# Patient Record
Sex: MALE | Race: OTHER | NOT HISPANIC OR LATINO | URBAN - METROPOLITAN AREA
[De-identification: names, ages, dates, MRNs, and addresses within clinical notes are randomized per-mention and may not be internally consistent; named-entity substitution may affect disease eponyms.]

---

## 2016-12-30 NOTE — ASU PATIENT PROFILE, ADULT - ABILITY TO HEAR (WITH HEARING AID OR HEARING APPLIANCE IF NORMALLY USED):
Mildly to Moderately Impaired: difficulty hearing in some environments or speaker may need to increase volume or speak distinctly/[place]

## 2016-12-30 NOTE — ASU PATIENT PROFILE, ADULT - PMH
Diabetes Mellitus Type II    History of Tongue Cancer  surgery and radiation  Hyperlipemia    Hypertension

## 2016-12-30 NOTE — ASU PATIENT PROFILE, ADULT - MEDICATIONS TO HOLD
Diuretics,diabetic pills and vitamins if applicable Diuretics, diabetic pills and vitamins if applicable

## 2016-12-30 NOTE — ASU PATIENT PROFILE, ADULT - PSH
Status Post Colostomy  reanastamosis S/P cataract extraction  Left eye.  Status Post Colostomy  reanastamosis

## 2017-01-03 ENCOUNTER — OUTPATIENT (OUTPATIENT)
Dept: OUTPATIENT SERVICES | Facility: HOSPITAL | Age: 67
LOS: 1 days | End: 2017-01-03
Payer: COMMERCIAL

## 2017-01-03 ENCOUNTER — RX RENEWAL (OUTPATIENT)
Age: 67
End: 2017-01-03

## 2017-01-03 VITALS
DIASTOLIC BLOOD PRESSURE: 63 MMHG | OXYGEN SATURATION: 95 % | HEART RATE: 98 BPM | RESPIRATION RATE: 17 BRPM | SYSTOLIC BLOOD PRESSURE: 100 MMHG

## 2017-01-03 VITALS
RESPIRATION RATE: 14 BRPM | HEART RATE: 66 BPM | TEMPERATURE: 98 F | SYSTOLIC BLOOD PRESSURE: 130 MMHG | WEIGHT: 111.55 LBS | DIASTOLIC BLOOD PRESSURE: 70 MMHG | HEIGHT: 64 IN | OXYGEN SATURATION: 97 %

## 2017-01-03 DIAGNOSIS — Z98.49 CATARACT EXTRACTION STATUS, UNSPECIFIED EYE: Chronic | ICD-10-CM

## 2017-01-03 DIAGNOSIS — H35.371 PUCKERING OF MACULA, RIGHT EYE: ICD-10-CM

## 2017-01-03 PROCEDURE — 67042 VIT FOR MACULAR HOLE: CPT | Mod: RT

## 2017-01-03 PROCEDURE — C1889: CPT

## 2017-01-03 NOTE — ASU DISCHARGE PLAN (ADULT/PEDIATRIC). - NOTIFY
Persistent Nausea and Vomiting/Bleeding that does not stop/Swelling that continues/Pain not relieved by Medications Swelling that continues/Fever greater than 101/Persistent Nausea and Vomiting/Bleeding that does not stop/Excessive Diarrhea/Inability to Tolerate Liquids or Foods/Unable to Urinate/Pain not relieved by Medications

## 2017-01-03 NOTE — ASU DISCHARGE PLAN (ADULT/PEDIATRIC). - PT EDUC
other (specify)/Eye shield with instructions , sunglasses and eye kit given to patient. Eye shield with instructions , sunglasses and eye kit given to patient janae gas bubble card./other (specify)

## 2017-01-09 ENCOUNTER — OTHER (OUTPATIENT)
Age: 67
End: 2017-01-09

## 2017-01-18 ENCOUNTER — MEDICATION RENEWAL (OUTPATIENT)
Age: 67
End: 2017-01-18

## 2017-02-01 ENCOUNTER — OUTPATIENT (OUTPATIENT)
Dept: OUTPATIENT SERVICES | Facility: HOSPITAL | Age: 67
LOS: 1 days | End: 2017-02-01

## 2017-02-01 ENCOUNTER — APPOINTMENT (OUTPATIENT)
Dept: OPHTHALMOLOGY | Facility: CLINIC | Age: 67
End: 2017-02-01

## 2017-02-01 DIAGNOSIS — Z98.49 CATARACT EXTRACTION STATUS, UNSPECIFIED EYE: Chronic | ICD-10-CM

## 2017-02-08 ENCOUNTER — APPOINTMENT (OUTPATIENT)
Dept: OPHTHALMOLOGY | Facility: CLINIC | Age: 67
End: 2017-02-08

## 2017-02-15 DIAGNOSIS — H33.321 ROUND HOLE, RIGHT EYE: ICD-10-CM

## 2017-02-16 ENCOUNTER — APPOINTMENT (OUTPATIENT)
Dept: INTERNAL MEDICINE | Facility: CLINIC | Age: 67
End: 2017-02-16

## 2017-02-16 VITALS
SYSTOLIC BLOOD PRESSURE: 136 MMHG | WEIGHT: 118 LBS | OXYGEN SATURATION: 98 % | HEART RATE: 82 BPM | TEMPERATURE: 98.2 F | DIASTOLIC BLOOD PRESSURE: 78 MMHG | HEIGHT: 63.78 IN | BODY MASS INDEX: 20.39 KG/M2

## 2017-02-16 DIAGNOSIS — Z23 ENCOUNTER FOR IMMUNIZATION: ICD-10-CM

## 2017-02-16 LAB — HBA1C MFR BLD HPLC: 6.5

## 2017-02-16 RX ORDER — PREDNISOLONE ACETATE 10 MG/ML
1 SUSPENSION/ DROPS OPHTHALMIC
Qty: 10 | Refills: 0 | Status: DISCONTINUED | COMMUNITY
Start: 2017-01-04

## 2017-02-16 RX ORDER — FAMOTIDINE 20 MG/1
20 TABLET, FILM COATED ORAL
Qty: 60 | Refills: 0 | Status: DISCONTINUED | COMMUNITY
Start: 2016-11-18

## 2017-02-16 RX ORDER — CIPROFLOXACIN 3 MG/ML
0.3 SOLUTION OPHTHALMIC
Qty: 5 | Refills: 0 | Status: DISCONTINUED | COMMUNITY
Start: 2017-01-04

## 2017-02-16 RX ORDER — AMMONIUM LACTATE 12 %
12 CREAM (GRAM) TOPICAL
Qty: 280 | Refills: 0 | Status: DISCONTINUED | COMMUNITY
Start: 2017-01-10

## 2017-02-16 RX ORDER — ERGOCALCIFEROL 1.25 MG/1
1.25 MG CAPSULE, LIQUID FILLED ORAL
Qty: 10 | Refills: 0 | Status: DISCONTINUED | COMMUNITY
Start: 2016-11-23

## 2017-02-16 RX ORDER — OXYCODONE AND ACETAMINOPHEN 10; 325 MG/1; MG/1
10-325 TABLET ORAL
Qty: 40 | Refills: 0 | Status: DISCONTINUED | COMMUNITY
Start: 2016-08-28

## 2017-02-21 LAB
25(OH)D3 SERPL-MCNC: 37.2 NG/ML
ALBUMIN SERPL ELPH-MCNC: 3.9 G/DL
ALP BLD-CCNC: 54 U/L
ALT SERPL-CCNC: 12 U/L
ANION GAP SERPL CALC-SCNC: 16 MMOL/L
APPEARANCE: CLEAR
AST SERPL-CCNC: 21 U/L
BASOPHILS # BLD AUTO: 0.03 K/UL
BASOPHILS NFR BLD AUTO: 0.3 %
BILIRUB SERPL-MCNC: 0.8 MG/DL
BILIRUBIN URINE: NEGATIVE
BLOOD URINE: NEGATIVE
BUN SERPL-MCNC: 11 MG/DL
CALCIUM SERPL-MCNC: 9.1 MG/DL
CHLORIDE SERPL-SCNC: 94 MMOL/L
CHOLEST SERPL-MCNC: 142 MG/DL
CHOLEST/HDLC SERPL: 1.8 RATIO
CO2 SERPL-SCNC: 24 MMOL/L
COLOR: YELLOW
CREAT SERPL-MCNC: 0.92 MG/DL
CREAT SPEC-SCNC: 75 MG/DL
EOSINOPHIL # BLD AUTO: 0.48 K/UL
EOSINOPHIL NFR BLD AUTO: 5.5 %
GLUCOSE QUALITATIVE U: NORMAL MG/DL
GLUCOSE SERPL-MCNC: 115 MG/DL
H PYLORI AG STL QL: NOT DETECTED
HBA1C MFR BLD HPLC: 6.5 %
HCT VFR BLD CALC: 39.9 %
HDLC SERPL-MCNC: 81 MG/DL
HGB BLD-MCNC: 13.5 G/DL
IMM GRANULOCYTES NFR BLD AUTO: 0.2 %
KETONES URINE: ABNORMAL
LDLC SERPL CALC-MCNC: 47 MG/DL
LEUKOCYTE ESTERASE URINE: NEGATIVE
LYMPHOCYTES # BLD AUTO: 1.54 K/UL
LYMPHOCYTES NFR BLD AUTO: 17.7 %
MAN DIFF?: NORMAL
MCHC RBC-ENTMCNC: 29.4 PG
MCHC RBC-ENTMCNC: 33.8 GM/DL
MCV RBC AUTO: 86.9 FL
MICROALBUMIN 24H UR DL<=1MG/L-MCNC: 0.5 MG/DL
MICROALBUMIN/CREAT 24H UR-RTO: 7 UG/MG
MONOCYTES # BLD AUTO: 1.1 K/UL
MONOCYTES NFR BLD AUTO: 12.6 %
NEUTROPHILS # BLD AUTO: 5.53 K/UL
NEUTROPHILS NFR BLD AUTO: 63.7 %
NITRITE URINE: NEGATIVE
PH URINE: 6
PLATELET # BLD AUTO: 220 K/UL
POTASSIUM SERPL-SCNC: 4.5 MMOL/L
PROT SERPL-MCNC: 6 G/DL
PROTEIN URINE: NEGATIVE MG/DL
RBC # BLD: 4.59 M/UL
RBC # FLD: 14.3 %
SODIUM SERPL-SCNC: 134 MMOL/L
SPECIFIC GRAVITY URINE: 1.01
TRIGL SERPL-MCNC: 71 MG/DL
TSH SERPL-ACNC: 1.53 UIU/ML
UROBILINOGEN URINE: NORMAL MG/DL
VIT B12 SERPL-MCNC: 460 PG/ML
WBC # FLD AUTO: 8.7 K/UL

## 2017-03-17 ENCOUNTER — APPOINTMENT (OUTPATIENT)
Dept: INTERNAL MEDICINE | Facility: CLINIC | Age: 67
End: 2017-03-17

## 2017-03-17 VITALS
WEIGHT: 114 LBS | HEART RATE: 110 BPM | TEMPERATURE: 97.8 F | OXYGEN SATURATION: 98 % | DIASTOLIC BLOOD PRESSURE: 60 MMHG | SYSTOLIC BLOOD PRESSURE: 104 MMHG | BODY MASS INDEX: 19.7 KG/M2 | HEIGHT: 63.78 IN

## 2017-04-01 ENCOUNTER — RX RENEWAL (OUTPATIENT)
Age: 67
End: 2017-04-01

## 2017-04-05 ENCOUNTER — APPOINTMENT (OUTPATIENT)
Dept: OPHTHALMOLOGY | Facility: CLINIC | Age: 67
End: 2017-04-05

## 2017-04-05 ENCOUNTER — OUTPATIENT (OUTPATIENT)
Dept: OUTPATIENT SERVICES | Facility: HOSPITAL | Age: 67
LOS: 1 days | End: 2017-04-05

## 2017-04-05 DIAGNOSIS — Z98.49 CATARACT EXTRACTION STATUS, UNSPECIFIED EYE: Chronic | ICD-10-CM

## 2017-04-19 ENCOUNTER — RX RENEWAL (OUTPATIENT)
Age: 67
End: 2017-04-19

## 2017-04-19 ENCOUNTER — MEDICATION RENEWAL (OUTPATIENT)
Age: 67
End: 2017-04-19

## 2017-05-04 ENCOUNTER — RX RENEWAL (OUTPATIENT)
Age: 67
End: 2017-05-04

## 2017-05-31 ENCOUNTER — RX RENEWAL (OUTPATIENT)
Age: 67
End: 2017-05-31

## 2017-06-08 DIAGNOSIS — H35.349 MACULAR CYST, HOLE, OR PSEUDOHOLE, UNSPECIFIED EYE: ICD-10-CM

## 2017-06-08 DIAGNOSIS — E13.3413: ICD-10-CM

## 2017-06-19 ENCOUNTER — APPOINTMENT (OUTPATIENT)
Dept: INTERNAL MEDICINE | Facility: CLINIC | Age: 67
End: 2017-06-19

## 2017-06-19 VITALS
HEIGHT: 63 IN | WEIGHT: 116 LBS | HEART RATE: 78 BPM | DIASTOLIC BLOOD PRESSURE: 60 MMHG | SYSTOLIC BLOOD PRESSURE: 110 MMHG | TEMPERATURE: 97.4 F | OXYGEN SATURATION: 98 % | BODY MASS INDEX: 20.55 KG/M2

## 2017-06-19 LAB — HBA1C MFR BLD HPLC: 7

## 2017-06-20 ENCOUNTER — APPOINTMENT (OUTPATIENT)
Dept: PHARMACY | Facility: CLINIC | Age: 67
End: 2017-06-20

## 2017-06-21 LAB
CREAT SPEC-SCNC: 30 MG/DL
MICROALBUMIN 24H UR DL<=1MG/L-MCNC: <0.3 MG/DL
MICROALBUMIN/CREAT 24H UR-RTO: NORMAL

## 2017-06-23 ENCOUNTER — APPOINTMENT (OUTPATIENT)
Dept: SPEECH THERAPY | Facility: CLINIC | Age: 67
End: 2017-06-23

## 2017-06-23 ENCOUNTER — OUTPATIENT (OUTPATIENT)
Dept: OUTPATIENT SERVICES | Facility: HOSPITAL | Age: 67
LOS: 1 days | End: 2017-06-23

## 2017-06-23 ENCOUNTER — OUTPATIENT (OUTPATIENT)
Dept: OUTPATIENT SERVICES | Facility: HOSPITAL | Age: 67
LOS: 1 days | Discharge: HOME | End: 2017-06-23

## 2017-06-23 ENCOUNTER — APPOINTMENT (OUTPATIENT)
Dept: PHARMACY | Facility: CLINIC | Age: 67
End: 2017-06-23

## 2017-06-23 DIAGNOSIS — Z98.49 CATARACT EXTRACTION STATUS, UNSPECIFIED EYE: Chronic | ICD-10-CM

## 2017-06-28 DIAGNOSIS — H90.3 SENSORINEURAL HEARING LOSS, BILATERAL: ICD-10-CM

## 2017-07-10 ENCOUNTER — NON-APPOINTMENT (OUTPATIENT)
Age: 67
End: 2017-07-10

## 2017-07-10 ENCOUNTER — APPOINTMENT (OUTPATIENT)
Dept: INTERNAL MEDICINE | Facility: CLINIC | Age: 67
End: 2017-07-10

## 2017-07-10 VITALS
TEMPERATURE: 97.9 F | BODY MASS INDEX: 20.55 KG/M2 | WEIGHT: 116 LBS | DIASTOLIC BLOOD PRESSURE: 72 MMHG | HEIGHT: 63 IN | OXYGEN SATURATION: 98 % | HEART RATE: 77 BPM | SYSTOLIC BLOOD PRESSURE: 110 MMHG

## 2017-07-10 DIAGNOSIS — R41.3 OTHER AMNESIA: ICD-10-CM

## 2017-07-20 ENCOUNTER — OUTPATIENT (OUTPATIENT)
Dept: OUTPATIENT SERVICES | Facility: HOSPITAL | Age: 67
LOS: 1 days | Discharge: HOME | End: 2017-07-20
Payer: MEDICARE

## 2017-07-20 DIAGNOSIS — Z98.49 CATARACT EXTRACTION STATUS, UNSPECIFIED EYE: Chronic | ICD-10-CM

## 2017-07-20 PROCEDURE — 90792 PSYCH DIAG EVAL W/MED SRVCS: CPT

## 2017-07-21 DIAGNOSIS — F10.99 ALCOHOL USE, UNSPECIFIED WITH UNSPECIFIED ALCOHOL-INDUCED DISORDER: ICD-10-CM

## 2017-07-24 ENCOUNTER — APPOINTMENT (OUTPATIENT)
Dept: INTERNAL MEDICINE | Facility: CLINIC | Age: 67
End: 2017-07-24

## 2017-07-24 VITALS
HEIGHT: 63 IN | WEIGHT: 116 LBS | OXYGEN SATURATION: 96 % | SYSTOLIC BLOOD PRESSURE: 122 MMHG | HEART RATE: 84 BPM | TEMPERATURE: 98.1 F | BODY MASS INDEX: 20.55 KG/M2 | DIASTOLIC BLOOD PRESSURE: 72 MMHG

## 2017-07-24 RX ORDER — AZITHROMYCIN 250 MG/1
250 TABLET, FILM COATED ORAL
Qty: 6 | Refills: 0 | Status: DISCONTINUED | COMMUNITY
Start: 2017-07-15

## 2017-08-07 ENCOUNTER — APPOINTMENT (OUTPATIENT)
Dept: OPHTHALMOLOGY | Facility: CLINIC | Age: 67
End: 2017-08-07
Payer: MEDICARE

## 2017-08-07 ENCOUNTER — APPOINTMENT (OUTPATIENT)
Dept: MRI IMAGING | Facility: CLINIC | Age: 67
End: 2017-08-07
Payer: MEDICARE

## 2017-08-07 ENCOUNTER — OUTPATIENT (OUTPATIENT)
Dept: OUTPATIENT SERVICES | Facility: HOSPITAL | Age: 67
LOS: 1 days | End: 2017-08-07
Payer: COMMERCIAL

## 2017-08-07 DIAGNOSIS — H25.12 AGE-RELATED NUCLEAR CATARACT, LEFT EYE: ICD-10-CM

## 2017-08-07 DIAGNOSIS — Z00.8 ENCOUNTER FOR OTHER GENERAL EXAMINATION: ICD-10-CM

## 2017-08-07 DIAGNOSIS — G44.52 NEW DAILY PERSISTENT HEADACHE (NDPH): ICD-10-CM

## 2017-08-07 DIAGNOSIS — Z98.49 CATARACT EXTRACTION STATUS, UNSPECIFIED EYE: Chronic | ICD-10-CM

## 2017-08-07 PROCEDURE — 92015 DETERMINE REFRACTIVE STATE: CPT

## 2017-08-07 PROCEDURE — 92014 COMPRE OPH EXAM EST PT 1/>: CPT

## 2017-08-07 PROCEDURE — 70551 MRI BRAIN STEM W/O DYE: CPT

## 2017-08-07 PROCEDURE — 92134 CPTRZ OPH DX IMG PST SGM RTA: CPT

## 2017-08-07 PROCEDURE — 70551 MRI BRAIN STEM W/O DYE: CPT | Mod: 26

## 2017-08-07 RX ORDER — DOXYCYCLINE HYCLATE 100 MG/1
100 CAPSULE ORAL
Qty: 20 | Refills: 0 | Status: DISCONTINUED | COMMUNITY
Start: 2017-07-18 | End: 2017-08-07

## 2017-08-07 RX ORDER — CEFUROXIME AXETIL 250 MG/1
250 TABLET ORAL
Qty: 20 | Refills: 0 | Status: DISCONTINUED | COMMUNITY
Start: 2017-07-18 | End: 2017-08-07

## 2017-08-07 RX ORDER — LEVOCETIRIZINE DIHYDROCHLORIDE 5 MG/1
5 TABLET ORAL DAILY
Qty: 15 | Refills: 0 | Status: DISCONTINUED | COMMUNITY
Start: 2017-07-24 | End: 2017-08-07

## 2017-08-07 RX ORDER — BENZONATATE 200 MG/1
200 CAPSULE ORAL
Qty: 1 | Refills: 0 | Status: DISCONTINUED | COMMUNITY
Start: 2017-07-24 | End: 2017-08-07

## 2017-08-22 ENCOUNTER — RX RENEWAL (OUTPATIENT)
Age: 67
End: 2017-08-22

## 2017-08-23 ENCOUNTER — RX RENEWAL (OUTPATIENT)
Age: 67
End: 2017-08-23

## 2017-08-29 ENCOUNTER — APPOINTMENT (OUTPATIENT)
Dept: ENDOCRINOLOGY | Facility: CLINIC | Age: 67
End: 2017-08-29
Payer: MEDICARE

## 2017-08-29 VITALS
WEIGHT: 114 LBS | HEART RATE: 77 BPM | SYSTOLIC BLOOD PRESSURE: 120 MMHG | HEIGHT: 63 IN | OXYGEN SATURATION: 95 % | BODY MASS INDEX: 20.2 KG/M2 | DIASTOLIC BLOOD PRESSURE: 70 MMHG

## 2017-08-29 DIAGNOSIS — Z82.49 FAMILY HISTORY OF ISCHEMIC HEART DISEASE AND OTHER DISEASES OF THE CIRCULATORY SYSTEM: ICD-10-CM

## 2017-08-29 PROCEDURE — 83036 HEMOGLOBIN GLYCOSYLATED A1C: CPT | Mod: QW

## 2017-08-29 PROCEDURE — 99204 OFFICE O/P NEW MOD 45 MIN: CPT | Mod: 25

## 2017-08-29 PROCEDURE — 82962 GLUCOSE BLOOD TEST: CPT

## 2017-08-29 RX ORDER — DEXTROMETHORPHAN HBR, GUAIFENESIN 20; 400 MG/20ML; MG/20ML
5-100 SOLUTION ORAL EVERY 4 HOURS
Qty: 1 | Refills: 0 | Status: DISCONTINUED | COMMUNITY
Start: 2017-07-24 | End: 2017-08-29

## 2017-08-29 RX ORDER — ALBUTEROL SULFATE 90 UG/1
108 (90 BASE) AEROSOL, METERED RESPIRATORY (INHALATION)
Qty: 18 | Refills: 0 | Status: DISCONTINUED | COMMUNITY
Start: 2017-07-18 | End: 2017-08-29

## 2017-08-29 RX ORDER — IPRATROPIUM BROMIDE AND ALBUTEROL SULFATE 2.5; .5 MG/3ML; MG/3ML
0.5-2.5 (3) SOLUTION RESPIRATORY (INHALATION)
Qty: 180 | Refills: 0 | Status: DISCONTINUED | COMMUNITY
Start: 2017-07-18 | End: 2017-08-29

## 2017-08-29 RX ORDER — IPRATROPIUM BROMIDE 17 UG/1
17 AEROSOL, METERED RESPIRATORY (INHALATION)
Qty: 12.9 | Refills: 0 | Status: DISCONTINUED | COMMUNITY
Start: 2017-08-23 | End: 2017-08-29

## 2017-08-31 LAB
GLUCOSE BLDC GLUCOMTR-MCNC: 96
HBA1C MFR BLD HPLC: 6.2

## 2017-09-20 ENCOUNTER — APPOINTMENT (OUTPATIENT)
Dept: INTERNAL MEDICINE | Facility: CLINIC | Age: 67
End: 2017-09-20
Payer: MEDICARE

## 2017-09-20 VITALS
OXYGEN SATURATION: 96 % | HEIGHT: 63 IN | DIASTOLIC BLOOD PRESSURE: 70 MMHG | BODY MASS INDEX: 20.38 KG/M2 | WEIGHT: 115 LBS | HEART RATE: 71 BPM | TEMPERATURE: 98.4 F | SYSTOLIC BLOOD PRESSURE: 115 MMHG

## 2017-09-20 DIAGNOSIS — Z23 ENCOUNTER FOR IMMUNIZATION: ICD-10-CM

## 2017-09-20 PROCEDURE — 36415 COLL VENOUS BLD VENIPUNCTURE: CPT

## 2017-09-20 PROCEDURE — G0008: CPT

## 2017-09-20 PROCEDURE — 99214 OFFICE O/P EST MOD 30 MIN: CPT | Mod: 25

## 2017-09-20 PROCEDURE — 90662 IIV NO PRSV INCREASED AG IM: CPT

## 2017-09-20 RX ORDER — LIDOCAINE HYDROCHLORIDE 20 MG/ML
2 SOLUTION OROPHARYNGEAL
Qty: 480 | Refills: 0 | Status: DISCONTINUED | COMMUNITY
Start: 2017-07-25

## 2017-09-20 RX ORDER — DIAZEPAM 5 MG/1
5 TABLET ORAL
Qty: 2 | Refills: 0 | Status: DISCONTINUED | COMMUNITY
Start: 2017-08-03

## 2017-09-20 RX ORDER — NAPROXEN 500 MG/1
500 TABLET ORAL
Qty: 60 | Refills: 0 | Status: DISCONTINUED | COMMUNITY
Start: 2017-05-10

## 2017-09-20 RX ORDER — LIDOCAINE 5 G/100G
5 OINTMENT TOPICAL
Qty: 360 | Refills: 0 | Status: DISCONTINUED | COMMUNITY
Start: 2017-05-15

## 2017-09-21 LAB
ALBUMIN SERPL ELPH-MCNC: 4.1 G/DL
ALP BLD-CCNC: 49 U/L
ALT SERPL-CCNC: 16 U/L
ANION GAP SERPL CALC-SCNC: 13 MMOL/L
AST SERPL-CCNC: 26 U/L
BILIRUB SERPL-MCNC: 0.3 MG/DL
BUN SERPL-MCNC: 9 MG/DL
CALCIUM SERPL-MCNC: 9.7 MG/DL
CHLORIDE SERPL-SCNC: 104 MMOL/L
CHOLEST SERPL-MCNC: 128 MG/DL
CHOLEST/HDLC SERPL: 1.8 RATIO
CO2 SERPL-SCNC: 27 MMOL/L
CREAT SERPL-MCNC: 0.92 MG/DL
CREAT SPEC-SCNC: 24 MG/DL
GLUCOSE SERPL-MCNC: 107 MG/DL
HDLC SERPL-MCNC: 72 MG/DL
LDLC SERPL CALC-MCNC: 47 MG/DL
MICROALBUMIN 24H UR DL<=1MG/L-MCNC: <0.3 MG/DL
MICROALBUMIN/CREAT 24H UR-RTO: NORMAL
POTASSIUM SERPL-SCNC: 4.9 MMOL/L
PROT SERPL-MCNC: 6.2 G/DL
SODIUM SERPL-SCNC: 144 MMOL/L
TRIGL SERPL-MCNC: 46 MG/DL

## 2017-10-12 ENCOUNTER — RX RENEWAL (OUTPATIENT)
Age: 67
End: 2017-10-12

## 2017-12-11 ENCOUNTER — APPOINTMENT (OUTPATIENT)
Dept: OTOLARYNGOLOGY | Facility: CLINIC | Age: 67
End: 2017-12-11
Payer: MEDICARE

## 2017-12-11 VITALS
HEART RATE: 90 BPM | BODY MASS INDEX: 20.38 KG/M2 | WEIGHT: 115 LBS | SYSTOLIC BLOOD PRESSURE: 116 MMHG | DIASTOLIC BLOOD PRESSURE: 73 MMHG | HEIGHT: 63 IN

## 2017-12-11 PROCEDURE — 99214 OFFICE O/P EST MOD 30 MIN: CPT | Mod: 25

## 2017-12-11 PROCEDURE — 31575 DIAGNOSTIC LARYNGOSCOPY: CPT

## 2017-12-14 ENCOUNTER — MEDICATION RENEWAL (OUTPATIENT)
Age: 67
End: 2017-12-14

## 2017-12-18 ENCOUNTER — APPOINTMENT (OUTPATIENT)
Dept: INTERNAL MEDICINE | Facility: CLINIC | Age: 67
End: 2017-12-18
Payer: MEDICARE

## 2017-12-18 VITALS
TEMPERATURE: 98.2 F | DIASTOLIC BLOOD PRESSURE: 70 MMHG | WEIGHT: 116 LBS | SYSTOLIC BLOOD PRESSURE: 100 MMHG | BODY MASS INDEX: 20.55 KG/M2 | OXYGEN SATURATION: 98 % | HEIGHT: 63 IN | HEART RATE: 100 BPM

## 2017-12-18 DIAGNOSIS — Z29.8 ENCOUNTER FOR OTHER SPECIFIED PROPHYLACTIC MEASURES: ICD-10-CM

## 2017-12-18 LAB — HBA1C MFR BLD HPLC: 6.4

## 2017-12-18 PROCEDURE — 83036 HEMOGLOBIN GLYCOSYLATED A1C: CPT | Mod: QW

## 2017-12-18 PROCEDURE — 99214 OFFICE O/P EST MOD 30 MIN: CPT | Mod: 25

## 2017-12-20 ENCOUNTER — APPOINTMENT (OUTPATIENT)
Dept: GASTROENTEROLOGY | Facility: CLINIC | Age: 67
End: 2017-12-20
Payer: MEDICARE

## 2017-12-20 VITALS
HEIGHT: 63 IN | WEIGHT: 113 LBS | DIASTOLIC BLOOD PRESSURE: 67 MMHG | HEART RATE: 89 BPM | BODY MASS INDEX: 20.02 KG/M2 | SYSTOLIC BLOOD PRESSURE: 98 MMHG

## 2017-12-20 PROCEDURE — 99215 OFFICE O/P EST HI 40 MIN: CPT

## 2017-12-20 PROCEDURE — 82274 ASSAY TEST FOR BLOOD FECAL: CPT | Mod: QW

## 2017-12-21 LAB — H PYLORI AG STL QL: NEGATIVE

## 2018-01-04 ENCOUNTER — RESULT REVIEW (OUTPATIENT)
Age: 68
End: 2018-01-04

## 2018-01-08 ENCOUNTER — APPOINTMENT (OUTPATIENT)
Dept: GASTROENTEROLOGY | Facility: CLINIC | Age: 68
End: 2018-01-08
Payer: MEDICARE

## 2018-01-08 ENCOUNTER — LABORATORY RESULT (OUTPATIENT)
Age: 68
End: 2018-01-08

## 2018-01-08 PROCEDURE — 45378 DIAGNOSTIC COLONOSCOPY: CPT

## 2018-01-08 PROCEDURE — 43239 EGD BIOPSY SINGLE/MULTIPLE: CPT | Mod: 59

## 2018-02-05 ENCOUNTER — APPOINTMENT (OUTPATIENT)
Dept: OPHTHALMOLOGY | Facility: CLINIC | Age: 68
End: 2018-02-05

## 2018-02-21 ENCOUNTER — APPOINTMENT (OUTPATIENT)
Dept: INTERNAL MEDICINE | Facility: CLINIC | Age: 68
End: 2018-02-21
Payer: MEDICARE

## 2018-02-21 VITALS
SYSTOLIC BLOOD PRESSURE: 142 MMHG | OXYGEN SATURATION: 99 % | HEART RATE: 110 BPM | TEMPERATURE: 98.6 F | BODY MASS INDEX: 20.02 KG/M2 | DIASTOLIC BLOOD PRESSURE: 70 MMHG | WEIGHT: 113 LBS | HEIGHT: 63 IN

## 2018-02-21 PROCEDURE — 36415 COLL VENOUS BLD VENIPUNCTURE: CPT

## 2018-02-21 PROCEDURE — G0439: CPT | Mod: 25

## 2018-02-21 RX ORDER — OMEPRAZOLE 20 MG/1
20 CAPSULE, DELAYED RELEASE ORAL
Qty: 60 | Refills: 3 | Status: COMPLETED | COMMUNITY
Start: 2017-12-21 | End: 2018-02-21

## 2018-02-21 RX ORDER — OMEPRAZOLE 20 MG/1
20 CAPSULE, DELAYED RELEASE ORAL TWICE DAILY
Qty: 180 | Refills: 3 | Status: COMPLETED | COMMUNITY
Start: 2016-05-09 | End: 2018-02-21

## 2018-02-22 LAB
25(OH)D3 SERPL-MCNC: 40.8 NG/ML
ALBUMIN SERPL ELPH-MCNC: 4.2 G/DL
ALP BLD-CCNC: 73 U/L
ALT SERPL-CCNC: 16 U/L
ANION GAP SERPL CALC-SCNC: 16 MMOL/L
APPEARANCE: CLEAR
AST SERPL-CCNC: 28 U/L
BASOPHILS # BLD AUTO: 0.03 K/UL
BASOPHILS NFR BLD AUTO: 0.4 %
BILIRUB SERPL-MCNC: 0.4 MG/DL
BILIRUBIN URINE: NEGATIVE
BLOOD URINE: NEGATIVE
BUN SERPL-MCNC: 11 MG/DL
CALCIUM SERPL-MCNC: 9.9 MG/DL
CHLORIDE SERPL-SCNC: 88 MMOL/L
CHOLEST SERPL-MCNC: 152 MG/DL
CHOLEST/HDLC SERPL: 1.6 RATIO
CO2 SERPL-SCNC: 28 MMOL/L
COLOR: YELLOW
CREAT SERPL-MCNC: 0.9 MG/DL
CREAT SPEC-SCNC: 45 MG/DL
EOSINOPHIL # BLD AUTO: 0.37 K/UL
EOSINOPHIL NFR BLD AUTO: 4.7 %
GLUCOSE QUALITATIVE U: NEGATIVE MG/DL
GLUCOSE SERPL-MCNC: 136 MG/DL
HBA1C MFR BLD HPLC: 6.6 %
HCT VFR BLD CALC: 40.5 %
HCV AB SER QL: NONREACTIVE
HCV S/CO RATIO: 0.19 S/CO
HDLC SERPL-MCNC: 96 MG/DL
HGB BLD-MCNC: 13.9 G/DL
IMM GRANULOCYTES NFR BLD AUTO: 0.4 %
KETONES URINE: ABNORMAL
LDLC SERPL CALC-MCNC: 44 MG/DL
LEUKOCYTE ESTERASE URINE: NEGATIVE
LYMPHOCYTES # BLD AUTO: 1.01 K/UL
LYMPHOCYTES NFR BLD AUTO: 12.9 %
MAN DIFF?: NORMAL
MCHC RBC-ENTMCNC: 28.4 PG
MCHC RBC-ENTMCNC: 34.3 GM/DL
MCV RBC AUTO: 82.8 FL
MICROALBUMIN 24H UR DL<=1MG/L-MCNC: 1.1 MG/DL
MICROALBUMIN/CREAT 24H UR-RTO: 24 MG/G
MONOCYTES # BLD AUTO: 0.49 K/UL
MONOCYTES NFR BLD AUTO: 6.3 %
NEUTROPHILS # BLD AUTO: 5.91 K/UL
NEUTROPHILS NFR BLD AUTO: 75.3 %
NITRITE URINE: NEGATIVE
PH URINE: 6
PLATELET # BLD AUTO: 320 K/UL
POTASSIUM SERPL-SCNC: 4.5 MMOL/L
PROT SERPL-MCNC: 6.7 G/DL
PROTEIN URINE: NEGATIVE MG/DL
RBC # BLD: 4.89 M/UL
RBC # FLD: 13.8 %
SODIUM SERPL-SCNC: 132 MMOL/L
SPECIFIC GRAVITY URINE: 1.01
TRIGL SERPL-MCNC: 62 MG/DL
TSH SERPL-ACNC: 2.51 UIU/ML
UROBILINOGEN URINE: NEGATIVE MG/DL
VIT B12 SERPL-MCNC: 797 PG/ML
WBC # FLD AUTO: 7.84 K/UL

## 2018-02-23 ENCOUNTER — MEDICATION RENEWAL (OUTPATIENT)
Age: 68
End: 2018-02-23

## 2018-03-12 ENCOUNTER — MEDICATION RENEWAL (OUTPATIENT)
Age: 68
End: 2018-03-12

## 2018-04-27 ENCOUNTER — APPOINTMENT (OUTPATIENT)
Dept: INTERNAL MEDICINE | Facility: CLINIC | Age: 68
End: 2018-04-27
Payer: MEDICARE

## 2018-04-27 VITALS
BODY MASS INDEX: 19.84 KG/M2 | SYSTOLIC BLOOD PRESSURE: 115 MMHG | WEIGHT: 112 LBS | TEMPERATURE: 98.2 F | HEIGHT: 63 IN | HEART RATE: 94 BPM | OXYGEN SATURATION: 98 % | DIASTOLIC BLOOD PRESSURE: 60 MMHG

## 2018-04-27 PROCEDURE — 99214 OFFICE O/P EST MOD 30 MIN: CPT | Mod: 25

## 2018-04-27 PROCEDURE — 36415 COLL VENOUS BLD VENIPUNCTURE: CPT

## 2018-04-27 RX ORDER — CYANOCOBALAMIN (VITAMIN B-12) 1000 MCG
100 TABLET, EXTENDED RELEASE ORAL DAILY
Qty: 30 | Refills: 3 | Status: ACTIVE | COMMUNITY
Start: 2018-04-27 | End: 1900-01-01

## 2018-04-27 RX ORDER — NAPROXEN 250 MG/1
250 TABLET ORAL
Qty: 60 | Refills: 0 | Status: DISCONTINUED | COMMUNITY
Start: 2018-01-10

## 2018-04-30 ENCOUNTER — APPOINTMENT (OUTPATIENT)
Dept: UROLOGY | Facility: CLINIC | Age: 68
End: 2018-04-30
Payer: MEDICARE

## 2018-04-30 VITALS
BODY MASS INDEX: 18.78 KG/M2 | WEIGHT: 110 LBS | DIASTOLIC BLOOD PRESSURE: 60 MMHG | TEMPERATURE: 98.1 F | SYSTOLIC BLOOD PRESSURE: 98 MMHG | HEART RATE: 86 BPM | RESPIRATION RATE: 16 BRPM | HEIGHT: 64 IN

## 2018-04-30 DIAGNOSIS — R33.9 RETENTION OF URINE, UNSPECIFIED: ICD-10-CM

## 2018-04-30 LAB
APPEARANCE: CLEAR
BILIRUBIN URINE: NEGATIVE
BLOOD URINE: NEGATIVE
COLOR: YELLOW
GLUCOSE QUALITATIVE U: NEGATIVE MG/DL
KETONES URINE: NEGATIVE
LEUKOCYTE ESTERASE URINE: NEGATIVE
NITRITE URINE: NEGATIVE
PH URINE: 6.5
PROTEIN URINE: NEGATIVE MG/DL
PSA FREE FLD-MCNC: 19
PSA FREE SERPL-MCNC: 0.15 NG/ML
PSA SERPL-MCNC: 0.79 NG/ML
SPECIFIC GRAVITY URINE: 1.01
UROBILINOGEN URINE: NEGATIVE MG/DL

## 2018-04-30 PROCEDURE — 99204 OFFICE O/P NEW MOD 45 MIN: CPT

## 2018-05-03 LAB
APPEARANCE: CLEAR
BILIRUBIN URINE: NEGATIVE
BLOOD URINE: NEGATIVE
COLOR: YELLOW
GLUCOSE QUALITATIVE U: NEGATIVE MG/DL
KETONES URINE: NEGATIVE
LEUKOCYTE ESTERASE URINE: NEGATIVE
NITRITE URINE: NEGATIVE
PH URINE: 5.5
PROTEIN URINE: NEGATIVE MG/DL
SPECIFIC GRAVITY URINE: 1.01
UROBILINOGEN URINE: NEGATIVE MG/DL

## 2018-05-04 LAB
BACTERIA UR CULT: NORMAL
CORE LAB FLUID CYTOLOGY: NORMAL

## 2018-05-08 ENCOUNTER — MEDICATION RENEWAL (OUTPATIENT)
Age: 68
End: 2018-05-08

## 2018-05-30 ENCOUNTER — INPATIENT (INPATIENT)
Facility: HOSPITAL | Age: 68
LOS: 3 days | Discharge: HOME | End: 2018-06-03
Attending: INTERNAL MEDICINE | Admitting: INTERNAL MEDICINE
Payer: MEDICARE

## 2018-05-30 VITALS
HEART RATE: 104 BPM | RESPIRATION RATE: 18 BRPM | DIASTOLIC BLOOD PRESSURE: 60 MMHG | SYSTOLIC BLOOD PRESSURE: 103 MMHG | OXYGEN SATURATION: 100 % | TEMPERATURE: 98 F

## 2018-05-30 DIAGNOSIS — K52.9 NONINFECTIVE GASTROENTERITIS AND COLITIS, UNSPECIFIED: ICD-10-CM

## 2018-05-30 DIAGNOSIS — Z98.49 CATARACT EXTRACTION STATUS, UNSPECIFIED EYE: Chronic | ICD-10-CM

## 2018-05-30 LAB
ALBUMIN SERPL ELPH-MCNC: 3.9 G/DL — SIGNIFICANT CHANGE UP (ref 3.3–5)
ALP SERPL-CCNC: 75 U/L — SIGNIFICANT CHANGE UP (ref 40–120)
ALT FLD-CCNC: 18 U/L — SIGNIFICANT CHANGE UP (ref 4–41)
APPEARANCE UR: SIGNIFICANT CHANGE UP
AST SERPL-CCNC: 34 U/L — SIGNIFICANT CHANGE UP (ref 4–40)
BASE EXCESS BLDV CALC-SCNC: -8.2 MMOL/L — SIGNIFICANT CHANGE UP
BASOPHILS # BLD AUTO: 0.02 K/UL — SIGNIFICANT CHANGE UP (ref 0–0.2)
BASOPHILS NFR BLD AUTO: 0.5 % — SIGNIFICANT CHANGE UP (ref 0–2)
BASOPHILS NFR SPEC: 1 % — SIGNIFICANT CHANGE UP (ref 0–2)
BILIRUB SERPL-MCNC: 0.2 MG/DL — SIGNIFICANT CHANGE UP (ref 0.2–1.2)
BILIRUB UR-MCNC: NEGATIVE — SIGNIFICANT CHANGE UP
BLOOD GAS VENOUS - CREATININE: 3.42 MG/DL — HIGH (ref 0.5–1.3)
BLOOD UR QL VISUAL: HIGH
BUN SERPL-MCNC: 45 MG/DL — HIGH (ref 7–23)
CALCIUM SERPL-MCNC: 8.4 MG/DL — SIGNIFICANT CHANGE UP (ref 8.4–10.5)
CHLORIDE BLDV-SCNC: 103 MMOL/L — SIGNIFICANT CHANGE UP (ref 96–108)
CHLORIDE SERPL-SCNC: 84 MMOL/L — LOW (ref 98–107)
CO2 SERPL-SCNC: 16 MMOL/L — LOW (ref 22–31)
COLOR SPEC: YELLOW — SIGNIFICANT CHANGE UP
CREAT SERPL-MCNC: 3.42 MG/DL — HIGH (ref 0.5–1.3)
EOSINOPHIL # BLD AUTO: 0 K/UL — SIGNIFICANT CHANGE UP (ref 0–0.5)
EOSINOPHIL NFR BLD AUTO: 0 % — SIGNIFICANT CHANGE UP (ref 0–6)
EOSINOPHIL NFR FLD: 2 % — SIGNIFICANT CHANGE UP (ref 0–6)
GAS PNL BLDV: 118 MMOL/L — CRITICAL LOW (ref 136–146)
GLUCOSE BLDV-MCNC: 152 — HIGH (ref 70–99)
GLUCOSE SERPL-MCNC: 146 MG/DL — HIGH (ref 70–99)
GLUCOSE UR-MCNC: NEGATIVE — SIGNIFICANT CHANGE UP
HCO3 BLDV-SCNC: 16 MMOL/L — LOW (ref 20–27)
HCT VFR BLD CALC: 39.8 % — SIGNIFICANT CHANGE UP (ref 39–50)
HCT VFR BLDV CALC: 43 % — SIGNIFICANT CHANGE UP (ref 39–51)
HGB BLD-MCNC: 13.3 G/DL — SIGNIFICANT CHANGE UP (ref 13–17)
HGB BLDV-MCNC: 14 G/DL — SIGNIFICANT CHANGE UP (ref 13–17)
HYALINE CASTS # UR AUTO: SIGNIFICANT CHANGE UP (ref 0–?)
IMM GRANULOCYTES # BLD AUTO: 0.03 # — SIGNIFICANT CHANGE UP
IMM GRANULOCYTES NFR BLD AUTO: 0.7 % — SIGNIFICANT CHANGE UP (ref 0–1.5)
KETONES UR-MCNC: NEGATIVE — SIGNIFICANT CHANGE UP
LACTATE BLDV-MCNC: 2.3 MMOL/L — HIGH (ref 0.5–2)
LEUKOCYTE ESTERASE UR-ACNC: NEGATIVE — SIGNIFICANT CHANGE UP
LG PLATELETS BLD QL AUTO: SLIGHT — SIGNIFICANT CHANGE UP
LIDOCAIN IGE QN: 257.1 U/L — HIGH (ref 7–60)
LYMPHOCYTES # BLD AUTO: 0.37 K/UL — LOW (ref 1–3.3)
LYMPHOCYTES # BLD AUTO: 9 % — LOW (ref 13–44)
LYMPHOCYTES NFR SPEC AUTO: 7 % — LOW (ref 13–44)
MCHC RBC-ENTMCNC: 27.7 PG — SIGNIFICANT CHANGE UP (ref 27–34)
MCHC RBC-ENTMCNC: 33.4 % — SIGNIFICANT CHANGE UP (ref 32–36)
MCV RBC AUTO: 82.7 FL — SIGNIFICANT CHANGE UP (ref 80–100)
METAMYELOCYTES # FLD: 1 % — SIGNIFICANT CHANGE UP (ref 0–1)
MONOCYTES # BLD AUTO: 0.27 K/UL — SIGNIFICANT CHANGE UP (ref 0–0.9)
MONOCYTES NFR BLD AUTO: 6.5 % — SIGNIFICANT CHANGE UP (ref 2–14)
MONOCYTES NFR BLD: 7 % — SIGNIFICANT CHANGE UP (ref 2–9)
MORPHOLOGY BLD-IMP: NORMAL — SIGNIFICANT CHANGE UP
MUCOUS THREADS # UR AUTO: SIGNIFICANT CHANGE UP
NEUTROPHIL AB SER-ACNC: 66 % — SIGNIFICANT CHANGE UP (ref 43–77)
NEUTROPHILS # BLD AUTO: 3.44 K/UL — SIGNIFICANT CHANGE UP (ref 1.8–7.4)
NEUTROPHILS NFR BLD AUTO: 83.3 % — HIGH (ref 43–77)
NEUTS BAND # BLD: 16 % — HIGH (ref 0–6)
NITRITE UR-MCNC: NEGATIVE — SIGNIFICANT CHANGE UP
NON-SQ EPI CELLS # UR AUTO: <1 — SIGNIFICANT CHANGE UP
NRBC # BLD: 0 /100WBC — SIGNIFICANT CHANGE UP
NRBC # FLD: 0 — SIGNIFICANT CHANGE UP
PCO2 BLDV: 40 MMHG — LOW (ref 41–51)
PH BLDV: 7.27 PH — LOW (ref 7.32–7.43)
PH UR: 6 — SIGNIFICANT CHANGE UP (ref 4.6–8)
PLATELET # BLD AUTO: 193 K/UL — SIGNIFICANT CHANGE UP (ref 150–400)
PLATELET CLUMP BLD QL SMEAR: SLIGHT — SIGNIFICANT CHANGE UP
PLATELET COUNT - ESTIMATE: NORMAL — SIGNIFICANT CHANGE UP
PMV BLD: 9.8 FL — SIGNIFICANT CHANGE UP (ref 7–13)
PO2 BLDV: < 24 MMHG — LOW (ref 35–40)
POTASSIUM BLDV-SCNC: 3.4 MMOL/L — SIGNIFICANT CHANGE UP (ref 3.4–4.5)
POTASSIUM SERPL-MCNC: 3.7 MMOL/L — SIGNIFICANT CHANGE UP (ref 3.5–5.3)
POTASSIUM SERPL-SCNC: 3.7 MMOL/L — SIGNIFICANT CHANGE UP (ref 3.5–5.3)
PROT SERPL-MCNC: 6.7 G/DL — SIGNIFICANT CHANGE UP (ref 6–8.3)
PROT UR-MCNC: 100 MG/DL — SIGNIFICANT CHANGE UP
RBC # BLD: 4.81 M/UL — SIGNIFICANT CHANGE UP (ref 4.2–5.8)
RBC # FLD: 13.4 % — SIGNIFICANT CHANGE UP (ref 10.3–14.5)
RBC CASTS # UR COMP ASSIST: SIGNIFICANT CHANGE UP (ref 0–?)
REVIEW TO FOLLOW: YES — SIGNIFICANT CHANGE UP
SAO2 % BLDV: 13 % — LOW (ref 60–85)
SODIUM SERPL-SCNC: 122 MMOL/L — LOW (ref 135–145)
SP GR SPEC: 1.01 — SIGNIFICANT CHANGE UP (ref 1–1.04)
SQUAMOUS # UR AUTO: SIGNIFICANT CHANGE UP
UROBILINOGEN FLD QL: NORMAL MG/DL — SIGNIFICANT CHANGE UP
WBC # BLD: 4.13 K/UL — SIGNIFICANT CHANGE UP (ref 3.8–10.5)
WBC # FLD AUTO: 4.13 K/UL — SIGNIFICANT CHANGE UP (ref 3.8–10.5)
WBC UR QL: HIGH (ref 0–?)

## 2018-05-30 PROCEDURE — 74177 CT ABD & PELVIS W/CONTRAST: CPT | Mod: 26

## 2018-05-30 RX ORDER — SODIUM CHLORIDE 9 MG/ML
1000 INJECTION INTRAMUSCULAR; INTRAVENOUS; SUBCUTANEOUS
Qty: 0 | Refills: 0 | Status: DISCONTINUED | OUTPATIENT
Start: 2018-05-30 | End: 2018-05-31

## 2018-05-30 RX ORDER — SODIUM CHLORIDE 9 MG/ML
1000 INJECTION INTRAMUSCULAR; INTRAVENOUS; SUBCUTANEOUS ONCE
Qty: 0 | Refills: 0 | Status: COMPLETED | OUTPATIENT
Start: 2018-05-30 | End: 2018-05-30

## 2018-05-30 RX ORDER — METRONIDAZOLE 500 MG
500 TABLET ORAL ONCE
Qty: 0 | Refills: 0 | Status: COMPLETED | OUTPATIENT
Start: 2018-05-30 | End: 2018-05-30

## 2018-05-30 RX ORDER — ACETAMINOPHEN 500 MG
650 TABLET ORAL EVERY 6 HOURS
Qty: 0 | Refills: 0 | Status: DISCONTINUED | OUTPATIENT
Start: 2018-05-30 | End: 2018-06-03

## 2018-05-30 RX ORDER — MORPHINE SULFATE 50 MG/1
4 CAPSULE, EXTENDED RELEASE ORAL ONCE
Qty: 0 | Refills: 0 | Status: DISCONTINUED | OUTPATIENT
Start: 2018-05-30 | End: 2018-05-30

## 2018-05-30 RX ORDER — CIPROFLOXACIN LACTATE 400MG/40ML
400 VIAL (ML) INTRAVENOUS ONCE
Qty: 0 | Refills: 0 | Status: COMPLETED | OUTPATIENT
Start: 2018-05-30 | End: 2018-05-30

## 2018-05-30 RX ADMIN — MORPHINE SULFATE 4 MILLIGRAM(S): 50 CAPSULE, EXTENDED RELEASE ORAL at 18:01

## 2018-05-30 RX ADMIN — SODIUM CHLORIDE 125 MILLILITER(S): 9 INJECTION INTRAMUSCULAR; INTRAVENOUS; SUBCUTANEOUS at 22:29

## 2018-05-30 RX ADMIN — Medication 650 MILLIGRAM(S): at 23:40

## 2018-05-30 RX ADMIN — SODIUM CHLORIDE 1000 MILLILITER(S): 9 INJECTION INTRAMUSCULAR; INTRAVENOUS; SUBCUTANEOUS at 20:47

## 2018-05-30 RX ADMIN — SODIUM CHLORIDE 1000 MILLILITER(S): 9 INJECTION INTRAMUSCULAR; INTRAVENOUS; SUBCUTANEOUS at 17:31

## 2018-05-30 RX ADMIN — Medication 100 MILLIGRAM(S): at 21:12

## 2018-05-30 RX ADMIN — Medication 200 MILLIGRAM(S): at 22:29

## 2018-05-30 RX ADMIN — MORPHINE SULFATE 4 MILLIGRAM(S): 50 CAPSULE, EXTENDED RELEASE ORAL at 17:31

## 2018-05-30 NOTE — ED PROVIDER NOTE - MEDICAL DECISION MAKING DETAILS
68M w/ above history p/w periumb pain, vomiting, diarrhea, fever, RLQ tender, concerning for underlying infxn vs obstruction  -labs, ct, pain meds

## 2018-05-30 NOTE — ED PROVIDER NOTE - OBJECTIVE STATEMENT
68M h/o HTN, HLD, DM presenting with abdominal pain. Started 3 days ago, notes periumbilical pain, constant, aching, a/w fever, vomiting (NBNB 2x/day), diarrhea (watery, multiple episodes), decreased appetite. Denies CP/SOB, dysuria.

## 2018-05-30 NOTE — ED PROVIDER NOTE - ATTENDING CONTRIBUTION TO CARE
Patient is a 67 yo M with hx of DM, HTN, hyperlipidemia, tongue cancer in remission here for abdominal pain x 3 days, + vomiting - nonbloody, nonbilious, diarrhea - nonbloody, fever, decreased appetite and PO intake. Pt reports fever of 104 at home yesterday.   VS noted - tachycardic to 104  Gen. no acute distress, Non toxic   HEENT: EOMI, mmm  Lungs: CTAB/L no C/ W /R   CVS: tachycardic   Abd; Soft, surgical scar midline, ttp in RLQ, no rebound or guarding  Ext: no edema  Skin: no rash  Neuro AAOx3 non focal clear speech  a/p: abd pain, RLQ - ttp - r/o appy vs colitis - labs, morphine, IVF, CT A/P  - Odalis MONCADA Patient is a 69 yo M with hx of DM, HTN, hyperlipidemia, tongue cancer in remission (2004 s/p chemo) here for abdominal pain x 3 days, + vomiting - nonbloody, nonbilious, diarrhea - nonbloody, fever x 3 days, decreased appetite and PO intake. Pt reports fever of 104 at today in the morning.   VS noted - tachycardic to 104  Gen. no acute distress, Non toxic   HEENT: EOMI, mmm  Lungs: CTAB/L no C/ W /R   CVS: tachycardic   Abd; Soft, surgical scar midline (from hernia surgery), ttp in RLQ, no rebound or guarding  Ext: no edema  Skin: no rash  Neuro AAOx3 non focal clear speech  a/p: abd pain, RLQ - ttp - r/o appy vs colitis - labs, morphine, IVF, CT A/P  - Odalis MONCADA

## 2018-05-30 NOTE — ED ADULT NURSE NOTE - OBJECTIVE STATEMENT
pt received to intake #10b with c/o abd pain and subjective fever since last night. denies vomiting and diarrhea. endorses chills. IV placed, labs drawn and sent. pending attending eval. pending CT scan. pt to go to [facility].

## 2018-05-30 NOTE — ED PROVIDER NOTE - PROGRESS NOTE DETAILS
discussed lab results with family regarding elevated lipase, HEIDI, and that pt underwent CT w/ IV contrast with an elevated creatinine. discussed that IV contrast will likely worsen existing HEIDI (from dehydration based on clinical history) and that pt need treatment with IV fluids while in hospital. regarding elevated lipase, family also notes that pt used to be heavy alcohol user, believes last drink was about 2 months ago, no h/o gallstones. pt and family aware of current results and understand that pt will be admitted for further treatment. awaiting CT results. pt feeling better after pain medication, CT shows mild colitis, started on cipro/flagyl, d/w hospitalist, will admit [place]: pt creatine elevated with BUN:CREAT ratio under 15- possibly underlying renal disease due to metabolic syndrome vs post obstructive uropathy - will place a bertrand and hydrate.  CT shows colitis- abx given.  ekg sinus tachy.  vss  admit to med for ange with colitis-

## 2018-05-30 NOTE — ED ADULT TRIAGE NOTE - CHIEF COMPLAINT QUOTE
Patient brought to ER from home by EMS for c/o right lower quadrant pain with rebound tenderness with vomiting and fever.

## 2018-05-30 NOTE — ED ADULT NURSE NOTE - ED STAT RN HANDOFF DETAILS
endorsed to darren salgado. pt  a*o x3, wife at beside as preferred . bertrand draining clear yellow urine. mar made aware of lab results of abnds 16 and fever.

## 2018-05-31 DIAGNOSIS — K52.9 NONINFECTIVE GASTROENTERITIS AND COLITIS, UNSPECIFIED: ICD-10-CM

## 2018-05-31 DIAGNOSIS — N40.0 BENIGN PROSTATIC HYPERPLASIA WITHOUT LOWER URINARY TRACT SYMPTOMS: ICD-10-CM

## 2018-05-31 DIAGNOSIS — N17.9 ACUTE KIDNEY FAILURE, UNSPECIFIED: ICD-10-CM

## 2018-05-31 DIAGNOSIS — A41.9 SEPSIS, UNSPECIFIED ORGANISM: ICD-10-CM

## 2018-05-31 DIAGNOSIS — R74.8 ABNORMAL LEVELS OF OTHER SERUM ENZYMES: ICD-10-CM

## 2018-05-31 DIAGNOSIS — E87.2 ACIDOSIS: ICD-10-CM

## 2018-05-31 DIAGNOSIS — I10 ESSENTIAL (PRIMARY) HYPERTENSION: ICD-10-CM

## 2018-05-31 DIAGNOSIS — Z29.9 ENCOUNTER FOR PROPHYLACTIC MEASURES, UNSPECIFIED: ICD-10-CM

## 2018-05-31 DIAGNOSIS — E87.1 HYPO-OSMOLALITY AND HYPONATREMIA: ICD-10-CM

## 2018-05-31 LAB
ALBUMIN SERPL ELPH-MCNC: 3.1 G/DL — LOW (ref 3.3–5)
ALP SERPL-CCNC: 66 U/L — SIGNIFICANT CHANGE UP (ref 40–120)
ALT FLD-CCNC: 18 U/L — SIGNIFICANT CHANGE UP (ref 4–41)
AMYLASE P1 CFR SERPL: 81 U/L — SIGNIFICANT CHANGE UP (ref 25–125)
AST SERPL-CCNC: 30 U/L — SIGNIFICANT CHANGE UP (ref 4–40)
BASE EXCESS BLDV CALC-SCNC: -10.5 MMOL/L — SIGNIFICANT CHANGE UP
BASOPHILS # BLD AUTO: 0.01 K/UL — SIGNIFICANT CHANGE UP (ref 0–0.2)
BASOPHILS NFR BLD AUTO: 0.5 % — SIGNIFICANT CHANGE UP (ref 0–2)
BILIRUB SERPL-MCNC: 0.2 MG/DL — SIGNIFICANT CHANGE UP (ref 0.2–1.2)
BLOOD GAS VENOUS - CREATININE: 2.31 MG/DL — HIGH (ref 0.5–1.3)
BUN SERPL-MCNC: 21 MG/DL — SIGNIFICANT CHANGE UP (ref 7–23)
BUN SERPL-MCNC: 34 MG/DL — HIGH (ref 7–23)
C DIFF TOX GENS STL QL NAA+PROBE: SIGNIFICANT CHANGE UP
CALCIUM SERPL-MCNC: 7.4 MG/DL — LOW (ref 8.4–10.5)
CALCIUM SERPL-MCNC: 7.5 MG/DL — LOW (ref 8.4–10.5)
CHLORIDE BLDV-SCNC: 108 MMOL/L — SIGNIFICANT CHANGE UP (ref 96–108)
CHLORIDE SERPL-SCNC: 93 MMOL/L — LOW (ref 98–107)
CHLORIDE SERPL-SCNC: 95 MMOL/L — LOW (ref 98–107)
CHOLEST SERPL-MCNC: 118 MG/DL — LOW (ref 120–199)
CO2 SERPL-SCNC: 13 MMOL/L — LOW (ref 22–31)
CO2 SERPL-SCNC: 15 MMOL/L — LOW (ref 22–31)
CREAT SERPL-MCNC: 1.35 MG/DL — HIGH (ref 0.5–1.3)
CREAT SERPL-MCNC: 2.28 MG/DL — HIGH (ref 0.5–1.3)
EOSINOPHIL # BLD AUTO: 0 K/UL — SIGNIFICANT CHANGE UP (ref 0–0.5)
EOSINOPHIL NFR BLD AUTO: 0 % — SIGNIFICANT CHANGE UP (ref 0–6)
GAS PNL BLDV: 127 MMOL/L — LOW (ref 136–146)
GLUCOSE BLDV-MCNC: 122 — HIGH (ref 70–99)
GLUCOSE SERPL-MCNC: 117 MG/DL — HIGH (ref 70–99)
GLUCOSE SERPL-MCNC: 240 MG/DL — HIGH (ref 70–99)
HCO3 BLDV-SCNC: 16 MMOL/L — LOW (ref 20–27)
HCT VFR BLD CALC: 32 % — LOW (ref 39–50)
HCT VFR BLD CALC: 32 % — LOW (ref 39–50)
HCT VFR BLDV CALC: 36.8 % — LOW (ref 39–51)
HDLC SERPL-MCNC: 56 MG/DL — HIGH (ref 35–55)
HGB BLD-MCNC: 11.1 G/DL — LOW (ref 13–17)
HGB BLD-MCNC: 11.1 G/DL — LOW (ref 13–17)
HGB BLDV-MCNC: 12 G/DL — LOW (ref 13–17)
IMM GRANULOCYTES # BLD AUTO: 0.01 # — SIGNIFICANT CHANGE UP
IMM GRANULOCYTES NFR BLD AUTO: 0.5 % — SIGNIFICANT CHANGE UP (ref 0–1.5)
LACTATE BLDV-MCNC: 1.1 MMOL/L — SIGNIFICANT CHANGE UP (ref 0.5–2)
LIDOCAIN IGE QN: 165.7 U/L — HIGH (ref 7–60)
LIPID PNL WITH DIRECT LDL SERPL: 45 MG/DL — SIGNIFICANT CHANGE UP
LYMPHOCYTES # BLD AUTO: 0.18 K/UL — LOW (ref 1–3.3)
LYMPHOCYTES # BLD AUTO: 9.3 % — LOW (ref 13–44)
MAGNESIUM SERPL-MCNC: 1.1 MG/DL — LOW (ref 1.6–2.6)
MAGNESIUM SERPL-MCNC: 1.5 MG/DL — LOW (ref 1.6–2.6)
MCHC RBC-ENTMCNC: 27.9 PG — SIGNIFICANT CHANGE UP (ref 27–34)
MCHC RBC-ENTMCNC: 27.9 PG — SIGNIFICANT CHANGE UP (ref 27–34)
MCHC RBC-ENTMCNC: 34.7 % — SIGNIFICANT CHANGE UP (ref 32–36)
MCHC RBC-ENTMCNC: 34.7 % — SIGNIFICANT CHANGE UP (ref 32–36)
MCV RBC AUTO: 80.4 FL — SIGNIFICANT CHANGE UP (ref 80–100)
MCV RBC AUTO: 80.4 FL — SIGNIFICANT CHANGE UP (ref 80–100)
MONOCYTES # BLD AUTO: 0.18 K/UL — SIGNIFICANT CHANGE UP (ref 0–0.9)
MONOCYTES NFR BLD AUTO: 9.3 % — SIGNIFICANT CHANGE UP (ref 2–14)
NEUTROPHILS # BLD AUTO: 1.56 K/UL — LOW (ref 1.8–7.4)
NEUTROPHILS NFR BLD AUTO: 80.4 % — HIGH (ref 43–77)
NRBC # FLD: 0 — SIGNIFICANT CHANGE UP
NRBC # FLD: 0 — SIGNIFICANT CHANGE UP
PCO2 BLDV: 27 MMHG — LOW (ref 41–51)
PH BLDV: 7.34 PH — SIGNIFICANT CHANGE UP (ref 7.32–7.43)
PHOSPHATE SERPL-MCNC: 2 MG/DL — LOW (ref 2.5–4.5)
PHOSPHATE SERPL-MCNC: 2.8 MG/DL — SIGNIFICANT CHANGE UP (ref 2.5–4.5)
PLATELET # BLD AUTO: 181 K/UL — SIGNIFICANT CHANGE UP (ref 150–400)
PLATELET # BLD AUTO: 181 K/UL — SIGNIFICANT CHANGE UP (ref 150–400)
PMV BLD: 10.4 FL — SIGNIFICANT CHANGE UP (ref 7–13)
PMV BLD: 10.4 FL — SIGNIFICANT CHANGE UP (ref 7–13)
PO2 BLDV: 53 MMHG — HIGH (ref 35–40)
POTASSIUM BLDV-SCNC: 2.9 MMOL/L — CRITICAL LOW (ref 3.4–4.5)
POTASSIUM SERPL-MCNC: 3.1 MMOL/L — LOW (ref 3.5–5.3)
POTASSIUM SERPL-MCNC: 3.1 MMOL/L — LOW (ref 3.5–5.3)
POTASSIUM SERPL-SCNC: 3.1 MMOL/L — LOW (ref 3.5–5.3)
POTASSIUM SERPL-SCNC: 3.1 MMOL/L — LOW (ref 3.5–5.3)
PROT SERPL-MCNC: 5.7 G/DL — LOW (ref 6–8.3)
RBC # BLD: 3.98 M/UL — LOW (ref 4.2–5.8)
RBC # BLD: 3.98 M/UL — LOW (ref 4.2–5.8)
RBC # FLD: 13.2 % — SIGNIFICANT CHANGE UP (ref 10.3–14.5)
RBC # FLD: 13.2 % — SIGNIFICANT CHANGE UP (ref 10.3–14.5)
SAO2 % BLDV: 84.4 % — SIGNIFICANT CHANGE UP (ref 60–85)
SODIUM SERPL-SCNC: 125 MMOL/L — LOW (ref 135–145)
SODIUM SERPL-SCNC: 127 MMOL/L — LOW (ref 135–145)
TRIGL SERPL-MCNC: 102 MG/DL — SIGNIFICANT CHANGE UP (ref 10–149)
WBC # BLD: 1.93 K/UL — LOW (ref 3.8–10.5)
WBC # BLD: 1.93 K/UL — LOW (ref 3.8–10.5)
WBC # FLD AUTO: 1.93 K/UL — LOW (ref 3.8–10.5)
WBC # FLD AUTO: 1.93 K/UL — LOW (ref 3.8–10.5)

## 2018-05-31 PROCEDURE — 12345: CPT | Mod: NC

## 2018-05-31 PROCEDURE — 99223 1ST HOSP IP/OBS HIGH 75: CPT | Mod: GC

## 2018-05-31 RX ORDER — INSULIN LISPRO 100/ML
VIAL (ML) SUBCUTANEOUS AT BEDTIME
Qty: 0 | Refills: 0 | Status: DISCONTINUED | OUTPATIENT
Start: 2018-05-31 | End: 2018-06-03

## 2018-05-31 RX ORDER — MAGNESIUM SULFATE 500 MG/ML
2 VIAL (ML) INJECTION ONCE
Qty: 0 | Refills: 0 | Status: COMPLETED | OUTPATIENT
Start: 2018-05-31 | End: 2018-05-31

## 2018-05-31 RX ORDER — METRONIDAZOLE 500 MG
500 TABLET ORAL EVERY 8 HOURS
Qty: 0 | Refills: 0 | Status: DISCONTINUED | OUTPATIENT
Start: 2018-05-31 | End: 2018-06-01

## 2018-05-31 RX ORDER — MORPHINE SULFATE 50 MG/1
2 CAPSULE, EXTENDED RELEASE ORAL EVERY 4 HOURS
Qty: 0 | Refills: 0 | Status: DISCONTINUED | OUTPATIENT
Start: 2018-05-31 | End: 2018-06-03

## 2018-05-31 RX ORDER — DEXTROSE 50 % IN WATER 50 %
12.5 SYRINGE (ML) INTRAVENOUS ONCE
Qty: 0 | Refills: 0 | Status: DISCONTINUED | OUTPATIENT
Start: 2018-05-31 | End: 2018-06-03

## 2018-05-31 RX ORDER — TAMSULOSIN HYDROCHLORIDE 0.4 MG/1
1 CAPSULE ORAL
Qty: 0 | Refills: 0 | COMMUNITY

## 2018-05-31 RX ORDER — DEXTROSE 50 % IN WATER 50 %
25 SYRINGE (ML) INTRAVENOUS ONCE
Qty: 0 | Refills: 0 | Status: DISCONTINUED | OUTPATIENT
Start: 2018-05-31 | End: 2018-06-03

## 2018-05-31 RX ORDER — SODIUM,POTASSIUM PHOSPHATES 278-250MG
1 POWDER IN PACKET (EA) ORAL
Qty: 0 | Refills: 0 | Status: DISCONTINUED | OUTPATIENT
Start: 2018-05-31 | End: 2018-06-02

## 2018-05-31 RX ORDER — MAGNESIUM SULFATE 500 MG/ML
1 VIAL (ML) INJECTION ONCE
Qty: 0 | Refills: 0 | Status: COMPLETED | OUTPATIENT
Start: 2018-05-31 | End: 2018-05-31

## 2018-05-31 RX ORDER — ASPIRIN/CALCIUM CARB/MAGNESIUM 324 MG
81 TABLET ORAL DAILY
Qty: 0 | Refills: 0 | Status: DISCONTINUED | OUTPATIENT
Start: 2018-05-31 | End: 2018-06-03

## 2018-05-31 RX ORDER — INSULIN LISPRO 100/ML
VIAL (ML) SUBCUTANEOUS
Qty: 0 | Refills: 0 | Status: DISCONTINUED | OUTPATIENT
Start: 2018-05-31 | End: 2018-06-03

## 2018-05-31 RX ORDER — SIMVASTATIN 20 MG/1
20 TABLET, FILM COATED ORAL AT BEDTIME
Qty: 0 | Refills: 0 | Status: DISCONTINUED | OUTPATIENT
Start: 2018-05-31 | End: 2018-06-03

## 2018-05-31 RX ORDER — METFORMIN HYDROCHLORIDE 850 MG/1
1 TABLET ORAL
Qty: 0 | Refills: 0 | COMMUNITY

## 2018-05-31 RX ORDER — MAGNESIUM SULFATE 500 MG/ML
2 VIAL (ML) INJECTION ONCE
Qty: 0 | Refills: 0 | Status: DISCONTINUED | OUTPATIENT
Start: 2018-05-31 | End: 2018-05-31

## 2018-05-31 RX ORDER — SODIUM CHLORIDE 9 MG/ML
1000 INJECTION, SOLUTION INTRAVENOUS
Qty: 0 | Refills: 0 | Status: DISCONTINUED | OUTPATIENT
Start: 2018-05-31 | End: 2018-05-31

## 2018-05-31 RX ORDER — TAMSULOSIN HYDROCHLORIDE 0.4 MG/1
0.4 CAPSULE ORAL AT BEDTIME
Qty: 0 | Refills: 0 | Status: DISCONTINUED | OUTPATIENT
Start: 2018-05-31 | End: 2018-06-03

## 2018-05-31 RX ORDER — FAMOTIDINE 10 MG/ML
1 INJECTION INTRAVENOUS
Qty: 0 | Refills: 0 | COMMUNITY

## 2018-05-31 RX ORDER — INSULIN LISPRO 100/ML
VIAL (ML) SUBCUTANEOUS EVERY 6 HOURS
Qty: 0 | Refills: 0 | Status: DISCONTINUED | OUTPATIENT
Start: 2018-05-31 | End: 2018-05-31

## 2018-05-31 RX ORDER — POTASSIUM CHLORIDE 20 MEQ
10 PACKET (EA) ORAL
Qty: 0 | Refills: 0 | Status: COMPLETED | OUTPATIENT
Start: 2018-05-31 | End: 2018-05-31

## 2018-05-31 RX ORDER — GLUCAGON INJECTION, SOLUTION 0.5 MG/.1ML
1 INJECTION, SOLUTION SUBCUTANEOUS ONCE
Qty: 0 | Refills: 0 | Status: DISCONTINUED | OUTPATIENT
Start: 2018-05-31 | End: 2018-05-31

## 2018-05-31 RX ORDER — POTASSIUM CHLORIDE 20 MEQ
20 PACKET (EA) ORAL
Qty: 0 | Refills: 0 | Status: COMPLETED | OUTPATIENT
Start: 2018-05-31 | End: 2018-05-31

## 2018-05-31 RX ORDER — LOSARTAN POTASSIUM 100 MG/1
1 TABLET, FILM COATED ORAL
Qty: 0 | Refills: 0 | COMMUNITY

## 2018-05-31 RX ORDER — OMEPRAZOLE 10 MG/1
1 CAPSULE, DELAYED RELEASE ORAL
Qty: 0 | Refills: 0 | COMMUNITY

## 2018-05-31 RX ORDER — ASPIRIN/CALCIUM CARB/MAGNESIUM 324 MG
1 TABLET ORAL
Qty: 0 | Refills: 0 | COMMUNITY

## 2018-05-31 RX ORDER — CIPROFLOXACIN LACTATE 400MG/40ML
400 VIAL (ML) INTRAVENOUS EVERY 24 HOURS
Qty: 0 | Refills: 0 | Status: DISCONTINUED | OUTPATIENT
Start: 2018-05-31 | End: 2018-06-03

## 2018-05-31 RX ORDER — AMLODIPINE BESYLATE 2.5 MG/1
1 TABLET ORAL
Qty: 0 | Refills: 0 | COMMUNITY

## 2018-05-31 RX ORDER — SODIUM CHLORIDE 9 MG/ML
1000 INJECTION, SOLUTION INTRAVENOUS
Qty: 0 | Refills: 0 | Status: DISCONTINUED | OUTPATIENT
Start: 2018-05-31 | End: 2018-06-03

## 2018-05-31 RX ORDER — DEXTROSE 50 % IN WATER 50 %
25 SYRINGE (ML) INTRAVENOUS ONCE
Qty: 0 | Refills: 0 | Status: DISCONTINUED | OUTPATIENT
Start: 2018-05-31 | End: 2018-05-31

## 2018-05-31 RX ORDER — DEXTROSE 50 % IN WATER 50 %
15 SYRINGE (ML) INTRAVENOUS ONCE
Qty: 0 | Refills: 0 | Status: DISCONTINUED | OUTPATIENT
Start: 2018-05-31 | End: 2018-06-03

## 2018-05-31 RX ORDER — HEPARIN SODIUM 5000 [USP'U]/ML
5000 INJECTION INTRAVENOUS; SUBCUTANEOUS EVERY 8 HOURS
Qty: 0 | Refills: 0 | Status: DISCONTINUED | OUTPATIENT
Start: 2018-05-31 | End: 2018-06-03

## 2018-05-31 RX ORDER — SIMVASTATIN 20 MG/1
1 TABLET, FILM COATED ORAL
Qty: 0 | Refills: 0 | COMMUNITY

## 2018-05-31 RX ORDER — LINAGLIPTIN 5 MG/1
1 TABLET, FILM COATED ORAL
Qty: 0 | Refills: 0 | COMMUNITY

## 2018-05-31 RX ORDER — GLUCAGON INJECTION, SOLUTION 0.5 MG/.1ML
1 INJECTION, SOLUTION SUBCUTANEOUS ONCE
Qty: 0 | Refills: 0 | Status: DISCONTINUED | OUTPATIENT
Start: 2018-05-31 | End: 2018-06-03

## 2018-05-31 RX ADMIN — Medication 100 MILLIEQUIVALENT(S): at 06:03

## 2018-05-31 RX ADMIN — MORPHINE SULFATE 2 MILLIGRAM(S): 50 CAPSULE, EXTENDED RELEASE ORAL at 07:33

## 2018-05-31 RX ADMIN — SIMVASTATIN 20 MILLIGRAM(S): 20 TABLET, FILM COATED ORAL at 23:51

## 2018-05-31 RX ADMIN — Medication 81 MILLIGRAM(S): at 13:28

## 2018-05-31 RX ADMIN — MORPHINE SULFATE 2 MILLIGRAM(S): 50 CAPSULE, EXTENDED RELEASE ORAL at 07:48

## 2018-05-31 RX ADMIN — TAMSULOSIN HYDROCHLORIDE 0.4 MILLIGRAM(S): 0.4 CAPSULE ORAL at 23:44

## 2018-05-31 RX ADMIN — Medication 100 MILLIGRAM(S): at 06:46

## 2018-05-31 RX ADMIN — Medication 100 MILLIGRAM(S): at 13:30

## 2018-05-31 RX ADMIN — Medication 100 MILLIEQUIVALENT(S): at 10:31

## 2018-05-31 RX ADMIN — Medication 20 MILLIEQUIVALENT(S): at 23:43

## 2018-05-31 RX ADMIN — Medication 100 MILLIEQUIVALENT(S): at 13:31

## 2018-05-31 RX ADMIN — Medication 20 MILLIEQUIVALENT(S): at 18:17

## 2018-05-31 RX ADMIN — HEPARIN SODIUM 5000 UNIT(S): 5000 INJECTION INTRAVENOUS; SUBCUTANEOUS at 23:43

## 2018-05-31 RX ADMIN — Medication 1 TABLET(S): at 18:20

## 2018-05-31 RX ADMIN — Medication 20 MILLIEQUIVALENT(S): at 19:24

## 2018-05-31 RX ADMIN — Medication 100 GRAM(S): at 04:45

## 2018-05-31 RX ADMIN — HEPARIN SODIUM 5000 UNIT(S): 5000 INJECTION INTRAVENOUS; SUBCUTANEOUS at 13:28

## 2018-05-31 RX ADMIN — Medication 50 GRAM(S): at 18:17

## 2018-05-31 RX ADMIN — Medication 200 MILLIGRAM(S): at 13:30

## 2018-05-31 RX ADMIN — HEPARIN SODIUM 5000 UNIT(S): 5000 INJECTION INTRAVENOUS; SUBCUTANEOUS at 06:46

## 2018-05-31 RX ADMIN — Medication 100 MILLIGRAM(S): at 23:45

## 2018-05-31 NOTE — PROGRESS NOTE ADULT - ATTENDING COMMENTS
Pt seen and examined. 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 Was in usual state of health until Sunday when he and his wife went on a Trip to [facility] Sun he had seafood. Had a Zoroastrianism gathering and ate vegetarian food. wife also ate same thing and had loose stool which self resolved however his symptoms progressed with diarrhea, vomiting and fever.     Pt reports abdominal cramping improving. No further nausea/vomiting. Tolerating liquids today.  Diarrhea improving 3-4 episodes from morning to evening today. Reports he feels like he wants to eat.     Exam notable for cachetic man, some white plaque on L buccal mucosa which daughter reports is chronic from dental irritation. Abdominal exam benign as well as cardiac and lung    Continue empiric abx. F/u pending stool studies  Correct hyponatremia slowly and monitor BMP frequently  Start nystatin for possible thrush on L buccal mucosa  Trend Cr Pt seen and examined. 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 Was in usual state of health until Sunday when he and his wife went on a Trip to [facility] Sun he had seafood. Had a Taoism gathering and ate vegetarian food. wife also ate same thing and had loose stool which self resolved however his symptoms progressed with diarrhea, vomiting and fever.     Pt reports abdominal cramping improving. No further nausea/vomiting. Tolerating liquids today.  Diarrhea improving 3-4 episodes from morning to evening today. Reports he feels like he wants to eat. Denies any recent weight loss.    Exam notable for cachetic man, some white plaque on L buccal mucosa which daughter reports is chronic from dental irritation. Abdominal exam benign as well as cardiac and lung    Continue empiric abx. F/u pending stool studies  Correct hyponatremia slowly and monitor BMP frequently  Start nystatin for possible thrush on L buccal mucosa  Trend Cr  Advance diet as tolerated

## 2018-05-31 NOTE — H&P ADULT - PROBLEM SELECTOR PLAN 1
pt with 3 days abd pain, diarrhea, now colitis shown on CT.  Likely infectious. Although no risk factors will check for c diff and stool cultures.   will c/w cipro and flagyl for now.  consider consulting GI in am.   will keep npo for now.

## 2018-05-31 NOTE — PROGRESS NOTE ADULT - PROBLEM SELECTOR PLAN 3
- Elevated lipase in setting of abd pain and diarrhea.   - CT review w/ radiology service        - no evidence of pancreatitis on scan.   - History and exam seem more consistent with colitis.   - Will check lipid profile to r/o hypertriglyceridemia.   - Will also trend lipase, and check amylase. - Elevated lipase in setting of abd pain and diarrhea.   - CT review w/ radiology service    - no evidence of pancreatitis on scan.   - History and exam seem more consistent with colitis.   - Will check lipid profile to r/o hypertriglyceridemia.   - Will also trend lipase, and check amylase.

## 2018-05-31 NOTE — H&P ADULT - NSHPLABSRESULTS_GEN_ALL_CORE
13.3   4.13  )-----------( 193      ( 30 May 2018 17:19 )             39.8           122<L>  |  84<L>  |  45<H>  ----------------------------<  146<H>  3.7   |  16<L>  |  3.42<H>    Ca    8.4      30 May 2018 17:19    TPro  6.7  /  Alb  3.9  /  TBili  0.2  /  DBili  x   /  AST  34  /  ALT  18  /  AlkPhos  75            Lipase, Serum: 257.1 U/L (18 @ 17:19)      Urinalysis Basic - ( 30 May 2018 17:19 )    Color: YELLOW / Appearance: HAZY / S.011 / pH: 6.0  Gluc: NEGATIVE / Ketone: NEGATIVE  / Bili: NEGATIVE / Urobili: NORMAL mg/dL   Blood: TRACE / Protein: 100 mg/dL / Nitrite: NEGATIVE   Leuk Esterase: NEGATIVE / RBC: 0-2 / WBC 5-10   Sq Epi: OCC / Non Sq Epi: x / Bacteria: x    Lactate Trend  Blood Gas Venous Comprehensive (18 @ 17:19)    Blood Gas Venous - Lactate: 2.3: Please note updated reference range. mmol/L    Blood Gas Venous - Chloride: 103 mmol/L    Blood Gas Venous - Creatinine: 3.42 mg/dL    pH, Venous: 7.27 pH    pCO2, Venous: 40 mmHg    pO2, Venous: < 24 mmHg    HCO3, Venous: 16 mmol/L    Base Excess, Venous: -8.2: REFERENCE RANGE = -3 + 2 mmol/L mmol/L    Oxygen Saturation, Venous: 13.0 %    Blood Gas Venous - Sodium: 118 mmol/L    Blood Gas Venous - Potassium: 3.4 mmol/L    Blood Gas Venous - Glucose: 152    Blood Gas Venous - Hemoglobin: 14.0 g/dL    Blood Gas Venous - Hematocrit: 43.0 %      CAPILLARY BLOOD GLUCOSE      RADIOLOGY, EKG & ADDITIONAL TESTS: Reviewed.   EKG- normal sinus rhythm.    < from: CT Abdomen and Pelvis w/ IV Cont (18 @ 19:40) >    FINDINGS:    LOWER CHEST: Within normal limits.    LIVER: Within normal limits.  BILE DUCTS: Normal caliber.  GALLBLADDER: Within normal limits.  SPLEEN: Within normal limits.  PANCREAS: Within normal limits.  ADRENALS: Within normal limits.  KIDNEYS/URETERS: Within normal limits.    BLADDER: Within normal limits.  REPRODUCTIVE ORGANS: Within normal limits.    BOWEL: Mild diffuse colonic wall thickening with trace associated   inflammatory change. Mildly thickened terminal ileum. Normal appendix. No   bowel obstruction.  PERITONEUM: No free air or fluid.  VESSELS:  Normal limits.  RETROPERITONEUM: No lymphadenopathy.    ABDOMINAL WALL: Ventral hernia containing fat and nonobstructed   transverse colon. Small left fat-containing hernia.  BONES: Degenerative changes. Bilateral L5 pars defects. A   benign-appearing expansile lesion of the left proximal femur with   sclerotic borders is unchanged dating back to .    IMPRESSION:  Mild colitis and terminal ileitis of indeterminate cause. Inflammatory   etiology is considered.    < end of copied text >

## 2018-05-31 NOTE — H&P ADULT - NSHPPHYSICALEXAM_GEN_ALL_CORE
Vital Signs Last 24 Hrs  T(C): 37.8 (31 May 2018 00:47), Max: 38.7 (30 May 2018 23:37)  T(F): 100 (31 May 2018 00:47), Max: 101.7 (30 May 2018 23:37)  HR: 125 (31 May 2018 00:47) (104 - 125)  BP: 123/59 (31 May 2018 00:47) (103/60 - 123/59)  BP(mean): --  RR: 18 (31 May 2018 00:47) (18 - 18)  SpO2: 99% (31 May 2018 00:47) (98% - 100%)    GENERAL: NAD,   HEENT:  Atraumatic, Normocephalic  EYES: EOMI, PERRLA, conjunctiva and sclera clear  NECK: Supple, No JVD  CHEST/LUNG: Clear to auscultation bilaterally; No wheezes, rales, or rhonchi  HEART: Regular rate and rhythm; No murmurs, rubs, or gallops  ABDOMEN: mild tenderness, worse in umblical area. non distended, normal bs.   EXTREMITIES:  2+ Peripheral Pulses, No clubbing, cyanosis, or edema  PSYCH: AAOx3  NEUROLOGY: non-focal exam  SKIN: No rashes or lesions Vital Signs Last 24 Hrs  T(C): 37.8 (31 May 2018 00:47), Max: 38.7 (30 May 2018 23:37)  T(F): 100 (31 May 2018 00:47), Max: 101.7 (30 May 2018 23:37)  HR: 125 (31 May 2018 00:47) (104 - 125)  BP: 123/59 (31 May 2018 00:47) (103/60 - 123/59)  BP(mean): --  RR: 18 (31 May 2018 00:47) (18 - 18)  SpO2: 99% (31 May 2018 00:47) (98% - 100%)    GENERAL: NAD, well developed  HEENT:  Atraumatic, Normocephalic  EYES: EOMI, PERRLA, conjunctiva and sclera clear  NECK: Supple, No JVD  CHEST/LUNG: Clear to auscultation bilaterally; No wheezes, rales, or rhonchi  HEART: Regular rate and rhythm; No murmurs, rubs, or gallops  ABDOMEN: mild tenderness, worse in umbilical area. non distended, normal BS.   EXTREMITIES:  2+ Peripheral Pulses, No clubbing, cyanosis, or edema  PSYCH: AAOx3, euthymic   NEUROLOGY: non-focal exam, moving all extremities   SKIN: No rashes or lesions

## 2018-05-31 NOTE — PROGRESS NOTE ADULT - PROBLEM SELECTOR PLAN 1
- 3 days abd pain, diarrhea, now colitis shown on CT, likely infectious.   - C. diff negative  - c/w cipro and flagyl  - NPO for now - 3 days abd pain, diarrhea, now colitis shown on CT, likely infectious.   - C. diff negative  - c/w cipro and flagyl

## 2018-05-31 NOTE — H&P ADULT - NSHPREVIEWOFSYSTEMS_GEN_ALL_CORE
REVIEW OF SYSTEMS:    CONSTITUTIONAL: +fever, no weakness.   EYES/ENT: No visual changes;  No vertigo or throat pain   NECK: No pain or stiffness  RESPIRATORY: No cough, wheezing, hemoptysis; No shortness of breath  CARDIOVASCULAR: No chest pain or palpitations  GASTROINTESTINAL: see HPI  GENITOURINARY: No dysuria, frequency or hematuria  MUSCULOSKELETAL: No myalgias or arthralgias.   NEUROLOGICAL: No numbness or weakness  SKIN: No itching, burning, rashes, or lesions   All other review of systems is negative unless indicated above. REVIEW OF SYSTEMS:    CONSTITUTIONAL: +fever, no weakness.   EYES/ENT: No visual changes;  No vertigo or throat pain   NECK: No pain or stiffness  RESPIRATORY: No cough, wheezing, hemoptysis; No shortness of breath  CARDIOVASCULAR: No chest pain or palpitations  GASTROINTESTINAL: + diffuse abdominal pain, + diarrhea, + vomiting   GENITOURINARY: No dysuria, frequency or hematuria  MUSCULOSKELETAL: No myalgias or arthralgias.   NEUROLOGICAL: No numbness or weakness  SKIN: No itching, burning, rashes, or lesions   All other review of systems is negative unless indicated above.

## 2018-05-31 NOTE — PROGRESS NOTE ADULT - SUBJECTIVE AND OBJECTIVE BOX
Patient is a 68y old  Male who presents with a chief complaint of abdominal pain, diarrhea. 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   - CT: Mild colitis and terminal ileitis  - Started on Cipro/Flagyl  - Blood Cx, stool Cx, O&P sent. C. Diff negative  Overnight Events: No acute events  Subjective:     PHYSICAL EXAM:  Vitals: T(F): 98.6  HR: 111  BP: 119/58  RR: 18  SpO2: 100% on RA  GENERAL: NAD, elderly   HEAD:  Atraumatic, Normocephalic  EYES: EOMI, PERRLA, conjunctiva and sclera clear  NECK: Supple, No JVD  CHEST/LUNG: Clear to auscultation bilaterally; No wheeze  HEART: Regular rate and rhythm; 2/6 systolic murmur at upper sternal border.  ABDOMEN: Soft, Nontender, Nondistended; Bowel sounds present  EXTREMITIES: WWP, no edema  PSYCH: Alert, oriented, pleasant   NEUROLOGY: non-focal  SKIN: No rashes or lesions    LABS:  CAPILLARY BLOOD GLUCOSE      POCT Blood Glucose.: 119 mg/dL (31 May 2018 06:50)    I&O's Summary    30 May 2018 07:01  -  31 May 2018 07:00  --------------------------------------------------------  IN: 300 mL / OUT: 1600 mL / NET: -1300 mL                              11.1   1.93  )-----------( 181      ( 31 May 2018 02:20 )             32.0     WBC Trend: 1.93<--, 4.13<--      127<L>  |  93<L>  |  34<H>  ----------------------------<  117<H>  3.1<L>   |  13<L>  |  2.28<H>    Ca    7.4<L>      31 May 2018 02:20  Phos  2.8     05-  Mg     1.1     -    TPro  5.7<L>  /  Alb  3.1<L>  /  TBili  0.2  /  DBili  x   /  AST  30  /  ALT  18  /  AlkPhos  66      Creatinine Trend: 2.28<--, 3.42<--        Urinalysis Basic - ( 30 May 2018 17:19 )    Color: YELLOW / Appearance: HAZY / S.011 / pH: 6.0  Gluc: NEGATIVE / Ketone: NEGATIVE  / Bili: NEGATIVE / Urobili: NORMAL mg/dL   Blood: TRACE / Protein: 100 mg/dL / Nitrite: NEGATIVE   Leuk Esterase: NEGATIVE / RBC: 0-2 / WBC 5-10   Sq Epi: OCC / Non Sq Epi: x / Bacteria: x            MEDICATIONS  (STANDING):  aspirin enteric coated 81 milliGRAM(s) Oral daily  ciprofloxacin   IVPB 400 milliGRAM(s) IV Intermittent every 24 hours  dextrose 5%. 1000 milliLiter(s) (50 mL/Hr) IV Continuous <Continuous>  dextrose 50% Injectable 12.5 Gram(s) IV Push once  dextrose 50% Injectable 25 Gram(s) IV Push once  dextrose 50% Injectable 25 Gram(s) IV Push once  heparin  Injectable 5000 Unit(s) SubCutaneous every 8 hours  insulin lispro (HumaLOG) corrective regimen sliding scale   SubCutaneous every 6 hours  metroNIDAZOLE  IVPB 500 milliGRAM(s) IV Intermittent every 8 hours  potassium chloride  10 mEq/100 mL IVPB 10 milliEquivalent(s) IV Intermittent every 1 hour  simvastatin 20 milliGRAM(s) Oral at bedtime  tamsulosin 0.4 milliGRAM(s) Oral at bedtime    MEDICATIONS  (PRN):  acetaminophen   Tablet 650 milliGRAM(s) Oral every 6 hours PRN For Temp greater than 38 C (100.4 F)  dextrose 40% Gel 15 Gram(s) Oral once PRN Blood Glucose LESS THAN 70 milliGRAM(s)/deciliter  glucagon  Injectable 1 milliGRAM(s) IntraMuscular once PRN Glucose LESS THAN 70 milligrams/deciliter  morphine  - Injectable 2 milliGRAM(s) IV Push every 4 hours PRN moderate and severe pain Patient is a 68y old  Male who presents with a chief complaint of abdominal pain, diarrhea. 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   - CT: Mild colitis and terminal ileitis  - Started on Cipro/Flagyl  - Blood Cx, stool Cx, O&P sent. C. Diff negative  Overnight Events: No acute events  Subjective: Continued abdominal pain. No additional concerns    PHYSICAL EXAM:  Vitals: T(F): 98.6  HR: 111  BP: 119/58  RR: 18  SpO2: 100% on RA  GENERAL: NAD, elderly   HEAD:  Atraumatic, Normocephalic  EYES: EOMI, PERRLA, conjunctiva and sclera clear  NECK: Supple, No JVD  CHEST/LUNG: Clear to auscultation bilaterally; No wheeze  HEART: Regular rate and rhythm; 2/6 systolic murmur at upper sternal border.  ABDOMEN: Soft, Nontender, Nondistended; Bowel sounds present  EXTREMITIES: WWP, no edema  PSYCH: Alert, oriented, pleasant   NEUROLOGY: non-focal  SKIN: No rashes or lesions    LABS:  CAPILLARY BLOOD GLUCOSE      POCT Blood Glucose.: 119 mg/dL (31 May 2018 06:50)    I&O's Summary    30 May 2018 07:01  -  31 May 2018 07:00  --------------------------------------------------------  IN: 300 mL / OUT: 1600 mL / NET: -1300 mL                              11.1   1.93  )-----------( 181      ( 31 May 2018 02:20 )             32.0     WBC Trend: 1.93<--, 4.13<--  05-31    127<L>  |  93<L>  |  34<H>  ----------------------------<  117<H>  3.1<L>   |  13<L>  |  2.28<H>    Ca    7.4<L>      31 May 2018 02:20  Phos  2.8     -  Mg     1.1         TPro  5.7<L>  /  Alb  3.1<L>  /  TBili  0.2  /  DBili  x   /  AST  30  /  ALT  18  /  AlkPhos  66      Creatinine Trend: 2.28<--, 3.42<--        Urinalysis Basic - ( 30 May 2018 17:19 )    Color: YELLOW / Appearance: HAZY / S.011 / pH: 6.0  Gluc: NEGATIVE / Ketone: NEGATIVE  / Bili: NEGATIVE / Urobili: NORMAL mg/dL   Blood: TRACE / Protein: 100 mg/dL / Nitrite: NEGATIVE   Leuk Esterase: NEGATIVE / RBC: 0-2 / WBC 5-10   Sq Epi: OCC / Non Sq Epi: x / Bacteria: x            MEDICATIONS  (STANDING):  aspirin enteric coated 81 milliGRAM(s) Oral daily  ciprofloxacin   IVPB 400 milliGRAM(s) IV Intermittent every 24 hours  dextrose 5%. 1000 milliLiter(s) (50 mL/Hr) IV Continuous <Continuous>  dextrose 50% Injectable 12.5 Gram(s) IV Push once  dextrose 50% Injectable 25 Gram(s) IV Push once  dextrose 50% Injectable 25 Gram(s) IV Push once  heparin  Injectable 5000 Unit(s) SubCutaneous every 8 hours  insulin lispro (HumaLOG) corrective regimen sliding scale   SubCutaneous every 6 hours  metroNIDAZOLE  IVPB 500 milliGRAM(s) IV Intermittent every 8 hours  potassium chloride  10 mEq/100 mL IVPB 10 milliEquivalent(s) IV Intermittent every 1 hour  simvastatin 20 milliGRAM(s) Oral at bedtime  tamsulosin 0.4 milliGRAM(s) Oral at bedtime    MEDICATIONS  (PRN):  acetaminophen   Tablet 650 milliGRAM(s) Oral every 6 hours PRN For Temp greater than 38 C (100.4 F)  dextrose 40% Gel 15 Gram(s) Oral once PRN Blood Glucose LESS THAN 70 milliGRAM(s)/deciliter  glucagon  Injectable 1 milliGRAM(s) IntraMuscular once PRN Glucose LESS THAN 70 milligrams/deciliter  morphine  - Injectable 2 milliGRAM(s) IV Push every 4 hours PRN moderate and severe pain

## 2018-05-31 NOTE — H&P ADULT - ASSESSMENT
68M hx gastritis/esophagitis, DM2, HTN, HLD, BPH, Tongue ca s/p resection and chemo presents to ED with 3 days of abdominal pain and diarrhea found to have colitis on CT, elevated lipase and acute renal failure.

## 2018-05-31 NOTE — H&P ADULT - PMH
BPH (benign prostatic hyperplasia)    Diabetes Mellitus Type II    Gastritis    History of Tongue Cancer  surgery and radiation  Hyperlipemia    Hypertension

## 2018-05-31 NOTE — PROVIDER CONTACT NOTE (CRITICAL VALUE NOTIFICATION) - BACKGROUND
Pt was admitted for noninfectious gastroenteritis on 5/30/18. PMH of history of tongue cancer, hyperlipidemia, diabetes type II, hypertension

## 2018-05-31 NOTE — PROGRESS NOTE ADULT - ASSESSMENT
68M hx gastritis/esophagitis, DM2, HTN, HLD, BPH, Tongue ca s/p resection and chemo presents to ED with 3 days of abdominal pain and diarrhea found to have colitis on CT, elevated lipase and acute renal failure. 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

## 2018-05-31 NOTE — H&P ADULT - PROBLEM SELECTOR PLAN 4
Pt with tachcardia and fever meeting sepsis criteria likely 2/2 colitis  c/w cipro and flagyl. pt s/p 2L ns bolus, Lactate 2.3, will trend.   will send blood cultures. Pt with tachycardia and fever meeting sepsis criteria likely 2/2 colitis  c/w cipro and flagyl. pt s/p 2L ns bolus, Lactate 2.3, will trend.   will send blood cultures.

## 2018-05-31 NOTE — H&P ADULT - HISTORY OF PRESENT ILLNESS
68M hx gastritis/esophagitis, DM2, HTN, HLD, BPH, Tongue ca s/p resection and chemo presents to ED with 3 days of abdominal pain and diarrhea. Pt states started having diffuse abdominal pain about 3 days ago, very severe in most quadrants, but worse in umblical area. Does not radiate to back or groin. He's also had diffuse diarrhea for the last 3 days, non bloody. Yesterday he had one episode of NBNB vomit. He's had decreased PO intake. Yesterday he started spiking fever to 100. Pt denies sick contacts, change in diet, recent abx, rash, dysuria, or back pain. Pt had colonoscopy 1/2018 which showed internal hemorrhoids and no polyps. EGD showed chronic esophagitis and gastritis. H. Pylori negative. Pt denies etoh use.     In ED, pt spiked temp to 101.7, p- 124, bp 113/43, rr- 18, spo2 98 on ra. CT a/p showed colitis, Pt recieved cipro and flagyl and 2L NS bolus. 68M hx gastritis/esophagitis, DM2, HTN, HLD, BPH, Tongue ca s/p resection and chemo presents to ED with 3 days of abdominal pain and diarrhea. Pt states started having diffuse abdominal pain about 3 days ago, very severe in most quadrants, but worse in umblical area. Does not radiate to back or groin. He's also had diffuse diarrhea for the last 3 days, non bloody. Yesterday he had one episode of NBNB vomit. He's had decreased PO intake. Yesterday he started spiking fever to 100. Pt denies sick contacts, change in diet, recent abx, rash, dysuria, or back pain. Pt had colonoscopy 1/2018 which showed internal hemorrhoids and no polyps. EGD showed chronic esophagitis and gastritis. H. Pylori negative. Pt denies etoh use.     In ED, pt spiked temp to 101.7, p- 124, bp 113/43, rr- 18, spo2 98 on RA. CT a/p showed colitis, Pt recieved cipro and flagyl and 2L NS bolus.

## 2018-05-31 NOTE — H&P ADULT - PROBLEM SELECTOR PLAN 2
pt with creatinine of 3.4 (0.9 in February).   LIkely pre-renal in setting of diarrhea and poor PO intake, however may also have component of post void obstruction with BPH, as well as medication induced with diuretics, ARB, PPI.   will hold antihypertensives and PPi for now. will leave in bertrand.  Will get urine lytes. Pt already given fluids. Will continue to monitor creatinine. pt with creatinine of 3.4 (0.9 in February).   Likely pre-renal in setting of diarrhea and poor PO intake, however may also have component of post void obstruction with BPH, as well as medication induced with diuretics, ARB, PPI.   will hold antihypertensives and PPI for now. will leave in bertrand.  Will get urine lytes. Pt already given fluids. Will continue to monitor creatinine.

## 2018-05-31 NOTE — H&P ADULT - PROBLEM SELECTOR PLAN 3
Pt with elevated lipase in setting of abd pain and diarrhea.   Reviewed CT with radiology and there's no evidence of pancreatitis on scan.   History and exam seem more consistent with colitis.   Pt denies drinking. Will check lipid profile to r/o hypertriglyceridemia.   Will also trend lipase, and check amylase.   Pt will be kept NPO. Pt with elevated lipase in setting of abd pain and diarrhea.   Reviewed CT with radiology service and there's no evidence of pancreatitis on scan.   History and exam seem more consistent with colitis.   Pt denies drinking. Will check lipid profile to r/o hypertriglyceridemia.   Will also trend lipase, and check amylase.   Pt will be kept NPO.

## 2018-05-31 NOTE — PATIENT PROFILE ADULT. - LANGUAGE ASSISTANCE NEEDED
prefers family to translate; wife and daughter at bedside/Yes-Patient/Caregiver accepts free interpretation services...

## 2018-05-31 NOTE — H&P ADULT - PROBLEM SELECTOR PLAN 6
pt with metabolic acidosis multifactorial with acute renal failure and diarrhea.   will continue to monitor.

## 2018-05-31 NOTE — H&P ADULT - PROBLEM SELECTOR PLAN 5
pt with hyponatremia in setting of diarrhea and poor PO intake. Pt received 2L ns bolus in ED and was on maintenance fluids. will d/c for now and recheck BMP to ensure sodium isn't rising too fast. Will adjust fluids accordingly

## 2018-06-01 LAB
BUN SERPL-MCNC: 10 MG/DL — SIGNIFICANT CHANGE UP (ref 7–23)
BUN SERPL-MCNC: 11 MG/DL — SIGNIFICANT CHANGE UP (ref 7–23)
BUN SERPL-MCNC: 13 MG/DL — SIGNIFICANT CHANGE UP (ref 7–23)
BUN SERPL-MCNC: 16 MG/DL — SIGNIFICANT CHANGE UP (ref 7–23)
CALCIUM SERPL-MCNC: 7.6 MG/DL — LOW (ref 8.4–10.5)
CALCIUM SERPL-MCNC: 7.6 MG/DL — LOW (ref 8.4–10.5)
CALCIUM SERPL-MCNC: 7.7 MG/DL — LOW (ref 8.4–10.5)
CALCIUM SERPL-MCNC: 7.8 MG/DL — LOW (ref 8.4–10.5)
CHLORIDE SERPL-SCNC: 92 MMOL/L — LOW (ref 98–107)
CHLORIDE SERPL-SCNC: 96 MMOL/L — LOW (ref 98–107)
CHLORIDE SERPL-SCNC: 97 MMOL/L — LOW (ref 98–107)
CHLORIDE SERPL-SCNC: 97 MMOL/L — LOW (ref 98–107)
CO2 SERPL-SCNC: 14 MMOL/L — LOW (ref 22–31)
CO2 SERPL-SCNC: 16 MMOL/L — LOW (ref 22–31)
CO2 SERPL-SCNC: 17 MMOL/L — LOW (ref 22–31)
CO2 SERPL-SCNC: 18 MMOL/L — LOW (ref 22–31)
CREAT SERPL-MCNC: 0.9 MG/DL — SIGNIFICANT CHANGE UP (ref 0.5–1.3)
CREAT SERPL-MCNC: 0.91 MG/DL — SIGNIFICANT CHANGE UP (ref 0.5–1.3)
CREAT SERPL-MCNC: 1.03 MG/DL — SIGNIFICANT CHANGE UP (ref 0.5–1.3)
CREAT SERPL-MCNC: 1.12 MG/DL — SIGNIFICANT CHANGE UP (ref 0.5–1.3)
GLUCOSE SERPL-MCNC: 112 MG/DL — HIGH (ref 70–99)
GLUCOSE SERPL-MCNC: 121 MG/DL — HIGH (ref 70–99)
GLUCOSE SERPL-MCNC: 153 MG/DL — HIGH (ref 70–99)
GLUCOSE SERPL-MCNC: 264 MG/DL — HIGH (ref 70–99)
HBA1C BLD-MCNC: 6.9 % — HIGH (ref 4–5.6)
HCT VFR BLD CALC: 32.5 % — LOW (ref 39–50)
HGB BLD-MCNC: 10.9 G/DL — LOW (ref 13–17)
MAGNESIUM SERPL-MCNC: 1.6 MG/DL — SIGNIFICANT CHANGE UP (ref 1.6–2.6)
MAGNESIUM SERPL-MCNC: 1.9 MG/DL — SIGNIFICANT CHANGE UP (ref 1.6–2.6)
MAGNESIUM SERPL-MCNC: 2.2 MG/DL — SIGNIFICANT CHANGE UP (ref 1.6–2.6)
MCHC RBC-ENTMCNC: 26.9 PG — LOW (ref 27–34)
MCHC RBC-ENTMCNC: 33.5 % — SIGNIFICANT CHANGE UP (ref 32–36)
MCV RBC AUTO: 80.2 FL — SIGNIFICANT CHANGE UP (ref 80–100)
NRBC # FLD: 0 — SIGNIFICANT CHANGE UP
PHOSPHATE SERPL-MCNC: 1.5 MG/DL — LOW (ref 2.5–4.5)
PHOSPHATE SERPL-MCNC: 1.9 MG/DL — LOW (ref 2.5–4.5)
PHOSPHATE SERPL-MCNC: 2 MG/DL — LOW (ref 2.5–4.5)
PLATELET # BLD AUTO: 206 K/UL — SIGNIFICANT CHANGE UP (ref 150–400)
PMV BLD: 10.6 FL — SIGNIFICANT CHANGE UP (ref 7–13)
POTASSIUM SERPL-MCNC: 3.1 MMOL/L — LOW (ref 3.5–5.3)
POTASSIUM SERPL-MCNC: 3.4 MMOL/L — LOW (ref 3.5–5.3)
POTASSIUM SERPL-MCNC: 3.6 MMOL/L — SIGNIFICANT CHANGE UP (ref 3.5–5.3)
POTASSIUM SERPL-MCNC: 4 MMOL/L — SIGNIFICANT CHANGE UP (ref 3.5–5.3)
POTASSIUM SERPL-SCNC: 3.1 MMOL/L — LOW (ref 3.5–5.3)
POTASSIUM SERPL-SCNC: 3.4 MMOL/L — LOW (ref 3.5–5.3)
POTASSIUM SERPL-SCNC: 3.6 MMOL/L — SIGNIFICANT CHANGE UP (ref 3.5–5.3)
POTASSIUM SERPL-SCNC: 4 MMOL/L — SIGNIFICANT CHANGE UP (ref 3.5–5.3)
RBC # BLD: 4.05 M/UL — LOW (ref 4.2–5.8)
RBC # FLD: 13.3 % — SIGNIFICANT CHANGE UP (ref 10.3–14.5)
SODIUM SERPL-SCNC: 123 MMOL/L — LOW (ref 135–145)
SODIUM SERPL-SCNC: 125 MMOL/L — LOW (ref 135–145)
SODIUM SERPL-SCNC: 126 MMOL/L — LOW (ref 135–145)
SODIUM SERPL-SCNC: 129 MMOL/L — LOW (ref 135–145)
SPECIMEN SOURCE: SIGNIFICANT CHANGE UP
SPECIMEN SOURCE: SIGNIFICANT CHANGE UP
WBC # BLD: 3.18 K/UL — LOW (ref 3.8–10.5)
WBC # FLD AUTO: 3.18 K/UL — LOW (ref 3.8–10.5)

## 2018-06-01 PROCEDURE — 99233 SBSQ HOSP IP/OBS HIGH 50: CPT

## 2018-06-01 RX ORDER — SODIUM CHLORIDE 9 MG/ML
1000 INJECTION, SOLUTION INTRAVENOUS
Qty: 0 | Refills: 0 | Status: DISCONTINUED | OUTPATIENT
Start: 2018-06-01 | End: 2018-06-03

## 2018-06-01 RX ORDER — POTASSIUM PHOSPHATE, MONOBASIC POTASSIUM PHOSPHATE, DIBASIC 236; 224 MG/ML; MG/ML
15 INJECTION, SOLUTION INTRAVENOUS ONCE
Qty: 0 | Refills: 0 | Status: COMPLETED | OUTPATIENT
Start: 2018-06-01 | End: 2018-06-01

## 2018-06-01 RX ORDER — POTASSIUM CHLORIDE 20 MEQ
10 PACKET (EA) ORAL
Qty: 0 | Refills: 0 | Status: COMPLETED | OUTPATIENT
Start: 2018-06-01 | End: 2018-06-01

## 2018-06-01 RX ORDER — SODIUM CHLORIDE 9 MG/ML
1000 INJECTION INTRAMUSCULAR; INTRAVENOUS; SUBCUTANEOUS
Qty: 0 | Refills: 0 | Status: DISCONTINUED | OUTPATIENT
Start: 2018-06-01 | End: 2018-06-01

## 2018-06-01 RX ORDER — POTASSIUM CHLORIDE 20 MEQ
40 PACKET (EA) ORAL ONCE
Qty: 0 | Refills: 0 | Status: COMPLETED | OUTPATIENT
Start: 2018-06-01 | End: 2018-06-01

## 2018-06-01 RX ADMIN — SIMVASTATIN 20 MILLIGRAM(S): 20 TABLET, FILM COATED ORAL at 22:08

## 2018-06-01 RX ADMIN — Medication 100 MILLIEQUIVALENT(S): at 02:06

## 2018-06-01 RX ADMIN — POTASSIUM PHOSPHATE, MONOBASIC POTASSIUM PHOSPHATE, DIBASIC 62.5 MILLIMOLE(S): 236; 224 INJECTION, SOLUTION INTRAVENOUS at 05:41

## 2018-06-01 RX ADMIN — Medication 40 MILLIEQUIVALENT(S): at 01:54

## 2018-06-01 RX ADMIN — Medication 100 MILLIGRAM(S): at 05:41

## 2018-06-01 RX ADMIN — HEPARIN SODIUM 5000 UNIT(S): 5000 INJECTION INTRAVENOUS; SUBCUTANEOUS at 11:19

## 2018-06-01 RX ADMIN — POTASSIUM PHOSPHATE, MONOBASIC POTASSIUM PHOSPHATE, DIBASIC 62.5 MILLIMOLE(S): 236; 224 INJECTION, SOLUTION INTRAVENOUS at 22:09

## 2018-06-01 RX ADMIN — Medication 2: at 12:53

## 2018-06-01 RX ADMIN — TAMSULOSIN HYDROCHLORIDE 0.4 MILLIGRAM(S): 0.4 CAPSULE ORAL at 22:16

## 2018-06-01 RX ADMIN — Medication 81 MILLIGRAM(S): at 11:19

## 2018-06-01 RX ADMIN — Medication 100 MILLIEQUIVALENT(S): at 03:10

## 2018-06-01 RX ADMIN — Medication 200 MILLIGRAM(S): at 11:20

## 2018-06-01 RX ADMIN — Medication 1 TABLET(S): at 09:43

## 2018-06-01 RX ADMIN — Medication 100 MILLIEQUIVALENT(S): at 04:12

## 2018-06-01 RX ADMIN — SODIUM CHLORIDE 70 MILLILITER(S): 9 INJECTION INTRAMUSCULAR; INTRAVENOUS; SUBCUTANEOUS at 01:54

## 2018-06-01 RX ADMIN — HEPARIN SODIUM 5000 UNIT(S): 5000 INJECTION INTRAVENOUS; SUBCUTANEOUS at 05:42

## 2018-06-01 RX ADMIN — HEPARIN SODIUM 5000 UNIT(S): 5000 INJECTION INTRAVENOUS; SUBCUTANEOUS at 22:08

## 2018-06-01 RX ADMIN — Medication 1 TABLET(S): at 17:55

## 2018-06-01 RX ADMIN — SODIUM CHLORIDE 75 MILLILITER(S): 9 INJECTION, SOLUTION INTRAVENOUS at 13:43

## 2018-06-01 RX ADMIN — Medication 1 TABLET(S): at 11:20

## 2018-06-01 NOTE — PROGRESS NOTE ADULT - SUBJECTIVE AND OBJECTIVE BOX
Patient is a 68y old  Male who presents with a chief complaint of abdominal pain, diarrhea. 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   - CT: Mild colitis and terminal ileitis  - Started on Cipro/Flagyl  - Blood Cx, stool Cx, O&P sent. C. Diff negative  - Continue with aggressive repletion of electrolytes  Overnight Events: Continued repletion of electrolytes overnight  Subjective: Continues to have numerous episodes of non-bloody diarrhea. No f/c, no n/v. No other concerns.      PHYSICAL EXAM:  Vitals: T(F): 98.4  HR: 97  BP: 110/62  RR: 18  SpO2: 98% on RA  GENERAL: NAD, elderly   HEAD:  Atraumatic, Normocephalic  EYES: EOMI, PERRLA, conjunctiva and sclera clear  CHEST/LUNG: Clear to auscultation bilaterally; No wheeze  HEART: Regular rate and rhythm; 2/6 systolic murmur at upper sternal border.  ABDOMEN: Soft, Nontender, Nondistended; Bowel sounds present  EXTREMITIES: WWP, no edema  PSYCH: Alert, oriented, pleasant   NEUROLOGY: non-focal  SKIN: No rashes or lesions    LABS:  CAPILLARY BLOOD GLUCOSE      POCT Blood Glucose.: 200 mg/dL (31 May 2018 22:09)  POCT Blood Glucose.: 107 mg/dL (31 May 2018 17:53)  POCT Blood Glucose.: 118 mg/dL (31 May 2018 12:30)    I&O's Summary    31 May 2018 07:01  -  2018 07:00  --------------------------------------------------------  IN: 0 mL / OUT: 1000 mL / NET: -1000 mL                              11.1   1.93  )-----------( 181      ( 31 May 2018 02:20 )             32.0     WBC Trend: 1.93<--, 4.13<--      123<L>  |  92<L>  |  16  ----------------------------<  153<H>  3.1<L>   |  16<L>  |  1.12    Ca    7.6<L>      2018 00:20  Phos  1.9     06-01  Mg     2.2     06-01    TPro  5.7<L>  /  Alb  3.1<L>  /  TBili  0.2  /  DBili  x   /  AST  30  /  ALT  18  /  AlkPhos  66  05-    Creatinine Trend: 1.12<--, 1.35<--, 2.28<--, 3.42<--        Urinalysis Basic - ( 30 May 2018 17:19 )    Color: YELLOW / Appearance: HAZY / S.011 / pH: 6.0  Gluc: NEGATIVE / Ketone: NEGATIVE  / Bili: NEGATIVE / Urobili: NORMAL mg/dL   Blood: TRACE / Protein: 100 mg/dL / Nitrite: NEGATIVE   Leuk Esterase: NEGATIVE / RBC: 0-2 / WBC 5-10   Sq Epi: OCC / Non Sq Epi: x / Bacteria: x            MEDICATIONS  (STANDING):  aspirin enteric coated 81 milliGRAM(s) Oral daily  ciprofloxacin   IVPB 400 milliGRAM(s) IV Intermittent every 24 hours  dextrose 5%. 1000 milliLiter(s) (50 mL/Hr) IV Continuous <Continuous>  dextrose 50% Injectable 12.5 Gram(s) IV Push once  dextrose 50% Injectable 25 Gram(s) IV Push once  heparin  Injectable 5000 Unit(s) SubCutaneous every 8 hours  insulin lispro (HumaLOG) corrective regimen sliding scale   SubCutaneous three times a day before meals  insulin lispro (HumaLOG) corrective regimen sliding scale   SubCutaneous at bedtime  metroNIDAZOLE  IVPB 500 milliGRAM(s) IV Intermittent every 8 hours  potassium acid phosphate/sodium acid phosphate tablet (K-PHOS No. 2) 1 Tablet(s) Oral three times a day with meals  simvastatin 20 milliGRAM(s) Oral at bedtime  sodium chloride 0.9%. 1000 milliLiter(s) (70 mL/Hr) IV Continuous <Continuous>  tamsulosin 0.4 milliGRAM(s) Oral at bedtime    MEDICATIONS  (PRN):  acetaminophen   Tablet 650 milliGRAM(s) Oral every 6 hours PRN For Temp greater than 38 C (100.4 F)  dextrose 40% Gel 15 Gram(s) Oral once PRN Blood Glucose LESS THAN 70 milliGRAM(s)/deciliter  glucagon  Injectable 1 milliGRAM(s) IntraMuscular once PRN Glucose LESS THAN 70 milligrams/deciliter  morphine  - Injectable 2 milliGRAM(s) IV Push every 4 hours PRN moderate and severe pain Major depression

## 2018-06-01 NOTE — PROGRESS NOTE ADULT - ASSESSMENT
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

## 2018-06-01 NOTE — PROGRESS NOTE ADULT - PROBLEM SELECTOR PLAN 3
- Elevated lipase in setting of abd pain and diarrhea.   - CT review w/ radiology service    - no evidence of pancreatitis on scan.   - History and exam seem more consistent with colitis.   - No hypertriglyceridemia on lipid panel

## 2018-06-01 NOTE — PROGRESS NOTE ADULT - PROBLEM SELECTOR PLAN 1
- 3 days abd pain, diarrhea, now colitis shown on CT, likely infectious.   - C. diff negative  - c/w cipro and flagyl, pending stool cultures, O&P, GI PCR - 3 days abd pain, diarrhea, now colitis shown on CT, likely infectious.   - C. diff negative     - d/c flagyl  - c/w cipro

## 2018-06-02 LAB
BUN SERPL-MCNC: 6 MG/DL — LOW (ref 7–23)
BUN SERPL-MCNC: 8 MG/DL — SIGNIFICANT CHANGE UP (ref 7–23)
CALCIUM SERPL-MCNC: 7.9 MG/DL — LOW (ref 8.4–10.5)
CALCIUM SERPL-MCNC: 7.9 MG/DL — LOW (ref 8.4–10.5)
CHLORIDE SERPL-SCNC: 97 MMOL/L — LOW (ref 98–107)
CHLORIDE SERPL-SCNC: 98 MMOL/L — SIGNIFICANT CHANGE UP (ref 98–107)
CO2 SERPL-SCNC: 19 MMOL/L — LOW (ref 22–31)
CO2 SERPL-SCNC: 19 MMOL/L — LOW (ref 22–31)
CREAT SERPL-MCNC: 0.78 MG/DL — SIGNIFICANT CHANGE UP (ref 0.5–1.3)
CREAT SERPL-MCNC: 0.84 MG/DL — SIGNIFICANT CHANGE UP (ref 0.5–1.3)
GLUCOSE SERPL-MCNC: 115 MG/DL — HIGH (ref 70–99)
GLUCOSE SERPL-MCNC: 190 MG/DL — HIGH (ref 70–99)
HCT VFR BLD CALC: 30.1 % — LOW (ref 39–50)
HGB BLD-MCNC: 10.9 G/DL — LOW (ref 13–17)
MAGNESIUM SERPL-MCNC: 1.3 MG/DL — LOW (ref 1.6–2.6)
MAGNESIUM SERPL-MCNC: 1.5 MG/DL — LOW (ref 1.6–2.6)
MCHC RBC-ENTMCNC: 27.7 PG — SIGNIFICANT CHANGE UP (ref 27–34)
MCHC RBC-ENTMCNC: 36.2 % — HIGH (ref 32–36)
MCV RBC AUTO: 76.6 FL — LOW (ref 80–100)
NRBC # FLD: 0 — SIGNIFICANT CHANGE UP
PHOSPHATE SERPL-MCNC: 2.1 MG/DL — LOW (ref 2.5–4.5)
PHOSPHATE SERPL-MCNC: 2.9 MG/DL — SIGNIFICANT CHANGE UP (ref 2.5–4.5)
PLATELET # BLD AUTO: 192 K/UL — SIGNIFICANT CHANGE UP (ref 150–400)
PMV BLD: 10.5 FL — SIGNIFICANT CHANGE UP (ref 7–13)
POTASSIUM SERPL-MCNC: 3.5 MMOL/L — SIGNIFICANT CHANGE UP (ref 3.5–5.3)
POTASSIUM SERPL-MCNC: 3.6 MMOL/L — SIGNIFICANT CHANGE UP (ref 3.5–5.3)
POTASSIUM SERPL-SCNC: 3.5 MMOL/L — SIGNIFICANT CHANGE UP (ref 3.5–5.3)
POTASSIUM SERPL-SCNC: 3.6 MMOL/L — SIGNIFICANT CHANGE UP (ref 3.5–5.3)
RBC # BLD: 3.93 M/UL — LOW (ref 4.2–5.8)
RBC # FLD: 13.5 % — SIGNIFICANT CHANGE UP (ref 10.3–14.5)
SODIUM SERPL-SCNC: 131 MMOL/L — LOW (ref 135–145)
SODIUM SERPL-SCNC: 131 MMOL/L — LOW (ref 135–145)
SPECIMEN SOURCE: SIGNIFICANT CHANGE UP
WBC # BLD: 3.06 K/UL — LOW (ref 3.8–10.5)
WBC # FLD AUTO: 3.06 K/UL — LOW (ref 3.8–10.5)

## 2018-06-02 PROCEDURE — 99233 SBSQ HOSP IP/OBS HIGH 50: CPT

## 2018-06-02 RX ORDER — LACTOBACILLUS ACIDOPHILUS 100MM CELL
1 CAPSULE ORAL
Qty: 0 | Refills: 0 | Status: DISCONTINUED | OUTPATIENT
Start: 2018-06-02 | End: 2018-06-03

## 2018-06-02 RX ORDER — LOPERAMIDE HCL 2 MG
2 TABLET ORAL ONCE
Qty: 0 | Refills: 0 | Status: COMPLETED | OUTPATIENT
Start: 2018-06-02 | End: 2018-06-02

## 2018-06-02 RX ORDER — SODIUM,POTASSIUM PHOSPHATES 278-250MG
1 POWDER IN PACKET (EA) ORAL
Qty: 0 | Refills: 0 | Status: COMPLETED | OUTPATIENT
Start: 2018-06-02 | End: 2018-06-02

## 2018-06-02 RX ORDER — MAGNESIUM SULFATE 500 MG/ML
2 VIAL (ML) INJECTION ONCE
Qty: 0 | Refills: 0 | Status: COMPLETED | OUTPATIENT
Start: 2018-06-02 | End: 2018-06-02

## 2018-06-02 RX ORDER — MAGNESIUM SULFATE 500 MG/ML
1 VIAL (ML) INJECTION ONCE
Qty: 0 | Refills: 0 | Status: COMPLETED | OUTPATIENT
Start: 2018-06-02 | End: 2018-06-02

## 2018-06-02 RX ORDER — POTASSIUM CHLORIDE 20 MEQ
20 PACKET (EA) ORAL ONCE
Qty: 0 | Refills: 0 | Status: COMPLETED | OUTPATIENT
Start: 2018-06-02 | End: 2018-06-02

## 2018-06-02 RX ADMIN — Medication 1 TABLET(S): at 17:40

## 2018-06-02 RX ADMIN — Medication 1: at 12:26

## 2018-06-02 RX ADMIN — SIMVASTATIN 20 MILLIGRAM(S): 20 TABLET, FILM COATED ORAL at 22:21

## 2018-06-02 RX ADMIN — Medication 20 MILLIEQUIVALENT(S): at 01:55

## 2018-06-02 RX ADMIN — Medication 50 GRAM(S): at 01:54

## 2018-06-02 RX ADMIN — HEPARIN SODIUM 5000 UNIT(S): 5000 INJECTION INTRAVENOUS; SUBCUTANEOUS at 13:26

## 2018-06-02 RX ADMIN — SODIUM CHLORIDE 75 MILLILITER(S): 9 INJECTION, SOLUTION INTRAVENOUS at 12:25

## 2018-06-02 RX ADMIN — Medication 1 TABLET(S): at 22:21

## 2018-06-02 RX ADMIN — Medication 100 GRAM(S): at 12:26

## 2018-06-02 RX ADMIN — Medication 2 MILLIGRAM(S): at 08:35

## 2018-06-02 RX ADMIN — TAMSULOSIN HYDROCHLORIDE 0.4 MILLIGRAM(S): 0.4 CAPSULE ORAL at 22:19

## 2018-06-02 RX ADMIN — HEPARIN SODIUM 5000 UNIT(S): 5000 INJECTION INTRAVENOUS; SUBCUTANEOUS at 22:19

## 2018-06-02 RX ADMIN — Medication 200 MILLIGRAM(S): at 13:25

## 2018-06-02 RX ADMIN — Medication 1 TABLET(S): at 08:35

## 2018-06-02 RX ADMIN — Medication 81 MILLIGRAM(S): at 12:27

## 2018-06-02 RX ADMIN — HEPARIN SODIUM 5000 UNIT(S): 5000 INJECTION INTRAVENOUS; SUBCUTANEOUS at 05:39

## 2018-06-02 RX ADMIN — SODIUM CHLORIDE 50 MILLILITER(S): 9 INJECTION, SOLUTION INTRAVENOUS at 08:37

## 2018-06-02 RX ADMIN — Medication 1 TABLET(S): at 12:26

## 2018-06-02 NOTE — PROGRESS NOTE ADULT - PROBLEM SELECTOR PLAN 1
- 4 days abd pain, diarrhea, now colitis shown on CT, likely infectious.   - C. diff negative     - d/c flagyl  - c/w cipro  - Given one dose of loperamide today.   - F/u stool studies.

## 2018-06-02 NOTE — PROGRESS NOTE ADULT - SUBJECTIVE AND OBJECTIVE BOX
Patient is a 68y old  Male who presents with a chief complaint of abdominal pain, diarrhea. (31 May 2018 01:47)      SUBJECTIVE / OVERNIGHT EVENTS: Pt states that he is still having BMs every few hours that are small in volumes.    MEDICATIONS  (STANDING):  aspirin enteric coated 81 milliGRAM(s) Oral daily  ciprofloxacin   IVPB 400 milliGRAM(s) IV Intermittent every 24 hours  dextrose 5%. 1000 milliLiter(s) (50 mL/Hr) IV Continuous <Continuous>  dextrose 50% Injectable 12.5 Gram(s) IV Push once  dextrose 50% Injectable 25 Gram(s) IV Push once  heparin  Injectable 5000 Unit(s) SubCutaneous every 8 hours  insulin lispro (HumaLOG) corrective regimen sliding scale   SubCutaneous three times a day before meals  insulin lispro (HumaLOG) corrective regimen sliding scale   SubCutaneous at bedtime  lactated ringers. 1000 milliLiter(s) (50 mL/Hr) IV Continuous <Continuous>  magnesium sulfate  IVPB 1 Gram(s) IV Intermittent once  potassium acid phosphate/sodium acid phosphate tablet (K-PHOS No. 2) 1 Tablet(s) Oral four times a day with meals  simvastatin 20 milliGRAM(s) Oral at bedtime  tamsulosin 0.4 milliGRAM(s) Oral at bedtime    MEDICATIONS  (PRN):  acetaminophen   Tablet 650 milliGRAM(s) Oral every 6 hours PRN For Temp greater than 38 C (100.4 F)  dextrose 40% Gel 15 Gram(s) Oral once PRN Blood Glucose LESS THAN 70 milliGRAM(s)/deciliter  glucagon  Injectable 1 milliGRAM(s) IntraMuscular once PRN Glucose LESS THAN 70 milligrams/deciliter  morphine  - Injectable 2 milliGRAM(s) IV Push every 4 hours PRN moderate and severe pain      T(C): 36.9 (06-02-18 @ 05:37), Max: 37.1 (06-01-18 @ 21:44)  HR: 88 (06-02-18 @ 05:37) (75 - 94)  BP: 110/60 (06-02-18 @ 05:37) (99/58 - 129/86)  RR: 18 (06-02-18 @ 05:37) (18 - 19)  SpO2: 100% (06-02-18 @ 05:37) (99% - 100%)  CAPILLARY BLOOD GLUCOSE      POCT Blood Glucose.: 162 mg/dL (02 Jun 2018 12:09)  POCT Blood Glucose.: 120 mg/dL (02 Jun 2018 08:19)  POCT Blood Glucose.: 203 mg/dL (01 Jun 2018 21:43)  POCT Blood Glucose.: 128 mg/dL (01 Jun 2018 17:50)  POCT Blood Glucose.: 236 mg/dL (01 Jun 2018 12:35)    I&O's Summary      PHYSICAL EXAM:  Vitals: T(F): 98.4  HR: 97  BP: 110/62  RR: 18  SpO2: 98% on RA  GENERAL: NAD, elderly   HEAD:  Atraumatic, Normocephalic  EYES: EOMI, PERRLA, conjunctiva and sclera clear  CHEST/LUNG: Clear to auscultation bilaterally; No wheeze  HEART: Regular rate and rhythm; 2/6 systolic murmur at upper sternal border.  ABDOMEN: Soft, Nontender, Nondistended; Bowel sounds present  EXTREMITIES: WWP, no edema  PSYCH: Alert, oriented, pleasant   NEUROLOGY: non-focal  SKIN: No rashes or lesions    LABS:  (06-02 @ 05:04)                        10.9  3.06 )-----------( 192                 30.1    Neutrophils = -- (--%)  Lymphocytes = -- (--%)  Eosinophils = -- (--%)  Basophils = -- (--%)  Monocytes = -- (--%)  Bands = --%    WBC Trend: 3.06<--, 3.18<--, 1.93<--  Hb Trend: 10.9<--, 10.9<--, 11.1<--, 13.3<--  Plt Trend: 192<--, 206<--, 181<--, 193<--  06-02    131<L>  |  98  |  6<L>  ----------------------------<  190<H>  3.5   |  19<L>  |  0.78    Ca    7.9<L>      02 Jun 2018 10:20  Phos  2.1     06-02  Mg     1.5     06-02      Creatinine Trend: 0.78<--, 0.84<--, 0.90<--, 0.91<--, 1.03<--, 1.12<--

## 2018-06-03 ENCOUNTER — TRANSCRIPTION ENCOUNTER (OUTPATIENT)
Age: 68
End: 2018-06-03

## 2018-06-03 VITALS
HEART RATE: 83 BPM | OXYGEN SATURATION: 98 % | SYSTOLIC BLOOD PRESSURE: 116 MMHG | TEMPERATURE: 99 F | DIASTOLIC BLOOD PRESSURE: 71 MMHG | RESPIRATION RATE: 18 BRPM

## 2018-06-03 LAB
ALBUMIN SERPL ELPH-MCNC: 2.8 G/DL — LOW (ref 3.3–5)
ALP SERPL-CCNC: 97 U/L — SIGNIFICANT CHANGE UP (ref 40–120)
ALT FLD-CCNC: 22 U/L — SIGNIFICANT CHANGE UP (ref 4–41)
AST SERPL-CCNC: 32 U/L — SIGNIFICANT CHANGE UP (ref 4–40)
BASOPHILS # BLD AUTO: 0.01 K/UL — SIGNIFICANT CHANGE UP (ref 0–0.2)
BASOPHILS NFR BLD AUTO: 0.3 % — SIGNIFICANT CHANGE UP (ref 0–2)
BILIRUB SERPL-MCNC: < 0.2 MG/DL — LOW (ref 0.2–1.2)
BUN SERPL-MCNC: 4 MG/DL — LOW (ref 7–23)
CALCIUM SERPL-MCNC: 8.3 MG/DL — LOW (ref 8.4–10.5)
CHLORIDE SERPL-SCNC: 102 MMOL/L — SIGNIFICANT CHANGE UP (ref 98–107)
CO2 SERPL-SCNC: 21 MMOL/L — LOW (ref 22–31)
CREAT SERPL-MCNC: 0.79 MG/DL — SIGNIFICANT CHANGE UP (ref 0.5–1.3)
EOSINOPHIL # BLD AUTO: 0.17 K/UL — SIGNIFICANT CHANGE UP (ref 0–0.5)
EOSINOPHIL NFR BLD AUTO: 5.2 % — SIGNIFICANT CHANGE UP (ref 0–6)
GI PCR PANEL, STOOL: SIGNIFICANT CHANGE UP
GLUCOSE SERPL-MCNC: 112 MG/DL — HIGH (ref 70–99)
HCT VFR BLD CALC: 29.7 % — LOW (ref 39–50)
HGB BLD-MCNC: 10.4 G/DL — LOW (ref 13–17)
IMM GRANULOCYTES # BLD AUTO: 0.05 # — SIGNIFICANT CHANGE UP
IMM GRANULOCYTES NFR BLD AUTO: 1.5 % — SIGNIFICANT CHANGE UP (ref 0–1.5)
LYMPHOCYTES # BLD AUTO: 0.65 K/UL — LOW (ref 1–3.3)
LYMPHOCYTES # BLD AUTO: 19.9 % — SIGNIFICANT CHANGE UP (ref 13–44)
MAGNESIUM SERPL-MCNC: 1.3 MG/DL — LOW (ref 1.6–2.6)
MCHC RBC-ENTMCNC: 27.9 PG — SIGNIFICANT CHANGE UP (ref 27–34)
MCHC RBC-ENTMCNC: 35 % — SIGNIFICANT CHANGE UP (ref 32–36)
MCV RBC AUTO: 79.6 FL — LOW (ref 80–100)
MONOCYTES # BLD AUTO: 0.56 K/UL — SIGNIFICANT CHANGE UP (ref 0–0.9)
MONOCYTES NFR BLD AUTO: 17.2 % — HIGH (ref 2–14)
NEUTROPHILS # BLD AUTO: 1.82 K/UL — SIGNIFICANT CHANGE UP (ref 1.8–7.4)
NEUTROPHILS NFR BLD AUTO: 55.9 % — SIGNIFICANT CHANGE UP (ref 43–77)
NRBC # FLD: 0 — SIGNIFICANT CHANGE UP
PHOSPHATE SERPL-MCNC: 3.1 MG/DL — SIGNIFICANT CHANGE UP (ref 2.5–4.5)
PLATELET # BLD AUTO: 211 K/UL — SIGNIFICANT CHANGE UP (ref 150–400)
PMV BLD: 9.9 FL — SIGNIFICANT CHANGE UP (ref 7–13)
POTASSIUM SERPL-MCNC: 3.5 MMOL/L — SIGNIFICANT CHANGE UP (ref 3.5–5.3)
POTASSIUM SERPL-SCNC: 3.5 MMOL/L — SIGNIFICANT CHANGE UP (ref 3.5–5.3)
PROT SERPL-MCNC: 5.3 G/DL — LOW (ref 6–8.3)
RBC # BLD: 3.73 M/UL — LOW (ref 4.2–5.8)
RBC # FLD: 13.7 % — SIGNIFICANT CHANGE UP (ref 10.3–14.5)
SODIUM SERPL-SCNC: 136 MMOL/L — SIGNIFICANT CHANGE UP (ref 135–145)
SPECIMEN SOURCE: SIGNIFICANT CHANGE UP
WBC # BLD: 3.26 K/UL — LOW (ref 3.8–10.5)
WBC # FLD AUTO: 3.26 K/UL — LOW (ref 3.8–10.5)

## 2018-06-03 PROCEDURE — 99239 HOSP IP/OBS DSCHRG MGMT >30: CPT

## 2018-06-03 RX ORDER — MAGNESIUM SULFATE 500 MG/ML
1 VIAL (ML) INJECTION ONCE
Qty: 0 | Refills: 0 | Status: COMPLETED | OUTPATIENT
Start: 2018-06-03 | End: 2018-06-03

## 2018-06-03 RX ADMIN — Medication 1 TABLET(S): at 09:16

## 2018-06-03 RX ADMIN — SODIUM CHLORIDE 75 MILLILITER(S): 9 INJECTION, SOLUTION INTRAVENOUS at 09:17

## 2018-06-03 RX ADMIN — SODIUM CHLORIDE 75 MILLILITER(S): 9 INJECTION, SOLUTION INTRAVENOUS at 06:25

## 2018-06-03 RX ADMIN — Medication 1: at 13:15

## 2018-06-03 RX ADMIN — HEPARIN SODIUM 5000 UNIT(S): 5000 INJECTION INTRAVENOUS; SUBCUTANEOUS at 06:24

## 2018-06-03 RX ADMIN — Medication 81 MILLIGRAM(S): at 13:16

## 2018-06-03 RX ADMIN — Medication 1 TABLET(S): at 13:16

## 2018-06-03 RX ADMIN — Medication 100 GRAM(S): at 13:16

## 2018-06-03 NOTE — DISCHARGE NOTE ADULT - MEDICATION SUMMARY - MEDICATIONS TO TAKE
I will START or STAY ON the medications listed below when I get home from the hospital:    aspirin 81 mg oral delayed release tablet  -- 1 tab(s) by mouth once a day  -- Indication: For Primary Prevention    losartan 50 mg oral tablet  -- 1 tab(s) by mouth once a day  -- Indication: For Hypertension    tamsulosin 0.4 mg oral capsule  -- 1 cap(s) by mouth once a day  -- Indication: For Benign Prostatic Hyperplasia    metFORMIN 1000 mg oral tablet  -- 1 tab(s) by mouth 2 times a day  -- Indication: For Diabetes    Tradjenta 5 mg oral tablet  -- 1 tab(s) by mouth once a day  -- Indication: For Diabetes    simvastatin 20 mg oral tablet  -- 1 tab(s) by mouth once a day (at bedtime)  -- Indication: For Hyperlipidemia    amLODIPine 5 mg oral tablet  -- 1 tab(s) by mouth once a day  -- Indication: For Hypertension    hydroCHLOROthiazide 25 mg oral tablet  -- 1 tab(s) by mouth once a day  -- Indication: For Hypertension    famotidine 20 mg oral tablet  -- 1 tab(s) by mouth 2 times a day  -- Indication: For Gastritis    omeprazole 40 mg oral delayed release capsule  -- 1 cap(s) by mouth 2 times a day  -- Indication: For Gastritis

## 2018-06-03 NOTE — DISCHARGE NOTE ADULT - OTHER SIGNIFICANT FINDINGS
CT Abdomen and Pelvis w/ IV Cont (05.30.18 @ 19:40)   IMPRESSION:  Mild colitis and terminal ileitis of indeterminate cause. Inflammatory etiology is considered.

## 2018-06-03 NOTE — PROGRESS NOTE ADULT - PROBLEM SELECTOR PLAN 5
- hyponatremia in setting of diarrhea and poor PO intake.      - s/p 2L ns bolus in ED and on maintenance fluids.      - Gentle IVF, trend BMP q 24 now     - Adjust fluids accordingly
- hyponatremia in setting of diarrhea and poor PO intake.      - s/p 2L ns bolus in ED and was on maintenance fluids.      - Gentle IVF, trend BMP q 24 now     - Adjust fluids accordingly
- hyponatremia in setting of diarrhea and poor PO intake.      - s/p 2L ns bolus in ED and was on maintenance fluids.      - d/c for now and recheck BMP to ensure sodium isn't rising too fast.      - Will adjust fluids accordingly
- hyponatremia in setting of diarrhea and poor PO intake.      - s/p 2L ns bolus in ED and was on maintenance fluids.      - Gentle IVF, trend BMP     - Adjust fluids accordingly

## 2018-06-03 NOTE — PROGRESS NOTE ADULT - PROBLEM SELECTOR PROBLEM 2
HEIDI (acute kidney injury)

## 2018-06-03 NOTE — DISCHARGE NOTE ADULT - CARE PROVIDER_API CALL
Eliza Sandoval (AMBAR), Geriatric Medicine; Internal Medicine  [address]  [place] [address]  Phone: [phone]  Fax: [phone]

## 2018-06-03 NOTE — DISCHARGE NOTE ADULT - HOSPITAL COURSE
Mr. 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 He states that he started having diffuse abdominal pain about 3 days ago, very severe in most quadrants, but worse in umblical area. He also had one episode of NBNB vomit. CT of the abdomen demonstrated colitis. He was also found to be hyponatremic and with an HEIDI, creatinine 3.4. He was aggressively hydrated and started on cipro/flagyl. Stool culture and C. diff PCR were negative. His diarrhea resolved over 4 days and his creatinine fell to 0.79. Antibiotics were discontinued and he was discharged in stable condition. Mr. 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 He states that he started having diffuse abdominal pain about 3 days ago, very severe in most quadrants, but worse in umblical area. He also had one episode of NBNB vomit. CT of the abdomen demonstrated colitis. He was also found to be hyponatremic and with an HEIDI, creatinine 3.4. He was aggressively hydrated and started on cipro/flagyl. Stool culture and C. diff PCR were negative. Salmonella was found of GI PCR. His diarrhea resolved over 4 days and his creatinine fell to 0.79. Antibiotics were discontinued and he was discharged in stable condition.

## 2018-06-03 NOTE — DISCHARGE NOTE ADULT - CONDITIONS AT DISCHARGE
Patient is alert and orientedx4 able to make needs known, self care. Patient being discharged home. patient in no acute distress at the moment. patient is [place] but no hearing aids, skin intact.

## 2018-06-03 NOTE — PROGRESS NOTE ADULT - PROBLEM SELECTOR PROBLEM 7
BPH (benign prostatic hyperplasia)

## 2018-06-03 NOTE — PROGRESS NOTE ADULT - PROBLEM SELECTOR PLAN 6
- pt with metabolic acidosis multifactorial with acute renal failure and diarrhea.   will continue to monitor.

## 2018-06-03 NOTE — DISCHARGE NOTE ADULT - PLAN OF CARE
Follow-up with your primary care physician You were admitted to [facility] for concern of diarrhea. You were found to have a condition known as Colitis. This is an inflammation of the colon and can lead to diarrhea as in your case. This is often due to infection. You were started on an antibiotic and received supportive care with IV fluids. Your electrolytes (blood salts) were out of balance from your diarrhea, and this was aggressively managed in the hospital. With resolution of your diarrhea, you were discharged in stable condition. Please follow-up with your primary care physician within the next month. As a result of your diarrhea, you became dehydrated. This lead to dysfunction of your kidneys known as an "acute kidney injury." With aggressive hydration, your kidney function returned to normal. Please follow-up with you primary care physician to assure that your kidneys remain healthy. You were admitted to [facility] for concern of diarrhea. You were found to have a condition known as Colitis and salmonella on GI PCR. This is an inflammation of the colon and can lead to diarrhea as in your case. This is often due to infection. You were treated with 5 days of an appropriate antibiotic and received supportive care with IV fluids. Your electrolytes (blood salts) were out of balance from your diarrhea, and this was aggressively managed in the hospital. With resolution of your diarrhea, you were discharged in stable condition. Please follow-up with your primary care physician within the next month. Please return to the hospital if you develop perfuse diarrhea or persistent fevers.

## 2018-06-03 NOTE — PROGRESS NOTE ADULT - ATTENDING COMMENTS
Stool culture with salmonella. Patient improved with ciprofloxacin. Will complete a 5 day course given response to treatment.    Stools more formed - 1 this morning. Afebrile. Will d/c home with oral antibiotics and f/u with PCP.    40 min spent in coordinating d/c of this patient

## 2018-06-03 NOTE — DISCHARGE NOTE ADULT - PATIENT PORTAL LINK FT
You can access the [facility] Patient Portal, offered by [facility], by registering with the following website: [email or web address]

## 2018-06-03 NOTE — PROGRESS NOTE ADULT - PROBLEM SELECTOR PLAN 1
- 4 days abd pain, diarrhea, now colitis shown on CT, likely infectious.   - C. diff negative  - c/w cipro  - s/p loperamide x1 yesterday.   - F/u stool studies.

## 2018-06-03 NOTE — PROGRESS NOTE ADULT - SUBJECTIVE AND OBJECTIVE BOX
Patient is a 68y old  Male who presents with a chief complaint of abdominal pain, diarrhea. 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   - CT: Mild colitis and terminal ileitis  - Started on Cipro/Flagyl  - Blood Cx, stool Cx, O&P sent. C. Diff negative  - Continued aggressive repletion of electrolytes  Overnight Events: No acute events. Started of immodium  Subjective: Continues to have non-bloody diarrhea but less frequently. No f/c, no n/v. No other concerns.    PHYSICAL EXAM:  Vitals: T(F): 98.2  HR: 88  BP: 118/72  RR: 18  SpO2: 100% on RA  GENERAL: NAD, elderly   HEAD:  Atraumatic, Normocephalic  EYES: EOMI, PERRLA, conjunctiva and sclera clear  CHEST/LUNG: Clear to auscultation bilaterally; No wheeze  HEART: Regular rate and rhythm; 2/6 systolic murmur at upper sternal border.  ABDOMEN: Soft, Nontender, Nondistended; Bowel sounds present  EXTREMITIES: WWP, no edema  PSYCH: Alert, oriented, pleasant   NEUROLOGY: non-focal  SKIN: No rashes or lesions    LABS:  CAPILLARY BLOOD GLUCOSE      POCT Blood Glucose.: 118 mg/dL (02 Jun 2018 22:04)  POCT Blood Glucose.: 101 mg/dL (02 Jun 2018 17:36)  POCT Blood Glucose.: 162 mg/dL (02 Jun 2018 12:09)  POCT Blood Glucose.: 120 mg/dL (02 Jun 2018 08:19)    I&O's Summary    02 Jun 2018 07:01  -  03 Jun 2018 07:00  --------------------------------------------------------  IN: 300 mL / OUT: 350 mL / NET: -50 mL                              10.4   3.26  )-----------( 211      ( 03 Jun 2018 05:55 )             29.7     WBC Trend: 3.26<--, 3.06<--, 3.18<--  06-02    131<L>  |  98  |  6<L>  ----------------------------<  190<H>  3.5   |  19<L>  |  0.78    Ca    7.9<L>      02 Jun 2018 10:20  Phos  2.1     06-02  Mg     1.5     06-02      Creatinine Trend: 0.78<--, 0.84<--, 0.90<--, 0.91<--, 1.03<--, 1.12<--    MEDICATIONS  (STANDING):  aspirin enteric coated 81 milliGRAM(s) Oral daily  ciprofloxacin   IVPB 400 milliGRAM(s) IV Intermittent every 24 hours  dextrose 5%. 1000 milliLiter(s) (50 mL/Hr) IV Continuous <Continuous>  dextrose 50% Injectable 12.5 Gram(s) IV Push once  dextrose 50% Injectable 25 Gram(s) IV Push once  heparin  Injectable 5000 Unit(s) SubCutaneous every 8 hours  insulin lispro (HumaLOG) corrective regimen sliding scale   SubCutaneous three times a day before meals  insulin lispro (HumaLOG) corrective regimen sliding scale   SubCutaneous at bedtime  lactated ringers. 1000 milliLiter(s) (75 mL/Hr) IV Continuous <Continuous>  lactobacillus acidophilus 1 Tablet(s) Oral three times a day with meals  simvastatin 20 milliGRAM(s) Oral at bedtime  tamsulosin 0.4 milliGRAM(s) Oral at bedtime    MEDICATIONS  (PRN):  acetaminophen   Tablet 650 milliGRAM(s) Oral every 6 hours PRN For Temp greater than 38 C (100.4 F)  dextrose 40% Gel 15 Gram(s) Oral once PRN Blood Glucose LESS THAN 70 milliGRAM(s)/deciliter  glucagon  Injectable 1 milliGRAM(s) IntraMuscular once PRN Glucose LESS THAN 70 milligrams/deciliter  morphine  - Injectable 2 milliGRAM(s) IV Push every 4 hours PRN moderate and severe pain

## 2018-06-03 NOTE — PROGRESS NOTE ADULT - PROBLEM SELECTOR PLAN 2
- Cr of  3.4 (0.9 in February).   - Likely pre-renal in setting of diarrhea and poor PO intake,      - Also consider post void obstruction with BPH,      - Also consider Medication induced with diuretics, ARB, PPI.   - Hold antihypertensives and PPI for now  - c/w bertrand  - f/u urine lytes.  - Consider IVF
- SCr markedly improve, now . 3.4 on admission (0.9 in February).   - Likely pre-renal in setting of diarrhea and poor PO intake,   - Hold antihypertensives and PPI for now  - c/w IVF rate increased to 75 from 50
- SCr markedly improve, now 0.78. 3.4 on admission (0.9 in February).   - Likely pre-renal in setting of diarrhea and poor PO intake,   - Hold antihypertensives and PPI for now  - c/w IVF 75 ml/hr
- SCr markedly improve, now . 3.4 on admission (0.9 in February).   - Likely pre-renal in setting of diarrhea and poor PO intake,   - Hold antihypertensives and PPI for now  - c/w IVF, BMP q6h

## 2018-06-03 NOTE — PROGRESS NOTE ADULT - PROBLEM SELECTOR PLAN 7
- Head placed given renal failure, unclear if retaining.   - Will c/w tamsulosin and do trial of void once HEIDI improved.
- Will c/w tamsulosin
- Will c/w tamsulosin
- Head placed given renal failure, unclear if retaining.   - Will c/w tamsulosin and do trial of void once HEIDI improved.

## 2018-06-03 NOTE — DISCHARGE NOTE ADULT - CARE PROVIDERS DIRECT ADDRESSES
[email or web address]

## 2018-06-03 NOTE — PROGRESS NOTE ADULT - PROBLEM SELECTOR PLAN 4
- Initially with tachycardia and fever meeting sepsis criteria likely 2/2 colitis  - However, tachycardia likely 2/2 low circulating volume and not sepsis.  - Nevertheless, c/w cipro. pt s/p 2L ns bolus, Lactate 2.3, will trend.   - f/u blood cultures.
- Initially with tachycardia and fever meeting sepsis criteria likely 2/2 colitis  - However, tachycardia likely 2/2 low circulating volume and not sepsis.  - Nevertheless, c/w cipro. pt s/p 2L ns bolus, Lactate 2.3, will trend.   - f/u blood cultures.
- Tachycardia and fever meeting sepsis criteria likely 2/2 colitis  - c/w cipro and flagyl. pt s/p 2L ns bolus, Lactate 2.3, will trend.   - f/u blood cultures.
- Initially with tachycardia and fever meeting sepsis criteria likely 2/2 colitis  - However, tachycardia likely 2/2 low circulating volume and not sepsis.  - Nevertheless, c/w cipro and flagyl. pt s/p 2L ns bolus, Lactate 2.3, will trend.   - f/u blood cultures.

## 2018-06-03 NOTE — PROGRESS NOTE ADULT - PROBLEM SELECTOR PLAN 8
will hold home medications in setting of sepsis and ange. bp currently stable.

## 2018-06-03 NOTE — DISCHARGE NOTE ADULT - CARE PLAN
Principal Discharge DX:	Colitis, acute  Goal:	Follow-up with your primary care physician  Assessment and plan of treatment:	You were admitted to [facility] for concern of diarrhea. You were found to have a condition known as Colitis. This is an inflammation of the colon and can lead to diarrhea as in your case. This is often due to infection. You were started on an antibiotic and received supportive care with IV fluids. Your electrolytes (blood salts) were out of balance from your diarrhea, and this was aggressively managed in the hospital. With resolution of your diarrhea, you were discharged in stable condition. Please follow-up with your primary care physician within the next month.  Secondary Diagnosis:	HEIDI (acute kidney injury)  Goal:	Follow-up with your primary care physician  Assessment and plan of treatment:	As a result of your diarrhea, you became dehydrated. This lead to dysfunction of your kidneys known as an "acute kidney injury." With aggressive hydration, your kidney function returned to normal. Please follow-up with you primary care physician to assure that your kidneys remain healthy. Principal Discharge DX:	Colitis, acute  Goal:	Follow-up with your primary care physician  Assessment and plan of treatment:	You were admitted to [facility] for concern of diarrhea. You were found to have a condition known as Colitis and salmonella on GI PCR. This is an inflammation of the colon and can lead to diarrhea as in your case. This is often due to infection. You were treated with 5 days of an appropriate antibiotic and received supportive care with IV fluids. Your electrolytes (blood salts) were out of balance from your diarrhea, and this was aggressively managed in the hospital. With resolution of your diarrhea, you were discharged in stable condition. Please follow-up with your primary care physician within the next month. Please return to the hospital if you develop perfuse diarrhea or persistent fevers.  Secondary Diagnosis:	HEIDI (acute kidney injury)  Goal:	Follow-up with your primary care physician  Assessment and plan of treatment:	As a result of your diarrhea, you became dehydrated. This lead to dysfunction of your kidneys known as an "acute kidney injury." With aggressive hydration, your kidney function returned to normal. Please follow-up with you primary care physician to assure that your kidneys remain healthy.

## 2018-06-04 LAB — BACTERIA STL CULT: SIGNIFICANT CHANGE UP

## 2018-06-05 LAB
BACTERIA BLD CULT: SIGNIFICANT CHANGE UP
BACTERIA BLD CULT: SIGNIFICANT CHANGE UP
O+P SPEC CONC: SIGNIFICANT CHANGE UP
SPECIMEN SOURCE: SIGNIFICANT CHANGE UP
TRI STN SPEC: SIGNIFICANT CHANGE UP

## 2018-06-11 ENCOUNTER — APPOINTMENT (OUTPATIENT)
Dept: INTERNAL MEDICINE | Facility: CLINIC | Age: 68
End: 2018-06-11
Payer: MEDICARE

## 2018-06-11 VITALS
SYSTOLIC BLOOD PRESSURE: 85 MMHG | OXYGEN SATURATION: 98 % | WEIGHT: 109 LBS | DIASTOLIC BLOOD PRESSURE: 50 MMHG | TEMPERATURE: 97.5 F | HEART RATE: 94 BPM | BODY MASS INDEX: 18.61 KG/M2 | HEIGHT: 64 IN

## 2018-06-11 DIAGNOSIS — Z09 ENCOUNTER FOR FOLLOW-UP EXAMINATION AFTER COMPLETED TREATMENT FOR CONDITIONS OTHER THAN MALIGNANT NEOPLASM: ICD-10-CM

## 2018-06-11 PROCEDURE — 36415 COLL VENOUS BLD VENIPUNCTURE: CPT

## 2018-06-11 PROCEDURE — 99214 OFFICE O/P EST MOD 30 MIN: CPT | Mod: 25

## 2018-06-11 NOTE — ASSESSMENT
[FreeTextEntry1] : s/p salmonella gastro enetritis \par - doing better - tolerating food po \par - advised gatroade , hydrations \par \par Low BP - hold BP medications \par - f/u 1 week \par - monitor Bp at home \par \par DM \par -- Controlled, continue current medications. Monitor Accu-Cheks daily,fasting and post prandial 2 hrs,  ophthalmology followup, podiatry follow up. Monitor for signs of hypoglycemia. Check hemoglobin A1c, urine for microalbumin.\par Continue 1800 kcal ADA diet, cut  rice, pasta, sugar, sweet, soda, juices, exercise daily 30 minutes , loose weight .\par

## 2018-06-11 NOTE — HISTORY OF PRESENT ILLNESS
[FreeTextEntry1] : hospital discharge \par Admission Date: \par -  Admission Date 30-May-2018 21:27. \par \par Discharge Date: \par - Discharge Date	03-Jun-2018 [de-identified] : - Hospital Course	 \par Mr. 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 He states that he \par started having diffuse abdominal pain about 3 days ago, very severe in most \par quadrants, but worse in umblical area. He also had one episode of NBNB vomit. \par CT of the abdomen demonstrated colitis. He was also found to be hyponatremic \par and with an HEIDI, creatinine 3.4. He was aggressively hydrated and started on \par cipro/flagyl. Stool culture and C. diff PCR were negative. Salmonella was found \par of GI PCR. His diarrhea resolved over 4 days and his creatinine fell to 0.79. \par Antibiotics were discontinued and he was discharged in stable condition. \par \par Treatments/Procedures/Significant Findings/Patient Condition: \par Other Significant Findings: \par - Other Significant Findings	 \par CT Abdomen and Pelvis w/ IV Cont (05.30.18 @ 19:40) \par IMPRESSION: \par Mild colitis and terminal ileitis of indeterminate cause. Inflammatory etiology \par is considered. \par \par dx as salmonella coilitis\par - tolerating po well , appetite is less , does not want to eat well, no fever , stools formed \par - no abd pain + bloating at times \par

## 2018-06-11 NOTE — REVIEW OF SYSTEMS
[Negative] : Gastrointestinal [Abdominal Pain] : no abdominal pain [Nausea] : no nausea [Diarrhea] : no diarrhea [FreeTextEntry7] : see HPI

## 2018-06-13 ENCOUNTER — MEDICATION RENEWAL (OUTPATIENT)
Age: 68
End: 2018-06-13

## 2018-06-13 LAB
ALBUMIN SERPL ELPH-MCNC: 3.6 G/DL
ALP BLD-CCNC: 70 U/L
ALT SERPL-CCNC: 22 U/L
ANION GAP SERPL CALC-SCNC: 17 MMOL/L
AST SERPL-CCNC: 20 U/L
BASOPHILS # BLD AUTO: 0.03 K/UL
BASOPHILS NFR BLD AUTO: 0.4 %
BILIRUB SERPL-MCNC: 0.3 MG/DL
BUN SERPL-MCNC: 6 MG/DL
CALCIUM SERPL-MCNC: 9 MG/DL
CHLORIDE SERPL-SCNC: 97 MMOL/L
CO2 SERPL-SCNC: 22 MMOL/L
CREAT SERPL-MCNC: 0.81 MG/DL
EOSINOPHIL # BLD AUTO: 0.25 K/UL
EOSINOPHIL NFR BLD AUTO: 3.5 %
GLUCOSE SERPL-MCNC: 175 MG/DL
HCT VFR BLD CALC: 34.7 %
HGB BLD-MCNC: 10.7 G/DL
IMM GRANULOCYTES NFR BLD AUTO: 0.4 %
LYMPHOCYTES # BLD AUTO: 1.95 K/UL
LYMPHOCYTES NFR BLD AUTO: 27 %
MAN DIFF?: NORMAL
MCHC RBC-ENTMCNC: 26.5 PG
MCHC RBC-ENTMCNC: 30.8 GM/DL
MCV RBC AUTO: 85.9 FL
MONOCYTES # BLD AUTO: 0.41 K/UL
MONOCYTES NFR BLD AUTO: 5.7 %
NEUTROPHILS # BLD AUTO: 4.55 K/UL
NEUTROPHILS NFR BLD AUTO: 63 %
PLATELET # BLD AUTO: 401 K/UL
POTASSIUM SERPL-SCNC: 4.4 MMOL/L
PROT SERPL-MCNC: 5.7 G/DL
RBC # BLD: 4.04 M/UL
RBC # FLD: 15.1 %
SODIUM SERPL-SCNC: 136 MMOL/L
WBC # FLD AUTO: 7.22 K/UL

## 2018-06-22 ENCOUNTER — APPOINTMENT (OUTPATIENT)
Dept: INTERNAL MEDICINE | Facility: CLINIC | Age: 68
End: 2018-06-22
Payer: MEDICARE

## 2018-06-22 VITALS
BODY MASS INDEX: 18.44 KG/M2 | TEMPERATURE: 97.9 F | DIASTOLIC BLOOD PRESSURE: 90 MMHG | SYSTOLIC BLOOD PRESSURE: 160 MMHG | WEIGHT: 108 LBS | HEART RATE: 90 BPM | HEIGHT: 64 IN

## 2018-06-22 DIAGNOSIS — Z86.19 PERSONAL HISTORY OF OTHER INFECTIOUS AND PARASITIC DISEASES: ICD-10-CM

## 2018-06-22 DIAGNOSIS — R22.1 LOCALIZED SWELLING, MASS AND LUMP, NECK: ICD-10-CM

## 2018-06-22 PROCEDURE — 99214 OFFICE O/P EST MOD 30 MIN: CPT

## 2018-06-22 NOTE — HISTORY OF PRESENT ILLNESS
[de-identified] : was in hospital 6/2018 for gastroenetritis\par dx as salmonella coilitis\par - tolerating po well , appetite is better , feels tired and weak  ,  stools formed \par - no abd pain \par - wants rx for glucerna \par \par HTN- BP medicatiosn on hold due to low BP \par \par Anemia- h-10.7 started iron tablets \par \par has forms for home care to be filled \par \par

## 2018-06-22 NOTE — ASSESSMENT
[FreeTextEntry1] : s/p salmonella GASTRO enteritis - resolving \par - doing better - tolerating food po \par \par anemia- h/h low - on iron pills repeat test 3 months \par - colonoscope 1/2018 ok \par \par poor po intake and weight loss- pt requesting glucerna - referral to see nutritionist given \par \par Anemia- continue iron tablets \par \par hypertyension \par -bp 150/90 \par - restart all medication \par - f/u 4 week \par - monitor Bp at home \par \par DM \par -- Controlled, continue current medications. Monitor Accu-Cheks daily,fasting and post prandial 2 hrs,  ophthalmology followup, podiatry follow up. Monitor for signs of hypoglycemia. Check hemoglobin A1c, urine for microalbumin.\par Continue 1800 kcal ADA diet, cut  rice, pasta, sugar, sweet, soda, juices, exercise daily 30 minutes , loose weight .\par

## 2018-07-09 ENCOUNTER — APPOINTMENT (OUTPATIENT)
Dept: CHRONIC DISEASE MANAGEMENT | Facility: CLINIC | Age: 68
End: 2018-07-09
Payer: MEDICARE

## 2018-07-09 PROCEDURE — 96150: CPT

## 2018-07-10 ENCOUNTER — MEDICATION RENEWAL (OUTPATIENT)
Age: 68
End: 2018-07-10

## 2018-07-11 ENCOUNTER — APPOINTMENT (OUTPATIENT)
Dept: RADIOLOGY | Facility: IMAGING CENTER | Age: 68
End: 2018-07-11
Payer: MEDICARE

## 2018-07-11 ENCOUNTER — OUTPATIENT (OUTPATIENT)
Dept: OUTPATIENT SERVICES | Facility: HOSPITAL | Age: 68
LOS: 1 days | End: 2018-07-11
Payer: COMMERCIAL

## 2018-07-11 DIAGNOSIS — Z00.8 ENCOUNTER FOR OTHER GENERAL EXAMINATION: ICD-10-CM

## 2018-07-11 DIAGNOSIS — Z98.49 CATARACT EXTRACTION STATUS, UNSPECIFIED EYE: Chronic | ICD-10-CM

## 2018-07-11 PROCEDURE — 73565 X-RAY EXAM OF KNEES: CPT | Mod: 26

## 2018-07-11 PROCEDURE — 73565 X-RAY EXAM OF KNEES: CPT

## 2018-07-30 ENCOUNTER — APPOINTMENT (OUTPATIENT)
Dept: UROLOGY | Facility: CLINIC | Age: 68
End: 2018-07-30

## 2018-07-30 ENCOUNTER — APPOINTMENT (OUTPATIENT)
Dept: INTERNAL MEDICINE | Facility: CLINIC | Age: 68
End: 2018-07-30
Payer: MEDICARE

## 2018-07-30 VITALS
BODY MASS INDEX: 18.95 KG/M2 | SYSTOLIC BLOOD PRESSURE: 74 MMHG | HEART RATE: 90 BPM | OXYGEN SATURATION: 95 % | TEMPERATURE: 97.8 F | HEIGHT: 64 IN | WEIGHT: 111 LBS | DIASTOLIC BLOOD PRESSURE: 20 MMHG

## 2018-07-30 VITALS — SYSTOLIC BLOOD PRESSURE: 90 MMHG | DIASTOLIC BLOOD PRESSURE: 45 MMHG

## 2018-07-30 VITALS — SYSTOLIC BLOOD PRESSURE: 70 MMHG | DIASTOLIC BLOOD PRESSURE: 20 MMHG

## 2018-07-30 PROBLEM — N40.0 BENIGN PROSTATIC HYPERPLASIA WITHOUT LOWER URINARY TRACT SYMPTOMS: Chronic | Status: ACTIVE | Noted: 2018-05-31

## 2018-07-30 PROBLEM — K29.70 GASTRITIS, UNSPECIFIED, WITHOUT BLEEDING: Chronic | Status: ACTIVE | Noted: 2018-05-31

## 2018-07-30 PROCEDURE — 99214 OFFICE O/P EST MOD 30 MIN: CPT

## 2018-07-30 NOTE — HISTORY OF PRESENT ILLNESS
[FreeTextEntry1] : f/u  [de-identified] : has forms for parking disability to be filled - got upset when I refused - said he will change his PCP- I informed it up to him to deside who he wants to see \par - as far as the paper work for Parking I cannot do without a reasonable diagnosis \par \par was in hospital 6/2018 for gastroenteritis\par dx as salmonella colitis\par - tolerating po well , appetite is better , feels tired and weak  ,  stools formed \par - no abd pain \par - wants rx for Glucerna \par \par HTN- restarted on medication came to check \par - no cp or dizzy spells \par \par Anemia- h-10.7 started iron tablets \par \par has forms for home care to be filled \par \par h/o DM x 10 yrs \par -saw endo 8/2017\par -saw eye doctor - retina specialist - did eye 1/3rd ? hole in retina ,s/p lazer rx , last visit 5 months ago\par -on metformin 1000 twice daily , Tradjenta 5mg daily -- \par -on statin 20 mg simvastatin , on arb losartan 50 \par - saw podiatrist Dx with feet neuropathy recommended diabetic shoes \par \par oral cancer 2004 rx with sx and chemo , followed by ENT yearly \par \par GERD- worse since 2 months , still acting up , started famotidine 20 bid , \par -saw gastro did 1/11/18 did EGD - Path reviewed ok \par -on omeprazole 20 ch twice daily , slight better \par - Colonoscope 1/2018 - due 5 yrs \par

## 2018-07-30 NOTE — ASSESSMENT
[FreeTextEntry1] : s/p salmonella GASTRO enteritis - resolved \par - doing better - tolerating food po \par \par anemia- h/h low - on iron pills repeat test 3 months \par - colonoscope 1/2018 ok \par \par poor po intake and weight loss- pt requesting glucerna - saw nutritionist \par \par hypertension \par -bp 100/45 \par - discontinue HCTZ , continue amlodipine and losartaan \par - f/u 4 week \par - monitor Bp at home \par \par DM \par - check Hgbaic - microalbumin \par -- Controlled, continue current medications. Monitor Accu-Cheks daily,fasting and post prandial 2 hrs,  ophthalmology followup, podiatry follow up. Monitor for signs of hypoglycemia. Check hemoglobin A1c, urine for microalbumin.\par Continue 1800 kcal ADA diet, cut  rice, pasta, sugar, sweet, soda, juices, exercise daily 30 minutes , loose weight .\par

## 2018-09-06 ENCOUNTER — APPOINTMENT (OUTPATIENT)
Dept: RADIOLOGY | Facility: IMAGING CENTER | Age: 68
End: 2018-09-06
Payer: MEDICARE

## 2018-09-06 ENCOUNTER — OUTPATIENT (OUTPATIENT)
Dept: OUTPATIENT SERVICES | Facility: HOSPITAL | Age: 68
LOS: 1 days | End: 2018-09-06
Payer: COMMERCIAL

## 2018-09-06 DIAGNOSIS — Z00.8 ENCOUNTER FOR OTHER GENERAL EXAMINATION: ICD-10-CM

## 2018-09-06 DIAGNOSIS — Z98.49 CATARACT EXTRACTION STATUS, UNSPECIFIED EYE: Chronic | ICD-10-CM

## 2018-09-06 PROCEDURE — 71100 X-RAY EXAM RIBS UNI 2 VIEWS: CPT

## 2018-09-06 PROCEDURE — 71100 X-RAY EXAM RIBS UNI 2 VIEWS: CPT | Mod: 26

## 2018-09-17 ENCOUNTER — APPOINTMENT (OUTPATIENT)
Dept: CHRONIC DISEASE MANAGEMENT | Facility: CLINIC | Age: 68
End: 2018-09-17

## 2018-09-19 ENCOUNTER — APPOINTMENT (OUTPATIENT)
Dept: INTERNAL MEDICINE | Facility: CLINIC | Age: 68
End: 2018-09-19

## 2018-09-24 ENCOUNTER — APPOINTMENT (OUTPATIENT)
Dept: CHRONIC DISEASE MANAGEMENT | Facility: CLINIC | Age: 68
End: 2018-09-24

## 2018-11-07 ENCOUNTER — INBOUND DOCUMENT (OUTPATIENT)
Age: 68
End: 2018-11-07

## 2019-02-05 NOTE — ASU PATIENT PROFILE, ADULT - VISION (WITH CORRECTIVE LENSES IF THE PATIENT USUALLY WEARS THEM):
Right eye distorted vision./Partially impaired: cannot see medication labels or newsprint, but can see obstacles in path, and the surrounding layout; can count fingers at arm's length
Airway patent, TM normal bilaterally, normal appearing mouth, dry mucosa, nose, throat, neck supple with full range of motion, no cervical adenopathy.

## 2019-02-06 ENCOUNTER — APPOINTMENT (OUTPATIENT)
Dept: OPHTHALMOLOGY | Facility: CLINIC | Age: 69
End: 2019-02-06
Payer: MEDICARE

## 2019-02-06 DIAGNOSIS — H35.341 MACULAR CYST, HOLE, OR PSEUDOHOLE, RIGHT EYE: ICD-10-CM

## 2019-02-06 PROCEDURE — 92014 COMPRE OPH EXAM EST PT 1/>: CPT

## 2019-02-06 PROCEDURE — 92134 CPTRZ OPH DX IMG PST SGM RTA: CPT

## 2019-03-04 PROBLEM — R41.3 MEMORY LOSS: Status: ACTIVE | Noted: 2017-07-03

## 2019-04-01 ENCOUNTER — APPOINTMENT (OUTPATIENT)
Dept: INTERNAL MEDICINE | Facility: CLINIC | Age: 69
End: 2019-04-01
Payer: MEDICARE

## 2019-04-01 VITALS
OXYGEN SATURATION: 96 % | BODY MASS INDEX: 20.32 KG/M2 | HEIGHT: 64 IN | DIASTOLIC BLOOD PRESSURE: 68 MMHG | HEART RATE: 90 BPM | TEMPERATURE: 98.2 F | WEIGHT: 119 LBS | SYSTOLIC BLOOD PRESSURE: 112 MMHG

## 2019-04-01 PROCEDURE — 99214 OFFICE O/P EST MOD 30 MIN: CPT

## 2019-04-01 RX ORDER — AMLODIPINE BESYLATE 5 MG/1
5 TABLET ORAL DAILY
Qty: 30 | Refills: 6 | Status: DISCONTINUED | COMMUNITY
Start: 2017-08-29 | End: 2019-04-01

## 2019-04-01 NOTE — ASSESSMENT
[FreeTextEntry1] : \par anemia- h/h low - on iron pills- get BW done last week \par - colonoscope 1/2018- poor prep - due again 4/2019 scheduled with EGD \par \par poor po intake and weight loss- pt requesting glucerna - saw nutritionist last year Rx renewed \par \par hypertension \par -bp 110/70\par - on losartaan and HCTZ \par - f/u 4 week \par - monitor Bp at home \par \par DM \par - check Hgbaic - microalbumin \par -- Controlled, continue current medications. Monitor Accu-Cheks daily,fasting and post prandial 2 hrs,  ophthalmology followup, podiatry follow up. Monitor for signs of hypoglycemia. Check hemoglobin A1c, urine for microalbumin.\par Continue 1800 kcal ADA diet, cut  rice, pasta, sugar, sweet, soda, juices, exercise daily 30 minutes , loose weight .\par

## 2019-04-01 NOTE — HISTORY OF PRESENT ILLNESS
[Spouse] : spouse [FreeTextEntry1] : switching care from outside physician to me \par came in for f/u will schedule CPE at a later date  [de-identified] : saw cardio last week did EKG- will be getting stress test 5/16th  \par -has colonoscope procedure - schedule for colonoscope 4/17/19 \par - last colonoscope 1/2018 - poor prep hx anemia recomeded by gi to repeat - also did bw with gastro last week \par \par HTN- on HCTZ 25 , losartan 50 \par - no cp or dizzy spells \par \par Anemia-did BW with gastro last week - taking glucerna 1 can - needs rx renewed was approved by insurance for 3 month last year \par \par h/o DM x 10 yrs \par -saw eye doctor 2/2019 -\par - retina specialist in past - did eye 1/3rd ? hole in retina ,s/p lazer rx , last visit 5 months ago\par -on metformin 500 twice daily , Tradjenta 5mg daily -- \par -on statin 20 mg simvastatin , on arb losartan 50 \par - saw podiatrist Dx with feet neuropathy recommended diabetic shoes \par \par oral cancer 2004 rx with sx and chemo , followed by ENT yearly \par \par GERD- still acting up , started famotidine 20 bid , \par -saw gastro did 1/11/18 did EGD - Path reviewed - will be getting another 4/2019 \par -on omeprazole 20 ch twice daily , slight better \par - Colonoscope 1/2018 - due 4/2019 \par

## 2019-04-01 NOTE — PHYSICAL EXAM
[No Acute Distress] : no acute distress [Well Nourished] : well nourished [Well Developed] : well developed [No Respiratory Distress] : no respiratory distress  [Clear to Auscultation] : lungs were clear to auscultation bilaterally [No Accessory Muscle Use] : no accessory muscle use [Normal Rate] : normal rate  [Regular Rhythm] : with a regular rhythm [Normal S1, S2] : normal S1 and S2 [Soft] : abdomen soft [Non Tender] : non-tender [Non-distended] : non-distended [Normal Bowel Sounds] : normal bowel sounds

## 2019-04-02 LAB
APPEARANCE: CLEAR
BILIRUBIN URINE: NEGATIVE
BLOOD URINE: NEGATIVE
COLOR: COLORLESS
CREAT SPEC-SCNC: 18 MG/DL
GLUCOSE QUALITATIVE U: NEGATIVE
KETONES URINE: NEGATIVE
LEUKOCYTE ESTERASE URINE: NEGATIVE
MICROALBUMIN 24H UR DL<=1MG/L-MCNC: <1.2 MG/DL
MICROALBUMIN/CREAT 24H UR-RTO: NORMAL MG/G
NITRITE URINE: NEGATIVE
PH URINE: 6.5
PROTEIN URINE: NEGATIVE
SPECIFIC GRAVITY URINE: 1.01
UROBILINOGEN URINE: NORMAL

## 2019-04-05 ENCOUNTER — APPOINTMENT (OUTPATIENT)
Dept: INTERNAL MEDICINE | Facility: CLINIC | Age: 69
End: 2019-04-05

## 2019-04-09 ENCOUNTER — RX RENEWAL (OUTPATIENT)
Age: 69
End: 2019-04-09

## 2019-04-15 ENCOUNTER — APPOINTMENT (OUTPATIENT)
Dept: GASTROENTEROLOGY | Facility: CLINIC | Age: 69
End: 2019-04-15

## 2019-04-24 ENCOUNTER — RX RENEWAL (OUTPATIENT)
Age: 69
End: 2019-04-24

## 2019-05-10 ENCOUNTER — RX RENEWAL (OUTPATIENT)
Age: 69
End: 2019-05-10

## 2019-05-31 ENCOUNTER — APPOINTMENT (OUTPATIENT)
Dept: INTERNAL MEDICINE | Facility: CLINIC | Age: 69
End: 2019-05-31
Payer: MEDICARE

## 2019-05-31 VITALS
HEIGHT: 64 IN | BODY MASS INDEX: 19.97 KG/M2 | WEIGHT: 117 LBS | HEART RATE: 82 BPM | OXYGEN SATURATION: 97 % | TEMPERATURE: 97.8 F | DIASTOLIC BLOOD PRESSURE: 62 MMHG | SYSTOLIC BLOOD PRESSURE: 116 MMHG

## 2019-05-31 PROCEDURE — 90471 IMMUNIZATION ADMIN: CPT

## 2019-05-31 PROCEDURE — 90750 HZV VACC RECOMBINANT IM: CPT

## 2019-05-31 PROCEDURE — 36415 COLL VENOUS BLD VENIPUNCTURE: CPT

## 2019-05-31 PROCEDURE — G0444 DEPRESSION SCREEN ANNUAL: CPT | Mod: 59

## 2019-05-31 PROCEDURE — G0439: CPT | Mod: 25

## 2019-05-31 RX ORDER — DONEPEZIL HYDROCHLORIDE 10 MG/1
10 TABLET ORAL
Qty: 30 | Refills: 0 | Status: DISCONTINUED | COMMUNITY
Start: 2017-08-12 | End: 2019-05-31

## 2019-05-31 RX ORDER — ACETAMINOPHEN 500 MG
1200-1000 TABLET ORAL
Qty: 90 | Refills: 0 | Status: ACTIVE | COMMUNITY
Start: 2019-05-31

## 2019-05-31 NOTE — HEALTH RISK ASSESSMENT
[Good] : ~his/her~  mood as  good [No falls in past year] : Patient reported no falls in the past year [0] : 2) Feeling down, depressed, or hopeless: Not at all (0) [With Significant Other] : lives with significant other [] :  [Feels Safe at Home] : Feels safe at home [Fully functional (bathing, dressing, toileting, transferring, walking, feeding)] : Fully functional (bathing, dressing, toileting, transferring, walking, feeding) [Fully functional (using the telephone, shopping, preparing meals, housekeeping, doing laundry, using] : Fully functional and needs no help or supervision to perform IADLs (using the telephone, shopping, preparing meals, housekeeping, doing laundry, using transportation, managing medications and managing finances) [] : No [de-identified] : sedentary  [[id number]] : 0 [Reports changes in hearing] : Reports no changes in hearing [Reports changes in vision] : Reports no changes in vision [Reports changes in dental health] : Reports no changes in dental health

## 2019-05-31 NOTE — ASSESSMENT
[FreeTextEntry1] : \par anemia- h/h low - on iron pills\par - colonoscope 4/2019 and EGD - neg \par -oral cancer 2004 rx with sx and chemo , followed by ENT yearly \par \par poor po intake and weight loss- pt requesting glucerna - saw nutritionist last year Rx renewed \par \par hypertension \par -bp 110/60\par - on losartaan and HCTZ 12.5 \par - monitor Bp at home \par \par DM \par - check Hgbaic - microalbumin \par -- Controlled, continue current medications. Monitor Accu-Cheks daily,fasting and post prandial 2 hrs,  ophthalmology followup, podiatry follow up. Monitor for signs of hypoglycemia. Check hemoglobin A1c, urine for microalbumin.\par Continue 1800 kcal ADA diet, cut  rice, pasta, sugar, sweet, soda, juices, exercise daily 30 minutes , loose weight .\par \par HCM \par Prevnar 13 given 2017\par pneumococcal 23 -2015\par colonoscope- 2015 ,Colonoscope 4/17/19 due 7-10 yrs \par flu vaccine given 1/2019\par hep c- ordered \par shringrex given today

## 2019-05-31 NOTE — HISTORY OF PRESENT ILLNESS
[Spouse] : spouse [FreeTextEntry1] : Came in for annual check up  [de-identified] : saw cardio  did EKG- did stress test 5/8/19 neg   \par - Did colonoscope 4/17/19 due repeat 7 yrs \par - last colonoscope 1/2018 - poor prep hx anemia recomeded by gi to repeat - also did bw with gastro last week \par \par HTN- on HCTZ 25 , losartan 50 \par - no cp or dizzy spells \par \par Anemia- taking glucerna - weight loss still on going as per pt \par \par h/o DM x 10 yrs \par -saw eye doctor 2/2019 -\par - retina specialist in past - did eye 1/3rd ? hole in retina ,s/p lazer rx , last visit 5 months ago\par -on metformin 500 twice daily , Tradjenta 5mg daily -- \par -on statin 20 mg simvastatin , on arb losartan 50 \par - saw podiatrist Dx with feet neuropathy recommended diabetic shoes \par \par oral cancer 2004 rx with sx and chemo , followed by ENT yearly \par \par GERD- still acting up , started famotidine 20 bid , \par -saw gastro-repeated EGd neg mild gastritis and colonoscope  4/2019 neg - due 7 yrs \par -on omeprazole 20 ch twice daily , slight better \par

## 2019-06-02 ENCOUNTER — FORM ENCOUNTER (OUTPATIENT)
Age: 69
End: 2019-06-02

## 2019-06-03 ENCOUNTER — APPOINTMENT (OUTPATIENT)
Dept: RADIOLOGY | Facility: IMAGING CENTER | Age: 69
End: 2019-06-03
Payer: MEDICARE

## 2019-06-03 ENCOUNTER — OUTPATIENT (OUTPATIENT)
Dept: OUTPATIENT SERVICES | Facility: HOSPITAL | Age: 69
LOS: 1 days | End: 2019-06-03
Payer: COMMERCIAL

## 2019-06-03 DIAGNOSIS — Z00.00 ENCOUNTER FOR GENERAL ADULT MEDICAL EXAMINATION WITHOUT ABNORMAL FINDINGS: ICD-10-CM

## 2019-06-03 DIAGNOSIS — Z98.49 CATARACT EXTRACTION STATUS, UNSPECIFIED EYE: Chronic | ICD-10-CM

## 2019-06-03 PROCEDURE — 77080 DXA BONE DENSITY AXIAL: CPT | Mod: 26

## 2019-06-03 PROCEDURE — 77080 DXA BONE DENSITY AXIAL: CPT

## 2019-06-04 ENCOUNTER — APPOINTMENT (OUTPATIENT)
Dept: RADIOLOGY | Facility: IMAGING CENTER | Age: 69
End: 2019-06-04

## 2019-06-05 ENCOUNTER — MEDICATION RENEWAL (OUTPATIENT)
Age: 69
End: 2019-06-05

## 2019-06-05 LAB
25(OH)D3 SERPL-MCNC: 36.1 NG/ML
ALBUMIN SERPL ELPH-MCNC: 4 G/DL
ALP BLD-CCNC: 56 U/L
ALT SERPL-CCNC: 14 U/L
ANION GAP SERPL CALC-SCNC: 11 MMOL/L
APPEARANCE: CLEAR
AST SERPL-CCNC: 23 U/L
BASOPHILS # BLD AUTO: 0.04 K/UL
BASOPHILS NFR BLD AUTO: 0.8 %
BILIRUB SERPL-MCNC: 0.3 MG/DL
BILIRUBIN URINE: NEGATIVE
BLOOD URINE: NEGATIVE
BUN SERPL-MCNC: 6 MG/DL
CALCIUM SERPL-MCNC: 9.3 MG/DL
CHLORIDE SERPL-SCNC: 101 MMOL/L
CHOLEST SERPL-MCNC: 124 MG/DL
CHOLEST/HDLC SERPL: 1.6 RATIO
CO2 SERPL-SCNC: 27 MMOL/L
COLOR: YELLOW
CREAT SERPL-MCNC: 0.91 MG/DL
EOSINOPHIL # BLD AUTO: 0.44 K/UL
EOSINOPHIL NFR BLD AUTO: 8.3 %
ESTIMATED AVERAGE GLUCOSE: 157 MG/DL
GLUCOSE QUALITATIVE U: NEGATIVE
GLUCOSE SERPL-MCNC: 131 MG/DL
HBA1C MFR BLD HPLC: 7.1 %
HCT VFR BLD CALC: 38 %
HDLC SERPL-MCNC: 80 MG/DL
HGB BLD-MCNC: 12 G/DL
IMM GRANULOCYTES NFR BLD AUTO: 0.2 %
KETONES URINE: NEGATIVE
LDLC SERPL CALC-MCNC: 33 MG/DL
LEUKOCYTE ESTERASE URINE: NEGATIVE
LYMPHOCYTES # BLD AUTO: 1.06 K/UL
LYMPHOCYTES NFR BLD AUTO: 20.1 %
MAN DIFF?: NORMAL
MCHC RBC-ENTMCNC: 27.3 PG
MCHC RBC-ENTMCNC: 31.6 GM/DL
MCV RBC AUTO: 86.6 FL
MEV IGG FLD QL IA: >300 AU/ML
MEV IGG+IGM SER-IMP: POSITIVE
MONOCYTES # BLD AUTO: 0.63 K/UL
MONOCYTES NFR BLD AUTO: 12 %
NEUTROPHILS # BLD AUTO: 3.09 K/UL
NEUTROPHILS NFR BLD AUTO: 58.6 %
NITRITE URINE: NEGATIVE
PH URINE: 8
PLATELET # BLD AUTO: 251 K/UL
POTASSIUM SERPL-SCNC: 4.8 MMOL/L
PROT SERPL-MCNC: 5.7 G/DL
PROTEIN URINE: NEGATIVE
RBC # BLD: 4.39 M/UL
RBC # FLD: 13.5 %
SODIUM SERPL-SCNC: 139 MMOL/L
SPECIFIC GRAVITY URINE: 1.01
TRIGL SERPL-MCNC: 53 MG/DL
TSH SERPL-ACNC: 2.02 UIU/ML
UROBILINOGEN URINE: NORMAL
VIT B12 SERPL-MCNC: >2000 PG/ML
WBC # FLD AUTO: 5.27 K/UL

## 2019-06-12 ENCOUNTER — RX RENEWAL (OUTPATIENT)
Age: 69
End: 2019-06-12

## 2019-06-17 ENCOUNTER — RX RENEWAL (OUTPATIENT)
Age: 69
End: 2019-06-17

## 2019-07-03 ENCOUNTER — MEDICATION RENEWAL (OUTPATIENT)
Age: 69
End: 2019-07-03

## 2019-07-10 ENCOUNTER — APPOINTMENT (OUTPATIENT)
Dept: ORTHOPEDIC SURGERY | Facility: CLINIC | Age: 69
End: 2019-07-10
Payer: MEDICARE

## 2019-07-10 VITALS — HEIGHT: 64 IN | BODY MASS INDEX: 19.97 KG/M2 | WEIGHT: 117 LBS

## 2019-07-10 DIAGNOSIS — G89.29 PAIN IN LEFT KNEE: ICD-10-CM

## 2019-07-10 DIAGNOSIS — M76.31 ILIOTIBIAL BAND SYNDROME, RIGHT LEG: ICD-10-CM

## 2019-07-10 DIAGNOSIS — M25.562 PAIN IN LEFT KNEE: ICD-10-CM

## 2019-07-10 PROCEDURE — 99204 OFFICE O/P NEW MOD 45 MIN: CPT

## 2019-07-10 PROCEDURE — 73562 X-RAY EXAM OF KNEE 3: CPT | Mod: 50

## 2019-07-15 ENCOUNTER — RX RENEWAL (OUTPATIENT)
Age: 69
End: 2019-07-15

## 2019-08-13 ENCOUNTER — APPOINTMENT (OUTPATIENT)
Dept: INTERNAL MEDICINE | Facility: CLINIC | Age: 69
End: 2019-08-13
Payer: MEDICARE

## 2019-08-13 VITALS
WEIGHT: 116 LBS | HEIGHT: 64 IN | TEMPERATURE: 98.2 F | HEART RATE: 108 BPM | OXYGEN SATURATION: 98 % | DIASTOLIC BLOOD PRESSURE: 60 MMHG | BODY MASS INDEX: 19.81 KG/M2 | SYSTOLIC BLOOD PRESSURE: 144 MMHG

## 2019-08-13 PROCEDURE — 99213 OFFICE O/P EST LOW 20 MIN: CPT

## 2019-08-13 NOTE — ASSESSMENT
[FreeTextEntry1] : \par Diabetes\par A1C 7.1, repeat at next visit with PCP\par continues metformin and Tradjenta\par on statin\par \par Hypertension\par well controlled, on losartan and HCTZ\par \par Anemia\par continues iron\par \par Osteoporosis\par ca + vit D\par repeat in 2 yr. \par \par HM\par Shingrix #2 needed, prefers to wait until PCP visit scheduled for 8/30\par

## 2019-08-30 ENCOUNTER — APPOINTMENT (OUTPATIENT)
Dept: INTERNAL MEDICINE | Facility: CLINIC | Age: 69
End: 2019-08-30
Payer: MEDICARE

## 2019-08-30 VITALS
DIASTOLIC BLOOD PRESSURE: 70 MMHG | TEMPERATURE: 97.9 F | OXYGEN SATURATION: 98 % | WEIGHT: 117 LBS | BODY MASS INDEX: 19.97 KG/M2 | HEART RATE: 109 BPM | SYSTOLIC BLOOD PRESSURE: 150 MMHG | HEIGHT: 64 IN

## 2019-08-30 DIAGNOSIS — Z23 ENCOUNTER FOR IMMUNIZATION: ICD-10-CM

## 2019-08-30 PROCEDURE — 99214 OFFICE O/P EST MOD 30 MIN: CPT | Mod: 25

## 2019-08-30 PROCEDURE — 36415 COLL VENOUS BLD VENIPUNCTURE: CPT

## 2019-08-30 PROCEDURE — 90471 IMMUNIZATION ADMIN: CPT

## 2019-08-30 PROCEDURE — 90750 HZV VACC RECOMBINANT IM: CPT

## 2019-08-30 RX ORDER — DICLOFENAC SODIUM 50 MG/1
50 TABLET, DELAYED RELEASE ORAL
Qty: 20 | Refills: 0 | Status: DISCONTINUED | COMMUNITY
Start: 2019-07-10 | End: 2019-08-30

## 2019-08-30 NOTE — HISTORY OF PRESENT ILLNESS
[de-identified] : \par HTN- on HCTZ 25 , losartan 50 \par - no cp or dizzy spells \par \par Anemia- taking glucerna - weight loss still on going as per pt \par \par h/o DM x 10 yrs \par -saw eye doctor 2/2019 -\par - retina specialist in past - did eye 1/3rd ? hole in retina ,s/p lazer rx , last visit 5 months ago\par -on metformin 500 twice daily , Tradjenta 5mg daily -- \par -on statin 20 mg simvastatin , on arb losartan 50 \par - saw podiatrist Dx with feet neuropathy recommended diabetic shoes \par \par oral cancer 2004 rx with sx and chemo , followed by ENT yearly \par \par GERD- still acting up \par -saw gastro-repeated EGd neg mild gastritis and colonoscope 4/2019 neg - due 7 yrs \par -on omeprazole 40 ch twice daily , slight better \par \par saw cardio did EKG- did stress test 5/8/19 neg \par - Did colonoscope 4/17/19 due repeat 7 yrs \par

## 2019-08-30 NOTE — ASSESSMENT
[FreeTextEntry1] : anemia- h/h low - on iron pills\par - colonoscope 4/2019 and EGD - neg \par -oral cancer 2004 rx with sx and chemo , followed by ENT yearly \par - change PPI to omeprazole 40 daily from BID dosing \par \par Osteoporosis \par - cont Calcium 500-600 mg daily \par -cont vitamin d 1000 units daily \par - do weight bearing exercises: walking with weights, stair climbing , weight training , jogging, aerobics etc \par -repeat test 2 yrs for a f/u\par \par \par hypertension \par -bp 110/60\par - on losartaan and HCTZ 12.5 \par - monitor Bp at home \par \par DM \par - check Hgbaic - microalbumin \par -- Controlled, continue current medications. Monitor Accu-Cheks daily,fasting and post prandial 2 hrs, ophthalmology followup, podiatry follow up. Monitor for signs of hypoglycemia. Check hemoglobin A1c, urine for microalbumin.\par Continue 1800 kcal ADA diet, cut rice, pasta, sugar, sweet, soda, juices, exercise daily 30 minutes , loose weight.\par \par HCM \par Prevnar 13 given 2017\par pneumococcal 23 -2015\par colonoscope- 2015 ,Colonoscope 4/17/19 due 7-10 yrs \par flu vaccine given 1/2019\par hep c- ordered \par shringrex 5/2019 - second dose today

## 2019-09-03 LAB
ALBUMIN SERPL ELPH-MCNC: 4.1 G/DL
ALP BLD-CCNC: 57 U/L
ALT SERPL-CCNC: 17 U/L
ANION GAP SERPL CALC-SCNC: 14 MMOL/L
AST SERPL-CCNC: 26 U/L
BASOPHILS # BLD AUTO: 0.04 K/UL
BASOPHILS NFR BLD AUTO: 0.8 %
BILIRUB SERPL-MCNC: 0.4 MG/DL
BUN SERPL-MCNC: 8 MG/DL
CALCIUM SERPL-MCNC: 9.4 MG/DL
CHLORIDE SERPL-SCNC: 98 MMOL/L
CHOLEST SERPL-MCNC: 141 MG/DL
CHOLEST/HDLC SERPL: 1.5 RATIO
CO2 SERPL-SCNC: 26 MMOL/L
CREAT SERPL-MCNC: 0.89 MG/DL
EOSINOPHIL # BLD AUTO: 0.3 K/UL
EOSINOPHIL NFR BLD AUTO: 5.7 %
ESTIMATED AVERAGE GLUCOSE: 137 MG/DL
GLUCOSE SERPL-MCNC: 142 MG/DL
HBA1C MFR BLD HPLC: 6.4 %
HCT VFR BLD CALC: 43.3 %
HDLC SERPL-MCNC: 94 MG/DL
HGB BLD-MCNC: 13.9 G/DL
IMM GRANULOCYTES NFR BLD AUTO: 0.4 %
LDLC SERPL CALC-MCNC: 39 MG/DL
LYMPHOCYTES # BLD AUTO: 1.07 K/UL
LYMPHOCYTES NFR BLD AUTO: 20.3 %
MAN DIFF?: NORMAL
MCHC RBC-ENTMCNC: 27.6 PG
MCHC RBC-ENTMCNC: 32.1 GM/DL
MCV RBC AUTO: 86.1 FL
MONOCYTES # BLD AUTO: 0.48 K/UL
MONOCYTES NFR BLD AUTO: 9.1 %
NEUTROPHILS # BLD AUTO: 3.37 K/UL
NEUTROPHILS NFR BLD AUTO: 63.7 %
PLATELET # BLD AUTO: 244 K/UL
POTASSIUM SERPL-SCNC: 5.3 MMOL/L
PROT SERPL-MCNC: 6.1 G/DL
RBC # BLD: 5.03 M/UL
RBC # FLD: 14.9 %
SODIUM SERPL-SCNC: 138 MMOL/L
TRIGL SERPL-MCNC: 42 MG/DL
WBC # FLD AUTO: 5.28 K/UL

## 2019-09-30 ENCOUNTER — RX RENEWAL (OUTPATIENT)
Age: 69
End: 2019-09-30

## 2019-10-04 ENCOUNTER — MED ADMIN CHARGE (OUTPATIENT)
Age: 69
End: 2019-10-04

## 2019-10-04 ENCOUNTER — APPOINTMENT (OUTPATIENT)
Dept: INTERNAL MEDICINE | Facility: CLINIC | Age: 69
End: 2019-10-04
Payer: MEDICARE

## 2019-10-04 PROCEDURE — G0008: CPT

## 2019-10-04 PROCEDURE — 90662 IIV NO PRSV INCREASED AG IM: CPT

## 2019-10-24 ENCOUNTER — MEDICATION RENEWAL (OUTPATIENT)
Age: 69
End: 2019-10-24

## 2019-11-26 NOTE — PATIENT PROFILE ADULT. - NS PRO PT REFERRAL QUES 2 YN
W Plasty Text: The lesion was extirpated to the level of the fat with a #15 scalpel blade.  Given the location of the defect, shape of the defect and the proximity to free margins a W-plasty was deemed most appropriate for repair.  Using a sterile surgical marker, the appropriate transposition arms of the W-plasty were drawn incorporating the defect and placing the expected incisions within the relaxed skin tension lines where possible.    The area thus outlined was incised deep to adipose tissue with a #15 scalpel blade.  The skin margins were undermined to an appropriate distance in all directions utilizing iris scissors.  The opposing transposition arms were then transposed into place in opposite direction and anchored with interrupted buried subcutaneous sutures. no

## 2019-11-29 ENCOUNTER — APPOINTMENT (OUTPATIENT)
Dept: INTERNAL MEDICINE | Facility: CLINIC | Age: 69
End: 2019-11-29
Payer: MEDICARE

## 2019-11-29 VITALS
BODY MASS INDEX: 19.81 KG/M2 | SYSTOLIC BLOOD PRESSURE: 124 MMHG | WEIGHT: 116 LBS | TEMPERATURE: 98.3 F | HEIGHT: 64 IN | DIASTOLIC BLOOD PRESSURE: 76 MMHG | HEART RATE: 77 BPM | OXYGEN SATURATION: 96 %

## 2019-11-29 LAB — HBA1C MFR BLD HPLC: 6.6

## 2019-11-29 PROCEDURE — 83036 HEMOGLOBIN GLYCOSYLATED A1C: CPT | Mod: QW

## 2019-11-29 PROCEDURE — 99214 OFFICE O/P EST MOD 30 MIN: CPT | Mod: 25

## 2019-11-29 RX ORDER — HYDROCHLOROTHIAZIDE 12.5 MG/1
12.5 CAPSULE ORAL
Qty: 90 | Refills: 0 | Status: DISCONTINUED | COMMUNITY
Start: 2019-11-09

## 2019-11-29 RX ORDER — KETOCONAZOLE 20 MG/G
2 CREAM TOPICAL
Qty: 60 | Refills: 0 | Status: DISCONTINUED | COMMUNITY
Start: 2019-11-27

## 2019-11-29 RX ORDER — DICLOFENAC SODIUM 10 MG/G
1 GEL TOPICAL
Qty: 100 | Refills: 0 | Status: DISCONTINUED | COMMUNITY
Start: 2019-11-26

## 2019-11-29 RX ORDER — ONDANSETRON 4 MG/1
4 TABLET ORAL
Qty: 9 | Refills: 0 | Status: DISCONTINUED | COMMUNITY
Start: 2019-10-06

## 2019-11-29 NOTE — ASSESSMENT
[FreeTextEntry1] : .b/l knee pain s/p gel inj \par -rx for brace given \par -avoid squatting , kneeling and twisting\par \par anemia- resolved , h -13.9 8/2019 \par - colonoscope 4/2019 and EGD - neg \par -oral cancer 2004 rx with sx and chemo , followed by ENT yearly \par - change PPI to omeprazole 40 daily from BID dosing \par \par lower back pain / radiculopathy \par - advised to avoid pulling pushing , lifting , carrying weights , bending etc \par - do warm compresses \par -back brace given \par -physical therapy if no help -referral to see ortho given \par - side effects reviewed \par - rtc if no improvement\par \par Osteoporosis \par - cont Calcium 500-600 mg daily \par -cont vitamin d 1000 units daily \par - do weight bearing exercises: walking with weights, stair climbing , weight training , jogging, aerobics etc \par -repeat test 2 yrs for a f/u\par \par hypertension \par -bp 130/60\par - on losartaan and HCTZ 12.5 \par - monitor Bp at home \par \par DM \par - check Hgbaic 6.6 stable  \par -- Controlled, continue current medications. Monitor Accu-Cheks daily,fasting and post prandial 2 hrs, ophthalmology followup, podiatry follow up. Monitor for signs of hypoglycemia. Check hemoglobin A1c, urine for microalbumin.\par Continue 1800 kcal ADA diet, cut rice, pasta, sugar, sweet, soda, juices, exercise daily 30 minutes , loose weight.\par \par HCM \par Prevnar 13 given 2017\par pneumococcal 23 -2015\par colonoscope- 2015 ,Colonoscope 4/17/19 due 7-10 yrs \par flu vaccine given 10/2019\par hep c-neg \par shringrex  5/2019 - second dose 8/2019

## 2019-11-29 NOTE — HISTORY OF PRESENT ILLNESS
[de-identified] : HTN- on HCTZ 25 , losartan 50 \par - no cp or dizzy spells \par \par b/l knee pain seeing ortho got B/l gel injections \par - feels slight better \par - will be traveling to [facility] next week - needs supportive brace \par lower back pain - needs rx for brace - feels pain \par \par Anemia- taking Glucerna - weight loss still on going as per pt \par \par h/o DM x 10 yrs \par -saw eye doctor 2/2019 -\par - retina specialist in past - did eye 1/3rd ? hole in retina ,s/p lazer rx , last visit 5 months ago\par -on metformin 500 twice daily , Tradjenta 5mg daily -- \par -on statin 20 mg simvastatin , on arb losartan 50 \par - saw podiatrist Dx with feet neuropathy recommended diabetic shoes \par \par oral cancer 2004 rx with sx and chemo , followed by ENT yearly \par \par GERD- still acting up \par -saw gastro- repeated EGd neg mild gastritis and colonoscope 4/2019 neg - due 7 yrs \par -on omeprazole 40 ch twice daily , slight better \par \par saw cardio did EKG- did stress test 5/8/19 neg \par - Did colonoscope 4/17/19 due repeat 7 yrs

## 2020-01-14 ENCOUNTER — RX RENEWAL (OUTPATIENT)
Age: 70
End: 2020-01-14

## 2020-01-20 ENCOUNTER — RX RENEWAL (OUTPATIENT)
Age: 70
End: 2020-01-20

## 2020-01-24 ENCOUNTER — APPOINTMENT (OUTPATIENT)
Dept: INTERNAL MEDICINE | Facility: CLINIC | Age: 70
End: 2020-01-24
Payer: MEDICARE

## 2020-01-24 VITALS
HEIGHT: 64 IN | SYSTOLIC BLOOD PRESSURE: 100 MMHG | HEART RATE: 72 BPM | BODY MASS INDEX: 18.95 KG/M2 | DIASTOLIC BLOOD PRESSURE: 60 MMHG | TEMPERATURE: 97.7 F | WEIGHT: 111 LBS | OXYGEN SATURATION: 96 %

## 2020-01-24 DIAGNOSIS — R35.0 FREQUENCY OF MICTURITION: ICD-10-CM

## 2020-01-24 PROCEDURE — 99214 OFFICE O/P EST MOD 30 MIN: CPT | Mod: 25

## 2020-01-24 PROCEDURE — 36415 COLL VENOUS BLD VENIPUNCTURE: CPT

## 2020-01-24 RX ORDER — MEFLOQUINE HYDROCHLORIDE 250 MG/1
250 TABLET ORAL
Qty: 8 | Refills: 0 | Status: DISCONTINUED | COMMUNITY
Start: 2017-12-18 | End: 2020-01-24

## 2020-01-24 NOTE — ASSESSMENT
[FreeTextEntry1] : increase urine frequencu \par - get UA , referral to see urologist given pt not taking tamsulosin \par \par .b/l knee pain s/p gel inj - better \par -avoid squatting , kneeling and twisting\par \par anemia- resolved , h -13.9 8/2019 \par - colonoscope 4/2019 and EGD - neg \par -oral cancer 2004 rx with sx and chemo , followed by ENT yearly \par - change PPI to omeprazole 40 daily from BID dosing \par \par lower back pain / radiculopathy \par - advised to avoid pulling pushing , lifting , carrying weights , bending etc \par - do warm compresses \par -back brace given \par -physical therapy if no help -referral to see ortho given \par - side effects reviewed \par - rtc if no improvement\par \par Osteoporosis \par - cont Calcium 500-600 mg daily \par -cont vitamin d 1000 units daily \par - do weight bearing exercises: walking with weights, stair climbing , weight training , jogging, aerobics etc \par -repeat test 2 yrs for a f/u\par \par hypertension \par -bp 130/60\par - on losartaan and HCTZ 12.5 \par - monitor Bp at home \par \par DM \par - check Hgbaic 6.6 stable \par -- Controlled, continue current medi\par -  Monitor Accu-Cheks daily,fasting and post prandial 2 hrs, ophthalmology followup, podiatry follow up. Monitor for signs of hypoglycemia. Check hemoglobin A1c, urine for microalbumin.\par Continue 1800 kcal ADA diet, cut rice, pasta, sugar, sweet, soda, juices, exercise daily 30 minutes , loose weight.\par \par HCM \par Prevnar 13 given 2017\par pneumococcal 23 -2015\par colonoscope- 2015 ,Colonoscope 4/17/19 due 7-10 yrs \par flu vaccine given 10/2019\par hep c-neg \par shringrex 5/2019 - second dose 8/2019. complete \par \par

## 2020-01-24 NOTE — HISTORY OF PRESENT ILLNESS
[Spouse] : spouse [de-identified] : f/u on ch medical issues \par \par HTN- on HCTZ 25 , losartan 50 \par - no cp or dizzy spells \par \par b/l knee pain seeing ortho got B/l gel injections \par - feels slight better \par lower back pain - needs rx for brace - feels pain \par \par Anemia- taking Glucerna - weight loss still on going as per pt \par \par h/o DM x 10 yrs \par -saw eye doctor 2/2019 -\par - retina specialist in past - did eye 1/3rd ? hole in retina ,s/p lazer rx\par -on metformin 500 twice daily , Tradjenta 5mg daily -- \par -on statin 20 mg simvastatin , on arb losartan 50 \par - saw podiatrist Dx with feet neuropathy recommended diabetic shoes \par \par oral cancer 2004 rx with sx and chemo , followed by ENT yearly \par \par GERD- still acting up \par -saw gastro- repeated EGd neg mild gastritis and colonoscope 4/2019 neg - due 7 yrs \par -on omeprazole 40 ch twice daily , slight better \par \par saw cardio did EKG- did stress test 5/8/19 neg \par - Did colonoscope 4/17/19 due repeat 7 yrs \par

## 2020-01-27 LAB
ANION GAP SERPL CALC-SCNC: 11 MMOL/L
APPEARANCE: CLEAR
BILIRUBIN URINE: NEGATIVE
BLOOD URINE: NEGATIVE
BUN SERPL-MCNC: 7 MG/DL
CALCIUM SERPL-MCNC: 9.2 MG/DL
CHLORIDE SERPL-SCNC: 99 MMOL/L
CHOLEST SERPL-MCNC: 135 MG/DL
CHOLEST/HDLC SERPL: 1.7 RATIO
CO2 SERPL-SCNC: 28 MMOL/L
COLOR: NORMAL
CREAT SERPL-MCNC: 0.88 MG/DL
GLUCOSE QUALITATIVE U: NEGATIVE
GLUCOSE SERPL-MCNC: 121 MG/DL
HDLC SERPL-MCNC: 79 MG/DL
KETONES URINE: NEGATIVE
LDLC SERPL CALC-MCNC: 45 MG/DL
LEUKOCYTE ESTERASE URINE: NEGATIVE
NITRITE URINE: NEGATIVE
PH URINE: 7.5
POTASSIUM SERPL-SCNC: 5 MMOL/L
PROTEIN URINE: NEGATIVE
SODIUM SERPL-SCNC: 138 MMOL/L
SPECIFIC GRAVITY URINE: 1.01
TRIGL SERPL-MCNC: 59 MG/DL
UROBILINOGEN URINE: NORMAL

## 2020-01-29 ENCOUNTER — APPOINTMENT (OUTPATIENT)
Dept: UROLOGY | Facility: CLINIC | Age: 70
End: 2020-01-29
Payer: MEDICARE

## 2020-01-29 PROCEDURE — 99213 OFFICE O/P EST LOW 20 MIN: CPT

## 2020-01-29 NOTE — ASSESSMENT
[FreeTextEntry1] : 69M hx T2DM, HTN, HLD, oral CA s/p resection and chemotherapy here w/urinary frequency.\par -- PVR 119cc, pt feels empty\par -- w/some residual, LUTS likely 2/2 BPH\par -- again will trial on Flomax 0.4mg qHS\par -- f/u UCx, UA\par -- f/u PSA\par -- reassess in 6mo

## 2020-01-29 NOTE — HISTORY OF PRESENT ILLNESS
[FreeTextEntry1] : 69M hx T2DM, HTN, HLD, oral CA s/p resection and chemotherapy here w/urinary frequency. Pt was previouslt evaluated with similar symptoms and was instructed to take Flomax, however they did not start that. Recently pt was evaluated by PMD, who obtained a UA - neg nitrite, neg LE, neg blood. Pt primarily bothered by nocturia, occurring 4-5x a night. He also has frequency 6-8 times a day. He states he feels emtpy after voiding. Endorses some weak stream. Denies dysuria, hematuria. Drinks 5-6 glasses of water a day, drinks 2 cups of tea in the AM and afternoon. Does not eat spicy foods.

## 2020-01-29 NOTE — PHYSICAL EXAM
[General Appearance - Well Nourished] : well nourished [General Appearance - Well Developed] : well developed [Edema] : no peripheral edema [] : no respiratory distress [Normal Appearance] : normal appearance [Abdomen Mass (___ Cm)] : no abdominal mass palpated [Abdomen Soft] : soft [Abdomen Tenderness] : non-tender [Exaggerated Use Of Accessory Muscles For Inspiration] : no accessory muscle use [Skin Color & Pigmentation] : normal skin color and pigmentation [Normal Station and Gait] : the gait and station were normal for the patient's age [Oriented To Time, Place, And Person] : oriented to person, place, and time [No Focal Deficits] : no focal deficits [Affect] : the affect was normal [Urethral Meatus] : meatus normal [Epididymis] : the epididymides were normal [Scrotum] : the scrotum was normal [Penis Abnormality] : normal circumcised penis [Testes Tenderness] : no tenderness of the testes [Testes Mass (___cm)] : there were no testicular masses [Prostate Tenderness] : the prostate was not tender [No Prostate Nodules] : no prostate nodules [Rectal Exam - Rectum] : digital rectal exam was normal [Prostate Size ___ gm] : prostate size [unfilled] gm

## 2020-02-03 LAB
APPEARANCE: CLEAR
BACTERIA UR CULT: NORMAL
BACTERIA: NEGATIVE
BILIRUBIN URINE: NEGATIVE
BLOOD URINE: NEGATIVE
COLOR: COLORLESS
GLUCOSE QUALITATIVE U: NEGATIVE
HYALINE CASTS: 0 /LPF
KETONES URINE: NEGATIVE
LEUKOCYTE ESTERASE URINE: NEGATIVE
MICROSCOPIC-UA: NORMAL
NITRITE URINE: NEGATIVE
PH URINE: 6.5
PROTEIN URINE: NEGATIVE
RED BLOOD CELLS URINE: 0 /HPF
SPECIFIC GRAVITY URINE: 1.01
SQUAMOUS EPITHELIAL CELLS: 0 /HPF
UROBILINOGEN URINE: NORMAL
WHITE BLOOD CELLS URINE: 0 /HPF

## 2020-02-11 LAB — PSA SERPL-MCNC: 0.97 NG/ML

## 2020-03-11 ENCOUNTER — APPOINTMENT (OUTPATIENT)
Dept: ORTHOPEDIC SURGERY | Facility: CLINIC | Age: 70
End: 2020-03-11
Payer: MEDICARE

## 2020-03-11 VITALS
SYSTOLIC BLOOD PRESSURE: 149 MMHG | HEIGHT: 64 IN | DIASTOLIC BLOOD PRESSURE: 84 MMHG | BODY MASS INDEX: 19.63 KG/M2 | WEIGHT: 115 LBS | HEART RATE: 90 BPM

## 2020-03-11 DIAGNOSIS — M17.12 UNILATERAL PRIMARY OSTEOARTHRITIS, LEFT KNEE: ICD-10-CM

## 2020-03-11 DIAGNOSIS — Z78.9 OTHER SPECIFIED HEALTH STATUS: ICD-10-CM

## 2020-03-11 PROCEDURE — 99204 OFFICE O/P NEW MOD 45 MIN: CPT

## 2020-03-11 PROCEDURE — 73564 X-RAY EXAM KNEE 4 OR MORE: CPT | Mod: 50

## 2020-03-11 NOTE — DISCUSSION/SUMMARY
[de-identified] : The patient was informed of his findings. He was advised of starting a course of physical therapy and may take Mobic if cleared by his primary care physician. Followup in 4-6 weeks to check his progress

## 2020-03-11 NOTE — HISTORY OF PRESENT ILLNESS
[Worsening] : worsening [___ wks] : [unfilled] week(s) ago [8] : a maximum pain level of 8/10 [Standing] : standing [Intermit.] : ~He/She~ states the symptoms seem to be intermittent [Walking] : worsened by walking [de-identified] : Pt presents for initial evaluation with pain in his bilateral knees worse pain to the right knee. Pt states there is no known injury. Pt has hx of CA to the left lower leg scar is noted. Pt takes Tylenol for pain with relief. Pt PCP is Dr Sandoval. [de-identified] : sit to stand [de-identified] : icy hot

## 2020-03-11 NOTE — PHYSICAL EXAM
[de-identified] : physical examination of both knees as close as tenderness to the medial compartment laterally. No acute deformities. well-healed left calf lateral incision corresponding to prior fibular excision surgery. [de-identified] : X-rays taken of both knees in AP lateral skyline and disclosed mild medial joint space narrowing Bilaterally

## 2020-03-13 ENCOUNTER — APPOINTMENT (OUTPATIENT)
Dept: OPHTHALMOLOGY | Facility: CLINIC | Age: 70
End: 2020-03-13

## 2020-03-26 ENCOUNTER — APPOINTMENT (OUTPATIENT)
Dept: SURGERY | Facility: CLINIC | Age: 70
End: 2020-03-26
Payer: MEDICARE

## 2020-03-26 VITALS
WEIGHT: 115 LBS | DIASTOLIC BLOOD PRESSURE: 72 MMHG | HEIGHT: 64 IN | SYSTOLIC BLOOD PRESSURE: 109 MMHG | BODY MASS INDEX: 19.63 KG/M2 | HEART RATE: 74 BPM

## 2020-03-26 PROCEDURE — 40808 BIOPSY OF MOUTH LESION: CPT

## 2020-03-26 PROCEDURE — 99203 OFFICE O/P NEW LOW 30 MIN: CPT | Mod: 25

## 2020-03-26 NOTE — ASSESSMENT
[FreeTextEntry1] : suspect hyperkeratosis. biopsy performed under topical cetacaine and 1 % lidocaine with epi. tolerated well. to call next week for results.

## 2020-03-26 NOTE — CONSULT LETTER
[Dear  ___] : Dear  [unfilled], [Consult Letter:] : I had the pleasure of evaluating your patient, [unfilled]. [Please see my note below.] : Please see my note below. [Consult Closing:] : Thank you very much for allowing me to participate in the care of this patient.  If you have any questions, please do not hesitate to contact me. [Sincerely,] : Sincerely, [FreeTextEntry2] : Dr. David Kanner, Dr. Jaime Kay [FreeTextEntry3] : Donald Xie MD, FACS\par System Director, Endocrine Surgery\par [facility]\par  [DrCarrington  ___] : Dr. SOLER

## 2020-03-26 NOTE — REASON FOR VISIT
[Initial Consultation] : an initial consultation for [FreeTextEntry2] : Lesion mouth [Spouse] : spouse [Other: _____] : [unfilled]

## 2020-03-26 NOTE — PHYSICAL EXAM
[de-identified] : changes left neck from prior surgery [de-identified] : no palpable thyroid nodules [Laryngoscopy Performed] : laryngoscopy was performed, see procedure section for findings [Midline] : located in midline position [de-identified] : changes left buccal and gingival flap with white lesion on surface [Normal] : orientation to person, place, and time: normal [de-identified] : indirect  laryngoscopy shows normal vocal cord mobility bilaterally with no lesions noted

## 2020-03-26 NOTE — HISTORY OF PRESENT ILLNESS
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 history of dental extraction 10 years ago. seen in this office  for white lesion of gingiva. biopsy showed hyperkeratosis.  did not return for f/u.  [facility]  recently went to dentist and referred here for lesion mouth.   denies bleeding or pain or weight loss.

## 2020-04-06 ENCOUNTER — RX RENEWAL (OUTPATIENT)
Age: 70
End: 2020-04-06

## 2020-04-21 ENCOUNTER — APPOINTMENT (OUTPATIENT)
Dept: OTOLARYNGOLOGY | Facility: CLINIC | Age: 70
End: 2020-04-21

## 2020-04-22 ENCOUNTER — RX RENEWAL (OUTPATIENT)
Age: 70
End: 2020-04-22

## 2020-04-22 ENCOUNTER — APPOINTMENT (OUTPATIENT)
Dept: ORTHOPEDIC SURGERY | Facility: CLINIC | Age: 70
End: 2020-04-22

## 2020-05-01 ENCOUNTER — APPOINTMENT (OUTPATIENT)
Dept: INTERNAL MEDICINE | Facility: CLINIC | Age: 70
End: 2020-05-01

## 2020-05-21 ENCOUNTER — APPOINTMENT (OUTPATIENT)
Dept: OPHTHALMOLOGY | Facility: CLINIC | Age: 70
End: 2020-05-21
Payer: MEDICARE

## 2020-05-21 ENCOUNTER — NON-APPOINTMENT (OUTPATIENT)
Age: 70
End: 2020-05-21

## 2020-05-21 PROCEDURE — 99212 OFFICE O/P EST SF 10 MIN: CPT | Mod: 95

## 2020-05-22 ENCOUNTER — RX RENEWAL (OUTPATIENT)
Age: 70
End: 2020-05-22

## 2020-05-27 ENCOUNTER — APPOINTMENT (OUTPATIENT)
Dept: OPHTHALMOLOGY | Facility: CLINIC | Age: 70
End: 2020-05-27
Payer: MEDICARE

## 2020-05-27 ENCOUNTER — NON-APPOINTMENT (OUTPATIENT)
Age: 70
End: 2020-05-27

## 2020-05-27 PROCEDURE — 92134 CPTRZ OPH DX IMG PST SGM RTA: CPT

## 2020-05-27 PROCEDURE — 92136 OPHTHALMIC BIOMETRY: CPT

## 2020-05-27 PROCEDURE — 92014 COMPRE OPH EXAM EST PT 1/>: CPT

## 2020-06-01 ENCOUNTER — APPOINTMENT (OUTPATIENT)
Dept: OTOLARYNGOLOGY | Facility: CLINIC | Age: 70
End: 2020-06-01

## 2020-06-16 ENCOUNTER — RX RENEWAL (OUTPATIENT)
Age: 70
End: 2020-06-16

## 2020-06-18 ENCOUNTER — APPOINTMENT (OUTPATIENT)
Dept: INTERNAL MEDICINE | Facility: CLINIC | Age: 70
End: 2020-06-18
Payer: MEDICARE

## 2020-06-18 DIAGNOSIS — M25.569 PAIN IN UNSPECIFIED KNEE: ICD-10-CM

## 2020-06-18 PROCEDURE — 99213 OFFICE O/P EST LOW 20 MIN: CPT | Mod: 95

## 2020-06-18 NOTE — ASSESSMENT
[FreeTextEntry1] : .b/l knee pain s/p gel inj - better \par -avoid squatting , kneeling and twisting\par \par anemia- resolved , h -13.9 8/2019 \par - colonoscope 4/2019 and EGD - neg \par -oral cancer 2004 rx with sx and chemo , followed by ENT yearly \par - change PPI to omeprazole 40 daily from BID dosing \par \par lower back pain / radiculopathy \par - advised to avoid pulling pushing , lifting , carrying weights , bending etc \par - do warm compresses \par -back brace given \par -physical therapy if no help -referral to see ortho given \par - side effects reviewed \par - rtc if no improvement\par \par Osteoporosis \par - cont Calcium 500-600 mg daily \par -cont vitamin d 1000 units daily \par - do weight bearing exercises: walking with weights, stair climbing , weight training , jogging, aerobics etc \par -repeat test 2 yrs for a f/u\par \par hypertension \par -bp 120/60\par - on losartaan and HCTZ 12.5 \par - monitor Bp at home \par \par DM \par - check Hgbaic 6.6 stable \par -- Controlled, continue current medi\par - Monitor Accu-Cheks daily,fasting and post prandial 2 hrs, ophthalmology followup, podiatry follow up. Monitor for signs of hypoglycemia. Check hemoglobin A1c, urine for microalbumin.\par Continue 1800 kcal ADA diet, cut rice, pasta, sugar, sweet, soda, juices, exercise daily 30 minutes , loose weight.\par \par HCM \par Prevnar 13 given 2017\par pneumococcal 23 -2015\par colonoscope- 2015 ,Colonoscope 4/17/19 due 7-10 yrs \par flu vaccine given 10/2019\par hep c-neg \par shringrex 5/2019 - second dose 8/2019. complete

## 2020-06-18 NOTE — HISTORY OF PRESENT ILLNESS
[Home] : at home, [unfilled] , at the time of the visit. [Medical Office: ([facility])___] : at the medical office located in  [Spouse] : spouse [FreeTextEntry4] : on File  [de-identified] : f/u on ch medical issues \par \par needs MQ 11 forms filled \par \par HTN- on HCTZ 25 , losartan 50 , home readings good- 120/70 \par - no cp or dizzy spells \par c/o lower back and b/l knee pain seeing ortho got B/l gel injections \par - feels slight better \par lower back pain - needs rx for brace - feels pain \par \par Anemia- taking Glucerna - weight loss still on going as per pt \par \par h/o DM x 10 yrs \par -saw eye doctor 5/2020 \par - retina specialist in past - did eye 1/3rd ? hole in retina ,s/p lazer rx - cataract rt eye was advising sx \par -on metformin 500 twice daily , Tradjenta 5mg daily -- \par -on statin 20 mg simvastatin , on arb losartan 50 \par - saw podiatrist Dx with feet neuropathy recommended diabetic shoes \par \par oral cancer 2004 rx with sx and chemo , followed by ENT yearly \par \par GERD- still acting up \par -saw gastro- repeated EGd neg mild gastritis and colonoscope 4/2019 neg - due 7 yrs \par -on omeprazole 40 ch twice daily , slight better \par \par saw cardio did EKG- did stress test 5/8/19 neg \par - Did colonoscope 4/17/19 due repeat 7 yrs \par  \par

## 2020-06-26 ENCOUNTER — APPOINTMENT (OUTPATIENT)
Dept: ORTHOPEDIC SURGERY | Facility: CLINIC | Age: 70
End: 2020-06-26
Payer: MEDICARE

## 2020-06-26 VITALS
SYSTOLIC BLOOD PRESSURE: 128 MMHG | WEIGHT: 120 LBS | DIASTOLIC BLOOD PRESSURE: 71 MMHG | BODY MASS INDEX: 20.49 KG/M2 | HEIGHT: 64 IN | HEART RATE: 74 BPM

## 2020-06-26 DIAGNOSIS — M25.461 EFFUSION, RIGHT KNEE: ICD-10-CM

## 2020-06-26 PROCEDURE — 73564 X-RAY EXAM KNEE 4 OR MORE: CPT | Mod: 50

## 2020-06-26 PROCEDURE — 20610 DRAIN/INJ JOINT/BURSA W/O US: CPT | Mod: RT

## 2020-06-26 PROCEDURE — 99213 OFFICE O/P EST LOW 20 MIN: CPT | Mod: 25

## 2020-06-26 NOTE — HISTORY OF PRESENT ILLNESS
[FreeTextEntry1] : This is a 70-year-old male with a history of diabetes, hyperlipidemia, hypertension, GERD, oral cancer status post mandible excision with left fibula reconstruction (2004, [facility]) with subsequent radiation and no evidence of metastatic disease, who presents for evaluation of bilateral knee pain (R>L).  He has had chronic bilateral knee pain related to degenerative arthritis which has been managed initially by Dr. Rojas (7/2019), followed by Dr. Monahan (3/2020).  Has had multiple injections in the past which have offered several months of relief at a time.  He was recommended to go to physical therapy in March however did not go due to the coronavirus epidemic.  His first session was therefore 4 days ago (6/22), and then a second session 2 days ago (6/24/20), after which his right knee pain began.  He says he was performing stationary bike and general strengthening exercises at that time.  Denies any trauma.  Has since had 10 out of 10 right knee pain localized to his right knee without radiation.  He notes a great deal of localized swelling and has applied ice and taken Tylenol without relief.  He has not taken any NSAIDs due to his history of GERD.  Otherwise denies any fevers or constitutional symptoms.

## 2020-06-26 NOTE — DISCUSSION/SUMMARY
[de-identified] : This is a 70-year-old male with bilateral degenerative arthritis with a right knee effusion likely a flare after vigorous physical therapy.  I have aspirated his right knee and injected steroid today which has provided symptomatic relief.  I have also sent a cell count and crystal analysis to rule out pseudogout.  Low suspicion of septic arthritis given his range of motion and clinical exam.  I have also prescribed meloxicam to be taken on a as needed basis with his omeprazole.  Side effects of this medication were reviewed.  He may resume his physical therapy.  He may follow-up on a as needed basis.

## 2020-06-26 NOTE — DATA REVIEWED
[de-identified] : 6/26/2020: Bilateral knee x-rays (AP, lateral, oblique, sunrise): No fx's, dislocations or osseous lesions.  Stable bilateral tricompartmental degenerative changes unchanged from prior.

## 2020-06-26 NOTE — PHYSICAL EXAM
[FreeTextEntry1] : General: well nourished, in no acute distress, alert and oriented to person, place and time.\par Psychiatric: normal mood and affect, no abnormal movements or speech patterns.\par Eyes: vision intact without deficits, sclera and conjunctiva were normal, pupils were equal in size. \par ENT: Ears and nose were normal in appearance. No thyromegaly.\par Lymph: no enlarged nodes, no lymphedema in extremity.\par Respiratory: Normal respiratory rhythm and effort. No wheezing detected without auscultation. No shortness of breath or respiratory distress.\par Cardiac: no cardiac related leg swelling, 2+ peripheral pulses.\par Neurology: normal gross sensation in extremities to light touch.\par Abdomen: soft, non-tender, tympanic, no masses.\par \par RLE:\par \par Skin CDI. Large effusion is present. No ecchymosis. +joint line TTP. ROM: 0-130 degrees w/ pain, w/o crepitus. No varus/valgus instability. No palpable masses. No lymphedema.\par Alignment: neutral\par EHL/FHL/GS/TA 5/5. S/S/SP/DP/T SILT. Toes warm, BCR. Compartments soft.\par \par LLE:\par \par Skin CDI. Lateral incisional scar over fibula well healed w/o any TTP. No effusion is present. No ecchymosis. No joint line TTP. ROM: 0-130 degrees w/ pain, w/o crepitus. No varus/valgus instability. No palpable masses. No lymphedema.\par Alignment: neutral\par EHL/FHL/GS/TA 5/5. S/S/SP/DP/T SILT. Toes warm, BCR. Compartments soft.

## 2020-06-26 NOTE — PROCEDURE
[Aspiration] : Aspiration [Injection] : Injection [Right] : of the right [Effusion] : Effusion [Knee Joint] : knee joint [Bleeding] : bleeding [Inflammation] : Inflammation [Infection] : infection [Patient] : patient [Risk] : risk [Verbal Consent Obtained] : verbal consent was obtained prior to the procedure [Alcohol] : Alcohol [Benefits] : benefits [Ethyl Chloride Spray] : ethyl chloride spray was used as a topical anesthetic [Lateral] : lateral [___ mL Fluid] : [unfilled] mL of [Yellow] : yellow [Same Needle/New Syringe] : the syringe was changed and the same needle was left in place and [Clear] : clear [1% Lidocaine___(mL)] : [unfilled] mL of 1% Lidocaine [Methylpred. 40mg/mL___(mL)] : [unfilled] mL of 40mg/mL methylprednisolone [Bandage Applied] : a bandage [Ace Wrap] : an ace wrap [Crystal Analysis] : crystal analysis [Cell Count] : cell count [Tolerated Well] : the patient tolerated the procedure well [None] : none [de-identified] : Glucose increase discussed [de-identified] : blood tinged w/ calcifications [de-identified] : 21 [de-identified] : dramatic improvement in pain post injection

## 2020-06-28 ENCOUNTER — NON-APPOINTMENT (OUTPATIENT)
Age: 70
End: 2020-06-28

## 2020-06-29 LAB
B PERT IGG+IGM PNL SER: ABNORMAL
COLOR FLD: NORMAL
FLUID INTAKE SUBSTANCE CLASS: NORMAL
LYMPHOCYTES # FLD MANUAL: 4 %
MONOS+MACROS NFR FLD MANUAL: 10 %
NEUTS SEG # FLD MANUAL: 86 %
RBC # FLD MANUAL: ABNORMAL /UL
SYCRY CLARITY: ABNORMAL
SYCRY COLOR: ABNORMAL
SYCRY ID: ABNORMAL
SYCRY TUBE: NORMAL
TOTAL CELLS COUNTED FLD: NORMAL /UL
TUBE TYPE: NORMAL

## 2020-07-06 ENCOUNTER — RX RENEWAL (OUTPATIENT)
Age: 70
End: 2020-07-06

## 2020-07-07 ENCOUNTER — RX RENEWAL (OUTPATIENT)
Age: 70
End: 2020-07-07

## 2020-07-09 ENCOUNTER — RX RENEWAL (OUTPATIENT)
Age: 70
End: 2020-07-09

## 2020-07-17 ENCOUNTER — APPOINTMENT (OUTPATIENT)
Dept: INTERNAL MEDICINE | Facility: CLINIC | Age: 70
End: 2020-07-17
Payer: MEDICARE

## 2020-07-17 VITALS
OXYGEN SATURATION: 96 % | HEIGHT: 64 IN | BODY MASS INDEX: 18.44 KG/M2 | DIASTOLIC BLOOD PRESSURE: 70 MMHG | TEMPERATURE: 98 F | WEIGHT: 108 LBS | SYSTOLIC BLOOD PRESSURE: 115 MMHG | HEART RATE: 70 BPM

## 2020-07-17 DIAGNOSIS — Z11.59 ENCOUNTER FOR SCREENING FOR OTHER VIRAL DISEASES: ICD-10-CM

## 2020-07-17 DIAGNOSIS — M17.11 UNILATERAL PRIMARY OSTEOARTHRITIS, RIGHT KNEE: ICD-10-CM

## 2020-07-17 PROCEDURE — 99214 OFFICE O/P EST MOD 30 MIN: CPT | Mod: 25

## 2020-07-17 PROCEDURE — 36415 COLL VENOUS BLD VENIPUNCTURE: CPT

## 2020-07-17 NOTE — ASSESSMENT
[FreeTextEntry1] : \par .b/l knee pain s/p gel inj -right knee pain swelling saw ortho did tap dx pseudo gout s/p steroid shot 6/2020\par -avoid squatting , kneeling and twisting\par - cont PT \par GERD\par -start omeprazole 40 po daily 30 minutes prior to breakfast 1 month trial \par -Educated patient lifestyle modification, advice to avoid fried food, greasy oily and spicy foods, avoid tomato, orange, lemon , or caffeinated beverages.\par -Avoid reclining upto 3 hours after meals\par -Followup gastro \par \par anemia- resolved , h -13.9 8/2019 \par - colonoscope 4/2019 and EGD - neg \par -oral cancer 2004 rx with sx and chemo , followed by ENT yearly \par - change PPI to omeprazole 40 daily from BID dosing \par \par lower back pain / radiculopathy \par - advised to avoid pulling pushing , lifting , carrying weights , bending etc \par - do warm compresses \par -back brace given \par -physical therapy if no help -referral to see ortho given \par - side effects reviewed \par - rtc if no improvement\par \par Osteoporosis \par - cont Calcium 500-600 mg daily \par -cont vitamin d 1000 units daily \par - do weight bearing exercises: walking with weights, stair climbing , weight training , jogging, aerobics etc \par -repeat test 2 yrs for a f/u\par \par hypertension \par -bp 120/60\par - on losartaan and HCTZ 12.5 \par - monitor Bp at home \par \par DM \par - check Hgbaic 6.6 stable \par -- Controlled, continue current medi\par - Monitor Accu-Cheks daily,fasting and post prandial 2 hrs, ophthalmology followup, podiatry follow up. Monitor for signs of hypoglycemia. Check hemoglobin A1c, urine for microalbumin.\par Continue 1800 kcal ADA diet, cut rice, pasta, sugar, sweet, soda, juices, exercise daily 30 minutes , loose weight.\par \par HCM \par Prevnar 13 given 2017\par pneumococcal 23 -2015\par colonoscope- 2015 ,Colonoscope 4/17/19 due 7-10 yrs \par flu vaccine given 10/2019\par hep c-neg \par shringrex 5/2019 - second dose 8/2019. complete. \par

## 2020-07-17 NOTE — REVIEW OF SYSTEMS
[Negative] : Gastrointestinal [Joint Pain] : joint pain [Joint Stiffness] : joint stiffness [Joint Swelling] : joint swelling

## 2020-07-17 NOTE — HISTORY OF PRESENT ILLNESS
[Spouse] : spouse [de-identified] : f/u on ch medical issues\par \par right knee pain - swelling saw ortho last month did arthrocentesis- did steroid shot \par - dx as arthritis - pseudogout - started therpay 2 x week \par \par HTN- on HCTZ 25 , losartan 50 , home readings good- 120/70 \par - no cp or dizzy spells \par \par c/o lower back and b/l knee pain seeing ortho got B/l gel injections \par - feels slight better \par lower back pain - needs rx for brace - feels pain \par \par Anemia- taking Glucerna - weight loss still on going as per pt \par \par h/o DM x 10 yrs \par -saw eye doctor 5/2020 \par - retina specialist in past - did eye 1/3rd ? hole in retina ,s/p lazer rx - cataract rt eye was advising sx \par -on metformin 500 twice daily , Tradjenta 5mg daily -- \par -on statin 20 mg simvastatin , on arb losartan 50 \par - saw podiatrist Dx with feet neuropathy recommended diabetic shoes \par \par oral cancer 2004 rx with sx and chemo , followed by ENT yearly \par \par GERD- still acting up \par -saw gastro- repeated EGd neg mild gastritis and colonoscope 4/2019 neg - due 7 yrs \par -on omeprazole 40 ch twice daily , slight better \par \par saw cardio did EKG- did stress test 5/8/19 neg \par - Did colonoscope 4/17/19 due repeat 7 yrs \par

## 2020-07-20 LAB
25(OH)D3 SERPL-MCNC: 55.9 NG/ML
ALBUMIN SERPL ELPH-MCNC: 4.7 G/DL
ALP BLD-CCNC: 67 U/L
ALT SERPL-CCNC: 21 U/L
ANION GAP SERPL CALC-SCNC: 13 MMOL/L
APPEARANCE: CLEAR
AST SERPL-CCNC: 25 U/L
BASOPHILS # BLD AUTO: 0.03 K/UL
BASOPHILS NFR BLD AUTO: 0.4 %
BILIRUB SERPL-MCNC: 0.4 MG/DL
BILIRUBIN URINE: NEGATIVE
BLOOD URINE: NEGATIVE
BUN SERPL-MCNC: 6 MG/DL
CALCIUM SERPL-MCNC: 10.2 MG/DL
CHLORIDE SERPL-SCNC: 96 MMOL/L
CHOLEST SERPL-MCNC: 150 MG/DL
CHOLEST/HDLC SERPL: 1.9 RATIO
CO2 SERPL-SCNC: 27 MMOL/L
COLOR: NORMAL
CREAT SERPL-MCNC: 0.89 MG/DL
EOSINOPHIL # BLD AUTO: 0.32 K/UL
EOSINOPHIL NFR BLD AUTO: 4.5 %
ESTIMATED AVERAGE GLUCOSE: 143 MG/DL
GLUCOSE QUALITATIVE U: NEGATIVE
GLUCOSE SERPL-MCNC: 113 MG/DL
HBA1C MFR BLD HPLC: 6.6 %
HCT VFR BLD CALC: 45.9 %
HDLC SERPL-MCNC: 80 MG/DL
HGB BLD-MCNC: 14.6 G/DL
IMM GRANULOCYTES NFR BLD AUTO: 0.3 %
KETONES URINE: NEGATIVE
LDLC SERPL CALC-MCNC: 56 MG/DL
LEUKOCYTE ESTERASE URINE: NEGATIVE
LYMPHOCYTES # BLD AUTO: 1.15 K/UL
LYMPHOCYTES NFR BLD AUTO: 16 %
MAN DIFF?: NORMAL
MCHC RBC-ENTMCNC: 27.5 PG
MCHC RBC-ENTMCNC: 31.8 GM/DL
MCV RBC AUTO: 86.6 FL
MONOCYTES # BLD AUTO: 0.65 K/UL
MONOCYTES NFR BLD AUTO: 9 %
NEUTROPHILS # BLD AUTO: 5.02 K/UL
NEUTROPHILS NFR BLD AUTO: 69.8 %
NITRITE URINE: NEGATIVE
PH URINE: 7
PLATELET # BLD AUTO: 280 K/UL
POTASSIUM SERPL-SCNC: 5.5 MMOL/L
PROT SERPL-MCNC: 6.7 G/DL
PROTEIN URINE: NEGATIVE
PSA FREE FLD-MCNC: 24 %
PSA FREE SERPL-MCNC: 0.19 NG/ML
PSA SERPL-MCNC: 0.82 NG/ML
RBC # BLD: 5.3 M/UL
RBC # FLD: 13.3 %
SARS-COV-2 IGG SERPL IA-ACNC: <0.1 INDEX
SARS-COV-2 IGG SERPL QL IA: NEGATIVE
SODIUM SERPL-SCNC: 136 MMOL/L
SPECIFIC GRAVITY URINE: 1.01
TRIGL SERPL-MCNC: 68 MG/DL
TSH SERPL-ACNC: 1.74 UIU/ML
UROBILINOGEN URINE: NORMAL
VIT B12 SERPL-MCNC: 1596 PG/ML
WBC # FLD AUTO: 7.19 K/UL

## 2020-07-21 ENCOUNTER — APPOINTMENT (OUTPATIENT)
Dept: GASTROENTEROLOGY | Facility: CLINIC | Age: 70
End: 2020-07-21
Payer: MEDICARE

## 2020-07-21 VITALS
WEIGHT: 108 LBS | TEMPERATURE: 97.9 F | BODY MASS INDEX: 18.44 KG/M2 | HEART RATE: 74 BPM | HEIGHT: 64 IN | DIASTOLIC BLOOD PRESSURE: 59 MMHG | SYSTOLIC BLOOD PRESSURE: 108 MMHG

## 2020-07-21 DIAGNOSIS — Z86.010 PERSONAL HISTORY OF COLONIC POLYPS: ICD-10-CM

## 2020-07-21 DIAGNOSIS — R09.89 OTHER SPECIFIED SYMPTOMS AND SIGNS INVOLVING THE CIRCULATORY AND RESPIRATORY SYSTEMS: ICD-10-CM

## 2020-07-21 DIAGNOSIS — H91.90 UNSPECIFIED HEARING LOSS, UNSPECIFIED EAR: ICD-10-CM

## 2020-07-21 PROCEDURE — 82274 ASSAY TEST FOR BLOOD FECAL: CPT | Mod: QW

## 2020-07-21 PROCEDURE — 99214 OFFICE O/P EST MOD 30 MIN: CPT

## 2020-07-21 RX ORDER — MELOXICAM 15 MG/1
15 TABLET ORAL
Qty: 30 | Refills: 2 | Status: DISCONTINUED | COMMUNITY
Start: 2020-06-26 | End: 2020-07-21

## 2020-07-21 RX ORDER — FAMOTIDINE 20 MG/1
20 TABLET, FILM COATED ORAL
Refills: 0 | Status: DISCONTINUED | COMMUNITY
End: 2020-07-21

## 2020-07-21 RX ORDER — POLYVINYL ALCOHOL 14 MG/ML
1.4 SOLUTION/ DROPS OPHTHALMIC
Qty: 1 | Refills: 5 | Status: DISCONTINUED | COMMUNITY
Start: 2017-02-08 | End: 2020-07-21

## 2020-07-21 RX ORDER — MELOXICAM 15 MG/1
15 TABLET ORAL
Qty: 30 | Refills: 1 | Status: DISCONTINUED | COMMUNITY
Start: 2020-03-11 | End: 2020-07-21

## 2020-07-21 RX ORDER — TAMSULOSIN HYDROCHLORIDE 0.4 MG/1
0.4 CAPSULE ORAL
Qty: 90 | Refills: 0 | Status: DISCONTINUED | COMMUNITY
Start: 2018-04-30 | End: 2020-07-21

## 2020-07-21 RX ORDER — CHLORHEXIDINE GLUCONATE 4 %
325 (65 FE) LIQUID (ML) TOPICAL DAILY
Qty: 30 | Refills: 6 | Status: DISCONTINUED | COMMUNITY
Start: 2018-06-13 | End: 2020-07-21

## 2020-07-21 RX ORDER — LIDOCAINE 5 G/100G
5 OINTMENT TOPICAL
Qty: 1 | Refills: 0 | Status: DISCONTINUED | COMMUNITY
Start: 2017-12-14 | End: 2020-07-21

## 2020-07-21 RX ORDER — OMEPRAZOLE 40 MG/1
40 CAPSULE, DELAYED RELEASE ORAL
Qty: 90 | Refills: 0 | Status: DISCONTINUED | COMMUNITY
Start: 2018-01-08 | End: 2020-07-21

## 2020-07-21 NOTE — ASSESSMENT
[FreeTextEntry1] : 1. GERD-recurrent symptoms after stopping omeprazole-upper endoscopy April 17, 2019 revealed mild gastritis; upper endoscopy April 2015 revealed intense antral and prepyloric erythema.\par 2. Tubular adenomas at colonoscopy April 2015--rule out metachronous colorectal neoplasia.\par 3. Oral cancer 2004, status post surgery and radiation-abnormal appearing buccal mucosa and gumline-rule out recurrent malignancy, rule out radiation change, rule out Candida..\par 4. Type 2 diabetes mellitus, reasonably well-controlled.\par 5. Smoker.\par 6. Hypertension.\par 7. Hyperlipidemia.\par 8.  Significant weight loss-rule out occult malignancy.\par 9.  Rales left base-rule out malignancy, rule out pneumonia, rule out interstitial change secondary to fibrosis.\par 10.  Status post cholecystectomy.\par \par Plan:\par 1.  A prescription for omeprazole 40 mg once daily 30 to 45 minutes AC was electronically submitted.\par 2.  Request made for CT scan of the neck, chest, abdomen and pelvis.\par 3.  Blood test results were reviewed.\par 4.  The patient has an appointment to see Dr. Xie later this week for reevaluation.\par 5.  Repeat upper endoscopy may be necessary depending on the results of the above tests and the patient's response to the PPI.\par \par

## 2020-07-21 NOTE — CONSULT LETTER
[Consult Letter:] : I had the pleasure of evaluating your patient, [unfilled]. [Dear  ___] : Dear  [unfilled], [Consult Closing:] : Thank you very much for allowing me to participate in the care of this patient.  If you have any questions, please do not hesitate to contact me. [( Thank you for referring [unfilled] for consultation for _____ )] : Thank you for referring [unfilled] for consultation for [unfilled] [Please see my note below.] : Please see my note below. [Sincerely,] : Sincerely, [FreeTextEntry3] : Darvin Lopes MD

## 2020-07-21 NOTE — REVIEW OF SYSTEMS
[Eye Pain] : eye pain [Constipation] : constipation [Loss Of Hearing] : hearing loss [Diarrhea] : diarrhea [Heartburn] : heartburn [Dry Skin] : dry skin [Negative] : Heme/Lymph

## 2020-07-21 NOTE — PHYSICAL EXAM
[General Appearance - Alert] : alert [General Appearance - In No Acute Distress] : in no acute distress [Sclera] : the sclera and conjunctiva were normal [Neck Cervical Mass (___cm)] : no neck mass was observed [Jugular Venous Distention Increased] : there was no jugular-venous distention [Thyroid Diffuse Enlargement] : the thyroid was not enlarged [Thyroid Nodule] : there were no palpable thyroid nodules [Auscultation Breath Sounds / Voice Sounds] : lungs were clear to auscultation bilaterally [Heart Sounds] : normal S1 and S2 [Heart Rate And Rhythm] : heart rate was normal and rhythm regular [Heart Sounds Gallop] : no gallops [Murmurs] : no murmurs [Full Pulse] : the pedal pulses are present [Heart Sounds Pericardial Friction Rub] : no pericardial rub [Bowel Sounds] : normal bowel sounds [Edema] : there was no peripheral edema [Abdomen Tenderness] : non-tender [Abdomen Soft] : soft [Abdomen Mass (___ Cm)] : no abdominal mass palpated [Normal Sphincter Tone] : normal sphincter tone [No Rectal Mass] : no rectal mass [Penis Abnormality] : the penis was normal [Scrotum] : the scrotum was normal [Testes Swelling] : the testicles were not swollen [Testes Mass (___cm)] : there were no testicular masses [Cervical Lymph Nodes Enlarged Posterior Bilaterally] : posterior cervical [Cervical Lymph Nodes Enlarged Anterior Bilaterally] : anterior cervical [Supraclavicular Lymph Nodes Enlarged Bilaterally] : supraclavicular [Femoral Lymph Nodes Enlarged Bilaterally] : femoral [Axillary Lymph Nodes Enlarged Bilaterally] : axillary [No Spinal Tenderness] : no spinal tenderness [No CVA Tenderness] : no ~M costovertebral angle tenderness [Inguinal Lymph Nodes Enlarged Bilaterally] : inguinal [Abnormal Walk] : normal gait [Nail Clubbing] : no clubbing  or cyanosis of the fingernails [Musculoskeletal - Swelling] : no joint swelling seen [Motor Tone] : muscle strength and tone were normal [Skin Color & Pigmentation] : normal skin color and pigmentation [] : no rash [Skin Turgor] : normal skin turgor [Internal Hemorrhoid] : no internal hemorrhoids [External Hemorrhoid] : no external hemorrhoids [Occult Blood Positive] : stool was negative for occult blood [FreeTextEntry1] : flat affect

## 2020-07-21 NOTE — HISTORY OF PRESENT ILLNESS
[FreeTextEntry1] : The patient is accompanied by his wife to help with translation.  The patient has a history of heartburn for a number of years, and he has been controlled on omeprazole 40 mg daily.  However, he ran out of medication 2 weeks ago, and has now been complaining of increased heartburn and "acid."  He has lost about 12 pounds over the past few months, but has no other specific complaints.  His bowel habits are regular.  He had upper endoscopy on April 17, 2019 by Dr. Juan Alexander, and it revealed mild gastritis, but was otherwise normal.  He also had a colonoscopy on the same day, and it revealed mild diverticulosis.  Dr. Nix had previously seen the patient in 2017.  The patient had a biopsy of his mouth on March 27, 2020 by , and it revealed "marked hyperparakeratosis and hyperorthokeratosis. Dysplasia is not identified.  The patient has an appointment to see Dr. Xie later this week.

## 2020-07-23 ENCOUNTER — APPOINTMENT (OUTPATIENT)
Dept: SURGERY | Facility: CLINIC | Age: 70
End: 2020-07-23
Payer: MEDICARE

## 2020-07-23 ENCOUNTER — APPOINTMENT (OUTPATIENT)
Dept: SURGERY | Facility: CLINIC | Age: 70
End: 2020-07-23

## 2020-07-23 DIAGNOSIS — Z85.819 PERSONAL HISTORY OF MALIGNANT NEOPLASM OF UNSPECIFIED SITE OF LIP, ORAL CAVITY, AND PHARYNX: ICD-10-CM

## 2020-07-23 PROCEDURE — 99213 OFFICE O/P EST LOW 20 MIN: CPT

## 2020-07-23 NOTE — HISTORY OF PRESENT ILLNESS
[de-identified] : Pt with history of gingival ca treated with surgery and  RT in 2005  by dr merino at [facility]. history of dental extraction 10 years ago. seen in this office  for white lesion of gingiva. biopsy showed hyperkeratosis.  did not return for f/u.  has been followed annually by head and neck service at [facility].  recently went to dentist and referred here for lesion mouth.   biopsy showed hyperkeratosis.  denies bleeding or pain or weight loss.

## 2020-07-23 NOTE — PHYSICAL EXAM
[de-identified] : no palpable thyroid nodules [de-identified] : changes left neck from prior surgery [Laryngoscopy Performed] : laryngoscopy was performed, see procedure section for findings [Midline] : located in midline position [de-identified] : changes left buccal and gingival flap with white lesion on surface [Normal] : orientation to person, place, and time: normal

## 2020-07-23 NOTE — ASSESSMENT
[FreeTextEntry1] : once again, encouraged to see dr garay, who had canceled appointment in June. to return as needed.  d/w dr garay who will be certain to f/u

## 2020-07-24 ENCOUNTER — APPOINTMENT (OUTPATIENT)
Dept: CT IMAGING | Facility: IMAGING CENTER | Age: 70
End: 2020-07-24

## 2020-07-25 ENCOUNTER — OUTPATIENT (OUTPATIENT)
Dept: OUTPATIENT SERVICES | Facility: HOSPITAL | Age: 70
LOS: 1 days | End: 2020-07-25
Payer: COMMERCIAL

## 2020-07-25 ENCOUNTER — APPOINTMENT (OUTPATIENT)
Dept: CT IMAGING | Facility: IMAGING CENTER | Age: 70
End: 2020-07-25
Payer: MEDICARE

## 2020-07-25 ENCOUNTER — RESULT REVIEW (OUTPATIENT)
Age: 70
End: 2020-07-25

## 2020-07-25 DIAGNOSIS — K13.70 UNSPECIFIED LESIONS OF ORAL MUCOSA: ICD-10-CM

## 2020-07-25 DIAGNOSIS — Z98.49 CATARACT EXTRACTION STATUS, UNSPECIFIED EYE: Chronic | ICD-10-CM

## 2020-07-25 DIAGNOSIS — R63.4 ABNORMAL WEIGHT LOSS: ICD-10-CM

## 2020-07-25 DIAGNOSIS — R09.89 OTHER SPECIFIED SYMPTOMS AND SIGNS INVOLVING THE CIRCULATORY AND RESPIRATORY SYSTEMS: ICD-10-CM

## 2020-07-25 PROCEDURE — 71260 CT THORAX DX C+: CPT

## 2020-07-25 PROCEDURE — 70491 CT SOFT TISSUE NECK W/DYE: CPT | Mod: 26

## 2020-07-25 PROCEDURE — 74177 CT ABD & PELVIS W/CONTRAST: CPT | Mod: 26

## 2020-07-25 PROCEDURE — 71260 CT THORAX DX C+: CPT | Mod: 26

## 2020-07-25 PROCEDURE — 70491 CT SOFT TISSUE NECK W/DYE: CPT

## 2020-07-25 PROCEDURE — 74177 CT ABD & PELVIS W/CONTRAST: CPT

## 2020-07-28 ENCOUNTER — OUTPATIENT (OUTPATIENT)
Dept: OUTPATIENT SERVICES | Facility: HOSPITAL | Age: 70
LOS: 1 days | Discharge: HOME | End: 2020-07-28

## 2020-07-28 ENCOUNTER — APPOINTMENT (OUTPATIENT)
Dept: OTOLARYNGOLOGY | Facility: CLINIC | Age: 70
End: 2020-07-28
Payer: MEDICARE

## 2020-07-28 VITALS — BODY MASS INDEX: 18.78 KG/M2 | HEIGHT: 64 IN | WEIGHT: 110 LBS

## 2020-07-28 DIAGNOSIS — Z98.49 CATARACT EXTRACTION STATUS, UNSPECIFIED EYE: Chronic | ICD-10-CM

## 2020-07-28 PROCEDURE — 99214 OFFICE O/P EST MOD 30 MIN: CPT | Mod: 25

## 2020-07-28 PROCEDURE — 31575 DIAGNOSTIC LARYNGOSCOPY: CPT

## 2020-07-28 NOTE — HISTORY OF PRESENT ILLNESS
[de-identified] : 70 year male presents for evaluation of mandibular gingiva found on pathology 03/27/2020. Pt is accompanied by daughter, Sharee , who will help translate for pt, denies pac services. History of oral cancer s/p composite resection and reconstruction and adj RT completed 2004. Denies dysphagia, odynophagia, weight loss. Last CT chest/neck 07/25/2020.  He reports that this area comes and goes and has been an issue secondary to irritation from his dentures.  He denies any pain or bleeding.

## 2020-07-28 NOTE — PROCEDURE
[None] : none [Unable to Cooperate with Mirror] : patient unable to cooperate with mirror [Flexible Endoscope] : examined with the flexible endoscope [Serial Number: ___] : Serial Number: [unfilled] [de-identified] : No lesions in the NPx, OPx, HPx or larynx. Stable posttreatment changes, VC are mobile, airway patent.

## 2020-07-28 NOTE — PHYSICAL EXAM
[Laryngoscopy Performed] : laryngoscopy was performed, see procedure section for findings [Normal] : no rashes [de-identified] : Posttreatment changes.  No LAD. [FreeTextEntry1] : Posttreatment changes, the mandible is quite atrophic, there is an area of thickened mucosa along the left buccal area, well defined, no ulceration, no TTP.

## 2020-07-28 NOTE — REASON FOR VISIT
[Subsequent Evaluation] : a subsequent evaluation for [Family Member] : family member [FreeTextEntry2] : evaluation of mandibular gingiva

## 2020-07-30 ENCOUNTER — APPOINTMENT (OUTPATIENT)
Dept: UROLOGY | Facility: CLINIC | Age: 70
End: 2020-07-30

## 2020-08-01 ENCOUNTER — NON-APPOINTMENT (OUTPATIENT)
Age: 70
End: 2020-08-01

## 2020-08-04 ENCOUNTER — RX RENEWAL (OUTPATIENT)
Age: 70
End: 2020-08-04

## 2020-08-12 DIAGNOSIS — K13.70 UNSPECIFIED LESIONS OF ORAL MUCOSA: ICD-10-CM

## 2020-08-12 DIAGNOSIS — Z85.819 PERSONAL HISTORY OF MALIGNANT NEOPLASM OF UNSPECIFIED SITE OF LIP, ORAL CAVITY, AND PHARYNX: ICD-10-CM

## 2020-09-16 ENCOUNTER — APPOINTMENT (OUTPATIENT)
Dept: OPHTHALMOLOGY | Facility: CLINIC | Age: 70
End: 2020-09-16

## 2020-10-01 ENCOUNTER — APPOINTMENT (OUTPATIENT)
Dept: OPHTHALMOLOGY | Facility: AMBULATORY SURGERY CENTER | Age: 70
End: 2020-10-01

## 2020-10-02 ENCOUNTER — APPOINTMENT (OUTPATIENT)
Dept: OPHTHALMOLOGY | Facility: CLINIC | Age: 70
End: 2020-10-02

## 2020-10-07 ENCOUNTER — APPOINTMENT (OUTPATIENT)
Dept: OPHTHALMOLOGY | Facility: CLINIC | Age: 70
End: 2020-10-07

## 2020-10-13 ENCOUNTER — APPOINTMENT (OUTPATIENT)
Dept: OTOLARYNGOLOGY | Facility: CLINIC | Age: 70
End: 2020-10-13
Payer: MEDICARE

## 2020-10-13 VITALS
WEIGHT: 108 LBS | DIASTOLIC BLOOD PRESSURE: 63 MMHG | BODY MASS INDEX: 18.44 KG/M2 | HEIGHT: 64 IN | HEART RATE: 79 BPM | SYSTOLIC BLOOD PRESSURE: 108 MMHG

## 2020-10-13 PROCEDURE — 99214 OFFICE O/P EST MOD 30 MIN: CPT | Mod: 25

## 2020-10-13 PROCEDURE — 31575 DIAGNOSTIC LARYNGOSCOPY: CPT

## 2020-10-13 NOTE — PHYSICAL EXAM
[Laryngoscopy Performed] : laryngoscopy was performed, see procedure section for findings [Normal] : no rashes [de-identified] : Posttreatment changes.  No LAD. [FreeTextEntry1] : Posttreatment changes, the mandible is quite atrophic, there is an area of thickened mucosa along the left buccal area, well defined, no ulceration, no TTP - this appears stable.

## 2020-10-13 NOTE — HISTORY OF PRESENT ILLNESS
[de-identified] : 70 year male with history of oral cancer s/p composite resection and reconstruction using RFFF and FFF followed by RT in 2004 and bx of mandibular gingiva on  03/27/2020 showed no dysplasia. . Last CT chest/neck 07/25/2020. \par  Pt is accompanied by wife, who will help translate for pt, here to f/u inner cheek area, no pain no bleeding no c.o . Denies dysphagia, odynophagia, weight loss  He still has issues with his dentures, and not eating well and has some wt loss.  He denies any pain or bleeding.

## 2020-10-13 NOTE — PROCEDURE
[Unable to Cooperate with Mirror] : patient unable to cooperate with mirror [None] : none [Flexible Endoscope] : examined with the flexible endoscope [Serial Number: ___] : Serial Number: [unfilled] [de-identified] : No lesions in the NPx, OPx, HPx or larynx.  Stable posttreatment changes, VC are mobile, airway patent.\par

## 2020-10-28 ENCOUNTER — NON-APPOINTMENT (OUTPATIENT)
Age: 70
End: 2020-10-28

## 2020-10-28 ENCOUNTER — APPOINTMENT (OUTPATIENT)
Dept: OPHTHALMOLOGY | Facility: CLINIC | Age: 70
End: 2020-10-28
Payer: MEDICARE

## 2020-10-28 ENCOUNTER — APPOINTMENT (OUTPATIENT)
Dept: INTERNAL MEDICINE | Facility: CLINIC | Age: 70
End: 2020-10-28
Payer: MEDICARE

## 2020-10-28 VITALS
DIASTOLIC BLOOD PRESSURE: 60 MMHG | SYSTOLIC BLOOD PRESSURE: 96 MMHG | TEMPERATURE: 97.9 F | BODY MASS INDEX: 19.46 KG/M2 | WEIGHT: 114 LBS | HEART RATE: 78 BPM | OXYGEN SATURATION: 98 % | HEIGHT: 64 IN

## 2020-10-28 PROCEDURE — 99072 ADDL SUPL MATRL&STAF TM PHE: CPT

## 2020-10-28 PROCEDURE — G0439: CPT

## 2020-10-28 PROCEDURE — G0444 DEPRESSION SCREEN ANNUAL: CPT

## 2020-10-28 PROCEDURE — 36415 COLL VENOUS BLD VENIPUNCTURE: CPT

## 2020-10-28 PROCEDURE — G0442 ANNUAL ALCOHOL SCREEN 15 MIN: CPT

## 2020-10-28 PROCEDURE — 92014 COMPRE OPH EXAM EST PT 1/>: CPT

## 2020-10-28 RX ORDER — HYDROCHLOROTHIAZIDE 12.5 MG/1
12.5 TABLET ORAL DAILY
Qty: 90 | Refills: 3 | Status: DISCONTINUED | COMMUNITY
Start: 2019-05-31 | End: 2020-10-28

## 2020-10-28 NOTE — HEALTH RISK ASSESSMENT
[Good] : ~his/her~  mood as  good [No] : No [No falls in past year] : Patient reported no falls in the past year [0] : 2) Feeling down, depressed, or hopeless: Not at all (0) [With Significant Other] : lives with significant other [Retired] : retired [] :  [] : No [de-identified] : walking  [[id number]] : 0 [Reports changes in hearing] : Reports no changes in hearing [Reports changes in vision] : Reports no changes in vision [Reports changes in dental health] : Reports no changes in dental health

## 2020-10-28 NOTE — ASSESSMENT
[FreeTextEntry1] : .b/l knee pain s/p gel inj -right knee pain swelling saw ortho did tap dx pseudo gout s/p steroid shot 6/2020\par -avoid squatting , kneeling and twisting\par - cont PT \par \par GERD- saw gastro did CT chest / abd pelvis 7/2020 reviewed - lesion sclerotic left thigh unchanged from 2006 \par -on omeprazole 40 po daily 30 minutes prior to breakfast 1 month trial \par -Educated patient lifestyle modification, advice to avoid fried food, greasy oily and spicy foods, avoid tomato, orange, lemon , or caffeinated beverages.\par -Avoid reclining upto 3 hours after meals\par -Followup gastro \par \par anemia- resolved \par - colonoscope 4/2019 and EGD - neg \par -oral cancer 2004 rx with sx and chemo , followed by ENT yearly \par - change PPI to omeprazole 40 daily from BID dosing \par \par lower back pain / radiculopathy \par - advised to avoid pulling pushing , lifting , carrying weights , bending etc \par - do warm compresses \par -back brace given \par - side effects reviewed \par - rtc if no improvement\par \par Osteoporosis - 2019 - referral for endo given \par - cont Calcium 500-600 mg daily \par -cont vitamin d 1000 units daily \par - do weight bearing exercises: walking with weights, stair climbing , weight training , jogging, aerobics etc \par -repeat test 2 yrs for a f/u\par \par hypertension \par -bp 96/58 - low - discontinue HCTZ \[facility] \par - monitor Bp at home \par \par DM \par - check Hgbaic 6.6 stable 7/2020\par -- Controlled, continue current medi\par - Monitor Accu-Cheks daily,fasting and post prandial 2 hrs, ophthalmology followup, podiatry follow up. Monitor for signs of hypoglycemia. Check hemoglobin A1c, urine for microalbumin.\par Continue 1800 kcal ADA diet, cut rice, pasta, sugar, sweet, soda, juices, exercise daily 30 minutes , loose weight.\par \par BPH -\par restart tamsulosin 0.4 mg po qhs \par - increase frequency- d/c HCTZ  - f/u urologist \par \par HCM \par Prevnar 13 given 2017\par pneumococcal 23 -2015\par colonoscope- 2015 ,Colonoscope 4/17/19 due 7-10 yrs \par [facility] \par hep c-neg \par shringrex 5/2019 - second dose 8/2019. complete. \par

## 2020-10-28 NOTE — HISTORY OF PRESENT ILLNESS
[Spouse] : spouse [de-identified] : came in for CPE \par \par right knee pain \par - swelling saw ortho 6/2020 did arthrocentesis- did steroid shot \par - dx as arthritis - pseudogout - finished therpay 2 x week \par \par HTN- on HCTZ 25 , losartan 50 , home readings good- 120/70 \par - no cp or dizzy spells \par \par c/o lower back and b/l knee pain seeing ortho got B/l gel injections \par - feels slight better \par lower back pain - needs rx for brace - feels pain \par \par Anemia- taking Glucerna - weight loss still on going as per pt \par \par h/o DM x 10 yrs \par -saw eye doctor 5/2020 \par - retina specialist in past - did eye 1/3rd ? hole in retina ,s/p lazer rx - cataract rt eye was advising sx \par -on metformin 750 twice daily , Tradjenta 5mg daily -- \par -on statin 20 mg simvastatin , on arb losartan 50 \par - saw podiatrist Dx with feet neuropathy recommended diabetic shoes \par \par oral cancer 2004 rx with sx and chemo , followed by ENT yearly stable \par \par GERD- still acting up - saw gastro did CT abd /pelvis/ chest 7/2020-reviewed with pt \par -saw gastro- repeated EGd neg mild gastritis and colonoscope 4/2019 neg - due 7 yrs \par -on omeprazole 40 ch twice daily , slight better \par \par saw cardio did EKG- did stress test 5/8/19 neg \par - Did colonoscope 4/17/19 due repeat 7 yrs \par  \par

## 2020-10-29 ENCOUNTER — APPOINTMENT (OUTPATIENT)
Dept: INTERNAL MEDICINE | Facility: CLINIC | Age: 70
End: 2020-10-29
Payer: MEDICARE

## 2020-10-29 VITALS
WEIGHT: 114 LBS | HEART RATE: 103 BPM | TEMPERATURE: 98.2 F | SYSTOLIC BLOOD PRESSURE: 104 MMHG | OXYGEN SATURATION: 97 % | DIASTOLIC BLOOD PRESSURE: 54 MMHG | HEIGHT: 64 IN | BODY MASS INDEX: 19.46 KG/M2

## 2020-10-29 DIAGNOSIS — H25.12 AGE-RELATED NUCLEAR CATARACT, LEFT EYE: ICD-10-CM

## 2020-10-29 DIAGNOSIS — Z01.818 ENCOUNTER FOR OTHER PREPROCEDURAL EXAMINATION: ICD-10-CM

## 2020-10-29 LAB
ALBUMIN SERPL ELPH-MCNC: 4 G/DL
ALP BLD-CCNC: 61 U/L
ALT SERPL-CCNC: 26 U/L
ANION GAP SERPL CALC-SCNC: 11 MMOL/L
APPEARANCE: CLEAR
AST SERPL-CCNC: 25 U/L
BASOPHILS # BLD AUTO: 0.05 K/UL
BASOPHILS NFR BLD AUTO: 0.6 %
BILIRUB SERPL-MCNC: 0.3 MG/DL
BILIRUBIN URINE: NEGATIVE
BLOOD URINE: NEGATIVE
BUN SERPL-MCNC: 15 MG/DL
CALCIUM SERPL-MCNC: 9.4 MG/DL
CHLORIDE SERPL-SCNC: 101 MMOL/L
CHOLEST SERPL-MCNC: 128 MG/DL
CO2 SERPL-SCNC: 29 MMOL/L
COLOR: NORMAL
CREAT SERPL-MCNC: 0.88 MG/DL
EOSINOPHIL # BLD AUTO: 0.58 K/UL
EOSINOPHIL NFR BLD AUTO: 6.5 %
ESTIMATED AVERAGE GLUCOSE: 143 MG/DL
GLUCOSE QUALITATIVE U: NEGATIVE
GLUCOSE SERPL-MCNC: 149 MG/DL
HBA1C MFR BLD HPLC: 6.6 %
HCT VFR BLD CALC: 41.3 %
HDLC SERPL-MCNC: 62 MG/DL
HGB BLD-MCNC: 13.1 G/DL
IMM GRANULOCYTES NFR BLD AUTO: 0.3 %
KETONES URINE: NEGATIVE
LDLC SERPL CALC-MCNC: 56 MG/DL
LEUKOCYTE ESTERASE URINE: NEGATIVE
LYMPHOCYTES # BLD AUTO: 1.21 K/UL
LYMPHOCYTES NFR BLD AUTO: 13.6 %
MAN DIFF?: NORMAL
MCHC RBC-ENTMCNC: 27.9 PG
MCHC RBC-ENTMCNC: 31.7 GM/DL
MCV RBC AUTO: 88.1 FL
MONOCYTES # BLD AUTO: 0.8 K/UL
MONOCYTES NFR BLD AUTO: 9 %
NEUTROPHILS # BLD AUTO: 6.24 K/UL
NEUTROPHILS NFR BLD AUTO: 70 %
NITRITE URINE: NEGATIVE
NONHDLC SERPL-MCNC: 67 MG/DL
PH URINE: 7
PLATELET # BLD AUTO: 231 K/UL
POTASSIUM SERPL-SCNC: 4.2 MMOL/L
PROT SERPL-MCNC: 5.7 G/DL
PROTEIN URINE: NEGATIVE
RBC # BLD: 4.69 M/UL
RBC # FLD: 13.2 %
SODIUM SERPL-SCNC: 140 MMOL/L
SPECIFIC GRAVITY URINE: 1.01
TRIGL SERPL-MCNC: 54 MG/DL
TSH SERPL-ACNC: 2.69 UIU/ML
UROBILINOGEN URINE: NORMAL
WBC # FLD AUTO: 8.91 K/UL

## 2020-10-29 PROCEDURE — 99072 ADDL SUPL MATRL&STAF TM PHE: CPT

## 2020-10-29 PROCEDURE — 93000 ELECTROCARDIOGRAM COMPLETE: CPT

## 2020-10-29 RX ORDER — OMEPRAZOLE 40 MG/1
40 CAPSULE, DELAYED RELEASE ORAL
Qty: 30 | Refills: 0 | Status: DISCONTINUED | COMMUNITY
Start: 2020-07-17 | End: 2020-10-29

## 2020-10-29 RX ORDER — BLOOD PRESSURE TEST KIT-LARGE
KIT MISCELLANEOUS
Qty: 1 | Refills: 0 | Status: ACTIVE | COMMUNITY
Start: 2018-06-11 | End: 1900-01-01

## 2020-10-29 RX ORDER — DICLOFENAC SODIUM 20 MG/G
2 SOLUTION TOPICAL
Qty: 224 | Refills: 0 | Status: DISCONTINUED | COMMUNITY
Start: 2020-04-22

## 2020-10-29 NOTE — RESULTS/DATA
[] : results reviewed [Normal] : The 12 - lead ECG is normal [No Acute Ischemia] : No acute ischemia [No Interval Change] : no interval change

## 2020-10-29 NOTE — PHYSICAL EXAM
[No Carotid Bruits] : no carotid bruits [No Edema] : there was no peripheral edema [Normal] : affect was normal and insight and judgment were intact

## 2020-10-29 NOTE — HISTORY OF PRESENT ILLNESS
[No Pertinent Cardiac History] : no history of aortic stenosis, atrial fibrillation, coronary artery disease, recent myocardial infarction, or implantable device/pacemaker [No Pertinent Pulmonary History] : no history of asthma, COPD, sleep apnea, or smoking [Diabetes] : diabetes [(Patient denies any chest pain, claudication, dyspnea on exertion, orthopnea, palpitations or syncope)] : Patient denies any chest pain, claudication, dyspnea on exertion, orthopnea, palpitations or syncope [Good (7-10 METs)] : Good (7-10 METs) [Chronic Anticoagulation] : no chronic anticoagulation [Chronic Kidney Disease] : no chronic kidney disease [FreeTextEntry1] : left cataract extraction  [FreeTextEntry2] : 11/5/20 [FreeTextEntry3] : Dr. Isha Medellin  [FreeTextEntry4] : 70 y.o. male with hypertension, diabetes, oral ca, anemia, back pain, GERD, OA, BPH.  Presents for presurgical evaluation prior to cataract surgery. \par Seen recently by PCP for wellness visit.  Diabetes well controlled.  Stopped HCTZ d/t low BP, continues losartan.  \par Feeling overall well.  Very active through the day.  Walked 30 min to appointment today.  Feels well with activity.  No chest pain, no dyspnea, no edema.   [FreeTextEntry7] : \par exercise stress test 5/8/2019 - normal\par echocardiogram 5/7/2019 - EF 64%, trace tricuspid and pulmonic regurg, normal PA pressure

## 2020-10-29 NOTE — PLAN
[FreeTextEntry1] : \par Home BP cuff ordered, advised to monitor 2-3 times/week and call with readings in 2 weeks.  Discussed decreasing losartan dose If remains low despite stopping HCTZ as of yesterday.

## 2020-10-29 NOTE — ASSESSMENT
[High Risk Surgery - Intraperitoneal, Intrathoracic or Supringuinal Vascular Procedures] : High Risk Surgery - Intraperitoneal, Intrathoracic or Supringuinal Vascular Procedures - No (0) [Ischemic Heart Disease] : Ischemic Heart Disease - No (0) [Congestive Heart Failure] : Congestive Heart Failure - No (0) [Prior Cerebrovascular Accident or TIA] : Prior Cerebrovascular Accident or TIA - No (0) [Creatinine >= 2mg/dL (1 Point)] : Creatinine >= 2mg/dL - No (0) [Insulin-dependent Diabetic (1 Point)] : Insulin-dependent Diabetic - No (0) [0] : 0 , RCRI Class: I, Risk of Post-Op Cardiac Complications: 3.9%, 95% CI for Risk Estimate: 2.8% - 5.4% [Patient Optimized for Surgery] : Patient optimized for surgery

## 2020-11-02 ENCOUNTER — APPOINTMENT (OUTPATIENT)
Dept: UROLOGY | Facility: CLINIC | Age: 70
End: 2020-11-02
Payer: MEDICARE

## 2020-11-02 VITALS — TEMPERATURE: 97.5 F

## 2020-11-02 LAB
APPEARANCE: CLEAR
BACTERIA: NEGATIVE
BILIRUBIN URINE: NEGATIVE
BLOOD URINE: NEGATIVE
COLOR: COLORLESS
GLUCOSE QUALITATIVE U: NEGATIVE
HYALINE CASTS: 0 /LPF
KETONES URINE: NEGATIVE
LEUKOCYTE ESTERASE URINE: NEGATIVE
MICROSCOPIC-UA: NORMAL
NITRITE URINE: NEGATIVE
PH URINE: 6.5
PROTEIN URINE: NEGATIVE
RED BLOOD CELLS URINE: 0 /HPF
SPECIFIC GRAVITY URINE: 1.01
SQUAMOUS EPITHELIAL CELLS: 0 /HPF
UROBILINOGEN URINE: NORMAL
WHITE BLOOD CELLS URINE: 0 /HPF

## 2020-11-02 PROCEDURE — 51798 US URINE CAPACITY MEASURE: CPT

## 2020-11-02 PROCEDURE — 99072 ADDL SUPL MATRL&STAF TM PHE: CPT

## 2020-11-02 PROCEDURE — 99213 OFFICE O/P EST LOW 20 MIN: CPT | Mod: 25

## 2020-11-02 NOTE — HISTORY OF PRESENT ILLNESS
[FreeTextEntry1] : Mauro Philip returns to the office today.  He is a 70-year-old man with history of BPH and lower urinary tract symptoms related to outlet obstruction.  He had been on tamsulosin which has been started by Dr. Ferguson in the past.  The patient states that he has been doing quite well with this medication but apparently ran out and then was given a new prescription by his primary care physician that he just restarted a few days ago.  Without the medication, he had noticed worsening nocturia about 3 or 4 times.  With the medication, he says that he usually voids only once or twice overnight.  He also has noticed increase in urgency and frequency.  He denies any gross hematuria or dysuria. \par \par \par \par

## 2020-11-02 NOTE — ASSESSMENT
[FreeTextEntry1] : A PSA was checked in July of this year and was found to be less than 1 ng/mL at that time.  This would be interpreted as him having very low risk of harboring clinically significant prostate cancer.\par \par A post void residual bladder scan today showed a volume of 124 mL.  This is relatively consistent with a previous post void residual of 118 mL in 2018.  His prescription for tamsulosin was renewed with refills for the next year.  He seems to be quite a bit more comfortable with the medication and will continue it for now.  He seems to be satisfied with his voiding patterns with the use of the medication and does not need any other additional urologic intervention at this point.  If he does develop worsening symptoms over time, either additional medication or surgical management of the prostate should be considered.\par \par He will return to the office in 1 year.\par \par

## 2020-11-03 LAB — BACTERIA UR CULT: NORMAL

## 2020-11-04 ENCOUNTER — APPOINTMENT (OUTPATIENT)
Dept: OPHTHALMOLOGY | Facility: CLINIC | Age: 70
End: 2020-11-04

## 2020-11-16 ENCOUNTER — APPOINTMENT (OUTPATIENT)
Dept: ENDOCRINOLOGY | Facility: CLINIC | Age: 70
End: 2020-11-16
Payer: MEDICARE

## 2020-11-16 VITALS
OXYGEN SATURATION: 95 % | WEIGHT: 115 LBS | SYSTOLIC BLOOD PRESSURE: 120 MMHG | TEMPERATURE: 97.7 F | BODY MASS INDEX: 19.63 KG/M2 | HEIGHT: 64 IN | HEART RATE: 71 BPM | DIASTOLIC BLOOD PRESSURE: 64 MMHG

## 2020-11-16 PROCEDURE — 99072 ADDL SUPL MATRL&STAF TM PHE: CPT

## 2020-11-16 PROCEDURE — 99204 OFFICE O/P NEW MOD 45 MIN: CPT | Mod: 25

## 2020-11-16 NOTE — PHYSICAL EXAM
[Alert] : alert [Well Nourished] : well nourished [Healthy Appearance] : healthy appearance [No Acute Distress] : no acute distress [Normal Sclera/Conjunctiva] : normal sclera/conjunctiva [Normal Hearing] : hearing was normal [No Neck Mass] : no neck mass was observed [No Respiratory Distress] : no respiratory distress [No Accessory Muscle Use] : no accessory muscle use [Clear to Auscultation] : lungs were clear to auscultation bilaterally [Normal S1, S2] : normal S1 and S2 [Normal Rate] : heart rate was normal [Regular Rhythm] : with a regular rhythm [No Edema] : no peripheral edema [Normal Bowel Sounds] : normal bowel sounds [Not Tender] : non-tender [Soft] : abdomen soft [No CVA Tenderness] : no ~M costovertebral angle tenderness [Normal Gait] : normal gait [No Involuntary Movements] : no involuntary movements were seen [Right Foot Was Examined] : right foot ~C was examined [Left Foot Was Examined] : left foot ~C was examined [Normal] : normal [Full ROM] : with full range of motion [Diminished Throughout Both Feet] : normal tactile sensation with monofilament testing throughout both feet [No Tremors] : no tremors [Oriented x3] : oriented to person, place, and time [Normal Affect] : the affect was normal

## 2020-11-16 NOTE — ASSESSMENT
[FreeTextEntry1] : This is a 70 y.o. man w/ DM2, HTN, LBP/ joint pain, anemia, oral cancer 2004 rx with sx and chemo, GERD, here for new pt. visit for DM2 and osteoporosis.\par \par DM2: He has a h/o DM x 10 yrs.\par His A1c was 6.6 10/2020. Well controlled.\par He is on metformin 750 twice daily , Tradjenta 5mg daily.\par OMEGA DOUGLAS  counseled on lifestyle modification through diet and exercise.\par UTD w/ optho  5/2020 and podiatrist.\par Will check urine microalb today.\par \par HTN: Well controlled on current regimen.\par \par HLD: C/W statin\par \par Osteoporosis:\par DEXA 6/2019 showed osteoporosis with T score -3 in L-spine. Will repeat.\par He takes Ca 1200mg daily and a MVI. Instructed to start Vit D 2000u PO daily.\par Will evaluate for secondary causes of osteo- PTH/ test/ celiac screen\par He will be undergoing dental work, will hold off starting tx until after that.\par \par \par

## 2020-11-16 NOTE — HISTORY OF PRESENT ILLNESS
[FreeTextEntry1] : This is a 70 y.o. man w/ DM2, HTN, LBP/ joint pain, anemia, oral cancer 2004 rx with sx and chemo, GERD, here for new pt. visit for DM2 and osteoporosis.\par \par DM2: He has ah/o DM x 10 yrs.\par His A1c was 6.6 10/2020.\par \par He is on metformin 750 twice daily , Tradjenta 5mg daily.\par \par -saw eye doctor 5/2020 \par - retina specialist in past - did eye 1/3rd ? hole in retina ,s/p ericka rx - cataract rt eye was advising sx , is going for the L eye\par \par - saw podiatrist Dx with feet neuropathy recommended diabetic shoes \par \par Diet:\par Bkfst: toast and tea\par Lunch: roti w/ veg or rice\par dinner: roti w/ veg \par He drinks glucerna and coffee\par He has dentures so has trouble chewing..\par \par He walks for 30 min most days.\par \par Also w/ h/o osteo. DEXA 6/2019 showed osteoporosis with T score -3 in L-spine.\par He takes Ca 1200mg daily and a MVI.\par \par He saw dentist last mth. On 11/20 he may get implant. He is going for a consultation.\par \par He has never had a broken bone. \par \par No prolonged steroid use.\par  [Calcium (dietary)] : dietary Calcium [Diabetes] : a history of diabetes

## 2020-11-19 ENCOUNTER — NON-APPOINTMENT (OUTPATIENT)
Age: 70
End: 2020-11-19

## 2020-11-19 ENCOUNTER — APPOINTMENT (OUTPATIENT)
Dept: OPHTHALMOLOGY | Facility: AMBULATORY SURGERY CENTER | Age: 70
End: 2020-11-19
Payer: MEDICARE

## 2020-11-19 PROCEDURE — 66984 XCAPSL CTRC RMVL W/O ECP: CPT | Mod: LT

## 2020-11-20 ENCOUNTER — NON-APPOINTMENT (OUTPATIENT)
Age: 70
End: 2020-11-20

## 2020-11-20 ENCOUNTER — APPOINTMENT (OUTPATIENT)
Dept: OPHTHALMOLOGY | Facility: CLINIC | Age: 70
End: 2020-11-20
Payer: MEDICARE

## 2020-11-20 LAB
CALCIUM SERPL-MCNC: 9.1 MG/DL
CREAT SPEC-SCNC: 39 MG/DL
MICROALBUMIN 24H UR DL<=1MG/L-MCNC: <1.2 MG/DL
MICROALBUMIN/CREAT 24H UR-RTO: NORMAL MG/G
PARATHYROID HORMONE INTACT: 32 PG/ML
SHBG SERPL-SCNC: 42 NMOL/L
TTG IGA SER IA-ACNC: <1.2 U/ML
TTG IGA SER-ACNC: NEGATIVE
TTG IGG SER IA-ACNC: 6 U/ML
TTG IGG SER IA-ACNC: ABNORMAL

## 2020-11-20 PROCEDURE — 99024 POSTOP FOLLOW-UP VISIT: CPT

## 2020-11-23 ENCOUNTER — NON-APPOINTMENT (OUTPATIENT)
Age: 70
End: 2020-11-23

## 2020-11-25 ENCOUNTER — NON-APPOINTMENT (OUTPATIENT)
Age: 70
End: 2020-11-25

## 2020-11-25 ENCOUNTER — APPOINTMENT (OUTPATIENT)
Dept: OPHTHALMOLOGY | Facility: CLINIC | Age: 70
End: 2020-11-25
Payer: MEDICARE

## 2020-11-25 LAB
TESTOST BND SERPL-MCNC: 7.1 PG/ML
TESTOST SERPL-MCNC: 363.3 NG/DL

## 2020-11-25 PROCEDURE — 99024 POSTOP FOLLOW-UP VISIT: CPT

## 2020-12-11 ENCOUNTER — OUTPATIENT (OUTPATIENT)
Dept: OUTPATIENT SERVICES | Facility: HOSPITAL | Age: 70
LOS: 1 days | End: 2020-12-11
Payer: COMMERCIAL

## 2020-12-11 ENCOUNTER — APPOINTMENT (OUTPATIENT)
Dept: RADIOLOGY | Facility: IMAGING CENTER | Age: 70
End: 2020-12-11
Payer: MEDICARE

## 2020-12-11 DIAGNOSIS — Z98.49 CATARACT EXTRACTION STATUS, UNSPECIFIED EYE: Chronic | ICD-10-CM

## 2020-12-11 DIAGNOSIS — Z00.8 ENCOUNTER FOR OTHER GENERAL EXAMINATION: ICD-10-CM

## 2020-12-11 PROCEDURE — 77080 DXA BONE DENSITY AXIAL: CPT | Mod: 26

## 2020-12-11 PROCEDURE — 77080 DXA BONE DENSITY AXIAL: CPT

## 2020-12-21 ENCOUNTER — APPOINTMENT (OUTPATIENT)
Dept: ORTHOPEDIC SURGERY | Facility: CLINIC | Age: 70
End: 2020-12-21
Payer: MEDICARE

## 2020-12-21 VITALS
WEIGHT: 115 LBS | DIASTOLIC BLOOD PRESSURE: 85 MMHG | BODY MASS INDEX: 19.63 KG/M2 | HEIGHT: 64 IN | SYSTOLIC BLOOD PRESSURE: 147 MMHG | OXYGEN SATURATION: 98 % | HEART RATE: 100 BPM

## 2020-12-21 PROCEDURE — 99072 ADDL SUPL MATRL&STAF TM PHE: CPT

## 2020-12-21 PROCEDURE — 99214 OFFICE O/P EST MOD 30 MIN: CPT

## 2020-12-21 NOTE — HISTORY OF PRESENT ILLNESS
[Worsening] : worsening [___ wks] : [unfilled] week(s) ago [8] : a minimum pain level of 8/10 [10] : a maximum pain level of 10/10 [Intermit.] : ~He/She~ states the symptoms seem to be intermittent [Walking] : worsened by walking [Knee Flexion] : worsened with knee flexion [Rest] : relieved by rest [de-identified] : Pt presents for follow up visit, today he is having pain in  his left knee, there is no edema, no buckling. Pt has no known injury. Hx reveal CA 2004 in his mouth. Pt had injection approx 1 year ago for the left knee pain, he does not recall the medication that was injected.  PT was seen by Dr James La for aspiration to the right knee 6/2020. [de-identified] : certain movements [de-identified] : Tylenol

## 2020-12-21 NOTE — DISCUSSION/SUMMARY
[de-identified] : The patient was advised of his findings after reviewing the results of his prior knee aspiration.  With the use of an  the patient was advised on seeking further definitive treatment with a rheumatologist since he has positive pyrophosphate crystals in his left knee aspirate.

## 2020-12-21 NOTE — PHYSICAL EXAM
[de-identified] : Physical examination of the left knee at this time does not disclose any acute effusions or erythema.  Range of motion from 0 to 120 degrees relatively nontender.

## 2020-12-23 ENCOUNTER — RX RENEWAL (OUTPATIENT)
Age: 70
End: 2020-12-23

## 2021-01-04 ENCOUNTER — NON-APPOINTMENT (OUTPATIENT)
Age: 71
End: 2021-01-04

## 2021-01-16 ENCOUNTER — APPOINTMENT (OUTPATIENT)
Dept: RHEUMATOLOGY | Facility: CLINIC | Age: 71
End: 2021-01-16

## 2021-01-19 ENCOUNTER — APPOINTMENT (OUTPATIENT)
Dept: OTOLARYNGOLOGY | Facility: CLINIC | Age: 71
End: 2021-01-19
Payer: MEDICARE

## 2021-01-19 PROCEDURE — 99214 OFFICE O/P EST MOD 30 MIN: CPT | Mod: 25

## 2021-01-19 PROCEDURE — 99072 ADDL SUPL MATRL&STAF TM PHE: CPT

## 2021-01-19 PROCEDURE — 31575 DIAGNOSTIC LARYNGOSCOPY: CPT

## 2021-01-19 NOTE — REASON FOR VISIT
[Subsequent Evaluation] : a subsequent evaluation for [FreeTextEntry2] : evaluation of mandibular gingiva

## 2021-01-19 NOTE — HISTORY OF PRESENT ILLNESS
[de-identified] : 70 year male with history of oral cancer s/p composite resection and reconstruction using RFFF and FFF followed by RT in 2004 and bx of mandibular gingiva on 03/27/2020 showed no dysplasia. Last CT chest/neck 07/25/2020.  Pt is accompanied by wife, who will help translate for pt, here to f/u inner cheek area irritation mostly like secondary to denture. pt is feeling better,  no pain no bleeding no c.o . pt went to dentist and told them that surgical site area is very stiffness and concerned in making new dentures. pt is having trouble chewing and loss some wt.  Denies dysphagia, odynophagia.

## 2021-01-19 NOTE — PHYSICAL EXAM
[Laryngoscopy Performed] : laryngoscopy was performed, see procedure section for findings [Normal] : no rashes [de-identified] : Posttreatment changes.  No LAD. [FreeTextEntry1] : Posttreatment changes, the mandible is quite atrophic, there is an area of thickened mucosa along the left buccal area, well defined, no ulceration, no TTP - this appears stable.

## 2021-01-19 NOTE — PROCEDURE
[Unable to Cooperate with Mirror] : patient unable to cooperate with mirror [None] : none [Flexible Endoscope] : examined with the flexible endoscope [Serial Number: ___] : Serial Number: [unfilled] [de-identified] : No lesions in the NPx, OPx, HPx or larynx. Stable posttreatment changes, VC are mobile, airway patent.\par

## 2021-01-20 ENCOUNTER — NON-APPOINTMENT (OUTPATIENT)
Age: 71
End: 2021-01-20

## 2021-01-20 ENCOUNTER — APPOINTMENT (OUTPATIENT)
Dept: OPHTHALMOLOGY | Facility: CLINIC | Age: 71
End: 2021-01-20
Payer: MEDICARE

## 2021-01-20 PROCEDURE — 92134 CPTRZ OPH DX IMG PST SGM RTA: CPT

## 2021-01-20 PROCEDURE — 99024 POSTOP FOLLOW-UP VISIT: CPT

## 2021-01-21 ENCOUNTER — NON-APPOINTMENT (OUTPATIENT)
Age: 71
End: 2021-01-21

## 2021-01-21 ENCOUNTER — APPOINTMENT (OUTPATIENT)
Dept: INTERNAL MEDICINE | Facility: CLINIC | Age: 71
End: 2021-01-21
Payer: MEDICARE

## 2021-01-21 VITALS
SYSTOLIC BLOOD PRESSURE: 105 MMHG | OXYGEN SATURATION: 98 % | WEIGHT: 120 LBS | BODY MASS INDEX: 20.49 KG/M2 | DIASTOLIC BLOOD PRESSURE: 70 MMHG | TEMPERATURE: 98.1 F | HEIGHT: 64 IN | HEART RATE: 88 BPM

## 2021-01-21 PROCEDURE — 99214 OFFICE O/P EST MOD 30 MIN: CPT | Mod: 25

## 2021-01-21 PROCEDURE — 99072 ADDL SUPL MATRL&STAF TM PHE: CPT

## 2021-01-21 RX ORDER — OFLOXACIN 3 MG/ML
0.3 SOLUTION/ DROPS OPHTHALMIC
Qty: 5 | Refills: 0 | Status: DISCONTINUED | COMMUNITY
Start: 2020-12-03

## 2021-01-21 NOTE — ASSESSMENT
[FreeTextEntry1] : .b/l knee pain s/p gel inj -right knee pain swelling saw ortho did tap dx pseudo gout s/p steroid shot 6/2020\par -avoid squatting , kneeling and twisting\par - cont PT - walking with rollator \par - letter for access a ride given \par \par GERD- saw gastro did CT chest / abd pelvis 7/2020 reviewed - lesion sclerotic left thigh unchanged from 2006 \par -on omeprazole 40 po daily 30 minutes prior to breakfast 1 month trial \par -Educated patient lifestyle modification, advice to avoid fried food, greasy oily and spicy foods, avoid tomato, orange, lemon , or caffeinated beverages.\par -Avoid reclining upto 3 hours after meals\par -Followup gastro \par \par anemia- resolved \par - colonoscope 4/2019 and EGD - neg \par -oral cancer 2004 rx with sx and chemo , followed by ENT yearly \par - change PPI to omeprazole 40 daily from BID dosing \par \par lower back pain / radiculopathy \par - advised to avoid pulling pushing , lifting , carrying weights , bending etc \par - do warm compresses \par -back brace given \par - side effects reviewed \par - rtc if no improvement\par \par Osteoporosis - 2019 - followed by endo dexa 12/2020 - osteopenia \par - cont Calcium 500-600 mg daily \par -cont vitamin d 1000 units daily \par - do weight bearing exercises: walking with weights, stair climbing , weight training , jogging, aerobics etc \par -repeat test 2 yrs for a f/u\par \par hypertension \par -bp 92/58 - low - discontinue HCTZ 10/2020 , change losartan to 25 mg daily \par - monitor Bp at home \par \par DM \par - check Hgbaic 6.6 stable 7/2020  10/2020 \par -- Controlled, continue current medication Metformin 750 2 tab daily \par - Monitor Accu-Cheks daily,fasting and post prandial 2 hrs, ophthalmology followup, podiatry follow up. Monitor for signs of hypoglycemia. Check hemoglobin A1c, urine for microalbumin.\par Continue 1800 kcal ADA diet, cut rice, pasta, sugar, sweet, soda, juices, exercise daily 30 minutes , loose weight.\par \par BPH -\par -tamsulosin 0.4 mg po qhs \par - increase frequency- d/c HCTZ - f/u urologist \par \par HCM \par Prevnar 13 given 2017\par pneumococcal 23 -2015\par colonoscope-  4/17/19 due 7-10 yrs \par [facility] \par hep c-neg \par shringrex 5/2019 - second dose 8/2019. complete. \par  \par COVID Vaccine: discussed risk and benefits of COVID vaccine; given risk factors for severe COVID disease, I recommend patient receive COVID vaccine; asked them to consider protective effects and balance with minimal known side effects from the vaccine at this time. \par \par COVID Vaccine referrals: \par must go to web site: nygrv92nocdtyt.health.nys.gov or or vaccinefinder.[facility].gov\par or Text "myturn" to 36270\par or call [phone]\par \par \par

## 2021-01-21 NOTE — HISTORY OF PRESENT ILLNESS
[Spouse] : spouse [de-identified] : f/u on ch medical issues \par \par right knee pain \par - swelling saw ortho 6/2020 did arthrocentesis- did steroid shot \par - dx as arthritis - pseudogout - finished therapy  2 x week - walking with rollator \par \par HTN- on HCTZ 25 , losartan 50 , home readings good- 120/70 \par - no cp or dizzy spells \par \par c/o lower back and b/l knee pain seeing ortho got B/l gel injections \par - feels slight better \par lower back pain - needs rx for brace - feels pain \par \par Anemia- taking Glucerna - weight loss still on going as per pt \par \par h/o DM x 10 yrs \par -saw eye doctor 5/2020 \par - retina specialist in past - did eye 1/3rd ? hole in retina ,s/p lazer rx - cataract rt eye was advising sx \par -on metformin 750 twice daily , Tradjenta 5mg daily -- \par -on statin 20 mg simvastatin , on arb losartan 50 \par - saw podiatrist Dx with feet neuropathy recommended diabetic shoes \par \par oral cancer 2004 rx with sx and chemo , followed by ENT yearly stable \par \par GERD- still acting up - saw gastro did CT abd /pelvis/ chest 7/2020-reviewed with pt \par -saw gastro- repeated EGd neg mild gastritis and colonoscope 4/2019 neg - due 7 yrs \par -on omeprazole 40 ch twice daily , slight better \par \par saw cardio did EKG- did stress test 5/8/19 neg \par - Did colonoscope 4/17/19 due repeat 7 yrs \par

## 2021-01-22 ENCOUNTER — NON-APPOINTMENT (OUTPATIENT)
Age: 71
End: 2021-01-22

## 2021-01-25 LAB
ALBUMIN SERPL ELPH-MCNC: 4 G/DL
ALP BLD-CCNC: 67 U/L
ALT SERPL-CCNC: 26 U/L
ANION GAP SERPL CALC-SCNC: 12 MMOL/L
AST SERPL-CCNC: 29 U/L
BILIRUB SERPL-MCNC: 0.2 MG/DL
BUN SERPL-MCNC: 17 MG/DL
CALCIUM SERPL-MCNC: 9 MG/DL
CHLORIDE SERPL-SCNC: 103 MMOL/L
CHOLEST SERPL-MCNC: 118 MG/DL
CO2 SERPL-SCNC: 25 MMOL/L
CREAT SERPL-MCNC: 0.98 MG/DL
ESTIMATED AVERAGE GLUCOSE: 154 MG/DL
GLUCOSE SERPL-MCNC: 168 MG/DL
HBA1C MFR BLD HPLC: 7 %
HDLC SERPL-MCNC: 62 MG/DL
LDLC SERPL CALC-MCNC: 36 MG/DL
NONHDLC SERPL-MCNC: 56 MG/DL
POTASSIUM SERPL-SCNC: 4.7 MMOL/L
PROT SERPL-MCNC: 5.8 G/DL
SODIUM SERPL-SCNC: 140 MMOL/L
TRIGL SERPL-MCNC: 96 MG/DL

## 2021-03-15 ENCOUNTER — APPOINTMENT (OUTPATIENT)
Dept: OTOLARYNGOLOGY | Facility: CLINIC | Age: 71
End: 2021-03-15
Payer: MEDICARE

## 2021-03-15 ENCOUNTER — OUTPATIENT (OUTPATIENT)
Dept: OUTPATIENT SERVICES | Facility: HOSPITAL | Age: 71
LOS: 1 days | Discharge: HOME | End: 2021-03-15

## 2021-03-15 VITALS
WEIGHT: 120 LBS | BODY MASS INDEX: 20.49 KG/M2 | SYSTOLIC BLOOD PRESSURE: 137 MMHG | HEIGHT: 64 IN | DIASTOLIC BLOOD PRESSURE: 80 MMHG | HEART RATE: 91 BPM

## 2021-03-15 DIAGNOSIS — Z98.49 CATARACT EXTRACTION STATUS, UNSPECIFIED EYE: Chronic | ICD-10-CM

## 2021-03-15 PROCEDURE — 99072 ADDL SUPL MATRL&STAF TM PHE: CPT

## 2021-03-15 PROCEDURE — 31575 DIAGNOSTIC LARYNGOSCOPY: CPT

## 2021-03-15 PROCEDURE — 99214 OFFICE O/P EST MOD 30 MIN: CPT | Mod: 25

## 2021-03-15 NOTE — HISTORY OF PRESENT ILLNESS
[de-identified] : 70 year male with history of oral cancer s/p composite resection and reconstruction using RFFF and FFF followed by RT in 2004 and bx of mandibular gingiva on 03/27/2020 showed no dysplasia. Last CT chest/neck 07/25/2020. He presents earlier than planned reporting new swelling along the right parotid area which he noticed about 1-2 weeks ago.  He denies pain.  He also denies any pain, bleeding from the left buccal lesion.  He reports that his eating has improved and weight stabilized.  He denies any dyspnea, dysphagia, otalgia.  His dentist is working on a new set of dentures for him.

## 2021-03-15 NOTE — REASON FOR VISIT
[Subsequent Evaluation] : a subsequent evaluation for [FreeTextEntry2] : follow up on mass on his right jaw.

## 2021-03-15 NOTE — PHYSICAL EXAM
[Laryngoscopy Performed] : laryngoscopy was performed, see procedure section for findings [Normal] : normal appearance, well groomed, well nourished, and in no acute distress [de-identified] : Posttreatment changes.  No LAD.  There is some erythema and fullness overlying the right parotid soft tissues, no TTP. [FreeTextEntry1] : Posttreatment changes, the mandible is quite atrophic, there is an area of thickened mucosa along the left buccal area, well defined, no ulceration, no TTP - this appears stable.

## 2021-03-15 NOTE — PROCEDURE
[Unable to Cooperate with Mirror] : patient unable to cooperate with mirror [None] : none [Flexible Endoscope] : examined with the flexible endoscope [Serial Number: ___] : Serial Number: [unfilled] [de-identified] : No lesions in the NPx, OPx, HPx or larynx. Stable posttreatment changes, VC are mobile, airway patent.

## 2021-03-17 DIAGNOSIS — M81.0 AGE-RELATED OSTEOPOROSIS W/OUT CURRENT PATHOLOGICAL FRACTURE: ICD-10-CM

## 2021-03-24 DIAGNOSIS — Z85.819 PERSONAL HISTORY OF MALIGNANT NEOPLASM OF UNSPECIFIED SITE OF LIP, ORAL CAVITY, AND PHARYNX: ICD-10-CM

## 2021-03-24 DIAGNOSIS — R22.1 LOCALIZED SWELLING, MASS AND LUMP, NECK: ICD-10-CM

## 2021-03-24 DIAGNOSIS — K13.70 UNSPECIFIED LESIONS OF ORAL MUCOSA: ICD-10-CM

## 2021-04-01 ENCOUNTER — NON-APPOINTMENT (OUTPATIENT)
Age: 71
End: 2021-04-01

## 2021-04-19 ENCOUNTER — APPOINTMENT (OUTPATIENT)
Dept: OTOLARYNGOLOGY | Facility: CLINIC | Age: 71
End: 2021-04-19
Payer: MEDICAID

## 2021-04-19 VITALS
DIASTOLIC BLOOD PRESSURE: 57 MMHG | HEART RATE: 85 BPM | WEIGHT: 118 LBS | HEIGHT: 64 IN | SYSTOLIC BLOOD PRESSURE: 88 MMHG | BODY MASS INDEX: 20.14 KG/M2

## 2021-04-19 DIAGNOSIS — R22.1 LOCALIZED SWELLING, MASS AND LUMP, NECK: ICD-10-CM

## 2021-04-19 PROCEDURE — 99072 ADDL SUPL MATRL&STAF TM PHE: CPT

## 2021-04-19 PROCEDURE — 99214 OFFICE O/P EST MOD 30 MIN: CPT | Mod: 25

## 2021-04-19 PROCEDURE — 31575 DIAGNOSTIC LARYNGOSCOPY: CPT

## 2021-04-19 NOTE — PHYSICAL EXAM
[Laryngoscopy Performed] : laryngoscopy was performed, see procedure section for findings [Normal] : no rashes [de-identified] : Posttreatment changes.  No LAD.  There is some fullness overlying the right parotid soft tissues, the erythema is improved, no TTP. [FreeTextEntry1] : Posttreatment changes, the mandible is quite atrophic, there is an area of thickened mucosa along the left buccal area, well defined, no ulceration, no TTP - this appears stable.

## 2021-04-19 NOTE — HISTORY OF PRESENT ILLNESS
[de-identified] : 71 year male with history of oral cancer s/p composite resection and reconstruction using RFFF and FFF followed by RT in 2004 and bx of mandibular gingiva on 03/27/2020 showed no dysplasia. Last CT chest/neck 07/25/2020. He is f/u on  right parotid area discomfort and completed antibiotic and no change. pt c.o chronic irritation in the left of mouth. His dentist is working on a new set of dentures for him.  He denies pain.  He reports that his eating has improved and weight stabilized.  He denies any dyspnea, dysphagia, otalgia.

## 2021-04-19 NOTE — REASON FOR VISIT
[Subsequent Evaluation] : a subsequent evaluation for [FreeTextEntry2] : follow up on mass on right side of the jaw.

## 2021-04-19 NOTE — PROCEDURE
[Unable to Cooperate with Mirror] : patient unable to cooperate with mirror [None] : none [Flexible Endoscope] : examined with the flexible endoscope [Serial Number: ___] : Serial Number: [unfilled] [de-identified] : No lesions in the NPx, OPx, HPx or larynx. Stable posttreatment changes, VC are mobile, airway patent.

## 2021-05-10 ENCOUNTER — APPOINTMENT (OUTPATIENT)
Dept: ENDOCRINOLOGY | Facility: CLINIC | Age: 71
End: 2021-05-10
Payer: MEDICARE

## 2021-05-10 VITALS
HEART RATE: 84 BPM | TEMPERATURE: 97.9 F | OXYGEN SATURATION: 99 % | DIASTOLIC BLOOD PRESSURE: 64 MMHG | SYSTOLIC BLOOD PRESSURE: 106 MMHG | WEIGHT: 120 LBS | HEIGHT: 64 IN | BODY MASS INDEX: 20.49 KG/M2

## 2021-05-10 LAB
GLUCOSE BLDC GLUCOMTR-MCNC: 148
HBA1C MFR BLD HPLC: 7

## 2021-05-10 PROCEDURE — 82962 GLUCOSE BLOOD TEST: CPT

## 2021-05-10 PROCEDURE — 83036 HEMOGLOBIN GLYCOSYLATED A1C: CPT | Mod: QW

## 2021-05-10 PROCEDURE — 99072 ADDL SUPL MATRL&STAF TM PHE: CPT

## 2021-05-10 PROCEDURE — 99215 OFFICE O/P EST HI 40 MIN: CPT | Mod: 25

## 2021-05-11 ENCOUNTER — RX RENEWAL (OUTPATIENT)
Age: 71
End: 2021-05-11

## 2021-05-12 ENCOUNTER — APPOINTMENT (OUTPATIENT)
Dept: INTERNAL MEDICINE | Facility: CLINIC | Age: 71
End: 2021-05-12
Payer: MEDICARE

## 2021-05-12 VITALS
BODY MASS INDEX: 20.49 KG/M2 | HEART RATE: 84 BPM | HEIGHT: 64 IN | DIASTOLIC BLOOD PRESSURE: 60 MMHG | OXYGEN SATURATION: 98 % | WEIGHT: 120 LBS | TEMPERATURE: 98.2 F | SYSTOLIC BLOOD PRESSURE: 105 MMHG

## 2021-05-12 DIAGNOSIS — M25.532 PAIN IN LEFT WRIST: ICD-10-CM

## 2021-05-12 LAB
ANION GAP SERPL CALC-SCNC: 12 MMOL/L
BUN SERPL-MCNC: 15 MG/DL
CALCIUM SERPL-MCNC: 9.9 MG/DL
CHLORIDE SERPL-SCNC: 99 MMOL/L
CHOLEST SERPL-MCNC: 124 MG/DL
CO2 SERPL-SCNC: 27 MMOL/L
CREAT SERPL-MCNC: 0.88 MG/DL
GLUCOSE SERPL-MCNC: 114 MG/DL
HDLC SERPL-MCNC: 60 MG/DL
LDLC SERPL CALC-MCNC: 49 MG/DL
NONHDLC SERPL-MCNC: 64 MG/DL
POTASSIUM SERPL-SCNC: 5.3 MMOL/L
SODIUM SERPL-SCNC: 137 MMOL/L
TRIGL SERPL-MCNC: 75 MG/DL

## 2021-05-12 PROCEDURE — 99214 OFFICE O/P EST MOD 30 MIN: CPT | Mod: 25

## 2021-05-12 PROCEDURE — 99072 ADDL SUPL MATRL&STAF TM PHE: CPT

## 2021-05-12 RX ORDER — AMOXICILLIN AND CLAVULANATE POTASSIUM 500; 125 MG/1; MG/1
500-125 TABLET, FILM COATED ORAL TWICE DAILY
Qty: 14 | Refills: 0 | Status: DISCONTINUED | COMMUNITY
Start: 2021-03-15 | End: 2021-05-12

## 2021-05-12 RX ORDER — METFORMIN HYDROCHLORIDE 1000 MG/1
1000 TABLET, EXTENDED RELEASE ORAL
Qty: 180 | Refills: 1 | Status: DISCONTINUED | COMMUNITY
Start: 2020-07-20 | End: 2021-05-12

## 2021-05-12 RX ORDER — METFORMIN ER 750 MG 750 MG/1
750 TABLET ORAL
Qty: 180 | Refills: 0 | Status: DISCONTINUED | COMMUNITY
Start: 2021-04-05 | End: 1900-01-01

## 2021-05-12 NOTE — ASSESSMENT
[FreeTextEntry1] : left wrist and thumb pain > rt - \par advised wrist brace at night \par - ortho hand specialist evaluation \par \par DM \par - [facility] 7 5/2021 \par -- Controlled, continue current medication Metformin 1000 BID and Trajanta 5 qd \par - Monitor Accu-Cheks daily,fasting and post prandial 2 hrs, ophthalmology followup, podiatry follow up. Monitor for signs of hypoglycemia. Check hemoglobin A1c, urine for microalbumin.\par Continue 1800 kcal ADA diet, cut rice, pasta, sugar, sweet, soda, juices, exercise daily 30 minutes , loose weight.\par \par .b/l knee pain s/p gel inj -right knee pain swelling saw ortho did tap dx pseudo gout s/p steroid shot 6/2020\par -avoid squatting , kneeling and twisting\par - cont PT - walking with rollator \par - letter for access a ride given \par \par GERD- saw gastro did CT chest / abd pelvis 7/2020 reviewed - lesion sclerotic left thigh unchanged from 2006 \par -on omeprazole 40 po daily 30 minutes prior to breakfast 1 month trial \par -Educated patient lifestyle modification, advice to avoid fried food, greasy oily and spicy foods, avoid tomato, orange, lemon , or caffeinated beverages.\par -Avoid reclining upto 3 hours after meals\par -Followup gastro \par \par anemia- resolved \par - colonoscope 4/2019 and EGD - neg \par -oral cancer 2004 rx with sx and chemo , followed by ENT yearly \par - change PPI to omeprazole 40 daily from BID dosing \par \par lower back pain / radiculopathy \par - advised to avoid pulling pushing , lifting , carrying weights , bending etc \par - do warm compresses \par -back brace given \par - side effects reviewed \par - rtc if no improvement\par \par Osteoporosis - 2019 - followed by endo dexa 12/2020 - osteopenia \par - cont Calcium 500-600 mg daily \par -cont vitamin d 1000 units daily \par - do weight bearing exercises: walking with weights, stair climbing , weight training , jogging, aerobics etc \par -repeat test 2 yrs for a f/u\par \par hypertension \par -stable ( - discontinue HCTZ 10/2020 ) change losartan to 25 mg daily \par - monitor Bp at home \par \par BPH -\par -tamsulosin 0.4 mg po qhs \par - increase frequency- d/c HCTZ - f/u urologist \par \par HCM \par Prevnar 13 given 2017\par pneumococcal 23 -2015\par colonoscope- 4/17/19 due 7-10 yrs \par [facility] \par hep c-neg \par shringrex 5/2019 - second dose 8/2019. complete. \par Pfizer - completed feb \par \par 5/12/21HCP fors given to pt will fill and bring next visit

## 2021-05-12 NOTE — HISTORY OF PRESENT ILLNESS
[de-identified] : f/u on ch medical issues \par \par c/o pain in left thumb base pain and wrist pain > rt wrist , cannot open bottles \par \par h/o DM x 10 yrs \par -saw Endo - [facility] 7 his metformin was increased to 1000 BID , - continue Tradjenta 5mg daily \par -saw eye doctor 5/2020 \par - retina specialist in past - did eye 1/3rd ? hole in retina ,s/p lazer rx - cataract rt eye was advising sx \par -on statin 20 mg simvastatin , on arb losartan 50 \par - saw podiatrist Dx with feet neuropathy recommended diabetic shoes \par \par oral cancer 2004 rx with sx and chemo , followed by ENT yearly stable \par \par GERD- still acting up - saw gastro did CT abd /pelvis/ chest 7/2020-reviewed with pt \par -saw gastro- repeated EGd neg mild gastritis and colonoscope 4/2019 neg - due 7 yrs \par -on omeprazole 40 ch twice daily , slight better \par \par right knee pain - ongoing \par - swelling saw ortho 6/2020 did arthrocentesis- did steroid shot \par - dx as arthritis - pseudogout - finished therapy 2 x week - walking with rollator \par \par HTN- on HCTZ 25 , losartan 50 , home readings good- 120/70 \par - no cp or dizzy spells \par \par c/o lower back and b/l knee pain seeing ortho got B/l gel injections \par - feels slight better \par lower back pain - needs rx for brace - feels pain \par \par Anemia- taking Glucerna - weight loss still on going as per pt \par \par saw cardio did EKG- did stress test 5/8/19 neg \par - Did colonoscope 4/17/19 due repeat 7 yrs \par

## 2021-05-14 LAB
CREAT SPEC-SCNC: 20 MG/DL
MICROALBUMIN 24H UR DL<=1MG/L-MCNC: <1.2 MG/DL
MICROALBUMIN/CREAT 24H UR-RTO: NORMAL MG/G

## 2021-06-14 ENCOUNTER — APPOINTMENT (OUTPATIENT)
Dept: ORTHOPEDIC SURGERY | Facility: CLINIC | Age: 71
End: 2021-06-14
Payer: MEDICARE

## 2021-06-14 VITALS
WEIGHT: 120 LBS | DIASTOLIC BLOOD PRESSURE: 65 MMHG | HEART RATE: 82 BPM | SYSTOLIC BLOOD PRESSURE: 103 MMHG | HEIGHT: 64 IN | BODY MASS INDEX: 20.49 KG/M2

## 2021-06-14 DIAGNOSIS — M17.0 BILATERAL PRIMARY OSTEOARTHRITIS OF KNEE: ICD-10-CM

## 2021-06-14 PROCEDURE — 73564 X-RAY EXAM KNEE 4 OR MORE: CPT | Mod: LT

## 2021-06-14 PROCEDURE — 99214 OFFICE O/P EST MOD 30 MIN: CPT

## 2021-06-14 PROCEDURE — 73130 X-RAY EXAM OF HAND: CPT | Mod: LT

## 2021-06-14 NOTE — HISTORY OF PRESENT ILLNESS
[de-identified] : 71 year old RHD male accompanied by his wife who is translating for evaluation of left hand pain x several months. He complains of pain at the base of the left thumb. There was no injury or trauma. The pain worsens when bearing weight or opening a jar. He has not done anything to treat this problem. \par He has a secondary complaint of chronic bilateral knee pain. He was seen in the past by Dr. Reuben La and Dr. Juvenal Monahan. He was treated with aspiration and cortisone injection which helped relieve pain temporary. He was found to have pseudogout in the left knee and was advised to follow up with rheumatology but has not yet done so.

## 2021-06-14 NOTE — PHYSICAL EXAM
[LE] : Sensory: Intact in bilateral lower extremities [DP] : dorsalis pedis 2+ and symmetric bilaterally [PT] : posterior tibial 2+ and symmetric bilaterally [Rad] : radial 2+ and symmetric bilaterally [Normal] : Alert and in no acute distress [Poor Appearance] : well-appearing [Acute Distress] : not in acute distress [Obese] : not obese [de-identified] : The patient has no respiratory distress. Mood and affect are normal. The patient is alert and oriented to person, place and time.\par Examination of the cervical spine demonstrates no tenderness, no deformity and no muscle spasm. Cervical spine rotation is 60° to the right, 60° to the left, 75° of extension and 45° of flexion. Neurologic exam of the upper extremities reveals intact sensation to light touch. Motor function is 5 over 5 in all groups. Deep tendon reflexes are 2+ and equal at the biceps, triceps and brachioradialis.\par Examination of the left shoulder demonstrates no swelling, no deformity and no tenderness. The shoulder is stable. Drop arm test is negative. [place] test is negative. Liftoff test is negative. Motor strength is 5 over 5 in all groups. Range of motion is full and identical to that of the right shoulder. Flexion is 160°, abduction 160°, external rotation 45° and internal rotation to the lower thoracic level.  The elbows are stable and nontender.  Examination of the left wrist and hand demonstrates tenderness at the thumb basal joint.  There is mild discomfort with motion.  Tendon function is intact throughout.  There is no deformity.  The skin is intact.  There is no lymphedema.\par There is no pain with active or passive motion of the hips.  There is no tenderness of either hip.  Examination of the knees demonstrates no swelling or deformity.  Quadriceps and hamstring function are intact.  The collateral and cruciate ligaments are stable.  There is mild anterior tenderness of the left knee.  Range of motion 0 to 115 degrees at both knees.  Gallo test is negative bilaterally.  The calves are soft and nontender.  Both Achilles tendons are intact.  There is no tenderness or swelling of either ankle or foot.  The skin is intact.  There is no lymphedema. [de-identified] : AP, lateral and oblique x-rays of the left hand taken today demonstrate no fracture or dislocation.  There are degenerative changes at the thumb CMC joint\par AP, lateral, tunnel and sunrise x-rays of both knees taken today demonstrate degenerative changes and chondrocalcinosis worse on the left than the right.  There is evidence of prior resection of the midportion of the left fibula.\par \par X-rays of the left knee which were taken December 21, 2020 are reviewed.  There are degenerative changes of the left knee seen on those x-rays.

## 2021-06-14 NOTE — DISCUSSION/SUMMARY
[de-identified] : The patient has evidence of arthritis of the left hand and both knees.  He has evidence of chondrocalcinosis on previously seen x-rays.  I have discussed the pathology, natural history and treatment options with him and his wife.  He will take ibuprofen for pain.  Medication risks have been reviewed.  If this stops being satisfactory he should have rheumatologic evaluation.  We discussed the possibility of physical therapy but he feels he can exercise on his own.

## 2021-07-17 ENCOUNTER — RX RENEWAL (OUTPATIENT)
Age: 71
End: 2021-07-17

## 2021-07-18 ENCOUNTER — RX RENEWAL (OUTPATIENT)
Age: 71
End: 2021-07-18

## 2021-07-20 ENCOUNTER — APPOINTMENT (OUTPATIENT)
Dept: OTOLARYNGOLOGY | Facility: CLINIC | Age: 71
End: 2021-07-20

## 2021-07-21 ENCOUNTER — NON-APPOINTMENT (OUTPATIENT)
Age: 71
End: 2021-07-21

## 2021-07-21 ENCOUNTER — APPOINTMENT (OUTPATIENT)
Dept: OPHTHALMOLOGY | Facility: CLINIC | Age: 71
End: 2021-07-21
Payer: MEDICARE

## 2021-07-21 PROCEDURE — 92012 INTRM OPH EXAM EST PATIENT: CPT

## 2021-07-22 ENCOUNTER — RX RENEWAL (OUTPATIENT)
Age: 71
End: 2021-07-22

## 2021-08-10 ENCOUNTER — APPOINTMENT (OUTPATIENT)
Dept: OTOLARYNGOLOGY | Facility: CLINIC | Age: 71
End: 2021-08-10
Payer: MEDICARE

## 2021-08-10 VITALS
HEART RATE: 73 BPM | BODY MASS INDEX: 20.49 KG/M2 | HEIGHT: 64 IN | SYSTOLIC BLOOD PRESSURE: 100 MMHG | WEIGHT: 120 LBS | DIASTOLIC BLOOD PRESSURE: 62 MMHG

## 2021-08-10 PROCEDURE — 99214 OFFICE O/P EST MOD 30 MIN: CPT | Mod: 25

## 2021-08-10 PROCEDURE — 31575 DIAGNOSTIC LARYNGOSCOPY: CPT

## 2021-08-10 NOTE — REASON FOR VISIT
[Subsequent Evaluation] : a subsequent evaluation for [Spouse] : spouse [FreeTextEntry2] : follow up for mass on right jaw.

## 2021-08-10 NOTE — PROCEDURE
[Trismus] : trismus preventing mirror examination [Dysphagia] : dysphagia not clearly evaluated by indirect laryngoscopy [None] : none [Flexible Endoscope] : examined with the flexible endoscope [Serial Number: ___] : Serial Number: [unfilled] [de-identified] : No lesions in the NPx, OPx, HPx or larynx. Stable posttreatment changes, VC are mobile, airway patent. \par  [de-identified] : oral cancer

## 2021-08-10 NOTE — HISTORY OF PRESENT ILLNESS
[de-identified] : 71 year male with history of oral cancer s/p composite resection and reconstruction using RFFF and FFF followed by RT in 2004 and bx of mandibular gingiva on 03/27/2020 showed no dysplasia. Last CT chest/neck 07/25/2020. pt c.o chronic irritation in the left of mouth despite his new dentures and that area has been more bothersome over the past couple of weeks.  He reports that his eating is an issue because the dentures don’t fit completely but overall has improved and weight stabilized. He denies any dyspnea, dysphagia, otalgia, bleeding.

## 2021-08-10 NOTE — PHYSICAL EXAM
L eye injury [de-identified] : Posttreatment changes.  No LAD.  There is some fullness overlying the right parotid soft tissues, the erythema is improved, no TTP. [Laryngoscopy Performed] : laryngoscopy was performed, see procedure section for findings [FreeTextEntry1] : Posttreatment changes, the mandible is quite atrophic, there is an area of thickened mucosa along the left buccal area, well defined, no ulceration - slightly more irritated superiorly right along where the upper and lower denture meet, no TTP, no bleeding. [Normal] : no rashes

## 2021-08-11 ENCOUNTER — APPOINTMENT (OUTPATIENT)
Dept: INTERNAL MEDICINE | Facility: CLINIC | Age: 71
End: 2021-08-11
Payer: MEDICARE

## 2021-08-11 VITALS
SYSTOLIC BLOOD PRESSURE: 100 MMHG | HEIGHT: 64 IN | BODY MASS INDEX: 20.14 KG/M2 | TEMPERATURE: 98.1 F | HEART RATE: 71 BPM | DIASTOLIC BLOOD PRESSURE: 60 MMHG | OXYGEN SATURATION: 98 % | WEIGHT: 118 LBS

## 2021-08-11 DIAGNOSIS — M54.16 RADICULOPATHY, LUMBAR REGION: ICD-10-CM

## 2021-08-11 DIAGNOSIS — G89.29 RADICULOPATHY, LUMBAR REGION: ICD-10-CM

## 2021-08-11 PROCEDURE — 99214 OFFICE O/P EST MOD 30 MIN: CPT | Mod: 25

## 2021-08-11 NOTE — ASSESSMENT
[FreeTextEntry1] : left wrist and thumb pain > rt - \par advised wrist brace at night \par - ortho hand specialist consult reviewed \par \par DM \par - [facility] 7 5/2021 -get [facility] \par -- Controlled, continue current medication Metformin 1000 BID and Trajanta 5 qd \par - Monitor Accu-Cheks daily,fasting and post prandial 2 hrs, ophthalmology followup, podiatry follow up. Monitor for signs of hypoglycemia. Check hemoglobin A1c, urine for microalbumin.\par Continue 1800 kcal ADA diet, cut rice, pasta, sugar, sweet, soda, juices, exercise daily 30 minutes , loose weight.\par \par .b/l knee pain s/p gel inj -right knee pain swelling saw ortho did tap dx pseudo gout s/p steroid shot 6/2020\par -avoid squatting , kneeling and twisting\par - cont PT - walking with rollator \par - letter for access a ride given \par \par GERD- saw GASTRO did CT chest / abd pelvis 7/2020 reviewed - lesion sclerotic left thigh unchanged from 2006 \par -on omeprazole 40 po daily 30 minutes prior to breakfast 1 month trial \par -Educated patient lifestyle modification, advice to avoid fried food, greasy oily and spicy foods, avoid tomato, orange, lemon , or caffeinated beverages.\par -Avoid reclining upto 3 hours after meals\par -Followup gastro \par \par anemia- resolved \par - colonoscope 4/2019 and EGD - neg \par -oral cancer 2004 rx with sx and chemo , followed by ENT yearly \par - change PPI to omeprazole 40 daily from BID dosing \par \par lower back pain / radiculopathy \par - advised to avoid pulling pushing , lifting , carrying weights , bending etc \par - do warm compresses \par -back brace given \par - side effects reviewed \par - rtc if no improvement\par \par Osteoporosis - 2019 - followed by endo dexa 12/2020 - osteopenia \par - cont Calcium 500-600 mg daily \par -cont vitamin d 1000 units daily \par - do weight bearing exercises: walking with weights, stair climbing , weight training , jogging, aerobics etc \par -repeat test 2 yrs for a f/u\par \par hypertension \par -stable ( - discontinue HCTZ 10/2020 ) change losartan to 25 mg daily \par - monitor Bp at home \par \par BPH -\par -tamsulosin 0.4 mg po qhs \par - increase frequency- d/c HCTZ - f/u urologist \par \par HCM \par Prevnar 13 given 2017\par pneumococcal 23 -2015\par colonoscope- 4/17/19 due 7-10 yrs \par [facility] \par hep c-neg \par shringrex 5/2019 - second dose 8/2019. complete. \par Moderna 1/25/21, 2/22/21 \par \par HCP forms scanned to chart

## 2021-08-11 NOTE — REVIEW OF SYSTEMS
[Joint Pain] : joint pain [Joint Stiffness] : joint stiffness [Back Pain] : back pain [Negative] : Gastrointestinal [Joint Swelling] : no joint swelling

## 2021-08-11 NOTE — HISTORY OF PRESENT ILLNESS
[de-identified] : f/u on ch medical issues \par \par c/o pain in left thumb base pain and wrist pain > rt wrist , cannot open bottles - saw ortho recommended PT dx arthritis \par \par h/o DM x 10 yrs \par -saw Endo - [facility] 7 his metformin was increased to 1000 BID , - continue Tradjenta 5mg daily \par -saw eye doctor 5/2020 \par - retina specialist in past - did eye 1/3rd ? hole in retina ,s/p lazer rx - cataract rt eye was advising sx \par -on statin 20 mg simvastatin , on arb losartan 50 \par - saw podiatrist Dx with feet neuropathy recommended diabetic shoes \par \par oral cancer 2004 rx with sx and chemo , followed by ENT yearly stable \par \par GERD- still acting up - saw gastro did CT abd /pelvis/ chest 7/2020-reviewed with pt \par -saw gastro- repeated EGd neg mild gastritis and colonoscope 4/2019 neg - due 7 yrs \par -on omeprazole 40 ch twice daily , slight better \par \par right knee pain - ongoing \par - swelling saw ortho 6/2020 did arthrocentesis- did steroid shot \par - dx as arthritis - pseudogout - finished therapy 2 x week - walking with rollator \par \par HTN- on HCTZ 25 , losartan 50 , home readings good- 120/70 \par - no cp or dizzy spells \par \par c/o lower back and b/l knee pain seeing ortho got B/l gel injections \par - feels slight better \par lower back pain - needs rx for brace - feels pain \par \par Anemia- taking Glucerna - weight loss still on going as per pt \par \par saw cardio did EKG- did stress test 5/8/19 neg \par - Did colonoscope 4/17/19 due repeat 7 yrs \par

## 2021-08-24 RX ORDER — BLOOD SUGAR DIAGNOSTIC
STRIP MISCELLANEOUS TWICE DAILY
Qty: 60 | Refills: 3 | Status: ACTIVE | COMMUNITY
Start: 2018-06-11 | End: 1900-01-01

## 2021-08-24 RX ORDER — LANCETS 28 GAUGE
EACH MISCELLANEOUS
Qty: 3 | Refills: 3 | Status: ACTIVE | COMMUNITY
Start: 2018-06-11 | End: 1900-01-01

## 2021-08-24 RX ORDER — BLOOD-GLUCOSE METER
KIT MISCELLANEOUS
Qty: 1 | Refills: 0 | Status: ACTIVE | COMMUNITY
Start: 2018-06-11 | End: 1900-01-01

## 2021-09-21 ENCOUNTER — APPOINTMENT (OUTPATIENT)
Dept: ENDOCRINOLOGY | Facility: CLINIC | Age: 71
End: 2021-09-21

## 2021-10-25 ENCOUNTER — APPOINTMENT (OUTPATIENT)
Dept: RADIOLOGY | Facility: IMAGING CENTER | Age: 71
End: 2021-10-25
Payer: MEDICARE

## 2021-10-25 ENCOUNTER — OUTPATIENT (OUTPATIENT)
Dept: OUTPATIENT SERVICES | Facility: HOSPITAL | Age: 71
LOS: 1 days | End: 2021-10-25
Payer: COMMERCIAL

## 2021-10-25 DIAGNOSIS — Z00.8 ENCOUNTER FOR OTHER GENERAL EXAMINATION: ICD-10-CM

## 2021-10-25 DIAGNOSIS — Z98.49 CATARACT EXTRACTION STATUS, UNSPECIFIED EYE: Chronic | ICD-10-CM

## 2021-10-25 PROCEDURE — 71046 X-RAY EXAM CHEST 2 VIEWS: CPT

## 2021-10-25 PROCEDURE — 71046 X-RAY EXAM CHEST 2 VIEWS: CPT | Mod: 26

## 2021-10-26 ENCOUNTER — APPOINTMENT (OUTPATIENT)
Dept: ENDOCRINOLOGY | Facility: CLINIC | Age: 71
End: 2021-10-26
Payer: MEDICARE

## 2021-10-26 VITALS
DIASTOLIC BLOOD PRESSURE: 70 MMHG | OXYGEN SATURATION: 98 % | BODY MASS INDEX: 20.49 KG/M2 | HEART RATE: 74 BPM | TEMPERATURE: 97.7 F | HEIGHT: 64 IN | SYSTOLIC BLOOD PRESSURE: 120 MMHG | WEIGHT: 120 LBS

## 2021-10-26 LAB — HBA1C MFR BLD HPLC: 7

## 2021-10-26 PROCEDURE — 83036 HEMOGLOBIN GLYCOSYLATED A1C: CPT | Mod: QW

## 2021-10-26 PROCEDURE — 99215 OFFICE O/P EST HI 40 MIN: CPT | Mod: 25

## 2021-10-28 LAB
25(OH)D3 SERPL-MCNC: 72.8 NG/ML
ALBUMIN SERPL ELPH-MCNC: 4.1 G/DL
ALP BLD-CCNC: 67 U/L
ALT SERPL-CCNC: 22 U/L
ANION GAP SERPL CALC-SCNC: 13 MMOL/L
AST SERPL-CCNC: 30 U/L
BILIRUB SERPL-MCNC: 0.3 MG/DL
BUN SERPL-MCNC: 17 MG/DL
CALCIUM SERPL-MCNC: 10.1 MG/DL
CHLORIDE SERPL-SCNC: 100 MMOL/L
CO2 SERPL-SCNC: 25 MMOL/L
CREAT SERPL-MCNC: 0.9 MG/DL
POTASSIUM SERPL-SCNC: 4.6 MMOL/L
PROT SERPL-MCNC: 6 G/DL
SODIUM SERPL-SCNC: 138 MMOL/L

## 2021-10-31 RX ORDER — ZOLEDRONIC ACID 5 MG/100ML
5 INJECTION INTRAVENOUS
Qty: 1 | Refills: 0 | Status: DISCONTINUED | OUTPATIENT
Start: 2020-12-09 | End: 2021-10-31

## 2021-11-09 ENCOUNTER — RX RENEWAL (OUTPATIENT)
Age: 71
End: 2021-11-09

## 2021-11-15 ENCOUNTER — APPOINTMENT (OUTPATIENT)
Dept: OTOLARYNGOLOGY | Facility: CLINIC | Age: 71
End: 2021-11-15
Payer: MEDICARE

## 2021-11-15 VITALS
WEIGHT: 121 LBS | SYSTOLIC BLOOD PRESSURE: 137 MMHG | HEART RATE: 83 BPM | BODY MASS INDEX: 20.16 KG/M2 | HEIGHT: 65 IN | DIASTOLIC BLOOD PRESSURE: 73 MMHG

## 2021-11-15 PROCEDURE — 31575 DIAGNOSTIC LARYNGOSCOPY: CPT

## 2021-11-15 PROCEDURE — 99214 OFFICE O/P EST MOD 30 MIN: CPT | Mod: 25

## 2021-11-15 RX ORDER — LOSARTAN POTASSIUM 25 MG/1
25 TABLET, FILM COATED ORAL DAILY
Qty: 1 | Refills: 0 | Status: COMPLETED | COMMUNITY
Start: 2017-09-20 | End: 2021-11-15

## 2021-11-15 RX ORDER — SIMVASTATIN 10 MG/1
10 TABLET, FILM COATED ORAL
Refills: 0 | Status: COMPLETED | COMMUNITY
End: 2021-11-15

## 2021-11-15 NOTE — PROCEDURE
[Trismus] : trismus preventing mirror examination [None] : none [Flexible Endoscope] : examined with the flexible endoscope [Serial Number: ___] : Serial Number: [unfilled] [de-identified] : No lesions in the NPx, OPx, HPx or larynx. Stable posttreatment changes, VC are mobile, airway patent. \par

## 2021-11-15 NOTE — PHYSICAL EXAM
[Laryngoscopy Performed] : laryngoscopy was performed, see procedure section for findings [Normal] : no rashes [de-identified] : Posttreatment changes.  No LAD.  There is some fullness overlying the right parotid soft tissues, no TTP. [FreeTextEntry1] : Posttreatment changes, the mandible is quite atrophic, there is an area of thickened mucosa along the left buccal area, well defined, no ulceration, no TTP, no bleeding.

## 2021-11-15 NOTE — HISTORY OF PRESENT ILLNESS
[de-identified] : 71 year male with history of oral cancer s/p composite resection and reconstruction using RFFF and FFF followed by RT in 2004 and bx of mandibular gingiva on 03/27/2020. showed no dysplasia. Last CT chest/neck 07/25/2020. Patient reports irritation to left mouth continues. Patient reports dentures fit better now, eating food has improved overall but reports feeling little bit of food at a time, denies changes to appetite. Patient reports weight is stable. Patient denies new swelling or pain at jaw. Patient denies dysphagia, odynophagia, aspirations, dyspnea, dysphonia, otalgia, recent fevers, throat infections or bleeding.

## 2021-11-18 ENCOUNTER — APPOINTMENT (OUTPATIENT)
Dept: INTERNAL MEDICINE | Facility: CLINIC | Age: 71
End: 2021-11-18
Payer: MEDICARE

## 2021-11-18 VITALS
TEMPERATURE: 97.9 F | DIASTOLIC BLOOD PRESSURE: 68 MMHG | BODY MASS INDEX: 19.97 KG/M2 | SYSTOLIC BLOOD PRESSURE: 120 MMHG | WEIGHT: 120 LBS | HEART RATE: 85 BPM | OXYGEN SATURATION: 98 %

## 2021-11-18 DIAGNOSIS — M25.512 PAIN IN LEFT SHOULDER: ICD-10-CM

## 2021-11-18 PROCEDURE — G0444 DEPRESSION SCREEN ANNUAL: CPT

## 2021-11-18 PROCEDURE — G0439: CPT

## 2021-11-18 PROCEDURE — G0442 ANNUAL ALCOHOL SCREEN 15 MIN: CPT

## 2021-11-18 NOTE — HISTORY OF PRESENT ILLNESS
[Spouse] : spouse [de-identified] : Seen today for AWV \par \par c/o pain in left thumb base pain and wrist pain > rt wrist , cannot open bottles - saw ortho recommended PT dx arthritis \par -left shoulder pain - knee pain - was told to see rheum needs referral \par \par h/o DM x 10 yrs \par -saw Endo - [facility] 7 his metformin was increased to 1000 BID , - continue Tradjenta 5mg daily \par -saw eye doctor 7/2021 \par - retina specialist in past - did eye 1/3rd ? hole in retina ,s/p lazer rx - cataract rt eye was advising sx \par -on statin 20 mg simvastatin , on arb losartan 50 \par - saw podiatrist Dx with feet neuropathy recommended diabetic shoes \par \par oral cancer 2004 rx with sx and chemo , followed by ENT yearly stable \par \par GERD- still acting up - saw gastro did CT abd /pelvis/ chest 7/2020-reviewed with pt \par -saw gastro- repeated EGd neg mild gastritis and colonoscope 4/2019 neg - due 7 yrs \par -on omeprazole 40 ch twice daily , slight better \par \par right knee pain - ongoing \par - swelling saw ortho 6/2020 did arthrocentesis- did steroid shot \par - dx as arthritis - pseudogout - finished therapy 2 x week - walking with rollator \par \par HTN- on HCTZ 25 , losartan 50 , home readings good- 120/70 \par - no cp or dizzy spells \par \par c/o lower back and b/l knee pain seeing ortho got B/l gel injections \par - feels slight better \par lower back pain - needs rx for brace - feels pain \par \par Anemia- taking Glucerna - weight loss still on going as per pt \par \par saw cardio did EKG- did stress test 11/2021 results pending has appt with Dr roman for results \par - Did colonoscope 4/17/19 due repeat 7 yrs \par \par Chest xray -10/25/21  was negative

## 2021-11-18 NOTE — HEALTH RISK ASSESSMENT
[Good] : ~his/her~  mood as  good [No] : No [No falls in past year] : Patient reported no falls in the past year [0] : 2) Feeling down, depressed, or hopeless: Not at all (0) [PHQ-2 Negative - No further assessment needed] : PHQ-2 Negative - No further assessment needed [With Significant Other] : lives with significant other [Retired] : retired [] :  [Independent] : using telephone [Some assistance needed] : managing finances [Full assistance needed] : using transportation [] : No [de-identified] : weights ,walking , stretching  [GYI2Coids] : 0 [Reports changes in hearing] : Reports no changes in hearing [Reports changes in vision] : Reports no changes in vision [Reports changes in dental health] : Reports no changes in dental health

## 2021-11-18 NOTE — ASSESSMENT
[FreeTextEntry1] : left wrist and thumb pain > rt - Knee pain, left shoulder pain \par advised wrist brace at night \par - ortho hand specialist consult reviewed \par - referral to see rheum given \par \par DM \par - [facility] 7 10/2021 -get [facility]IC \par -- Controlled, continue current medication Metformin 1000 BID and Trajanta 5 qd \par - Monitor Accu-Cheks daily,fasting and post prandial 2 hrs, ophthalmology followup, podiatry follow up. Monitor for signs of hypoglycemia. Check hemoglobin A1c, urine for microalbumin.\par Continue 1800 kcal ADA diet, cut rice, pasta, sugar, sweet, soda, juices, exercise daily 30 minutes , loose weight.\par \par .b/l knee pain s/p gel inj -right knee pain swelling saw ortho did tap dx pseudo gout s/p steroid shot 6/2020\par -avoid squatting , kneeling and twisting\par - cont PT - walking with rollator \par - letter for access a ride given \par \par GERD- saw GASTRO did CT chest / abd pelvis 7/2020 reviewed - lesion sclerotic left thigh unchanged from 2006 \par -on omeprazole 40 po daily 30 minutes prior to breakfast 1 month trial \par -Educated patient lifestyle modification, advice to avoid fried food, greasy oily and spicy foods, avoid tomato, orange, lemon , or caffeinated beverages.\par -Avoid reclining upto 3 hours after meals\par -Followup gastro \par \par anemia- resolved \par - colonoscope 4/2019 and EGD - neg \par -oral cancer 2004 rx with sx and chemo , followed by ENT yearly \par - change PPI to omeprazole 40 daily from BID dosing \par \par lower back pain / radiculopathy \par - advised to avoid pulling pushing , lifting , carrying weights , bending etc \par - do warm compresses \par -back brace given \par - side effects reviewed \par - rtc if no improvement\par \par Osteoporosis - 2019 - followed by endo dexa 12/2020 - osteopenia \par - cont Calcium 500-600 mg daily \par -cont vitamin d 1000 units daily \par - do weight bearing exercises: walking with weights, stair climbing , weight training , jogging, aerobics etc \par -repeat test 2 yrs for a f/u\par \par hypertension \par -stable ( - discontinue HCTZ 10/2020 ) change losartan to 25 mg daily \par - monitor Bp at home \par \par BPH -\par -tamsulosin 0.4 mg po qhs \par - increase frequency- d/c HCTZ - f/u urologist \par \par HCM \par Prevnar 13 given 2017\par pneumococcal 23 -2015\par colonoscope- 4/17/19 due 7-10 yrs \par flu vaccine given 10/2021 at [facility] \par hep c-neg \par shringrex 5/2019 - second dose 8/2019. complete. \par Moderna 1/25/21, 2/22/21 , booster Moderna 10/13/21 \par \par HCP forms scanned to chart. \par

## 2021-12-09 ENCOUNTER — APPOINTMENT (OUTPATIENT)
Dept: RHEUMATOLOGY | Facility: CLINIC | Age: 71
End: 2021-12-09
Payer: MEDICARE

## 2021-12-09 VITALS
DIASTOLIC BLOOD PRESSURE: 74 MMHG | SYSTOLIC BLOOD PRESSURE: 126 MMHG | HEIGHT: 64 IN | BODY MASS INDEX: 20.49 KG/M2 | WEIGHT: 120 LBS | HEART RATE: 83 BPM | TEMPERATURE: 97.5 F | OXYGEN SATURATION: 96 %

## 2021-12-09 PROCEDURE — 99204 OFFICE O/P NEW MOD 45 MIN: CPT

## 2021-12-10 ENCOUNTER — RESULT REVIEW (OUTPATIENT)
Age: 71
End: 2021-12-10

## 2021-12-10 ENCOUNTER — OUTPATIENT (OUTPATIENT)
Dept: OUTPATIENT SERVICES | Facility: HOSPITAL | Age: 71
LOS: 1 days | End: 2021-12-10
Payer: COMMERCIAL

## 2021-12-10 ENCOUNTER — APPOINTMENT (OUTPATIENT)
Dept: RADIOLOGY | Facility: IMAGING CENTER | Age: 71
End: 2021-12-10
Payer: MEDICARE

## 2021-12-10 DIAGNOSIS — Z00.8 ENCOUNTER FOR OTHER GENERAL EXAMINATION: ICD-10-CM

## 2021-12-10 DIAGNOSIS — Z98.49 CATARACT EXTRACTION STATUS, UNSPECIFIED EYE: Chronic | ICD-10-CM

## 2021-12-10 PROCEDURE — 73130 X-RAY EXAM OF HAND: CPT

## 2021-12-10 PROCEDURE — 73130 X-RAY EXAM OF HAND: CPT | Mod: 26,LT,RT

## 2021-12-10 PROCEDURE — 73030 X-RAY EXAM OF SHOULDER: CPT

## 2021-12-10 PROCEDURE — 73030 X-RAY EXAM OF SHOULDER: CPT | Mod: 26,LT

## 2022-01-05 ENCOUNTER — RX RENEWAL (OUTPATIENT)
Age: 72
End: 2022-01-05

## 2022-01-06 ENCOUNTER — RX RENEWAL (OUTPATIENT)
Age: 72
End: 2022-01-06

## 2022-01-06 RX ORDER — BLOOD SUGAR DIAGNOSTIC
STRIP MISCELLANEOUS
Qty: 1 | Refills: 6 | Status: ACTIVE | COMMUNITY
Start: 2022-01-06 | End: 1900-01-01

## 2022-02-02 ENCOUNTER — APPOINTMENT (OUTPATIENT)
Dept: RHEUMATOLOGY | Facility: CLINIC | Age: 72
End: 2022-02-02

## 2022-03-08 ENCOUNTER — APPOINTMENT (OUTPATIENT)
Dept: OTOLARYNGOLOGY | Facility: CLINIC | Age: 72
End: 2022-03-08
Payer: MEDICARE

## 2022-03-08 VITALS
HEIGHT: 64 IN | SYSTOLIC BLOOD PRESSURE: 126 MMHG | WEIGHT: 120 LBS | HEART RATE: 72 BPM | BODY MASS INDEX: 20.49 KG/M2 | DIASTOLIC BLOOD PRESSURE: 75 MMHG

## 2022-03-08 PROCEDURE — 31575 DIAGNOSTIC LARYNGOSCOPY: CPT

## 2022-03-08 PROCEDURE — 99213 OFFICE O/P EST LOW 20 MIN: CPT | Mod: 25

## 2022-03-08 NOTE — HISTORY OF PRESENT ILLNESS
[de-identified] : 71 year male with history of oral cancer s/p composite resection and reconstruction using RFFF and FFF followed by RT in 2004 and bx of mandibular gingiva on 03/27/2020. Showed no dysplasia. Last CT chest/neck 07/25/2020.\par Patient reports irritation to left mouth continues. Patient reports dentures fit well now, on a regular diet but eating food cause pain at times--eats and chews slowly. Patient reports weight is stable. Patient denies new swelling. Patient denies dysphagia, odynophagia, aspirations, dyspnea, dysphonia, otalgia, recent fevers/infections or bleeding.

## 2022-03-08 NOTE — PROCEDURE
[Trismus] : trismus preventing mirror examination [None] : none [Flexible Endoscope] : examined with the flexible endoscope [Serial Number: ___] : Serial Number: [unfilled] [de-identified] : No lesions in the NPx, OPx, HPx or larynx. Expected posttreatment changes, VC are mobile, airway patent. \par

## 2022-03-08 NOTE — PHYSICAL EXAM
[Laryngoscopy Performed] : laryngoscopy was performed, see procedure section for findings [Normal] : no rashes [de-identified] : Posttreatment changes.  No LAD.  There is some fullness overlying the right parotid soft tissues, no TTP. [FreeTextEntry1] : Posttreatment changes, the mandible is quite atrophic, there is an area of thickened mucosa along the left buccal area, well defined, no ulceration, no TTP, no bleeding.  There is a retained right maxillary molar tooth which appears not infected.

## 2022-03-21 ENCOUNTER — APPOINTMENT (OUTPATIENT)
Dept: ENDOCRINOLOGY | Facility: CLINIC | Age: 72
End: 2022-03-21
Payer: MEDICARE

## 2022-03-21 VITALS
BODY MASS INDEX: 20.49 KG/M2 | TEMPERATURE: 97.9 F | OXYGEN SATURATION: 97 % | WEIGHT: 120 LBS | HEIGHT: 64 IN | HEART RATE: 96 BPM | DIASTOLIC BLOOD PRESSURE: 62 MMHG | SYSTOLIC BLOOD PRESSURE: 120 MMHG

## 2022-03-21 LAB — HBA1C MFR BLD HPLC: 7.1

## 2022-03-21 PROCEDURE — 99215 OFFICE O/P EST HI 40 MIN: CPT | Mod: 25

## 2022-03-21 PROCEDURE — 83036 HEMOGLOBIN GLYCOSYLATED A1C: CPT | Mod: QW

## 2022-03-22 ENCOUNTER — APPOINTMENT (OUTPATIENT)
Dept: RHEUMATOLOGY | Facility: CLINIC | Age: 72
End: 2022-03-22
Payer: MEDICARE

## 2022-03-22 VITALS
OXYGEN SATURATION: 95 % | SYSTOLIC BLOOD PRESSURE: 142 MMHG | TEMPERATURE: 97 F | BODY MASS INDEX: 20.49 KG/M2 | DIASTOLIC BLOOD PRESSURE: 76 MMHG | HEIGHT: 64 IN | HEART RATE: 73 BPM | WEIGHT: 120 LBS

## 2022-03-22 PROCEDURE — 99213 OFFICE O/P EST LOW 20 MIN: CPT

## 2022-03-28 ENCOUNTER — APPOINTMENT (OUTPATIENT)
Dept: INTERNAL MEDICINE | Facility: CLINIC | Age: 72
End: 2022-03-28
Payer: MEDICARE

## 2022-03-28 VITALS
HEART RATE: 69 BPM | OXYGEN SATURATION: 98 % | SYSTOLIC BLOOD PRESSURE: 135 MMHG | DIASTOLIC BLOOD PRESSURE: 84 MMHG | BODY MASS INDEX: 20.49 KG/M2 | HEIGHT: 64 IN | TEMPERATURE: 98.1 F | WEIGHT: 120 LBS

## 2022-03-28 PROCEDURE — 99214 OFFICE O/P EST MOD 30 MIN: CPT | Mod: 25

## 2022-03-28 NOTE — ASSESSMENT
[FreeTextEntry1] : left wrist and thumb pain > rt - Knee pain, left shoulder pain \par advised wrist brace at night \par - ortho hand specialist consult reviewed \par -saw rheum consult reviewed  recommended cortisone shot and PT \par \par DM \par - Hgbaic 7 10/2021 -poc AIC 7.1 3/2022 \par -- Controlled, continue current medication Metformin 1000 BID and Trajanta 5 qd \par - Monitor Accu-Cheks daily,fasting and post prandial 2 hrs, ophthalmology followup, podiatry follow up. Monitor for signs of hypoglycemia. Check hemoglobin A1c, urine for microalbumin.\par Continue 1800 kcal ADA diet, cut rice, pasta, sugar, sweet, soda, juices, exercise daily 30 minutes , loose weight.\par \par .b/l knee pain s/p gel inj -right knee pain swelling saw ortho did tap dx pseudo gout s/p steroid shot 6/2020\par -avoid squatting , kneeling and twisting\par - cont PT - walking with rollator \par - letter for access a ride given \par \par GERD- saw GASTRO did CT chest / abd pelvis 7/2020 reviewed - lesion sclerotic left thigh unchanged from 2006 \par -on omeprazole 40 po daily 30 minutes prior to breakfast 1 month trial \par -Educated patient lifestyle modification, advice to avoid fried food, greasy oily and spicy foods, avoid tomato, orange, lemon , or caffeinated beverages.\par -Avoid reclining upto 3 hours after meals\par -Followup gastro \par \par weight loss- underweight - rx glucerna- to see nutritionist \par \par anemia- resolved \par - colonoscope 4/2019 and EGD - neg \par -oral cancer 2004 rx with sx and chemo , followed by ENT yearly \par - change PPI to omeprazole 40 daily from BID dosing \par \par lower back pain / radiculopathy \par - advised to avoid pulling pushing , lifting , carrying weights , bending etc \par - do warm compresses \par -back brace given \par - side effects reviewed \par - rtc if no improvement\par \par Osteoporosis - 2019 - followed by endo dexa 12/2020 - osteopenia \par - cont Calcium 500-600 mg daily \par -cont vitamin d 1000 units daily \par - do weight bearing exercises: walking with weights, stair climbing , weight training , jogging, aerobics etc \par -repeat test 2 yrs for a f/u\par \par hypertension \par -stable ( - discontinue HCTZ 10/2020 )  losartan to 50  mg daily \par - monitor Bp at home \par \par BPH -\par -tamsulosin 0.4 mg po qhs \par - increase frequency- d/c HCTZ - f/u urologist \par \par HCM \par Prevnar 13 given 2017\par pneumococcal 23 -2015\par colonoscope- 4/17/19 due 7-10 yrs \par flu vaccine given 10/2021 at [facility] \par hep c-neg \par shringrex 5/2019 - second dose 8/2019. complete. \par Moderna 1/25/21, 2/22/21 , booster Moderna 10/13/21 \par \par HCP forms scanned to chart.

## 2022-03-28 NOTE — HISTORY OF PRESENT ILLNESS
[Spouse] : spouse [de-identified] : seen for f/u on ch medical issues \par \par c/o pain in left thumb base pain and wrist pain > rt wrist , cannot open bottles - saw ortho recommended PT dx arthritis \par -left shoulder pain - knee pain - was told to see rheum needs referral \par \par h/o DM x 10 yrs \par -saw Endo - AIC 7.1 3/2022 \par - his metformin was increased to 1000 BID , - continue Tradjenta 5mg daily \par -saw eye doctor 7/2021 \par - retina specialist in past - did eye 1/3rd ? hole in retina ,s/p lazer rx - cataract rt eye was advising sx \par -on statin 20 mg simvastatin , on arb losartan 50 \par - saw podiatrist Dx with feet neuropathy recommended diabetic shoes \par \par oral cancer 2004 rx with sx and chemo , followed by ENT yearly stable \par - weight loss - no apetite - was on glucerna last year needs refill \par \par GERD- still acting up - saw gastro did CT abd /pelvis/ chest 7/2020-reviewed with pt \par -saw gastro- repeated EGd neg mild gastritis and colonoscope 4/2019 neg - due 7 yrs \par -on omeprazole 40 ch twice daily , slight better \par \par right knee pain - ongoing \par - swelling saw ortho 6/2020 did arthrocentesis- did steroid shot \par - dx as arthritis - pseudogout - finished therapy 2 x week - walking with rollator \par \par HTN- on HCTZ 25 , losartan 50 , home readings good- 120/70 \par - no cp or dizzy spells \par \par c/o lower back and b/l knee pain seeing ortho got B/l gel injections \par - feels slight better \par lower back pain - needs rx for brace - feels pain \par \par Anemia- taking Glucerna - weight loss still on going as per pt needs rx \par \par saw cardio did EKG- did stress test 11/2021 results pending has appt with Dr roman for results \par - Did colonoscope 4/17/19 due repeat 7 yrs \par \par

## 2022-03-29 LAB
25(OH)D3 SERPL-MCNC: 53.4 NG/ML
ALBUMIN SERPL ELPH-MCNC: 4.2 G/DL
ALP BLD-CCNC: 69 U/L
ALT SERPL-CCNC: 20 U/L
ANION GAP SERPL CALC-SCNC: 9 MMOL/L
AST SERPL-CCNC: 21 U/L
BASOPHILS # BLD AUTO: 0.04 K/UL
BASOPHILS NFR BLD AUTO: 0.6 %
BILIRUB SERPL-MCNC: 0.2 MG/DL
BUN SERPL-MCNC: 18 MG/DL
CALCIUM SERPL-MCNC: 9.6 MG/DL
CHLORIDE SERPL-SCNC: 100 MMOL/L
CHOLEST SERPL-MCNC: 123 MG/DL
CO2 SERPL-SCNC: 28 MMOL/L
CREAT SERPL-MCNC: 0.88 MG/DL
EGFR: 91 ML/MIN/1.73M2
EOSINOPHIL # BLD AUTO: 0.36 K/UL
EOSINOPHIL NFR BLD AUTO: 5.4 %
GLUCOSE SERPL-MCNC: 160 MG/DL
HCT VFR BLD CALC: 42.4 %
HDLC SERPL-MCNC: 63 MG/DL
HGB BLD-MCNC: 13.4 G/DL
IMM GRANULOCYTES NFR BLD AUTO: 0.3 %
LDLC SERPL CALC-MCNC: 48 MG/DL
LYMPHOCYTES # BLD AUTO: 1.09 K/UL
LYMPHOCYTES NFR BLD AUTO: 16.2 %
MAN DIFF?: NORMAL
MCHC RBC-ENTMCNC: 27.5 PG
MCHC RBC-ENTMCNC: 31.6 GM/DL
MCV RBC AUTO: 86.9 FL
MONOCYTES # BLD AUTO: 0.59 K/UL
MONOCYTES NFR BLD AUTO: 8.8 %
NEUTROPHILS # BLD AUTO: 4.61 K/UL
NEUTROPHILS NFR BLD AUTO: 68.7 %
NONHDLC SERPL-MCNC: 60 MG/DL
PLATELET # BLD AUTO: 218 K/UL
POTASSIUM SERPL-SCNC: 4.9 MMOL/L
PROT SERPL-MCNC: 5.8 G/DL
RBC # BLD: 4.88 M/UL
RBC # FLD: 12.9 %
SODIUM SERPL-SCNC: 137 MMOL/L
TRIGL SERPL-MCNC: 57 MG/DL
WBC # FLD AUTO: 6.71 K/UL

## 2022-04-02 ENCOUNTER — RX RENEWAL (OUTPATIENT)
Age: 72
End: 2022-04-02

## 2022-04-11 PROBLEM — Z11.59 SCREENING FOR VIRAL DISEASE: Status: ACTIVE | Noted: 2020-07-17

## 2022-05-04 ENCOUNTER — NON-APPOINTMENT (OUTPATIENT)
Age: 72
End: 2022-05-04

## 2022-05-16 ENCOUNTER — RX RENEWAL (OUTPATIENT)
Age: 72
End: 2022-05-16

## 2022-05-18 ENCOUNTER — NON-APPOINTMENT (OUTPATIENT)
Age: 72
End: 2022-05-18

## 2022-05-18 ENCOUNTER — APPOINTMENT (OUTPATIENT)
Dept: OPHTHALMOLOGY | Facility: CLINIC | Age: 72
End: 2022-05-18
Payer: MEDICARE

## 2022-05-18 PROCEDURE — 92134 CPTRZ OPH DX IMG PST SGM RTA: CPT

## 2022-05-18 PROCEDURE — 92014 COMPRE OPH EXAM EST PT 1/>: CPT

## 2022-06-14 ENCOUNTER — APPOINTMENT (OUTPATIENT)
Dept: OTOLARYNGOLOGY | Facility: CLINIC | Age: 72
End: 2022-06-14
Payer: MEDICARE

## 2022-06-14 VITALS
DIASTOLIC BLOOD PRESSURE: 77 MMHG | WEIGHT: 120 LBS | HEART RATE: 92 BPM | HEIGHT: 64 IN | BODY MASS INDEX: 20.49 KG/M2 | TEMPERATURE: 98.2 F | SYSTOLIC BLOOD PRESSURE: 133 MMHG

## 2022-06-14 PROCEDURE — 31575 DIAGNOSTIC LARYNGOSCOPY: CPT

## 2022-06-14 PROCEDURE — 99214 OFFICE O/P EST MOD 30 MIN: CPT | Mod: 25

## 2022-06-14 NOTE — HISTORY OF PRESENT ILLNESS
[de-identified] : 72 year old male with history of oral cancer s/p composite resection and reconstruction using RFFF and FFF followed by RT in 2004. History of biopsy of mandibular gingiva on 03/27/2020 showing no dysplasia. Last CT chest/neck 07/25/2020. Presents today for evaluation of new oral lesion on his right upper gums. Now on regular diet. States dentures irritate the lesion. Reports difficulty to eat due to pain. Denies bleeding and noticeable change in size. No associated dysphagia, odynophagia and dysphonia. Weight remains stable.

## 2022-06-14 NOTE — PROCEDURE
[Trismus] : trismus preventing mirror examination [None] : none [Flexible Endoscope] : examined with the flexible endoscope [Serial Number: ___] : Serial Number: [unfilled] [de-identified] : No lesions in the NPx, OPx, HPx or larynx.  Expected posttreatment changes, VC are mobile, airway patent.\par

## 2022-06-14 NOTE — PHYSICAL EXAM
[Laryngoscopy Performed] : laryngoscopy was performed, see procedure section for findings [Normal] : no rashes [de-identified] : Posttreatment changes.  No LAD.  There is some fullness overlying the right parotid soft tissues, no TTP. [FreeTextEntry1] : Posttreatment changes, the mandible is quite atrophic, there is an area of thickened mucosa along the left buccal area, well defined, no ulceration, no TTP, no bleeding.  The area of irritation along the right maxillary alveolus is an area where there may be a retained molar? There is a 1-2 mm area of bony exposure with clean margins, no bleeding, no ulceration.

## 2022-06-27 ENCOUNTER — APPOINTMENT (OUTPATIENT)
Dept: INTERNAL MEDICINE | Facility: CLINIC | Age: 72
End: 2022-06-27
Payer: MEDICARE

## 2022-06-27 VITALS
SYSTOLIC BLOOD PRESSURE: 106 MMHG | DIASTOLIC BLOOD PRESSURE: 63 MMHG | HEIGHT: 64 IN | TEMPERATURE: 97.8 F | BODY MASS INDEX: 19.97 KG/M2 | HEART RATE: 71 BPM | WEIGHT: 117 LBS

## 2022-06-27 DIAGNOSIS — R63.4 ABNORMAL WEIGHT LOSS: ICD-10-CM

## 2022-06-27 LAB — HBA1C MFR BLD HPLC: 7.1

## 2022-06-27 PROCEDURE — 99214 OFFICE O/P EST MOD 30 MIN: CPT | Mod: 25

## 2022-06-27 PROCEDURE — 83036 HEMOGLOBIN GLYCOSYLATED A1C: CPT | Mod: QW

## 2022-06-27 RX ORDER — COVID-19 MOLECULAR TEST ASSAY
KIT MISCELLANEOUS
Qty: 8 | Refills: 0 | Status: DISCONTINUED | COMMUNITY
Start: 2022-06-01

## 2022-06-27 NOTE — ASSESSMENT
[FreeTextEntry1] : left wrist and thumb pain > rt - Knee pain, left shoulder pain \par advised wrist brace at night \par - ortho hand specialist consult reviewed \par -saw rheum consult reviewed recommended cortisone shot and PT \par \par DM \par - Hgbaic 7 10/2021 -poc AIC 7.1 3/2022 --> 7.1 6/27/22 \par -- Controlled, continue current medication Metformin 1000 BID and Trajanta 5 qd \par - Monitor Accu-Cheks daily,fasting and post prandial 2 hrs, ophthalmology followup, podiatry follow up. Monitor for signs of hypoglycemia. Check hemoglobin A1c, urine for microalbumin.\par Continue 1800 kcal ADA diet, cut rice, pasta, sugar, sweet, soda, juices, exercise daily 30 minutes , loose weight.\par \par .b/l knee pain s/p gel inj -right knee pain swelling saw ortho did tap dx pseudo gout s/p steroid shot 6/2020\par -avoid squatting , kneeling and twisting\par - cont PT - walking with rollator \par - letter for access a ride given \par \par GERD- saw GASTRO did CT chest / abd pelvis 7/2020 reviewed - lesion sclerotic left thigh unchanged from 2006 \par -on omeprazole 40 po daily 30 minutes prior to breakfast 1 month trial \par -Educated patient lifestyle modification, advice to avoid fried food, greasy oily and spicy foods, avoid tomato, orange, lemon , or caffeinated beverages.\par -Avoid reclining upto 3 hours after meals\par -Followup gastro \par \par weight loss- underweight -pt lost weight has not been approved for glucerna - as not approved - last visit 3/2022 - restart  rx glucerna- to see nutritionist \par \par anemia- resolved \par - colonoscope 4/2019 and EGD - neg \par -oral cancer 2004 rx with sx and chemo , followed by ENT yearly \par - change PPI to omeprazole 40 daily from BID dosing \par \par lower back pain / radiculopathy \par - advised to avoid pulling pushing , lifting , carrying weights , bending etc \par - do warm compresses \par -back brace given \par - side effects reviewed \par - rtc if no improvement\par \par Osteoporosis - 2019 - followed by endo dexa 12/2020 - osteopenia \par - cont Calcium 500-600 mg daily \par -cont vitamin d 1000 units daily \par - do weight bearing exercises: walking with weights, stair climbing , weight training , jogging, aerobics etc \par -repeat test 2 yrs for a f/u\par \par hypertension \par -stable ( - discontinue HCTZ 10/2020 ) losartan to 50 mg daily \par - monitor Bp at home \par \par BPH -\par -tamsulosin 0.4 mg po qhs \par - increase frequency- d/c HCTZ - f/u urologist \par \par HCM \par Prevnar 13 given 2017\par pneumococcal 23 -2015\par colonoscope- 4/17/19 due 7-10 yrs \par flu vaccine given 10/2021 at [facility] \par hep c-neg \par shringrex 5/2019 - second dose 8/2019. complete. \par Moderna 1/25/21, 2/22/21 , booster Moderna 10/13/21 , 6/2022 \par \par HCP forms scanned to chart.

## 2022-06-27 NOTE — HISTORY OF PRESENT ILLNESS
[Spouse] : spouse [de-identified] : seen for f/u on ch medical issues \par \par c/o pain in left thumb base pain and wrist pain > rt wrist , cannot open bottles - saw ortho recommended PT dx arthritis \par -left shoulder pain - knee pain -saw rheum 3/2022 \par - finished PT - pain better flares on and off - uses local pain cream -volteran gel \par \par h/o DM x 10 yrs \par -saw Endo - AIC 7.1 3/2022 \par - his metformin was increased to 1000 BID , - continue Tradjenta 5mg daily \par -saw eye doctor- 5/18/22 - stable \par - retina specialist in past - did eye 1/3rd ? hole in retina ,s/p lazer rx - cataract rt eye was advising sx \par -on statin 20 mg simvastatin , on arb losartan 50 \par - saw podiatrist Dx with feet neuropathy recommended diabetic shoes \par \par oral cancer 2004 rx with sx and chemo , followed by ENT yearly stable \par - weight loss - no apetite - was on glucerna last year needs refill - lost weight since last visit cannot tolerate spicy food - weight was stable while on glucerna off since last visit 3/2022 \par \par GERD- still acting up - saw gastro did CT abd /pelvis/ chest 7/2020-reviewed with pt \par -saw gastro- repeated EGd neg mild gastritis and colonoscope 4/2019 neg - due 7 yrs \par -on omeprazole 40 ch twice daily , slight better \par \par right knee pain - ongoing \par - swelling saw ortho 6/2020 did arthrocentesis- did steroid shot \par - dx as arthritis - pseudogout - finished therapy 2 x week - walking with rollator \par \par HTN- on HCTZ 25 , losartan 50 , home readings good- 120/70 \par - no cp or dizzy spells \par \par c/o lower back and b/l knee pain seeing ortho got B/l gel injections \par - feels slight better \par lower back pain - needs rx for brace - feels pain \par \par Anemia- taking Glucerna - weight loss still on going as per pt needs rx \par \par saw cardio did EKG- did stress test 11/2021 results pending has appt with Dr roman for results \par - Did colonoscope 4/17/19 due repeat 7 yrs \par

## 2022-06-28 LAB
ALBUMIN SERPL ELPH-MCNC: 3.8 G/DL
ALP BLD-CCNC: 64 U/L
ALT SERPL-CCNC: 20 U/L
ANION GAP SERPL CALC-SCNC: 10 MMOL/L
AST SERPL-CCNC: 22 U/L
BASOPHILS # BLD AUTO: 0.02 K/UL
BASOPHILS NFR BLD AUTO: 0.3 %
BILIRUB SERPL-MCNC: 0.3 MG/DL
BUN SERPL-MCNC: 13 MG/DL
CALCIUM SERPL-MCNC: 9 MG/DL
CHLORIDE SERPL-SCNC: 105 MMOL/L
CO2 SERPL-SCNC: 25 MMOL/L
CREAT SERPL-MCNC: 0.87 MG/DL
EGFR: 91 ML/MIN/1.73M2
EOSINOPHIL # BLD AUTO: 0.42 K/UL
EOSINOPHIL NFR BLD AUTO: 6.7 %
GLUCOSE SERPL-MCNC: 126 MG/DL
HCT VFR BLD CALC: 40.2 %
HGB BLD-MCNC: 12.8 G/DL
IMM GRANULOCYTES NFR BLD AUTO: 0.2 %
LYMPHOCYTES # BLD AUTO: 1.14 K/UL
LYMPHOCYTES NFR BLD AUTO: 18.2 %
MAN DIFF?: NORMAL
MCHC RBC-ENTMCNC: 27.5 PG
MCHC RBC-ENTMCNC: 31.8 GM/DL
MCV RBC AUTO: 86.5 FL
MONOCYTES # BLD AUTO: 0.56 K/UL
MONOCYTES NFR BLD AUTO: 9 %
NEUTROPHILS # BLD AUTO: 4.1 K/UL
NEUTROPHILS NFR BLD AUTO: 65.6 %
PLATELET # BLD AUTO: 202 K/UL
POTASSIUM SERPL-SCNC: 4.4 MMOL/L
PROT SERPL-MCNC: 5.5 G/DL
RBC # BLD: 4.65 M/UL
RBC # FLD: 13.2 %
SODIUM SERPL-SCNC: 139 MMOL/L
WBC # FLD AUTO: 6.25 K/UL

## 2022-07-01 ENCOUNTER — NON-APPOINTMENT (OUTPATIENT)
Age: 72
End: 2022-07-01

## 2022-07-16 ENCOUNTER — RX RENEWAL (OUTPATIENT)
Age: 72
End: 2022-07-16

## 2022-07-28 ENCOUNTER — APPOINTMENT (OUTPATIENT)
Dept: CARE COORDINATION | Facility: HOME HEALTH | Age: 72
End: 2022-07-28

## 2022-07-28 VITALS — RESPIRATION RATE: 16 BRPM

## 2022-07-28 DIAGNOSIS — Z85.819 PERSONAL HISTORY OF MALIGNANT NEOPLASM OF UNSPECIFIED SITE OF LIP, ORAL CAVITY, AND PHARYNX: ICD-10-CM

## 2022-07-28 DIAGNOSIS — F17.200 NICOTINE DEPENDENCE, UNSPECIFIED, UNCOMPLICATED: ICD-10-CM

## 2022-07-28 DIAGNOSIS — M19.90 UNSPECIFIED OSTEOARTHRITIS, UNSPECIFIED SITE: ICD-10-CM

## 2022-07-28 DIAGNOSIS — Z83.3 FAMILY HISTORY OF DIABETES MELLITUS: ICD-10-CM

## 2022-07-28 DIAGNOSIS — Z86.39 PERSONAL HISTORY OF OTHER ENDOCRINE, NUTRITIONAL AND METABOLIC DISEASE: ICD-10-CM

## 2022-07-28 PROCEDURE — 99349 HOME/RES VST EST MOD MDM 40: CPT | Mod: 95

## 2022-07-28 PROCEDURE — 99406 BEHAV CHNG SMOKING 3-10 MIN: CPT | Mod: 95

## 2022-07-28 RX ORDER — ACETAMINOPHEN ER 650 MG TABLET,EXTENDED RELEASE 650 MG
650 TABLET, EXTENDED RELEASE ORAL
Refills: 0 | Status: ACTIVE | COMMUNITY

## 2022-07-28 NOTE — HISTORY OF PRESENT ILLNESS
[Home] : at home, [unfilled] , at the time of the visit. [Spouse] : spouse [Verbal consent obtained from patient] : the patient, [unfilled] [FreeTextEntry1] : c/o joint and back pain, and memory loss\par  [de-identified] : Patient is a 72 year y/o male with a history of diabetes, arthritis, GERD, anemia, BPH, HTN, HLD, Mouth cancer in 2004 (now resolved), with complaints of joint and back pain, and memory loss. Patient observed via tele health in is alert, and in no acute distress. Patient's wife present for televisit.  Patient states he has occasional joint and back pain, and at times has difficulty sleeping due to discomfort.  Patient states they take 2 Tylenol Arthritis tabs for pain with seems to help.  Advised patient to try taking Tylenol Arthritis BID.  Wife states patient has difficulty with eating due to the resection from the mouth cancer, and states she patient is losing weight.  Referral for nutrition made.  Patient states he is still smoking approx 1 cigarette per day.  Patient requesting nicotine patches.  Patient states patient is getting more confused and very forgetful.  States she did have a work up and was diagnosed with dementia.  Wife denies agitations or behavioral issues.  Wife checks blood sugars twice a day and in the mornings they are normally in the 120's.  Patient denies chest pain, palpitations, shortness of breath, abdominal pain, nausea, vomiting, diarrhea, lightheaded or dizziness.\par \par \par

## 2022-07-28 NOTE — COUNSELING
[Cessation strategies including cessation program discussed] : Cessation strategies including cessation program discussed [Use of nicotine replacement therapies and other medications discussed] : Use of nicotine replacement therapies and other medications discussed [Yes] : Willing to quit smoking [FreeTextEntry1] : 8

## 2022-07-28 NOTE — REVIEW OF SYSTEMS
[Joint Pain] : joint pain [Back Pain] : back pain [Confusion] : confusion [Memory Loss] : memory loss [Negative] : Integumentary [Fever] : no fever [Chills] : no chills [Fatigue] : no fatigue [Chest Pain] : no chest pain [Palpitations] : no palpitations [Lower Ext Edema] : no lower extremity edema [Shortness Of Breath] : no shortness of breath [Dyspnea on Exertion] : not dyspnea on exertion [Abdominal Pain] : no abdominal pain [Nausea] : no nausea [Constipation] : no constipation [Diarrhea] : no diarrhea [Vomiting] : no vomiting [Depression] : no depression [FreeTextEntry2] : Lack of appetite [FreeTextEntry4] : Difficulty eating food due to mouth surgery for cancer

## 2022-07-28 NOTE — HEALTH RISK ASSESSMENT
[0] : 2) Feeling down, depressed, or hopeless: Not at all (0) [PHQ-2 Negative - No further assessment needed] : PHQ-2 Negative - No further assessment needed [[id number]] : 0

## 2022-07-28 NOTE — PHYSICAL EXAM
[No Acute Distress] : no acute distress [Well-Appearing] : well-appearing [Normal Sclera/Conjunctiva] : normal sclera/conjunctiva [Normal Outer Ear/Nose] : the outer ears and nose were normal in appearance [No Respiratory Distress] : no respiratory distress  [No Accessory Muscle Use] : no accessory muscle use [No Edema] : there was no peripheral edema [Non-distended] : non-distended [Normal Gait] : normal gait [Normal Affect] : the affect was normal [Normal Mood] : the mood was normal [de-identified] : F

## 2022-07-28 NOTE — ASSESSMENT
[FreeTextEntry1] : Patient is a 72 year y/o male with a history of diabetes, arthritis, GERD, anemia, BPH, HTN, HLD, Mouth cancer in 2004 (now resolved), with complaints of joint and back pain, and memory loss. Patient observed via tele health in is alert, and in no acute distress. Patient's wife present for televisit.  Patient states he has occasional joint and back pain, and at times has difficulty sleeping due to discomfort.  Patient states they take 2 Tylenol Arthritis tabs for pain with seems to help.  Advised patient to try taking Tylenol Arthritis BID.  Wife states patient has difficulty with eating due to the resection from the mouth cancer, and states she patient is losing weight.  Referral for nutrition made.  Patient states he is still smoking approx 1 cigarette per day.  Patient requesting nicotine patches.  Patient states patient is getting more confused and very forgetful.  States she did have a work up and was diagnosed with dementia.  Wife denies agitations or behavioral issues.  Wife checks blood sugars twice a day and in the mornings they are normally in the 120's.  Patient denies chest pain, palpitations, shortness of breath, abdominal pain, nausea, vomiting, diarrhea, lightheaded or dizziness.\par \par \par

## 2022-07-30 ENCOUNTER — RX RENEWAL (OUTPATIENT)
Age: 72
End: 2022-07-30

## 2022-07-31 ENCOUNTER — RX RENEWAL (OUTPATIENT)
Age: 72
End: 2022-07-31

## 2022-08-15 ENCOUNTER — RX RENEWAL (OUTPATIENT)
Age: 72
End: 2022-08-15

## 2022-08-23 ENCOUNTER — APPOINTMENT (OUTPATIENT)
Dept: ENDOCRINOLOGY | Facility: CLINIC | Age: 72
End: 2022-08-23

## 2022-10-18 ENCOUNTER — RX RENEWAL (OUTPATIENT)
Age: 72
End: 2022-10-18

## 2022-10-18 ENCOUNTER — APPOINTMENT (OUTPATIENT)
Dept: OTOLARYNGOLOGY | Facility: CLINIC | Age: 72
End: 2022-10-18

## 2022-10-18 VITALS
SYSTOLIC BLOOD PRESSURE: 128 MMHG | BODY MASS INDEX: 18.78 KG/M2 | HEIGHT: 64 IN | WEIGHT: 110 LBS | DIASTOLIC BLOOD PRESSURE: 76 MMHG | HEART RATE: 88 BPM

## 2022-10-18 PROCEDURE — 99213 OFFICE O/P EST LOW 20 MIN: CPT | Mod: 25

## 2022-10-18 PROCEDURE — 31575 DIAGNOSTIC LARYNGOSCOPY: CPT

## 2022-10-18 NOTE — HISTORY OF PRESENT ILLNESS
[de-identified] : 72 year old male with history of oral cancer s/p composite resection and reconstruction using RFFF and FFF followed by RT in 2004. History of biopsy of mandibular gingiva on 03/27/2020 showing no dysplasia. Last CT chest/neck 07/25/2020. \aury Presents today for follow-up for area of thickened buccal mucosa on the left. Reports he is on a soft diet--states dentures irritate so soft food is easier to tolerate and less painful. Denies bleeding and noticeable change in size. \par Denies dysphagia, odynophagia, dysphonia, dyspnea, recent fevers/infections, chills, night sweats.

## 2022-10-18 NOTE — PHYSICAL EXAM
[Laryngoscopy Performed] : laryngoscopy was performed, see procedure section for findings [Normal] : no rashes [de-identified] : Posttreatment changes.  No LAD.  There is some fullness overlying the right parotid soft tissues, no TTP. [FreeTextEntry1] : Posttreatment changes, the mandible is quite atrophic, there is an area of thickened mucosa along the left buccal area, well defined, no ulceration, no TTP, no bleeding.  The area of irritation along the right maxillary alveolus is an area where there may be a retained molar or its root? There is a 1-2 mm area of bony exposure with clean margins, no bleeding, no ulceration.

## 2022-10-18 NOTE — PROCEDURE
[Trismus] : trismus preventing mirror examination [None] : none [Flexible Endoscope] : examined with the flexible endoscope [Serial Number: ___] : Serial Number: [unfilled] [de-identified] : No lesions in the NPx, OPx, HPx or larynx. Expected posttreatment changes, VC are mobile, airway patent.

## 2022-10-28 ENCOUNTER — RESULT CHARGE (OUTPATIENT)
Age: 72
End: 2022-10-28

## 2022-10-28 ENCOUNTER — APPOINTMENT (OUTPATIENT)
Dept: ENDOCRINOLOGY | Facility: CLINIC | Age: 72
End: 2022-10-28

## 2022-10-28 VITALS — WEIGHT: 118 LBS | BODY MASS INDEX: 20.14 KG/M2 | HEIGHT: 64 IN

## 2022-10-28 LAB — HBA1C MFR BLD HPLC: 6.8

## 2022-10-28 PROCEDURE — G0108 DIAB MANAGE TRN  PER INDIV: CPT

## 2022-11-09 ENCOUNTER — APPOINTMENT (OUTPATIENT)
Dept: OPHTHALMOLOGY | Facility: CLINIC | Age: 72
End: 2022-11-09

## 2022-11-09 ENCOUNTER — NON-APPOINTMENT (OUTPATIENT)
Age: 72
End: 2022-11-09

## 2022-11-09 PROCEDURE — 92014 COMPRE OPH EXAM EST PT 1/>: CPT

## 2022-11-09 PROCEDURE — 92134 CPTRZ OPH DX IMG PST SGM RTA: CPT

## 2022-11-28 ENCOUNTER — APPOINTMENT (OUTPATIENT)
Dept: INTERNAL MEDICINE | Facility: CLINIC | Age: 72
End: 2022-11-28

## 2022-11-28 VITALS
DIASTOLIC BLOOD PRESSURE: 72 MMHG | BODY MASS INDEX: 20.14 KG/M2 | HEART RATE: 79 BPM | HEIGHT: 64 IN | WEIGHT: 118 LBS | SYSTOLIC BLOOD PRESSURE: 120 MMHG | TEMPERATURE: 97.6 F | OXYGEN SATURATION: 98 %

## 2022-11-28 DIAGNOSIS — I70.90 UNSPECIFIED ATHEROSCLEROSIS: ICD-10-CM

## 2022-11-28 DIAGNOSIS — G47.8 OTHER SLEEP DISORDERS: ICD-10-CM

## 2022-11-28 PROCEDURE — G0439: CPT

## 2022-11-28 NOTE — HISTORY OF PRESENT ILLNESS
[Spouse] : spouse [de-identified] : Patient is a 72 year y/o male with a history of diabetes, arthritis, GERD, anemia, BPH, HTN, HLD, Mouth cancer in 2004 (now resolved), with complaints of joint and back pain, and memory loss.\par Seen today for AWV \par \par c/o Difficulty walking- since 7/2022 \par - was podiatrist -was told to get power wheel chair - he gave prescription for motarised wheel chair - but insurance did not cover \par -no bowel or bladder incontinence - requesting motorized wheelchair rx\par \par c/o pain in left thumb base pain and wrist pain > rt wrist , cannot open bottles - saw ortho recommended PT dx arthritis \par -left shoulder pain - knee pain -saw rheum 3/2022 \par - finished PT - pain better flares on and off - uses local pain cream -volteran gel \par \par h/o DM x 10 yrs \par -saw Endo - AIC 6.8 10/2022 \par - his metformin was changed to  1000 am 500 PN QD , - continue Tradjenta 5mg daily \par -saw eye doctor-11/9/22  - stable \par - retina specialist in past - did eye 1/3rd ? hole in retina ,s/p lazer rx - cataract rt eye was advising sx \par -on statin 20 mg simvastatin , on arb losartan 50 \par - saw podiatrist Dx with feet neuropathy recommended diabetic shoes \par \par oral cancer 2004 rx with sx and chemo , followed by ENT yearly stable \par - weight loss - no appetite - was on glucerna last year needs refill - lost weight since last visit cannot tolerate spicy food - weight was stable while on glucerna off since last visit 3/2022 \par \par GERD- still acting up - saw gastro did CT abd /pelvis/ chest 7/2020-reviewed with pt \par -saw gastro- repeated EGd neg mild gastritis and colonoscope 4/2019 neg - due 7 yrs \par -on omeprazole 40 ch twice daily , slight better \par \par right knee pain - ongoing \par - swelling saw ortho 6/2020 did arthrocentesis- did steroid shot \par - dx as arthritis - pseudogout - finished therapy 2 x week - walking with rollator \par \par HTN- on HCTZ 25 , losartan 50 , home readings good- 120/70 \par - no cp or dizzy spells \par \par c/o lower back and b/l knee pain seeing ortho got B/l gel injections \par - feels slight better \par lower back pain - needs rx for brace - feels pain \par \par Anemia- taking Glucerna - weight loss still on going as per pt needs rx \par \par saw cardio did EKG- did stress test 11/2021 results pending has appt with Dr roman for results \par - Did colonoscope 4/17/19 due repeat 7 yrs \par  \par

## 2022-11-28 NOTE — HEALTH RISK ASSESSMENT
[Good] : ~his/her~  mood as  good [Never] : Never [No] : In the past 12 months have you used drugs other than those required for medical reasons? No [No falls in past year] : Patient reported no falls in the past year [0] : 2) Feeling down, depressed, or hopeless: Not at all (0) [PHQ-2 Negative - No further assessment needed] : PHQ-2 Negative - No further assessment needed [With Significant Other] : lives with significant other [Retired] : retired [] :  [Independent] : managing finances [Some assistance needed] : using telephone [de-identified] : walking  [[id number]] : 0

## 2022-11-28 NOTE — ASSESSMENT
[FreeTextEntry1] : difficulty walking - 7/2022 - gait and stiffness   rigidity r/o Parkinsons - no tremors \par -neurology evaluation \par \par DM \par - Hgbaic 6.8 10/22 \par -- Controlled, now recent dose changed 10/28/22  Metformin 500 pm and  1000 am and Trajanta 5 qd \par - Monitor Accu-Cheks daily,fasting and post prandial 2 hrs, ophthalmology followup, podiatry follow up. Monitor for signs of hypoglycemia. 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 6/2020\par -avoid squatting , kneeling and twisting\par - cont PT - walking with rollator \par - letter for access a ride given \par \par GERD- saw GASTRO did CT chest / abd pelvis 7/2020 reviewed - lesion sclerotic left thigh unchanged from 2006 \par -on omeprazole 40 po daily 30 minutes prior to breakfast 1 month trial \par -Educated patient lifestyle modification, advice to avoid fried food, greasy oily and spicy foods, avoid tomato, orange, lemon , or caffeinated beverages.\par -Avoid reclining upto 3 hours after meals\par -Followup gastro \par \par weight loss- underweight -pt lost weight has not been approved for glucerna - as not approved - last visit 3/2022 - restart rx glucerna- to see nutritionist \par \par anemia- resolved \par - colonoscope 4/2019 and EGD - neg \par -oral cancer 2004 rx with sx and chemo , followed by ENT yearly \par - change PPI to omeprazole 40 daily from BID dosing \par \par lower back pain / radiculopathy \par - advised to avoid pulling pushing , lifting , carrying weights , bending etc \par - do warm compresses \par -back brace given \par - side effects reviewed \par - rtc if no improvement\par \par Osteoporosis - 2019 - followed by endo dexa 12/2020 - osteopenia \par - cont Calcium 500-600 mg daily \par -cont vitamin d 1000 units daily \par - do weight bearing exercises: walking with weights, stair climbing , weight training , jogging, aerobics etc \par -repeat test 2 yrs for a f/u\par \par hypertension 100/60\par -stable ( - discontinue HCTZ 10/2020 ) losartan to 50 mg daily \par - monitor Bp at home \par \par BPH -\par -tamsulosin 0.4 mg po qhs \par - increase frequency- d/c HCTZ - f/u urologist \par \par HCM \par Prevnar 13 given 2017\par pneumococcal 23 -2015\par colonoscope- 4/17/19 due 7-10 yrs \par flu vaccine given 1010/22 at [facility] \par hep c-neg \par shringrex 5/2019 - second dose 8/2019. complete. \par Moderna 1/25/21, 2/22/21 , booster Moderna 10/13/21 , 6/2022 , 10/10/22 \par \par HCP forms scanned to chart. \par

## 2022-11-28 NOTE — PHYSICAL EXAM
[No Acute Distress] : no acute distress [Well-Appearing] : well-appearing [Normal Sclera/Conjunctiva] : normal sclera/conjunctiva [PERRL] : pupils equal round and reactive to light [EOMI] : extraocular movements intact [Normal Outer Ear/Nose] : the outer ears and nose were normal in appearance [Normal Oropharynx] : the oropharynx was normal [No JVD] : no jugular venous distention [No Lymphadenopathy] : no lymphadenopathy [Supple] : supple [Thyroid Normal, No Nodules] : the thyroid was normal and there were no nodules present [No Respiratory Distress] : no respiratory distress  [No Accessory Muscle Use] : no accessory muscle use [Clear to Auscultation] : lungs were clear to auscultation bilaterally [Normal Rate] : normal rate  [Regular Rhythm] : with a regular rhythm [Normal S1, S2] : normal S1 and S2 [No Murmur] : no murmur heard [No Carotid Bruits] : no carotid bruits [No Abdominal Bruit] : a ~M bruit was not heard ~T in the abdomen [No Varicosities] : no varicosities [Pedal Pulses Present] : the pedal pulses are present [No Edema] : there was no peripheral edema [No Palpable Aorta] : no palpable aorta [No Extremity Clubbing/Cyanosis] : no extremity clubbing/cyanosis [Soft] : abdomen soft [Non Tender] : non-tender [Non-distended] : non-distended [No Masses] : no abdominal mass palpated [No HSM] : no HSM [Normal Bowel Sounds] : normal bowel sounds [Normal Posterior Cervical Nodes] : no posterior cervical lymphadenopathy [Normal Anterior Cervical Nodes] : no anterior cervical lymphadenopathy [No CVA Tenderness] : no CVA  tenderness [No Spinal Tenderness] : no spinal tenderness [No Joint Swelling] : no joint swelling [Grossly Normal Strength/Tone] : grossly normal strength/tone [No Rash] : no rash [Coordination Grossly Intact] : coordination grossly intact [No Focal Deficits] : no focal deficits [Normal Gait] : normal gait [Deep Tendon Reflexes (DTR)] : deep tendon reflexes were 2+ and symmetric [Normal Affect] : the affect was normal [Normal Insight/Judgement] : insight and judgment were intact

## 2022-11-29 LAB
ALBUMIN SERPL ELPH-MCNC: 3.8 G/DL
ALP BLD-CCNC: 65 U/L
ALT SERPL-CCNC: 20 U/L
ANION GAP SERPL CALC-SCNC: 11 MMOL/L
APPEARANCE: CLEAR
AST SERPL-CCNC: 24 U/L
BASOPHILS # BLD AUTO: 0.03 K/UL
BASOPHILS NFR BLD AUTO: 0.4 %
BILIRUB SERPL-MCNC: 0.3 MG/DL
BILIRUBIN URINE: NEGATIVE
BLOOD URINE: NEGATIVE
BUN SERPL-MCNC: 13 MG/DL
CALCIUM SERPL-MCNC: 9.3 MG/DL
CHLORIDE SERPL-SCNC: 100 MMOL/L
CHOLEST SERPL-MCNC: 128 MG/DL
CO2 SERPL-SCNC: 25 MMOL/L
COLOR: COLORLESS
CREAT SERPL-MCNC: 0.84 MG/DL
CREAT SPEC-SCNC: 19 MG/DL
EGFR: 92 ML/MIN/1.73M2
EOSINOPHIL # BLD AUTO: 0.39 K/UL
EOSINOPHIL NFR BLD AUTO: 5.5 %
ESTIMATED AVERAGE GLUCOSE: 160 MG/DL
GLUCOSE QUALITATIVE U: NEGATIVE
GLUCOSE SERPL-MCNC: 150 MG/DL
HBA1C MFR BLD HPLC: 7.2 %
HCT VFR BLD CALC: 42.9 %
HDLC SERPL-MCNC: 71 MG/DL
HGB BLD-MCNC: 13.8 G/DL
IMM GRANULOCYTES NFR BLD AUTO: 0.3 %
KETONES URINE: NEGATIVE
LDLC SERPL CALC-MCNC: 48 MG/DL
LEUKOCYTE ESTERASE URINE: NEGATIVE
LYMPHOCYTES # BLD AUTO: 1.28 K/UL
LYMPHOCYTES NFR BLD AUTO: 17.9 %
MAN DIFF?: NORMAL
MCHC RBC-ENTMCNC: 27.9 PG
MCHC RBC-ENTMCNC: 32.2 GM/DL
MCV RBC AUTO: 86.7 FL
MICROALBUMIN 24H UR DL<=1MG/L-MCNC: <1.2 MG/DL
MICROALBUMIN/CREAT 24H UR-RTO: NORMAL MG/G
MONOCYTES # BLD AUTO: 0.6 K/UL
MONOCYTES NFR BLD AUTO: 8.4 %
NEUTROPHILS # BLD AUTO: 4.82 K/UL
NEUTROPHILS NFR BLD AUTO: 67.5 %
NITRITE URINE: NEGATIVE
NONHDLC SERPL-MCNC: 57 MG/DL
PH URINE: 6.5
PLATELET # BLD AUTO: 196 K/UL
POTASSIUM SERPL-SCNC: 4.3 MMOL/L
PROT SERPL-MCNC: 5.5 G/DL
PROTEIN URINE: NEGATIVE
PSA FREE FLD-MCNC: 30 %
PSA FREE SERPL-MCNC: 0.26 NG/ML
PSA SERPL-MCNC: 0.85 NG/ML
RBC # BLD: 4.95 M/UL
RBC # FLD: 13.2 %
SODIUM SERPL-SCNC: 137 MMOL/L
SPECIFIC GRAVITY URINE: 1.01
TRIGL SERPL-MCNC: 45 MG/DL
TSH SERPL-ACNC: 3.02 UIU/ML
UROBILINOGEN URINE: NORMAL
VIT B12 SERPL-MCNC: >2000 PG/ML
WBC # FLD AUTO: 7.14 K/UL

## 2022-12-29 ENCOUNTER — NON-APPOINTMENT (OUTPATIENT)
Age: 72
End: 2022-12-29

## 2023-01-03 ENCOUNTER — APPOINTMENT (OUTPATIENT)
Dept: INTERNAL MEDICINE | Facility: CLINIC | Age: 73
End: 2023-01-03
Payer: MEDICARE

## 2023-01-03 DIAGNOSIS — U07.1 COVID-19: ICD-10-CM

## 2023-01-03 PROCEDURE — 99213 OFFICE O/P EST LOW 20 MIN: CPT | Mod: 95

## 2023-01-03 RX ORDER — SODIUM CHLORIDE FOR INHALATION 0.9 %
0.9 VIAL, NEBULIZER (ML) INHALATION
Qty: 1 | Refills: 0 | Status: ACTIVE | COMMUNITY
Start: 2023-01-03 | End: 1900-01-01

## 2023-01-03 RX ORDER — NIRMATRELVIR AND RITONAVIR 300-100 MG
20 X 150 MG & KIT ORAL
Qty: 1 | Refills: 0 | Status: COMPLETED | COMMUNITY
Start: 2022-12-27 | End: 2023-01-03

## 2023-01-03 RX ORDER — BENZONATATE 100 MG/1
100 CAPSULE ORAL 3 TIMES DAILY
Qty: 30 | Refills: 0 | Status: COMPLETED | COMMUNITY
Start: 2022-12-27 | End: 2023-01-03

## 2023-01-03 NOTE — HISTORY OF PRESENT ILLNESS
[Home] : at home, [unfilled] , at the time of the visit. [Medical Office: ([facility])___] : at the medical office located in  [FreeTextEntry8] : \par 72 y.o. male, PMHx diabetes, arthritis, GERD, anemia, BPH, HTN, HLD, mouth cancer in 2004 (now resolved), joint and back pain, memory loss.\par COVID infection, day 9.  \par Presents with wife with c/o cough.  Taking benzonatate prescribed by PCP with no improvement.  Completed Paxlovid.  \par Has been feeling short of breath, but no wheeze.  Pulse oximeter around 98% at rest and with activity.  \par No longer having a fever.  \par Drinking lots of fluids, not much appetite, bad taste in his mouth.  \par Using nicotine patch, but has tried to smoke a little.  Caused more coughing.  Cough worse when lying down.  \par Chest hurts with lots of coughing, otherwise no chest pain.  Blood glucose has been controlled.  \par \par \par

## 2023-01-03 NOTE — PHYSICAL EXAM
[No Acute Distress] : no acute distress [Normal] : no acute distress, well nourished, well developed and well-appearing [No Respiratory Distress] : no respiratory distress

## 2023-01-03 NOTE — REVIEW OF SYSTEMS
[Fever] : no fever [Chills] : no chills [Fatigue] : fatigue [Night Sweats] : no night sweats [Shortness Of Breath] : shortness of breath [Wheezing] : no wheezing [Cough] : cough [Negative] : Gastrointestinal

## 2023-01-03 NOTE — ASSESSMENT
[FreeTextEntry1] : Cough s/p COVID infection:\par Fever resolved.  Subjective dyspnea, but O2 sat maintained at 98%.  \par Will try alternative cough suppressant.  \par Wife has nebulizer machine.  Will start Albuterol + saline every 12 hours.  Saline alone every 6 hours.  \par In person evaluation advised if not improving through the week.  \par

## 2023-01-05 ENCOUNTER — LABORATORY RESULT (OUTPATIENT)
Age: 73
End: 2023-01-05

## 2023-01-05 ENCOUNTER — APPOINTMENT (OUTPATIENT)
Dept: NEUROLOGY | Facility: CLINIC | Age: 73
End: 2023-01-05
Payer: MEDICARE

## 2023-01-05 VITALS
BODY MASS INDEX: 20.14 KG/M2 | OXYGEN SATURATION: 96 % | DIASTOLIC BLOOD PRESSURE: 71 MMHG | HEART RATE: 86 BPM | SYSTOLIC BLOOD PRESSURE: 115 MMHG | TEMPERATURE: 95.3 F | WEIGHT: 118 LBS | HEIGHT: 64 IN

## 2023-01-05 DIAGNOSIS — M25.561 PAIN IN RIGHT KNEE: ICD-10-CM

## 2023-01-05 DIAGNOSIS — M25.562 PAIN IN RIGHT KNEE: ICD-10-CM

## 2023-01-05 DIAGNOSIS — R26.2 DIFFICULTY IN WALKING, NOT ELSEWHERE CLASSIFIED: ICD-10-CM

## 2023-01-05 DIAGNOSIS — G89.29 PAIN IN RIGHT KNEE: ICD-10-CM

## 2023-01-05 DIAGNOSIS — R53.1 WEAKNESS: ICD-10-CM

## 2023-01-05 PROCEDURE — 99205 OFFICE O/P NEW HI 60 MIN: CPT

## 2023-01-05 NOTE — CONSULT LETTER
[Dear  ___] : Dear  [unfilled], [Consult Letter:] : I had the pleasure of evaluating your patient, [unfilled]. [Please see my note below.] : Please see my note below. [Consult Closing:] : Thank you very much for allowing me to participate in the care of this patient.  If you have any questions, please do not hesitate to contact me. [Sincerely,] : Sincerely, [FreeTextEntry3] : Valencia Acuna MD, MPH\par

## 2023-01-05 NOTE — ASSESSMENT
[FreeTextEntry1] : The patient is here for evaluation of ambulation problems, he does have back and neck pain which could be contributing to his limitation of ambulation, however neurological examination also yields signs of peripheral neuropathy likely related to diabetes, should also consider diagnosis of ALS which is less likely however also possible.  Additionally, patient's gait is slow and small shuffling steps with walker, needs to lean on walker to get up from sitting position which raises concern for deconditioning as well as possible Parkinsons versus parkinsonism.  Will obtain MRI of the brain, as well as nerve conduction EMG of the arms of the legs as well as neuropathy labs.  Will direct care after test results.  Meanwhile, patient continue with his physical therapy for strengthening.\par \par fu after emg\par \par I spent the time noted on the day of this patient encounter preparing for, providing and documenting the above E/M service and counseling and educate patient on differential, workup, disease course, and treatment/management. Education was provided to the patient during this encounter. All questions and concerns were answered and addressed in detail. The patient verbalized understanding and agreed to plan. Patient was advised to continue to monitor for neurologic symptoms and to notify my office or go to the nearest emergency room if there are any changes. Any orders/referrals and communications were provided as well. \par Side effects of the above medications were discussed in detail including but not limited to applicable black box warning and teratogenicity as appropriate. \par Patient was advised to bring previous records to my office, including CD of imaging, when applicable. \par \par

## 2023-01-05 NOTE — HISTORY OF PRESENT ILLNESS
[FreeTextEntry1] : 72 Y.o. male, PMHx diabetes, arthritis, GERD, anemia, BPH, HTN, HLD, DM, mouth cancer in 2004 (now resolved), joint and back pain here for ambulation problems.  The patient is here for walking problems started trouble walking NOv 2022.  Says that he has weakness in legs on straight surfaces.  Has numbness and tingling in both legs.  Has back and knee pain. Has pain when has difficulty walking.\par \par Has weakness in the arm but no numbness or tingling in the arms.  Also has neck pain.\par No head trauma or difficulty with swallowing.  The patient has chronic difficulty with speaking, present for years.\par \par Started using walker to ambulate after NOv 2022.\par \par Patient had COVID 10 Dec 21 2022, after he started trouble walking, has symptoms of cough.

## 2023-01-05 NOTE — PHYSICAL EXAM
[Person] : oriented to person [Concentration Intact] : normal concentrating ability [Naming Objects] : no difficulty naming common objects [Fluency] : fluency intact [Comprehension] : comprehension intact [Vocabulary] : adequate range of vocabulary [Cranial Nerves Optic (II)] : visual acuity intact bilaterally,  visual fields full to confrontation, pupils equal round and reactive to light [Cranial Nerves Oculomotor (III)] : extraocular motion intact [Cranial Nerves Trigeminal (V)] : facial sensation intact symmetrically [Cranial Nerves Glossopharyngeal (IX)] : tongue and palate midline [Cranial Nerves Accessory (XI - Cranial And Spinal)] : head turning and shoulder shrug symmetric [Cranial Nerves Hypoglossal (XII)] : there was no tongue deviation with protrusion [Motor Tone] : muscle tone was normal in all four extremities [Involuntary Movements] : no involuntary movements were seen [1+] : Patella left 1+ [0] : Ankle jerk left 0 [Coordination - Dysmetria Impaired Finger-to-Nose Bilateral] : not present [Coordination - Dysmetria Impaired Heel-to-Shin Bilateral] : not present [Plantar Reflex Right Only] : normal on the right [Plantar Reflex Left Only] : normal on the left [FreeTextEntry5] : left facial weakness secondary to mouth cancer resection [FreeTextEntry6] : patient gives poor effort on strenght tetsing [FreeTextEntry7] : sensory loss to LT and pP in bl legs [FreeTextEntry8] : slow and small shuffling steps with walker, needs to lean on walker to get up from sitting position

## 2023-01-09 ENCOUNTER — OUTPATIENT (OUTPATIENT)
Dept: OUTPATIENT SERVICES | Facility: HOSPITAL | Age: 73
LOS: 1 days | End: 2023-01-09
Payer: MEDICAID

## 2023-01-09 ENCOUNTER — APPOINTMENT (OUTPATIENT)
Dept: RADIOLOGY | Facility: IMAGING CENTER | Age: 73
End: 2023-01-09
Payer: MEDICARE

## 2023-01-09 ENCOUNTER — APPOINTMENT (OUTPATIENT)
Dept: INTERNAL MEDICINE | Facility: CLINIC | Age: 73
End: 2023-01-09
Payer: MEDICARE

## 2023-01-09 ENCOUNTER — NON-APPOINTMENT (OUTPATIENT)
Age: 73
End: 2023-01-09

## 2023-01-09 VITALS
DIASTOLIC BLOOD PRESSURE: 77 MMHG | SYSTOLIC BLOOD PRESSURE: 132 MMHG | BODY MASS INDEX: 20.14 KG/M2 | TEMPERATURE: 97.7 F | HEIGHT: 64 IN | OXYGEN SATURATION: 97 % | WEIGHT: 118 LBS | HEART RATE: 96 BPM

## 2023-01-09 DIAGNOSIS — R00.2 PALPITATIONS: ICD-10-CM

## 2023-01-09 DIAGNOSIS — U07.1 COVID-19: ICD-10-CM

## 2023-01-09 DIAGNOSIS — E46 UNSPECIFIED PROTEIN-CALORIE MALNUTRITION: Chronic | ICD-10-CM

## 2023-01-09 PROCEDURE — 93000 ELECTROCARDIOGRAM COMPLETE: CPT | Mod: 59

## 2023-01-09 PROCEDURE — 71046 X-RAY EXAM CHEST 2 VIEWS: CPT

## 2023-01-09 PROCEDURE — 71046 X-RAY EXAM CHEST 2 VIEWS: CPT | Mod: 26

## 2023-01-09 PROCEDURE — 99214 OFFICE O/P EST MOD 30 MIN: CPT | Mod: 25

## 2023-01-09 NOTE — HISTORY OF PRESENT ILLNESS
[de-identified] : 72 Y.o. male, PMHx diabetes, arthritis, GERD, anemia, BPH, HTN, HLD, mouth cancer in 2004 (now resolved), joint and back pain, memory loss.seen for f/u from last visit 1/3/23 \par accompanied with spouse \par \par hx COVID infection, day 15. s/p Paxlovid \par cough is better able to sleep better after taking promethazine cough syrup able to sleep better now \par no CP now \par - SOB better now since before \par c/o feeling palpitations sp at night since dx of covid \par also BP running high

## 2023-01-09 NOTE — ASSESSMENT
[FreeTextEntry1] : S/p covid day 17 as per pt s/p Paxlovid \par -s/s improving \par -cough better with syrup\par -get chest xary \par BW reviewed \par \par intermittent palpitations since covid -on and off \par - EKG - NSR at 92 bpm - advised to only give neb rx if needed \par TSh, mg  nl 1/5/23 \par stress test 11/5/21 neg \par -cardio evaluation \par - educated to cut down on caffeine , tea , soda , chocolates etc \par -educated pt if palpitation reoccurs or Cp , left shoulder pain to go to ER or Call 911\par \par Memory loss and Difficulty walking - 7/2022 \par saw neuro 1/5/23 - consult reviewed -started Duloxitine and ordered MRI brain ,EMG/ NCS , BW \par advised PT \par -has f/u 4/18/23 \par \par Elevated BP- will monitor -if elevated next visit consider restarting medication \par -stable ( - discontinue HCTZ 10/2020 ) losartan to 50 mg daily \par - monitor Bp at home \par \par DM \par - Hgbaic 6.8 10/22 --> 7.2 1/5/23 \par -- Controlled, now recent dose changed 10/28/22 Metformin 500 pm and 1000 am and Trajanta 5 qd \par - Monitor Accu-Cheks daily,fasting and post prandial 2 hrs, ophthalmology followup, podiatry follow up. Monitor for signs of hypoglycemia. 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 6/2020\par -avoid squatting , kneeling and twisting\par - cont PT - walking with rollator \par - letter for access a ride given \par \par GERD- saw GASTRO did CT chest / abd pelvis 7/2020 reviewed - lesion sclerotic left thigh unchanged from 2006 \par -on omeprazole 40 po daily 30 minutes prior to breakfast 1 month trial \par -Educated patient lifestyle modification, advice to avoid fried food, greasy oily and spicy foods, avoid tomato, orange, lemon , or caffeinated beverages.\par -Avoid reclining upto 3 hours after meals\par -Followup gastro \par \par weight loss- underweight -pt lost weight  -  rx glucerna faxed to [facility] - insurance changed \par \par anemia- resolved \par - colonoscope 4/2019 and EGD - neg \par -oral cancer 2004 rx with sx and chemo , followed by ENT yearly \par - change PPI to omeprazole 40 daily from BID dosing \par \par lower back pain / radiculopathy \par - advised to avoid pulling pushing , lifting , carrying weights , bending etc \par - do warm compresses \par -back brace given \par - side effects reviewed \par - rtc if no improvement\par \par Osteoporosis - 2019 - followed by endo dexa 12/2020 - osteopenia \par - cont Calcium 500-600 mg daily \par -cont vitamin d 1000 units daily \par - do weight bearing exercises: walking with weights, stair climbing , weight training , jogging, aerobics etc \par -repeat test 2 yrs for a f/u\par \par BPH -\par -tamsulosin 0.4 mg po qhs \par - increase frequency- d/c HCTZ - f/u urologist \par \par \par

## 2023-01-10 ENCOUNTER — NON-APPOINTMENT (OUTPATIENT)
Age: 73
End: 2023-01-10

## 2023-01-11 ENCOUNTER — APPOINTMENT (OUTPATIENT)
Dept: CARDIOLOGY | Facility: CLINIC | Age: 73
End: 2023-01-11
Payer: MEDICARE

## 2023-01-11 ENCOUNTER — NON-APPOINTMENT (OUTPATIENT)
Age: 73
End: 2023-01-11

## 2023-01-11 VITALS
SYSTOLIC BLOOD PRESSURE: 126 MMHG | HEART RATE: 86 BPM | HEIGHT: 64 IN | DIASTOLIC BLOOD PRESSURE: 75 MMHG | BODY MASS INDEX: 18.78 KG/M2 | WEIGHT: 110 LBS | OXYGEN SATURATION: 99 %

## 2023-01-11 PROCEDURE — 99204 OFFICE O/P NEW MOD 45 MIN: CPT | Mod: 25

## 2023-01-11 PROCEDURE — 93000 ELECTROCARDIOGRAM COMPLETE: CPT

## 2023-01-11 NOTE — REVIEW OF SYSTEMS
[Fever] : no fever [Feeling Fatigued] : feeling fatigued [Blurry Vision] : no blurred vision [Earache] : no earache [SOB] : no shortness of breath [Dyspnea on exertion] : not dyspnea during exertion [Cough] : cough

## 2023-01-11 NOTE — CARDIOLOGY SUMMARY
[de-identified] : Sinus rhythm nonspecific T wave changes [de-identified] : 2021 normal perfusion stress testing. [de-identified] : 2019, echo shows normal ejection fraction with no significant valvular heart disease.

## 2023-01-11 NOTE — HISTORY OF PRESENT ILLNESS
[FreeTextEntry1] : 72-year-old male with history of hypertension type 2 diabetes.  Patient had COVID infection 2 weeks ago, at that time he had symptoms of intense coughing fever productive cough.  He managed his symptoms conservatively staying at home was not hospitalized.  His wife reports that because of the intense coughing which was happening mostly at night he was complaining of chest pain every time he would cough.  Patient reports that he has been feeling very anxious at night especially when he coughs and he gets chest pain with it but his symptoms have gradually improved in the last few days though he still has cough with mild yellow sputum production.  He had x-ray performed that I reviewed the results does not show any consolidation or any significant parenchymal disease.  Otherwise prior to this episode of COVID he was active and did not have any chest pain or shortness of breath reported.  Even now he says that while walking within the house his breathing is okay and he does not have any exertional chest pain symptoms.\par \par I reviewed prior testing that was done in 2021 he had a negative stress test normal ejection fraction on echocardiogram with no evidence of any valvular heart disease.

## 2023-01-11 NOTE — DISCUSSION/SUMMARY
[FreeTextEntry1] : Patient symptoms of chest pain and nocturnal coughing are likely related to his recent COVID infection.  Patient and family both report that he is gradually getting better.  At this time he has no exertional symptoms to report.  From cardiovascular standpoint I have reassured him that his symptoms do not appear to be cardiac in origin and at this time I recommend clinical monitoring and follow-up in the next 3 to 4 weeks.  If of course if he has any change in his symptoms or the symptoms are persistent then we will get further cardiovascular testing with consideration for stress testing and an echocardiogram. [EKG obtained to assist in diagnosis and management of assessed problem(s)] : EKG obtained to assist in diagnosis and management of assessed problem(s)

## 2023-01-11 NOTE — PHYSICAL EXAM
[Well Developed] : well developed [Normal Conjunctiva] : normal conjunctiva [Normal Venous Pressure] : normal venous pressure [No Carotid Bruit] : no carotid bruit [Clear Lung Fields] : clear lung fields [Good Air Entry] : good air entry [de-identified] : Patient has mild cough at the moment

## 2023-01-23 LAB
ACE BLD-CCNC: 34 U/L
ALBUMIN MFR SERPL ELPH: 52.5 %
ALBUMIN SERPL ELPH-MCNC: 3.9 G/DL
ALBUMIN SERPL-MCNC: 3.2 G/DL
ALBUMIN/GLOB SERPL: 1.1 RATIO
ALP BLD-CCNC: 79 U/L
ALPHA1 GLOB MFR SERPL ELPH: 6.7 %
ALPHA1 GLOB SERPL ELPH-MCNC: 0.4 G/DL
ALPHA2 GLOB MFR SERPL ELPH: 18.4 %
ALPHA2 GLOB SERPL ELPH-MCNC: 1.1 G/DL
ALT SERPL-CCNC: 31 U/L
ANA SER IF-ACNC: NEGATIVE
ANION GAP SERPL CALC-SCNC: 13 MMOL/L
AST SERPL-CCNC: 26 U/L
B-GLOBULIN MFR SERPL ELPH: 12.7 %
B-GLOBULIN SERPL ELPH-MCNC: 0.8 G/DL
BASOPHILS # BLD AUTO: 0.04 K/UL
BASOPHILS NFR BLD AUTO: 0.4 %
BILIRUB SERPL-MCNC: 0.2 MG/DL
BUN SERPL-MCNC: 13 MG/DL
CALCIUM SERPL-MCNC: 9.6 MG/DL
CARDIOLIPIN AB SER IA-ACNC: NEGATIVE
CHLORIDE SERPL-SCNC: 102 MMOL/L
CMV IGG AVIDITY SERPL IA-RTO: 0.73
CO2 SERPL-SCNC: 24 MMOL/L
CREAT SERPL-MCNC: 0.87 MG/DL
CRP SERPL-MCNC: 3 MG/L
EBV DNA SERPL NAA+PROBE-ACNC: ABNORMAL IU/ML
EBVPCR LOG: ABNORMAL LOG10IU/ML
EGFR: 91 ML/MIN/1.73M2
ENA SCL70 IGG SER IA-ACNC: <0.2 AL
ENA SS-A AB SER IA-ACNC: <0.2 AL
ENA SS-B AB SER IA-ACNC: <0.2 AL
EOSINOPHIL # BLD AUTO: 0.26 K/UL
EOSINOPHIL NFR BLD AUTO: 2.8 %
ERYTHROCYTE [SEDIMENTATION RATE] IN BLOOD BY WESTERGREN METHOD: 14 MM/HR
ESTIMATED AVERAGE GLUCOSE: 160 MG/DL
FOLATE SERPL-MCNC: 17.5 NG/ML
GAMMA GLOB FLD ELPH-MCNC: 0.6 G/DL
GAMMA GLOB MFR SERPL ELPH: 9.7 %
GLUCOSE BS SERPL-MCNC: 102 MG/DL
GLUCOSE SERPL-MCNC: 99 MG/DL
HBA1C MFR BLD HPLC: 7.2 %
HCT VFR BLD CALC: 44.1 %
HGB BLD-MCNC: 13.8 G/DL
HIV1+2 AB SPEC QL IA.RAPID: NONREACTIVE
HTLV I+II AB SER QL: NORMAL
IMM GRANULOCYTES NFR BLD AUTO: 0.7 %
INTERPRETATION SERPL IEP-IMP: NORMAL
LYMPHOCYTES # BLD AUTO: 1.48 K/UL
LYMPHOCYTES NFR BLD AUTO: 15.8 %
M PROTEIN SPEC IFE-MCNC: NORMAL
MAN DIFF?: NORMAL
MCHC RBC-ENTMCNC: 28.1 PG
MCHC RBC-ENTMCNC: 31.3 GM/DL
MCV RBC AUTO: 89.8 FL
METHYLMALONATE SERPL-SCNC: 129 NMOL/L
MONOCYTES # BLD AUTO: 0.73 K/UL
MONOCYTES NFR BLD AUTO: 7.8 %
NEUTROPHILS # BLD AUTO: 6.78 K/UL
NEUTROPHILS NFR BLD AUTO: 72.5 %
PLATELET # BLD AUTO: 423 K/UL
POTASSIUM SERPL-SCNC: 4.7 MMOL/L
PROT SERPL-MCNC: 5.9 G/DL
PROT SERPL-MCNC: 6.1 G/DL
PROT SERPL-MCNC: 6.1 G/DL
RBC # BLD: 4.91 M/UL
RBC # FLD: 13.5 %
SODIUM SERPL-SCNC: 140 MMOL/L
T PALLIDUM AB SER QL IA: NEGATIVE
T3 SERPL-MCNC: 126 NG/DL
T4 SERPL-MCNC: 9.6 UG/DL
TSH SERPL-ACNC: 2.17 UIU/ML
TTG IGA SER IA-ACNC: <1.2 U/ML
TTG IGA SER-ACNC: NEGATIVE
TTG IGG SER IA-ACNC: 6.7 U/ML
TTG IGG SER IA-ACNC: ABNORMAL
VIT B12 SERPL-MCNC: >2000 PG/ML
WBC # FLD AUTO: 9.36 K/UL

## 2023-02-08 ENCOUNTER — APPOINTMENT (OUTPATIENT)
Dept: GASTROENTEROLOGY | Facility: CLINIC | Age: 73
End: 2023-02-08
Payer: MEDICARE

## 2023-02-08 VITALS
HEIGHT: 64 IN | DIASTOLIC BLOOD PRESSURE: 63 MMHG | WEIGHT: 113 LBS | SYSTOLIC BLOOD PRESSURE: 136 MMHG | BODY MASS INDEX: 19.29 KG/M2 | HEART RATE: 85 BPM

## 2023-02-08 DIAGNOSIS — L29.9 PRURITUS, UNSPECIFIED: ICD-10-CM

## 2023-02-08 DIAGNOSIS — K21.9 GASTRO-ESOPHAGEAL REFLUX DISEASE W/OUT ESOPHAGITIS: ICD-10-CM

## 2023-02-08 DIAGNOSIS — R89.4 ABNORMAL IMMUNOLOGICAL FINDINGS IN SPECIMENS FROM OTHER ORGANS, SYSTEMS AND TISSUES: ICD-10-CM

## 2023-02-08 DIAGNOSIS — Z80.0 FAMILY HISTORY OF MALIGNANT NEOPLASM OF DIGESTIVE ORGANS: ICD-10-CM

## 2023-02-08 DIAGNOSIS — Z01.818 ENCOUNTER FOR OTHER PREPROCEDURAL EXAMINATION: ICD-10-CM

## 2023-02-08 DIAGNOSIS — K25.9 GASTRIC ULCER, UNSPECIFIED AS ACUTE OR CHRONIC, W/OUT HEMORRHAGE OR PERFORATION: ICD-10-CM

## 2023-02-08 PROCEDURE — 82274 ASSAY TEST FOR BLOOD FECAL: CPT | Mod: QW

## 2023-02-08 PROCEDURE — 99215 OFFICE O/P EST HI 40 MIN: CPT | Mod: 25

## 2023-02-08 RX ORDER — DULOXETINE HYDROCHLORIDE 20 MG/1
20 CAPSULE, DELAYED RELEASE PELLETS ORAL
Qty: 30 | Refills: 5 | Status: DISCONTINUED | COMMUNITY
Start: 2023-01-05 | End: 2023-02-08

## 2023-02-08 RX ORDER — PROMETHAZINE HYDROCHLORIDE AND DEXTROMETHORPHAN HYDROBROMIDE ORAL SOLUTION 15; 6.25 MG/5ML; MG/5ML
6.25-15 SOLUTION ORAL
Qty: 120 | Refills: 0 | Status: DISCONTINUED | COMMUNITY
Start: 2023-01-03 | End: 2023-02-08

## 2023-02-08 NOTE — HISTORY OF PRESENT ILLNESS
[FreeTextEntry1] : Mauro underwent recent neurologic evaluation because of memory loss, unsteadiness, and was found to have weakly positive transglutaminase-IgG.  Neurology requests EGD with small bowel biopsies.  He has had frequent heartburn despite taking omeprazole.  For the past couple of days, he has had generalized pruritus, started on Benadryl cream, without significant improvement.  Duodenal biopsies were negative for celiac disease at EGD (by me) January 2018.  Last colonoscopy April 2019 revealed mild sigmoid diverticulosis and hemorrhoids.

## 2023-02-08 NOTE — PHYSICAL EXAM
[No Acute Distress] : no acute distress [Well Developed] : well developed [Normal] : heart rate was normal and rhythm regular, normal S1 and S2, no murmurs [None] : no edema [Bowel Sounds] : normal bowel sounds [Abdomen Tenderness] : non-tender [Abdomen Soft] : soft [No External Hemorrhoid] : no external hemorrhoids [+3] : prostate: +3 [Inguinal Lymph Nodes Enlarged Bilaterally] : no inguinal lymphadenopathy [Normal Color / Pigmentation] : normal skin color and pigmentation [Occult Blood] : negative occult blood [FIT Test] : negative FIT test [de-identified] : somewhat cachectic [de-identified] : wears mask [de-identified] : biscalenar, bitemporal wasting [de-identified] : surgical scars [de-identified] : excoriations [de-identified] : mild memory loss

## 2023-02-08 NOTE — CONSULT LETTER
[Dear  ___] : Dear  [unfilled], [Courtesy Letter:] : I had the pleasure of seeing your patient, [unfilled], in my office today. [Please see my note below.] : Please see my note below. [Consult Closing:] : Thank you very much for allowing me to participate in the care of this patient.  If you have any questions, please do not hesitate to contact me. [Sincerely,] : Sincerely, [FreeTextEntry3] : Kevin Nix M.D.\par  [DrCarrington  ___] : Dr. SOLER

## 2023-02-08 NOTE — REASON FOR VISIT
[Consultation] : a consultation visit [Spouse] : spouse [FreeTextEntry1] : Heart burn , questionable celiac?

## 2023-02-08 NOTE — REVIEW OF SYSTEMS
9/6/2022 Ms. Swapna Cm is a 64 year old female here for PBC. She is a previous patient of Dr Mota last seen in March. She has been maintained on ursodiol. She had a Elastography with F2 disease.  Her alk phos of 180 has been stable.  Labs from PCP 7/2022 stable. She was unable to get Ocaliva due to cost.  She also has a chronically elevated AFP.  MRI done in November 2021 and June 2022 shows a hemangioma but is otherwise unremarkable. She had a hx of colon polyps and family history of colon cancer.  Due- 2023. CS [Feeling Tired] : feeling tired [Loss Of Hearing] : hearing loss [Heart Rate Is Fast] : fast heart rate [Cough] : cough [Itching] : itching [Recent Weight Loss (___ Lbs)] : recent [unfilled] ~Ulb weight loss [As Noted in HPI] : as noted in HPI [Heartburn] : heartburn [de-identified] : dry skin

## 2023-02-08 NOTE — ASSESSMENT
[FreeTextEntry1] : 1.  Weakly positive transglutaminase-IgG, chronic GERD, underweight/cachectic appearance; history of gastric erosions, with negative duodenal biopsies 2018--rule out celiac disease, smoldering esophagitis, underlying neoplasm.\par 2.  Family history of colon cancer (mother); history of colonic tubular adenomas, with diverticulosis at last colonoscopy April 2019.  Stool guaiac negative with negative fecal immunochemical test on today's exam.\par 3.  Type 2 diabetes mellitus with nephropathy.\par 4.  History of oral cancer 2004, status post surgery and radiation.\par 5.  History of tobacco use.\par 6.  Memory loss, unsteadiness of unclear cause.\par 7.  Hypertension.\par 8.  Hyperlipidemia.\par 9.  Recent pruritus of unclear cause--normal liver chemistries noted last month.\par 10.  Status post COVID-19 December 2022.\par 11. Status post cholecystectomy.\par \par Plan:\par 1.  Interim medical records reviewed.\par 2.  Agree with need for repeat EGD--hold diabetes medicine on the morning of the exam.  Procedure, rationale,  material risks, and anesthesia plan were reviewed and brochure given.\par 3.  Follow-up with PMD regarding recent pruritus.\par 4.  No plans for repeat colonoscopy, as I suspect that the potential risks outweigh the benefits at this point.

## 2023-02-10 LAB — SARS-COV-2 N GENE NPH QL NAA+PROBE: NOT DETECTED

## 2023-02-13 ENCOUNTER — APPOINTMENT (OUTPATIENT)
Dept: GASTROENTEROLOGY | Facility: CLINIC | Age: 73
End: 2023-02-13
Payer: MEDICARE

## 2023-02-13 ENCOUNTER — LABORATORY RESULT (OUTPATIENT)
Age: 73
End: 2023-02-13

## 2023-02-13 PROCEDURE — 43239 EGD BIOPSY SINGLE/MULTIPLE: CPT

## 2023-02-15 ENCOUNTER — NON-APPOINTMENT (OUTPATIENT)
Age: 73
End: 2023-02-15

## 2023-02-17 ENCOUNTER — NON-APPOINTMENT (OUTPATIENT)
Age: 73
End: 2023-02-17

## 2023-03-22 ENCOUNTER — NON-APPOINTMENT (OUTPATIENT)
Age: 73
End: 2023-03-22

## 2023-03-22 ENCOUNTER — APPOINTMENT (OUTPATIENT)
Dept: OPHTHALMOLOGY | Facility: CLINIC | Age: 73
End: 2023-03-22
Payer: MEDICARE

## 2023-03-22 PROCEDURE — 92012 INTRM OPH EXAM EST PATIENT: CPT

## 2023-03-22 PROCEDURE — 92134 CPTRZ OPH DX IMG PST SGM RTA: CPT

## 2023-03-23 ENCOUNTER — APPOINTMENT (OUTPATIENT)
Dept: INTERNAL MEDICINE | Facility: CLINIC | Age: 73
End: 2023-03-23
Payer: MEDICARE

## 2023-03-23 VITALS
TEMPERATURE: 97.8 F | WEIGHT: 114 LBS | SYSTOLIC BLOOD PRESSURE: 113 MMHG | HEART RATE: 87 BPM | DIASTOLIC BLOOD PRESSURE: 71 MMHG | OXYGEN SATURATION: 98 % | BODY MASS INDEX: 19.46 KG/M2 | HEIGHT: 64 IN

## 2023-03-23 PROCEDURE — 99214 OFFICE O/P EST MOD 30 MIN: CPT | Mod: 25

## 2023-03-23 RX ORDER — METFORMIN HYDROCHLORIDE 500 MG/1
500 TABLET, FILM COATED, EXTENDED RELEASE ORAL
Qty: 180 | Refills: 0 | Status: COMPLETED | COMMUNITY
Start: 2023-02-22 | End: 2023-03-23

## 2023-03-23 NOTE — HISTORY OF PRESENT ILLNESS
[Spouse] : spouse [de-identified] : Patient is a 72 year y/o male with a history of diabetes, arthritis, GERD, anemia, BPH, HTN, HLD, Mouth cancer in 2004 (now resolved), with complaints of joint and back pain, and memory loss.\par Seen today for f/u on ch issues \par \par skin itching - saw allergy sp advised to take zyrtec \par \par c/o Difficulty walking- since 7/2022 \par - was podiatrist -was told to get power wheel chair - he gave prescription for motarised wheel chair - but insurance did not cover \par -no bowel or bladder incontinence - requesting motorized wheelchair rx\par \par c/o pain in left thumb base pain and wrist pain > rt wrist , cannot open bottles - saw ortho recommended PT dx arthritis \par -left shoulder pain - knee pain -saw rheum 3/2022 \par - finished PT - pain better flares on and off - uses local pain cream -Voltaren gel \par \par h/o DM x 10 yrs \par -saw Endo - AIC 7.2 1/2023 \par - his metformin was changed to 1000 am 500 PN QD , - continue Tradjenta 5mg daily \par -saw eye doctor -11/9/22 - stable \par - retina specialist in past - did eye 1/3rd ? hole in retina ,s/p lazer rx - cataract rt eye was advising sx \par -on statin 20 mg simvastatin , on arb losartan 50 \par - saw podiatrist Dx with feet neuropathy recommended diabetic shoes \par \par oral cancer 2004 rx with sx and chemo , followed by ENT yearly stable \par - weight loss - no appetite - was on glucerna last year needs refill - lost weight since last visit cannot tolerate spicy food - weight was stable while on glucerna off since last visit 3/2022 \par \par GERD- still acting up - saw gastro did CT abd /pelvis/ chest 7/2020-reviewed with pt \par -saw gastro- repeated EGd neg mild gastritis and colonoscope 4/2019 neg - due 7 yrs \par -on omeprazole 40 ch twice daily , slight better \par \par right knee pain - ongoing \par - swelling saw ortho 6/2020 did arthrocentesis- did steroid shot \par - dx as arthritis - pseudogout - finished therapy 2 x week - walking with rollator \par \par HTN- on HCTZ 25 , losartan 50 , home readings good- 120/70 \par - no cp or dizzy spells \par \par c/o lower back and b/l knee pain seeing ortho got B/l gel injections \par - feels slight better \par lower back pain - needs rx for brace - feels pain \par \par Anemia- taking Glucerna - weight loss still on going as per pt needs rx \par \par saw cardio did EKG- did stress test 11/2021 results pending has appt with Dr roman for results \par - Did colonoscope 4/17/19 due repeat 7 yrs \par  \par

## 2023-03-23 NOTE — ASSESSMENT
[FreeTextEntry1] : difficulty walking - 7/2022 - gait and stiffness rigidity r/o Parkinsons - no tremors saw -neurology 1/5/23 - recommended PT - doing PT not helping  \par \par DM \par - Hgbaic 7.2 1/2023 - get AIC \par -- Controlled, now recent dose changed 10/28/22 Metformin 500 pm and 1000 am and Trajanta 5 qd \par - Monitor Accu-Cheks daily,fasting and post prandial 2 hrs, ophthalmology followup, podiatry follow up. Monitor for signs of hypoglycemia. 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 6/2020\par -avoid squatting , kneeling and twisting\par - cont PT - walking with rollator \par - letter for access a ride given \par \par GERD- saw GASTRO did CT chest / abd pelvis 7/2020 reviewed - lesion sclerotic left thigh unchanged from 2006 \par -on omeprazole 40 po daily 30 minutes prior to breakfast 1 month trial \par -Educated patient lifestyle modification, advice to avoid fried food, greasy oily and spicy foods, avoid tomato, orange, lemon , or caffeinated beverages.\par -Avoid reclining upto 3 hours after meals\par -Followup gastro \par \par weight loss- underweight -pt lost weight has not been approved for glucerna - as not approved - last visit 3/2022 - restart rx glucerna- to see nutritionist \par \par anemia- resolved \par - colonoscope 4/2019 and EGD - neg \par -oral cancer 2004 rx with sx and chemo , followed by ENT yearly \par - change PPI to omeprazole 40 daily from BID dosing \par \par lower back pain / radiculopathy \par - advised to avoid pulling pushing , lifting , carrying weights , bending etc \par - do warm compresses \par -back brace given \par - side effects reviewed \par - rtc if no improvement\par \par Osteoporosis - 2019 - followed by endo dexa 12/2020 - osteopenia \par - cont Calcium 500-600 mg daily \par -cont vitamin d 1000 units daily \par - do weight bearing exercises: walking with weights, stair climbing , weight training , jogging, aerobics etc \par -repeat test 2 yrs for a f/u\par \par hypertension 110/60\par -stable ( - discontinue HCTZ 10/2020 ) losartan to 50 mg daily \par - monitor Bp at home \par \par BPH -\par -tamsulosin 0.4 mg po qhs \par - increase frequency- d/c HCTZ - f/u urologist referral placed\par \par HCM \par Prevnar 13 given 2017\par pneumococcal 23 -2015\par colonoscope- 4/17/19 due 7-10 yrs \par flu vaccine given 1010/22 at [facility] \par hep c-neg \par shringrex 5/2019 - second dose 8/2019. complete. \par Moderna 1/25/21, 2/22/21 , booster Moderna 10/13/21 , 6/2022 , 10/10/22 \par \par HCP forms scanned to chart. \par  \par \par

## 2023-03-24 LAB
ALBUMIN SERPL ELPH-MCNC: 4.1 G/DL
ALP BLD-CCNC: 65 U/L
ALT SERPL-CCNC: 19 U/L
ANION GAP SERPL CALC-SCNC: 11 MMOL/L
AST SERPL-CCNC: 27 U/L
BILIRUB SERPL-MCNC: 0.2 MG/DL
BUN SERPL-MCNC: 14 MG/DL
CALCIUM SERPL-MCNC: 9.5 MG/DL
CHLORIDE SERPL-SCNC: 101 MMOL/L
CHOLEST SERPL-MCNC: 142 MG/DL
CO2 SERPL-SCNC: 28 MMOL/L
CREAT SERPL-MCNC: 0.82 MG/DL
EGFR: 93 ML/MIN/1.73M2
ESTIMATED AVERAGE GLUCOSE: 143 MG/DL
GLUCOSE SERPL-MCNC: 133 MG/DL
HBA1C MFR BLD HPLC: 6.6 %
HDLC SERPL-MCNC: 70 MG/DL
LDLC SERPL CALC-MCNC: 61 MG/DL
NONHDLC SERPL-MCNC: 71 MG/DL
POTASSIUM SERPL-SCNC: 4.8 MMOL/L
PROT SERPL-MCNC: 5.8 G/DL
SODIUM SERPL-SCNC: 140 MMOL/L
TRIGL SERPL-MCNC: 55 MG/DL

## 2023-03-24 RX ORDER — METFORMIN HYDROCHLORIDE 1000 MG/1
1000 TABLET, FILM COATED, EXTENDED RELEASE ORAL
Qty: 180 | Refills: 1 | Status: COMPLETED | COMMUNITY
Start: 2021-05-10 | End: 2023-03-24

## 2023-03-27 ENCOUNTER — APPOINTMENT (OUTPATIENT)
Dept: ENDOCRINOLOGY | Facility: CLINIC | Age: 73
End: 2023-03-27

## 2023-03-27 ENCOUNTER — APPOINTMENT (OUTPATIENT)
Dept: ENDOCRINOLOGY | Facility: CLINIC | Age: 73
End: 2023-03-27
Payer: MEDICARE

## 2023-03-27 VITALS
BODY MASS INDEX: 19.46 KG/M2 | HEART RATE: 81 BPM | DIASTOLIC BLOOD PRESSURE: 70 MMHG | OXYGEN SATURATION: 97 % | HEIGHT: 64 IN | WEIGHT: 114 LBS | SYSTOLIC BLOOD PRESSURE: 107 MMHG

## 2023-03-27 PROCEDURE — 99214 OFFICE O/P EST MOD 30 MIN: CPT

## 2023-03-27 NOTE — PHYSICAL EXAM
[Alert] : alert [Well Nourished] : well nourished [Healthy Appearance] : healthy appearance [No Acute Distress] : no acute distress [Normal Sclera/Conjunctiva] : normal sclera/conjunctiva [Normal Hearing] : hearing was normal [No Neck Mass] : no neck mass was observed [No Respiratory Distress] : no respiratory distress [No Accessory Muscle Use] : no accessory muscle use [Clear to Auscultation] : lungs were clear to auscultation bilaterally [Normal S1, S2] : normal S1 and S2 [Normal Rate] : heart rate was normal [Regular Rhythm] : with a regular rhythm [No Edema] : no peripheral edema [Normal Bowel Sounds] : normal bowel sounds [Not Tender] : non-tender [Soft] : abdomen soft [No CVA Tenderness] : no ~M costovertebral angle tenderness [Normal Gait] : normal gait [No Involuntary Movements] : no involuntary movements were seen [Normal] : normal [Full ROM] : with full range of motion [No Tremors] : no tremors [Oriented x3] : oriented to person, place, and time [Normal Affect] : the affect was normal [Diminished Throughout Both Feet] : normal tactile sensation with monofilament testing throughout both feet

## 2023-03-27 NOTE — HISTORY OF PRESENT ILLNESS
[Calcium (dietary)] : dietary Calcium [Diabetes] : a history of diabetes [FreeTextEntry1] : This is a 72  y.o. man w/ DM2, HTN, LBP/ joint pain, anemia, oral cancer 2004 rx with sx and chemo, GERD, presents for follow up for type 2 DM and GERD. \par \par Here for follow up last seen 3/23/22, one year ago, by dr. Harvey with CDE visit in interim. \par Wife is present at visit, expresses concern for not seeing Dr. Harvey today for her appointment. I offered to see the patient, who has had one visit with Dr. Mcclendon, and she states she would like to discuss with Dr. Harvey in regards to her osteoporosis. \par \par 1. DM\par DM2: He has ah/o DM x 10 yrs.\par His A1c was 6.6 10/2020.--->7.0 (may 2021)--->7.0 oct 2021--->7.2022--> A1c 6.6% 3/24/23\par \par Medications:\par He is on metformin 750 mg twice daily , Tradjenta 5 mg daily.\par \par not watching diet\par \par Glucose Readings:\par No glucose meter\par Glucose AM fastin, 120s. \par After dinner: 146\par He walks for 30 min most days.\par \par HCM\par simvastatin \par losartan \par he is on b12 \par -2022\par - retina specialist in past - did eye  ? hole in retina ,s/p niltoner rx - cataract rt eye was advising sx , and also s/p left eye cataract surgery \par - saw podiatrist Dx with feet neuropathy recommended diabetic shoes \par \par #bone health \par Also w/ h/o osteo. DEXA 2019 showed osteoporosis with T score -3 in L-spine.\par than on repeat his DXA showed :\par dec 2020\par spine -2.3\par fem neck -1.0\par total hip -0.3\par \par He takes Ca 600 daily and a MVI. and vitamin D 1000 (decrease from )\par + acid reflux , on Omeprazole. \par his frax score is major frax 5.8 and hip fracture 1.8% (reviewed and calculated)\par He saw dentist last in \par He has never had a broken bone. \par No prolonged steroid use.\par \par Interval History:\par Some urology issues, on Flomax. \par Had some weight loss. Dr. Nix did EGD, normal. He had a colonscopy previously, he is not due. \par On Glucerna, gained some weight after starting Glucerna.

## 2023-03-27 NOTE — ASSESSMENT
[Diabetes Foot Care] : diabetes foot care [Long Term Vascular Complications] : long term vascular complications of diabetes [Carbohydrate Consistent Diet] : carbohydrate consistent diet [Importance of Diet and Exercise] : importance of diet and exercise to improve glycemic control, achieve weight loss and improve cardiovascular health [Exercise/Effect on Glucose] : exercise/effect on glucose [Self Monitoring of Blood Glucose] : self monitoring of blood glucose [Retinopathy Screening] : Patient was referred to ophthalmology for retinopathy screening [FreeTextEntry1] : This is a 72  y.o. man w/ DM2, HTN, LBP/ joint pain, anemia, oral cancer 2004 rx with sx and chemo, GERD,\par \par visit for DM2 and osteopenia \par \par 1. DM\par DM2: He has a h/o DM x 10 yrs.\par His A1c was 6.6 ---7.0 may 2021-->7.0 oct 2021--->7.1 march 2022--> A1c 6.6% 3/24/23\par Tradjenta 5mg daily and  metformin 750 mg BID\par UTD optho and podiatry. \par \par \par HTN: goal BP less than 130/80 monitor at home \par mc/cr negatuve in 2021\par \par HLD: C/W statin\par \par Osteopenia:\par DEXA 6/2019 showed osteoporosis with T score -3 in L-spine. \par than 12/2020- dxa with osteopenia \par treatment held off at the time - (plan was reclast) see dr. guerrero notes in jan 2021\par  600mg via supplement and 600mg via diet  and a MVI and Vit D 1000u PO daily. (repeat Vitamin  D level, since the last level was 70s (we asked him to decrease from 2000)\par \par frax does not qualify for treatment at this time \par have d/w pt and wife at length in previous visits\par can repeat +DXA in dec 2022\par d.w pt to repeat vitamin D levels at pcp visit with his labs \par \par pt also should followup with PCP regarding ongoing medical care and followup of medical issues/concerns and exam\par \par

## 2023-04-14 ENCOUNTER — APPOINTMENT (OUTPATIENT)
Dept: UROLOGY | Facility: CLINIC | Age: 73
End: 2023-04-14
Payer: MEDICARE

## 2023-04-14 PROCEDURE — 51741 ELECTRO-UROFLOWMETRY FIRST: CPT

## 2023-04-14 PROCEDURE — 99213 OFFICE O/P EST LOW 20 MIN: CPT

## 2023-04-14 PROCEDURE — 51798 US URINE CAPACITY MEASURE: CPT

## 2023-04-14 NOTE — HISTORY OF PRESENT ILLNESS
[FreeTextEntry1] : Seen by Marty and Nichelle in e past for LUTs.\par  on tamsulosin:  4-5 day and nocturia 3-4 times.  - he tends to double void and he FOS slow, especially on\par no dysuria or hematuria. \par \par

## 2023-04-16 LAB
APPEARANCE: CLEAR
BACTERIA UR CULT: NORMAL
BACTERIA: NEGATIVE
BILIRUBIN URINE: NEGATIVE
BLOOD URINE: NEGATIVE
COLOR: NORMAL
GLUCOSE QUALITATIVE U: NEGATIVE
HYALINE CASTS: 0 /LPF
KETONES URINE: NEGATIVE
LEUKOCYTE ESTERASE URINE: NEGATIVE
MICROSCOPIC-UA: NORMAL
NITRITE URINE: NEGATIVE
PH URINE: 6.5
PROTEIN URINE: NEGATIVE
PSA SERPL-MCNC: 0.73 NG/ML
RED BLOOD CELLS URINE: 0 /HPF
SPECIFIC GRAVITY URINE: 1.01
SQUAMOUS EPITHELIAL CELLS: 0 /HPF
UROBILINOGEN URINE: NORMAL
WHITE BLOOD CELLS URINE: 0 /HPF

## 2023-04-18 ENCOUNTER — APPOINTMENT (OUTPATIENT)
Dept: NEUROLOGY | Facility: CLINIC | Age: 73
End: 2023-04-18

## 2023-04-19 ENCOUNTER — APPOINTMENT (OUTPATIENT)
Dept: NEUROLOGY | Facility: CLINIC | Age: 73
End: 2023-04-19

## 2023-04-25 ENCOUNTER — APPOINTMENT (OUTPATIENT)
Dept: OTOLARYNGOLOGY | Facility: CLINIC | Age: 73
End: 2023-04-25

## 2023-05-19 ENCOUNTER — APPOINTMENT (OUTPATIENT)
Dept: NEUROLOGY | Facility: CLINIC | Age: 73
End: 2023-05-19

## 2023-06-01 ENCOUNTER — NON-APPOINTMENT (OUTPATIENT)
Age: 73
End: 2023-06-01

## 2023-06-09 ENCOUNTER — APPOINTMENT (OUTPATIENT)
Dept: INTERNAL MEDICINE | Facility: CLINIC | Age: 73
End: 2023-06-09
Payer: MEDICARE

## 2023-06-09 VITALS
WEIGHT: 110 LBS | TEMPERATURE: 98.1 F | RESPIRATION RATE: 16 BRPM | OXYGEN SATURATION: 98 % | DIASTOLIC BLOOD PRESSURE: 67 MMHG | SYSTOLIC BLOOD PRESSURE: 124 MMHG | HEART RATE: 79 BPM | BODY MASS INDEX: 18.78 KG/M2 | HEIGHT: 64 IN

## 2023-06-09 DIAGNOSIS — D64.9 ANEMIA, UNSPECIFIED: ICD-10-CM

## 2023-06-09 PROCEDURE — 99214 OFFICE O/P EST MOD 30 MIN: CPT

## 2023-06-09 RX ORDER — TAMSULOSIN HYDROCHLORIDE 0.4 MG/1
0.4 CAPSULE ORAL
Qty: 90 | Refills: 3 | Status: COMPLETED | COMMUNITY
Start: 2020-01-29 | End: 2023-06-09

## 2023-06-09 NOTE — HISTORY OF PRESENT ILLNESS
[Spouse] : spouse [de-identified] : Patient is a 73 year y/o male with a history of diabetes, arthritis, GERD, anemia, BPH, HTN, HLD, Mouth cancer in 2004 (now resolved), with complaints of joint and back pain, and memory loss.\par Seen today for f/u on ch issues \par \par c/l leg bumps on feet and hands -1 weeks ago - + itching - use OTC Cepholol - no help - resolved spontaneously \par \par h/o  Difficulty walking- since 7/2022 \par - was podiatrist -was told to get power wheel chair - he gave prescription for motarised wheel chair - but insurance did not cover \par -no bowel or bladder incontinence - requesting motorized wheelchair rx\par \par c/o pain in left thumb base pain and wrist pain > rt wrist , cannot open bottles - saw ortho recommended PT dx arthritis \par -left shoulder pain - knee pain -saw rheum 3/2022 \par - finished PT - pain better flares on and off - uses local pain cream -Voltaren gel \par \par h/o DM x 10 yrs \par -saw Endo - AIC 7.2 1/2023 \par - his metformin was changed to 1000 am 500 PN QD , - continue Tradjenta 5mg daily \par -saw eye doctor -11/9/22 - stable \par - retina specialist in past - did eye 1/3rd ? hole in retina ,s/p lazer rx - cataract rt eye was advising sx \par -on statin 20 mg simvastatin , on arb losartan 50 \par - saw podiatrist Dx with feet neuropathy recommended diabetic shoes \par \par oral cancer 2004 rx with sx and chemo , followed by ENT yearly stable \par - weight loss - no appetite - was on glucerna last year needs refill - lost weight since last visit cannot tolerate spicy food - weight was stable while on glucerna off since last visit 3/2022 \par \par GERD- still acting up - saw gastro did CT abd /pelvis/ chest 7/2020-reviewed with pt \par -saw gastro- repeated EGd neg mild gastritis and colonoscope 4/2019 neg - due 7 yrs \par -on omeprazole 40 ch twice daily , slight better \par \par right knee pain - ongoing \par - swelling saw ortho 6/2020 did arthrocentesis- did steroid shot \par - dx as arthritis - pseudogout - finished therapy 2 x week - walking with rollator \par \par HTN- on HCTZ 25 , losartan 50 , home readings good- 120/70 \par - no cp or dizzy spells \par \par c/o lower back and b/l knee pain seeing ortho got B/l gel injections \par - feels slight better \par lower back pain - needs rx for brace - feels pain \par \par Anemia- taking Glucerna - weight loss still on going as per pt needs rx \par \par saw cardio did EKG- did stress test 11/2021 results pending has appt with Dr roman for results \par - Did colonoscope 4/17/19 due repeat 7 yrs \par \par saw urologist 4/2023 ok BPH on tamsulocin 0.4 BID \par

## 2023-06-09 NOTE — ASSESSMENT
[FreeTextEntry1] : difficulty walking - 7/2022 - gait and stiffness rigidity r/o Parkinsons - no tremors saw -neurology 1/5/23 - recommended PT - doing PT not helping \par \par DM \par - Hgbaic-6.6 3/2023 \par -- Controlled, now recent dose changed 10/28/22 Metformin 500 pm and 1000 am and Trajanta 5 qd \par - Monitor Accu-Cheks daily,fasting and post prandial 2 hrs, ophthalmology followup, podiatry follow up. Monitor for signs of hypoglycemia. 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 6/2020\par -avoid squatting , kneeling and twisting\par - cont PT - walking with rollator \par - letter for access a ride given \par \par GERD- saw GASTRO did CT chest / abd pelvis 7/2020 reviewed - lesion sclerotic left thigh unchanged from 2006 \par -on omeprazole 40 po daily 30 minutes prior to breakfast 1 month trial \par -Educated patient lifestyle modification, advice to avoid fried food, greasy oily and spicy foods, avoid tomato, orange, lemon , or caffeinated beverages.\par -Avoid reclining upto 3 hours after meals\par -Followup gastro \par \par weight loss- underweight -pt lost weight has not been approved for glucerna - as not approved - last visit 3/2022 - restart rx glucerna- to see nutritionist \par \par anemia- resolved \par - colonoscope 4/2019 and EGD - neg \par -oral cancer 2004 rx with sx and chemo , followed by ENT yearly \par - change PPI to omeprazole 40 daily from BID dosing \par \par lower back pain / radiculopathy \par - advised to avoid pulling pushing , lifting , carrying weights , bending etc \par - do warm compresses \par -back brace given \par - side effects reviewed \par - rtc if no improvement\par \par Osteoporosis - 2019 - followed by endo dexa 12/2020 - osteopenia \par - cont Calcium 500-600 mg daily \par -cont vitamin d 1000 units daily \par - do weight bearing exercises: walking with weights, stair climbing , weight training , jogging, aerobics etc \par -repeat test 2 yrs for a f/u\par \par hypertension 110/60\par -stable ( - discontinue HCTZ 10/2020 ) losartan to 50 mg daily \par - monitor Bp at home \par \par BPH -saw 4/2023 \par -tamsulosin 0.4 mg po BID \par - increase frequency- d/c HCTZ - f/u urologist referral placed\par \par HCM \par Prevnar 13 given 2017\par pneumococcal 23 -2015\par colonoscope- 4/17/19 due 7-10 yrs \par flu vaccine given 1010/22 at [facility] \par hep c-neg \par shringrex 5/2019 - second dose 8/2019. complete. \par Moderna 1/25/21, 2/22/21 , booster Moderna 10/13/21 , 6/2022 , 10/10/22 \par \par HCP forms scanned to chart. \par

## 2023-06-13 DIAGNOSIS — L21.0 SEBORRHEA CAPITIS: ICD-10-CM

## 2023-06-13 LAB
ALBUMIN SERPL ELPH-MCNC: 3.8 G/DL
ALP BLD-CCNC: 66 U/L
ALT SERPL-CCNC: 18 U/L
ANION GAP SERPL CALC-SCNC: 10 MMOL/L
AST SERPL-CCNC: 24 U/L
BILIRUB SERPL-MCNC: 0.3 MG/DL
BUN SERPL-MCNC: 11 MG/DL
CALCIUM SERPL-MCNC: 8.8 MG/DL
CHLORIDE SERPL-SCNC: 104 MMOL/L
CHOLEST SERPL-MCNC: 120 MG/DL
CO2 SERPL-SCNC: 27 MMOL/L
CREAT SERPL-MCNC: 0.89 MG/DL
CREAT SPEC-SCNC: 63 MG/DL
EGFR: 90 ML/MIN/1.73M2
ESTIMATED AVERAGE GLUCOSE: 154 MG/DL
GLUCOSE SERPL-MCNC: 151 MG/DL
HBA1C MFR BLD HPLC: 7 %
HDLC SERPL-MCNC: 60 MG/DL
LDLC SERPL CALC-MCNC: 48 MG/DL
MICROALBUMIN 24H UR DL<=1MG/L-MCNC: <1.2 MG/DL
MICROALBUMIN/CREAT 24H UR-RTO: NORMAL MG/G
NONHDLC SERPL-MCNC: 60 MG/DL
POTASSIUM SERPL-SCNC: 4.2 MMOL/L
PROT SERPL-MCNC: 5.4 G/DL
SODIUM SERPL-SCNC: 140 MMOL/L
TRIGL SERPL-MCNC: 62 MG/DL
TSH SERPL-ACNC: 3.12 UIU/ML

## 2023-06-13 RX ORDER — KETOCONAZOLE 20.5 MG/ML
2 SHAMPOO, SUSPENSION TOPICAL
Qty: 1 | Refills: 1 | Status: ACTIVE | COMMUNITY
Start: 2023-06-13 | End: 1900-01-01

## 2023-07-14 ENCOUNTER — APPOINTMENT (OUTPATIENT)
Dept: UROLOGY | Facility: CLINIC | Age: 73
End: 2023-07-14

## 2023-07-25 ENCOUNTER — APPOINTMENT (OUTPATIENT)
Dept: OTOLARYNGOLOGY | Facility: CLINIC | Age: 73
End: 2023-07-25
Payer: MEDICARE

## 2023-07-25 VITALS
BODY MASS INDEX: 18.78 KG/M2 | HEIGHT: 64 IN | WEIGHT: 110 LBS | HEART RATE: 73 BPM | DIASTOLIC BLOOD PRESSURE: 73 MMHG | SYSTOLIC BLOOD PRESSURE: 146 MMHG

## 2023-07-25 DIAGNOSIS — Z85.819 PERSONAL HISTORY OF MALIGNANT NEOPLASM OF UNSPECIFIED SITE OF LIP, ORAL CAVITY, AND PHARYNX: ICD-10-CM

## 2023-07-25 PROCEDURE — 99213 OFFICE O/P EST LOW 20 MIN: CPT | Mod: 25

## 2023-07-25 PROCEDURE — 31575 DIAGNOSTIC LARYNGOSCOPY: CPT

## 2023-07-25 NOTE — PHYSICAL EXAM
[de-identified] : Posttreatment changes.  No LAD.  There is some fullness overlying the right parotid soft tissues, no TTP. [Laryngoscopy Performed] : laryngoscopy was performed, see procedure section for findings [FreeTextEntry1] : Posttreatment changes, the mandible is quite atrophic, there is an area of thickened mucosa along the left buccal area, well defined, no ulceration, no TTP, no bleeding.  The area of irritation along the right maxillary alveolus is an area where there may be a retained molar or its root?  His molar along the right mandible is somewhat mobile. [Normal] : no rashes

## 2023-07-25 NOTE — PROCEDURE
[Trismus] : trismus preventing mirror examination [None] : none [Flexible Endoscope] : examined with the flexible endoscope [Serial Number: ___] : Serial Number: [unfilled] [de-identified] : No lesions in the NPx, OPx, HPx or larynx.  Expected posttreatment changes, VC are mobile, airway patent.\par

## 2023-07-25 NOTE — HISTORY OF PRESENT ILLNESS
[de-identified] : 73 year old male presents for left cheek swelling\par Hx of oral cancer s/p composite resection and reconstruction using RFFF and FFF followed by RT in 2004. History of biopsy of mandibular gingiva on 03/27/2020 showing no dysplasia. Last CT chest/neck 07/25/2020\par Has had left sided mouth pain since yesterday - went to dentist who wants tooth #32 removed \par Left cheek swelling - no pain on cheek, only around tooth \par Pain with chewing \par Denies dysphagia, odynophagia, aspirations, dyspnea, dysphonia

## 2023-07-31 ENCOUNTER — APPOINTMENT (OUTPATIENT)
Dept: ENDOCRINOLOGY | Facility: CLINIC | Age: 73
End: 2023-07-31

## 2023-08-08 ENCOUNTER — TRANSCRIPTION ENCOUNTER (OUTPATIENT)
Age: 73
End: 2023-08-08

## 2023-09-11 ENCOUNTER — APPOINTMENT (OUTPATIENT)
Age: 73
End: 2023-09-11
Payer: COMMERCIAL

## 2023-09-11 PROCEDURE — D7140: CPT

## 2023-09-11 PROCEDURE — D7251: CPT

## 2023-09-18 ENCOUNTER — APPOINTMENT (OUTPATIENT)
Dept: INTERNAL MEDICINE | Facility: CLINIC | Age: 73
End: 2023-09-18
Payer: MEDICARE

## 2023-09-18 ENCOUNTER — APPOINTMENT (OUTPATIENT)
Age: 73
End: 2023-09-18
Payer: COMMERCIAL

## 2023-09-18 VITALS
HEART RATE: 75 BPM | RESPIRATION RATE: 16 BRPM | OXYGEN SATURATION: 98 % | WEIGHT: 110 LBS | DIASTOLIC BLOOD PRESSURE: 88 MMHG | BODY MASS INDEX: 18.78 KG/M2 | SYSTOLIC BLOOD PRESSURE: 128 MMHG | HEIGHT: 64 IN

## 2023-09-18 DIAGNOSIS — F03.90 UNSPECIFIED DEMENTIA W/OUT BEHAVIORAL DISTURBANCE: ICD-10-CM

## 2023-09-18 DIAGNOSIS — N13.8 BENIGN PROSTATIC HYPERPLASIA WITH LOWER URINARY TRACT SYMPMS: ICD-10-CM

## 2023-09-18 DIAGNOSIS — N40.1 BENIGN PROSTATIC HYPERPLASIA WITH LOWER URINARY TRACT SYMPMS: ICD-10-CM

## 2023-09-18 PROCEDURE — 99214 OFFICE O/P EST MOD 30 MIN: CPT | Mod: 25

## 2023-09-18 PROCEDURE — D0171: CPT

## 2023-09-19 LAB
ALBUMIN SERPL ELPH-MCNC: 4 G/DL
ANION GAP SERPL CALC-SCNC: 11 MMOL/L
BUN SERPL-MCNC: 16 MG/DL
CALCIUM SERPL-MCNC: 9.5 MG/DL
CHLORIDE SERPL-SCNC: 103 MMOL/L
CO2 SERPL-SCNC: 27 MMOL/L
CREAT SERPL-MCNC: 0.95 MG/DL
EGFR: 84 ML/MIN/1.73M2
ESTIMATED AVERAGE GLUCOSE: 166 MG/DL
GLUCOSE SERPL-MCNC: 132 MG/DL
HBA1C MFR BLD HPLC: 7.4 %
PHOSPHATE SERPL-MCNC: 3.2 MG/DL
POTASSIUM SERPL-SCNC: 5.1 MMOL/L
SODIUM SERPL-SCNC: 141 MMOL/L

## 2023-09-21 ENCOUNTER — OUTPATIENT (OUTPATIENT)
Dept: OUTPATIENT SERVICES | Facility: HOSPITAL | Age: 73
LOS: 1 days | End: 2023-09-21
Payer: COMMERCIAL

## 2023-09-21 ENCOUNTER — APPOINTMENT (OUTPATIENT)
Dept: RADIOLOGY | Facility: IMAGING CENTER | Age: 73
End: 2023-09-21
Payer: MEDICARE

## 2023-09-21 ENCOUNTER — APPOINTMENT (OUTPATIENT)
Age: 73
End: 2023-09-21

## 2023-09-21 DIAGNOSIS — Z98.49 CATARACT EXTRACTION STATUS, UNSPECIFIED EYE: Chronic | ICD-10-CM

## 2023-09-21 DIAGNOSIS — M85.80 OTHER SPECIFIED DISORDERS OF BONE DENSITY AND STRUCTURE, UNSPECIFIED SITE: ICD-10-CM

## 2023-09-21 PROCEDURE — 77080 DXA BONE DENSITY AXIAL: CPT | Mod: 26

## 2023-09-21 PROCEDURE — 77080 DXA BONE DENSITY AXIAL: CPT

## 2023-09-28 PROBLEM — Z85.819 HISTORY OF ORAL CANCER: Status: ACTIVE | Noted: 2020-07-28

## 2023-10-09 ENCOUNTER — APPOINTMENT (OUTPATIENT)
Dept: ENDOCRINOLOGY | Facility: CLINIC | Age: 73
End: 2023-10-09
Payer: MEDICARE

## 2023-10-09 VITALS
SYSTOLIC BLOOD PRESSURE: 118 MMHG | HEIGHT: 64 IN | HEART RATE: 95 BPM | WEIGHT: 114 LBS | DIASTOLIC BLOOD PRESSURE: 66 MMHG | OXYGEN SATURATION: 98 % | BODY MASS INDEX: 19.46 KG/M2

## 2023-10-09 DIAGNOSIS — M85.80 OTHER SPECIFIED DISORDERS OF BONE DENSITY AND STRUCTURE, UNSPECIFIED SITE: ICD-10-CM

## 2023-10-09 PROCEDURE — 99215 OFFICE O/P EST HI 40 MIN: CPT | Mod: 25

## 2023-10-09 RX ORDER — METFORMIN HYDROCHLORIDE 1000 MG/1
1000 TABLET, EXTENDED RELEASE ORAL
Qty: 180 | Refills: 1 | Status: ACTIVE | COMMUNITY
Start: 2023-03-24 | End: 1900-01-01

## 2023-10-10 LAB
25(OH)D3 SERPL-MCNC: 43.9 NG/ML
ALBUMIN SERPL ELPH-MCNC: 4.1 G/DL
ALP BLD-CCNC: 66 U/L
ALT SERPL-CCNC: 16 U/L
ANION GAP SERPL CALC-SCNC: 11 MMOL/L
AST SERPL-CCNC: 24 U/L
BILIRUB SERPL-MCNC: 0.3 MG/DL
BUN SERPL-MCNC: 16 MG/DL
CALCIUM SERPL-MCNC: 9.6 MG/DL
CALCIUM SERPL-MCNC: 9.6 MG/DL
CHLORIDE SERPL-SCNC: 101 MMOL/L
CO2 SERPL-SCNC: 29 MMOL/L
CREAT SERPL-MCNC: 0.93 MG/DL
EGFR: 86 ML/MIN/1.73M2
GLUCOSE SERPL-MCNC: 125 MG/DL
PARATHYROID HORMONE INTACT: 48 PG/ML
POTASSIUM SERPL-SCNC: 4.8 MMOL/L
PROT SERPL-MCNC: 5.8 G/DL
SODIUM SERPL-SCNC: 140 MMOL/L

## 2023-10-10 RX ORDER — ZOLEDRONIC ACID 5 MG/100ML
5 INJECTION INTRAVENOUS
Qty: 1 | Refills: 0 | Status: ACTIVE | COMMUNITY
Start: 2023-10-10 | End: 1900-01-01

## 2023-10-17 DIAGNOSIS — R79.89 OTHER SPECIFIED ABNORMAL FINDINGS OF BLOOD CHEMISTRY: ICD-10-CM

## 2023-10-17 LAB
TESTOST FREE SERPL-MCNC: 2.2 PG/ML
TESTOST SERPL-MCNC: 285 NG/DL

## 2023-10-17 RX ORDER — ZOLEDRONIC ACID 5 MG/100ML
5 INJECTION INTRAVENOUS
Refills: 0 | Status: COMPLETED | OUTPATIENT
Start: 2023-10-17 | End: 1900-01-01

## 2023-10-19 ENCOUNTER — APPOINTMENT (OUTPATIENT)
Age: 73
End: 2023-10-19
Payer: COMMERCIAL

## 2023-10-19 PROCEDURE — D0150: CPT

## 2023-10-19 PROCEDURE — D5730: CPT

## 2023-10-20 LAB
FSH SERPL-MCNC: 7.8 IU/L
LH SERPL-ACNC: 10.4 IU/L
TESTOST FREE SERPL-MCNC: 9.2 PG/ML
TESTOST SERPL-MCNC: 310 NG/DL

## 2023-10-26 ENCOUNTER — APPOINTMENT (OUTPATIENT)
Age: 73
End: 2023-10-26
Payer: COMMERCIAL

## 2023-10-26 PROCEDURE — 99024 POSTOP FOLLOW-UP VISIT: CPT

## 2023-10-31 ENCOUNTER — APPOINTMENT (OUTPATIENT)
Dept: RHEUMATOLOGY | Facility: CLINIC | Age: 73
End: 2023-10-31
Payer: MEDICARE

## 2023-10-31 VITALS
SYSTOLIC BLOOD PRESSURE: 109 MMHG | RESPIRATION RATE: 16 BRPM | OXYGEN SATURATION: 98 % | DIASTOLIC BLOOD PRESSURE: 62 MMHG | TEMPERATURE: 98.6 F | HEART RATE: 83 BPM

## 2023-10-31 VITALS
RESPIRATION RATE: 16 BRPM | DIASTOLIC BLOOD PRESSURE: 81 MMHG | SYSTOLIC BLOOD PRESSURE: 125 MMHG | OXYGEN SATURATION: 97 % | HEART RATE: 92 BPM

## 2023-10-31 PROCEDURE — 96374 THER/PROPH/DIAG INJ IV PUSH: CPT

## 2023-10-31 RX ORDER — ZOLEDRONIC ACID 5 MG/100ML
5 INJECTION INTRAVENOUS
Qty: 0 | Refills: 0 | Status: COMPLETED
Start: 2023-10-17

## 2023-11-01 ENCOUNTER — APPOINTMENT (OUTPATIENT)
Age: 73
End: 2023-11-01
Payer: COMMERCIAL

## 2023-11-01 PROCEDURE — D5731: CPT

## 2023-11-01 PROCEDURE — D0140: CPT | Mod: 1L

## 2023-11-06 ENCOUNTER — EMERGENCY (EMERGENCY)
Facility: HOSPITAL | Age: 73
LOS: 1 days | Discharge: HOME | End: 2023-11-06
Attending: EMERGENCY MEDICINE | Admitting: EMERGENCY MEDICINE
Payer: MEDICARE

## 2023-11-06 VITALS
HEART RATE: 85 BPM | SYSTOLIC BLOOD PRESSURE: 138 MMHG | RESPIRATION RATE: 16 BRPM | TEMPERATURE: 99 F | OXYGEN SATURATION: 100 % | DIASTOLIC BLOOD PRESSURE: 61 MMHG

## 2023-11-06 DIAGNOSIS — Z98.49 CATARACT EXTRACTION STATUS, UNSPECIFIED EYE: Chronic | ICD-10-CM

## 2023-11-06 PROCEDURE — 99285 EMERGENCY DEPT VISIT HI MDM: CPT

## 2023-11-06 PROCEDURE — 73030 X-RAY EXAM OF SHOULDER: CPT | Mod: 26,LT

## 2023-11-06 PROCEDURE — 93010 ELECTROCARDIOGRAM REPORT: CPT

## 2023-11-06 PROCEDURE — 70450 CT HEAD/BRAIN W/O DYE: CPT | Mod: 26,MA

## 2023-11-06 PROCEDURE — 71250 CT THORAX DX C-: CPT | Mod: 26,MA

## 2023-11-06 PROCEDURE — 73020 X-RAY EXAM OF SHOULDER: CPT | Mod: 26,LT,XE

## 2023-11-06 RX ORDER — LIDOCAINE 4 G/100G
1 CREAM TOPICAL ONCE
Refills: 0 | Status: COMPLETED | OUTPATIENT
Start: 2023-11-06 | End: 2023-11-06

## 2023-11-06 RX ORDER — ACETAMINOPHEN 500 MG
975 TABLET ORAL ONCE
Refills: 0 | Status: COMPLETED | OUTPATIENT
Start: 2023-11-06 | End: 2023-11-06

## 2023-11-06 RX ADMIN — Medication 975 MILLIGRAM(S): at 11:01

## 2023-11-06 RX ADMIN — LIDOCAINE 1 PATCH: 4 CREAM TOPICAL at 11:01

## 2023-11-06 NOTE — ED PROVIDER NOTE - PROGRESS NOTE DETAILS
CT imaging/x-ray negative for acute fractures.  Patient had some improvement with his pain after medications.  Advised to continue taking Tylenol, Motrin, lidocaine patch and follow-up with PCP. CT imaging/x-ray negative for acute fractures.  Patient had some improvement with his pain after medications.  Advised to continue taking Tylenol, Motrin, lidocaine patch and follow-up with PCP. ROM of left shoulder improved. I explained results in detail with both patient and wife. Incentive spirometer given. Asked to follow up with pcp within 1 week. Return precautions given.

## 2023-11-06 NOTE — ED PROVIDER NOTE - PHYSICAL EXAMINATION
Chest wall: focal right chest wall tenderness to lower ribs, no skin changes  Spine:   No midline C–T–L-spine tenderness, no step-offs, no ecchymosis, no paraspinal tenderness  Left shoulder: Tender to palpation on anterior shoulder and upper humerus, range of motion limited by pain but patient can elevate arm greater than 90 degrees with some discomfort,  strength is 5/5 bilaterally, sensation intact, no TTP to bilateral scapula/trapezius muscles  Pelvis:  stable, no  TTP to bilateral hips, full range of motion bilaterally  Knees: No ecchymosis, swelling, tenderness, full range of motion bilaterally

## 2023-11-06 NOTE — ED PROVIDER NOTE - CLINICAL SUMMARY MEDICAL DECISION MAKING FREE TEXT BOX
Odalis MONCADA:  73-year-old male with history as documented above presenting with fall while sleeping 4 days ago.  Patient was in a chair and tipped over and fell.  He reports pain to the right side of his chest.  Pain has become worse since yesterday.  He also has pain to his left shoulder with limited range of motion secondary to pain.  He is on aspirin 81 mg daily.  On exam, he has reproducible tenderness to the right chest wall and tenderness to his left shoulder with limited range of motion secondary to pain.  There is no obvious deformity.  Differential includes rib fractures, shoulder injury. Plan for CT chest, CT head 2/2 aspirin use, shoulder x-rays, pain control with lidocaine patch, acetaminophen.  Will get screening EKG.  Patient likely has rib fractures and will  require pain control, incentive spirometer. Odalis MONCADA:  73-year-old male with history as documented above presenting with fall while sleeping 4 days ago.  Patient was in a chair and tipped over and fell.  He reports pain to the right side of his chest.  Pain has become worse since yesterday.  He also has pain to his left shoulder with limited range of motion secondary to pain.  He is on aspirin 81 mg daily.  On exam, he has reproducible tenderness to the right chest wall and tenderness to his left shoulder with limited range of motion secondary to pain.  There is no obvious deformity.  Differential includes rib fractures, shoulder injury. Plan for CT chest, CT head 2/2 aspirin use, shoulder x-rays, pain control with lidocaine patch, acetaminophen.  Will get screening EKG.        CT imaging/x-rays negative for acute fractures.  Patient advised to continue taking Tylenol, Motrin, lidocaine patch for pain and follow-up with PCP.  A copy of all results was provided to him.

## 2023-11-06 NOTE — ED PROVIDER NOTE - PATIENT PORTAL LINK FT
You can access the FollowMyHealth Patient Portal offered by [facility] by registering at the following website: [email or web address]. By joining [employer]’s FollowMyHealth portal, you will also be able to view your health information using other applications (apps) compatible with our system.

## 2023-11-06 NOTE — ED ADULT NURSE NOTE - NSFALLHARMRISKINTERV_ED_ALL_ED

## 2023-11-06 NOTE — ED PROVIDER NOTE - NSICDXPASTMEDICALHX_GEN_ALL_CORE_FT
PAST MEDICAL HISTORY:  BPH (benign prostatic hyperplasia)     Diabetes Mellitus Type II     Gastritis     History of Tongue Cancer surgery and radiation    Hyperlipemia     Hypertension

## 2023-11-06 NOTE — ED ADULT NURSE NOTE - OBJECTIVE STATEMENT
Pt received to rm 26   , awake and alert, A&OX4, ambulatory at baseline. C/o fall that happened last Friday 11/3. pt states he was sleeping and fell out of his chair. pt denies hitting head and or LOC. Pt has pain to the left shoulder and right ribs. limited ROM to the left shoulder and tenderness and pain upon palpation to the right side of ribcage. no bruising  noted to ribs or shoulder. pluses <2 sec bilaterally.   Respirations even and unlabored. Denies CP, SOB, N/V, HA, dizziness, palpitations, blurry vision. comfort measures in place. safety maintained.

## 2023-11-06 NOTE — ED PROVIDER NOTE - NSFOLLOWUPINSTRUCTIONS_ED_ALL_ED_FT
You were seen here in the emergency department for evaluation of chest wall pain and left shoulder pain.  You had a CT of your chest that did not show any acute fractures of your ribs.  You had an EKG that did not show any concerning abnormalities.  You had x-rays of your left shoulder that also did not show any fracture.    Please continue to take Tylenol, Motrin, over-the-counter lidocaine patch for your pain.  You were given an incentive spirometer.  Please use this 2-3 times a day to make sure you are taking deep breaths to prevent any additional problems with your lungs.  If you develop fevers, shortness of breath, new symptoms of concern, please return to the emergency department.

## 2023-11-06 NOTE — ED PROVIDER NOTE - OBJECTIVE STATEMENT
Odalis MONCADA: Patient is a 73-year-old male with a history of type 2 diabetes mellitus, on metformin 750 mg twice daily, BPH, hypertension, hyperlipidemia, history of tongue cancer in 2004 s/p surgery and RT  (stable in remission) here for evaluation of right-sided rib pain since having a fall 4 days ago on Friday.  History is provided by both patient and his wife.  Patient speaks English and is able to answer questions appropriately.  Accordingly, he was sleeping in the chair and fell over onto his right side.  He states he was wearing a hat did not hit his head or pass out.  He denies any subsequent headache, vision changes.  He states he also has left shoulder pain.  patient states he had pain in his left shoulder and was able to move it but pain became worse last night.  He took Advil yesterday without relief.  He denies any shortness of breath, fevers, nausea, vomiting, abdominal pain, dysuria, black or bloody stools.  Patient is on 81 mg aspirin.  Patient walks independently at baseline.

## 2023-11-06 NOTE — ED PROVIDER NOTE - NEUROLOGICAL MOTOR
CRITICAL CARE PROGRESS NOTE      Patient: Jaya Head    Unit/Bed:4D-04/004-A  YOB: 1969  MRN: [id number]   PCP: Jeanine Kelley MD  Date of Admission: 5/17/2020  Chief Complaint:- SOB     Assessment and Plan:      1. Acute on chronic hypoxic hypercapnic respiratory failure: Wears 3 L at baseline.  Patient also wears CPAP at night, with 3 L bled in.  On arrival to the emergency room patient was noted to be hypoxic with an SPO2 in the 62s.  He was emergently intubated.  Maintain peak pressure less than 35.   Continues to require high peep, likely will need trach for continued vent support   2. Acute on chronic heart failure with reduced ejection fraction: Noted +2 pitting edema bilateral lower extremities.  Diuresis 60 mg of Lasix twice daily. Albumin level normal.  ECHO 50-55%.  Added milrinone 5/19 for increased diuresis, stopped 5/23, diuresed 6.6L   3. Moderate Pulmonary hypertension: Tadalafil added 5/21. Right ventricular pressures 55 to 60 mmHg  4. COPD exacerbation: Steroids, Solu-Medrol 40 mg every 6 hours for 3 days.  Rocephin.  Bio fire pneumonia panel negative, Bevespi added 5/20. Repeat biofire 5/22 negative   5. Fever of unknown origin: Recultured 5/22, negative urine and biofire pneumonia negative, primary service continuing rocephin. 6. Elevated troponin: Likely secondary to hypoxia, EKG shows no acute changes.  Repeat echo pending, trend troponin.  Patient on 325 ASA daily.  Cardiology consulted, possible ischemic work-up in the future. 7. Hypertension: History, currently hypotensive.  Norvasc with hold parameters  8. Pericardial effusion: Small effusion noted on echo, circumferential but no evidence of tamponade. 9. Schizophrenia: Risperdal    10. GERD: Pepcid twice daily  11. Hyperlipidemia: Pravastatin 40 mg nightly  12. Hyperkalemia: resolved; 1 dose kayexalate  13.  Acute kidney injury: resolved;  Likely secondary to hypotension, and prerenal in nature.  Urine electrolytes shows pre-renal, likely hypotension cause, monitor.  Improved creatinine 1.1    INITIAL H AND P AND ICU COURSE:  Zac Ortez is a 45year-old black male who presented to [facility] on 5/17/2020 with complaints of shortness of breath that started today. Sophia Wright has a past medical history of hypertension, heart failure, schizophrenia, COPD.  HPI is limited secondary to patient inability to give details of history secondary to hypoxia and need for immediate intubation.  Patient was dropped off at the emergency room and no family member accompanied patient to the emergency room. 5/18 patient  continues on mechanical ventilation 5/20 increased PEEP requirements overnight. Malika Reas for COPD exacerbation.  Milrinone added 5/19 by cardiology for increased diuresis 5/21 continue to diuresis. Wander Roman is improving.  PEEP has improved to 10 overnight.  5/22 patient developed fever overnight. Tadalafil was added 5/21 for moderate pulmonary hypertension. 5/23     Past Medical History:  Per HPI  Family History: Mother: Hypertension  Social History:  Current every day smoker, 20-pack-year history, denies EtOH use.  Denies drug use. ROS   Intubated and sedated on mechanical ventilation    Scheduled Meds:   Tadalafil (PAH)  40 mg Oral Daily    glycopyrrolate-formoterol  2 puff Inhalation BID    furosemide  40 mg Intravenous BID    aspirin  324 mg Oral Daily    carBAMazepine  200 mg Oral BID    pravastatin  40 mg Oral Nightly    risperiDONE  2 mg Oral BID    sodium chloride flush  10 mL Intravenous 2 times per day    enoxaparin  40 mg Subcutaneous Q24H    predniSONE  40 mg Oral Daily    cefTRIAXone (ROCEPHIN) IV  1 g Intravenous Q24H    famotidine  20 mg Per NG tube BID     Continuous Infusions:   propofol 35 mcg/kg/min (05/23/20 0816)    milrinone 0.125 mcg/kg/min (05/22/20 1206)    norepinephrine Stopped (05/23/20 0834)       PHYSICAL EXAMINATION:  T:  97.9. P:  76. RR:  14. B/P:  91/51.   FiO2: 70. O2 Sat:  93.  I/O: 1465/2350  Body mass index is 35.35 kg/m². General:   Acute on chronically ill-appearing black male  HEENT:  normocephalic and atraumatic. No scleral icterus. PERR  Neck: supple. No Thyromegaly. Lungs: clear to auscultation. No retractions  Cardiac: RRR. No JVD. Abdomen: soft. Nontender. Extremities: No clubbing, cyanosis. + 2 pitting edema bilateral lower extremities   Vasculature: capillary refill < 3 seconds. Palpable dorsalis pedis pulses. Skin:  warm and dry. Psych: Responds to pain x4, does not follow commands. Opens eyes to verbal stimulation   Lymph:  No supraclavicular adenopathy. Neurologic:  No focal deficit. No seizures. Data: (All radiographs, tracings, PFTs, and imaging are personally viewed and interpreted unless otherwise noted).  Sodium 140, potassium 3.2, chloride 99, CO2 35, BUN 40, creatinine 0.7, anion gap 6.0, glucose 95.  WBC 12.1, hemoglobin 15.4, hematocrit 51.5, platelet count 558.  Telemetry shows normal sinus rhythm   Chest x-ray 5/21/2020 reports stable cardiac enlargement, moderate diffuse pulmonary venous congestion.  Chest x-ray 5/19/2020 reports moderate hazy appearance of both mid and lower lung fields consistent with atelectasis pneumonia and edema, small effusion on the right side questionable small left effusion.  Overall appearance worse.  Echocardiogram 5/18/2020 reports ejection fraction from 50 to 55%, enlarged right atrium moderate pulmonary hypertension, small circumferential pericardial effusion          Meets Continued ICU Level Care Criteria:    [x] Yes   [] No - Transfer Planned to listed location:  [] HOSPITALIST CONTACTED- DR     Case and plan discussed with Dr. Melisa Fowler and Dr Chi Hernandez. Electronically signed by Beny Dunlap.  SAVANNAH Leon - CNP  CRITICAL CARE SPECIALIST normal

## 2023-11-06 NOTE — ED ADULT TRIAGE NOTE - CHIEF COMPLAINT QUOTE
Pt. c/o right rib pain and left shoulder pain s/p fall on Friday. States he was sitting on the chair sleeping and fell forward onto the floor. Denies head trauma or LOC. Phx: HTN, HLD, DMT2

## 2023-11-08 ENCOUNTER — APPOINTMENT (OUTPATIENT)
Dept: INTERNAL MEDICINE | Facility: CLINIC | Age: 73
End: 2023-11-08
Payer: MEDICARE

## 2023-11-08 VITALS
HEIGHT: 64 IN | WEIGHT: 117 LBS | DIASTOLIC BLOOD PRESSURE: 70 MMHG | RESPIRATION RATE: 15 BRPM | OXYGEN SATURATION: 98 % | BODY MASS INDEX: 19.97 KG/M2 | HEART RATE: 80 BPM | SYSTOLIC BLOOD PRESSURE: 115 MMHG

## 2023-11-08 DIAGNOSIS — R07.81 PLEURODYNIA: ICD-10-CM
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
## 2023-11-08 PROCEDURE — 99214 OFFICE O/P EST MOD 30 MIN: CPT

## 2023-11-08 RX ORDER — LIDOCAINE 5% 700 MG/1
5 PATCH TOPICAL
Qty: 10 | Refills: 2 | Status: ACTIVE | COMMUNITY
Start: 2023-11-08 | End: 1900-01-01

## 2023-11-09 ENCOUNTER — APPOINTMENT (OUTPATIENT)
Age: 73
End: 2023-11-09
Payer: COMMERCIAL

## 2023-11-09 ENCOUNTER — RX RENEWAL (OUTPATIENT)
Age: 73
End: 2023-11-09

## 2023-11-09 PROCEDURE — XXXXX: CPT | Mod: 1L

## 2023-11-29 ENCOUNTER — OUTPATIENT (OUTPATIENT)
Dept: OUTPATIENT SERVICES | Facility: HOSPITAL | Age: 73
LOS: 1 days | End: 2023-11-29
Payer: COMMERCIAL

## 2023-11-29 ENCOUNTER — APPOINTMENT (OUTPATIENT)
Dept: INTERNAL MEDICINE | Facility: CLINIC | Age: 73
End: 2023-11-29
Payer: MEDICARE

## 2023-11-29 ENCOUNTER — APPOINTMENT (OUTPATIENT)
Dept: RADIOLOGY | Facility: IMAGING CENTER | Age: 73
End: 2023-11-29
Payer: MEDICARE

## 2023-11-29 VITALS
HEART RATE: 77 BPM | OXYGEN SATURATION: 98 % | SYSTOLIC BLOOD PRESSURE: 116 MMHG | BODY MASS INDEX: 19.4 KG/M2 | RESPIRATION RATE: 15 BRPM | DIASTOLIC BLOOD PRESSURE: 72 MMHG | WEIGHT: 113 LBS

## 2023-11-29 DIAGNOSIS — Z00.00 ENCOUNTER FOR GENERAL ADULT MEDICAL EXAMINATION W/OUT ABNORMAL FINDINGS: ICD-10-CM

## 2023-11-29 DIAGNOSIS — Z98.49 CATARACT EXTRACTION STATUS, UNSPECIFIED EYE: Chronic | ICD-10-CM

## 2023-11-29 DIAGNOSIS — R29.898 OTHER SYMPTOMS AND SIGNS INVOLVING THE MUSCULOSKELETAL SYSTEM: ICD-10-CM

## 2023-11-29 DIAGNOSIS — M25.511 PAIN IN RIGHT SHOULDER: ICD-10-CM

## 2023-11-29 PROCEDURE — G0439: CPT

## 2023-11-29 PROCEDURE — 73130 X-RAY EXAM OF HAND: CPT | Mod: 26,RT

## 2023-11-29 PROCEDURE — 73130 X-RAY EXAM OF HAND: CPT

## 2023-11-30 LAB
APPEARANCE: CLEAR
BACTERIA: NEGATIVE /HPF
BILIRUBIN URINE: NEGATIVE
BLOOD URINE: NEGATIVE
CAST: 0 /LPF
CHOLEST SERPL-MCNC: 133 MG/DL
COLOR: YELLOW
CREAT SPEC-SCNC: 65 MG/DL
EPITHELIAL CELLS: 0 /HPF
ESTIMATED AVERAGE GLUCOSE: 143 MG/DL
GLUCOSE QUALITATIVE U: NEGATIVE MG/DL
HBA1C MFR BLD HPLC: 6.6 %
HDLC SERPL-MCNC: 69 MG/DL
KETONES URINE: NEGATIVE MG/DL
LDLC SERPL CALC-MCNC: 50 MG/DL
LEUKOCYTE ESTERASE URINE: NEGATIVE
MICROALBUMIN 24H UR DL<=1MG/L-MCNC: <1.2 MG/DL
MICROALBUMIN/CREAT 24H UR-RTO: NORMAL MG/G
MICROSCOPIC-UA: NORMAL
NITRITE URINE: NEGATIVE
NONHDLC SERPL-MCNC: 64 MG/DL
PH URINE: 7.5
PROTEIN URINE: NEGATIVE MG/DL
PSA FREE FLD-MCNC: 23 %
PSA FREE SERPL-MCNC: 0.23 NG/ML
PSA SERPL-MCNC: 0.98 NG/ML
RED BLOOD CELLS URINE: 0 /HPF
SPECIFIC GRAVITY URINE: 1.01
TRIGL SERPL-MCNC: 68 MG/DL
TSH SERPL-ACNC: 2.92 UIU/ML
UROBILINOGEN URINE: 0.2 MG/DL
VIT B12 SERPL-MCNC: 1298 PG/ML
WHITE BLOOD CELLS URINE: 1 /HPF

## 2023-12-06 ENCOUNTER — APPOINTMENT (OUTPATIENT)
Dept: ORTHOPEDIC SURGERY | Facility: CLINIC | Age: 73
End: 2023-12-06
Payer: MEDICARE

## 2023-12-06 PROCEDURE — 99214 OFFICE O/P EST MOD 30 MIN: CPT

## 2023-12-07 ENCOUNTER — TRANSCRIPTION ENCOUNTER (OUTPATIENT)
Age: 73
End: 2023-12-07

## 2023-12-07 ENCOUNTER — APPOINTMENT (OUTPATIENT)
Age: 73
End: 2023-12-07
Payer: COMMERCIAL

## 2023-12-07 ENCOUNTER — RX RENEWAL (OUTPATIENT)
Age: 73
End: 2023-12-07

## 2023-12-07 PROCEDURE — D5110 COMPLETE DENTURE - MAXILLARY: CPT | Mod: 1L

## 2023-12-07 PROCEDURE — D5120 COMPLETE DENTURE - MANDIBULAR: CPT

## 2023-12-19 ENCOUNTER — APPOINTMENT (OUTPATIENT)
Dept: ORTHOPEDIC SURGERY | Facility: CLINIC | Age: 73
End: 2023-12-19
Payer: MEDICARE

## 2023-12-19 VITALS
HEART RATE: 59 BPM | SYSTOLIC BLOOD PRESSURE: 120 MMHG | DIASTOLIC BLOOD PRESSURE: 70 MMHG | OXYGEN SATURATION: 98 % | BODY MASS INDEX: 19.29 KG/M2 | WEIGHT: 113 LBS | HEIGHT: 64 IN

## 2023-12-19 DIAGNOSIS — S49.92XA UNSPECIFIED INJURY OF LEFT SHOULDER AND UPPER ARM, INITIAL ENCOUNTER: ICD-10-CM

## 2023-12-19 PROCEDURE — 99204 OFFICE O/P NEW MOD 45 MIN: CPT

## 2023-12-27 ENCOUNTER — APPOINTMENT (OUTPATIENT)
Dept: ORTHOPEDIC SURGERY | Facility: CLINIC | Age: 73
End: 2023-12-27
Payer: MEDICARE

## 2023-12-27 PROCEDURE — 99214 OFFICE O/P EST MOD 30 MIN: CPT | Mod: 25

## 2023-12-28 ENCOUNTER — APPOINTMENT (OUTPATIENT)
Age: 73
End: 2023-12-28
Payer: COMMERCIAL

## 2023-12-28 PROCEDURE — XXXXX: CPT

## 2023-12-31 ENCOUNTER — APPOINTMENT (OUTPATIENT)
Dept: MRI IMAGING | Facility: IMAGING CENTER | Age: 73
End: 2023-12-31
Payer: MEDICARE

## 2023-12-31 ENCOUNTER — OUTPATIENT (OUTPATIENT)
Dept: OUTPATIENT SERVICES | Facility: HOSPITAL | Age: 73
LOS: 1 days | End: 2023-12-31
Payer: COMMERCIAL

## 2023-12-31 DIAGNOSIS — Z98.49 CATARACT EXTRACTION STATUS, UNSPECIFIED EYE: Chronic | ICD-10-CM

## 2023-12-31 DIAGNOSIS — Z00.8 ENCOUNTER FOR OTHER GENERAL EXAMINATION: ICD-10-CM

## 2023-12-31 PROCEDURE — 73221 MRI JOINT UPR EXTREM W/O DYE: CPT | Mod: 26,LT

## 2023-12-31 PROCEDURE — 73221 MRI JOINT UPR EXTREM W/O DYE: CPT

## 2024-01-02 ENCOUNTER — APPOINTMENT (OUTPATIENT)
Dept: ORTHOPEDIC SURGERY | Facility: CLINIC | Age: 74
End: 2024-01-02
Payer: MEDICARE

## 2024-01-02 DIAGNOSIS — M75.02 ADHESIVE CAPSULITIS OF LEFT SHOULDER: ICD-10-CM

## 2024-01-02 PROCEDURE — 20611 DRAIN/INJ JOINT/BURSA W/US: CPT | Mod: LT

## 2024-01-02 PROCEDURE — 99214 OFFICE O/P EST MOD 30 MIN: CPT | Mod: 25

## 2024-01-03 ENCOUNTER — NON-APPOINTMENT (OUTPATIENT)
Age: 74
End: 2024-01-03

## 2024-01-03 ENCOUNTER — APPOINTMENT (OUTPATIENT)
Dept: OPHTHALMOLOGY | Facility: CLINIC | Age: 74
End: 2024-01-03
Payer: MEDICARE

## 2024-01-03 PROCEDURE — 92014 COMPRE OPH EXAM EST PT 1/>: CPT | Mod: 25

## 2024-01-03 PROCEDURE — 92134 CPTRZ OPH DX IMG PST SGM RTA: CPT

## 2024-01-04 PROBLEM — M75.02 ADHESIVE CAPSULITIS OF LEFT SHOULDER: Status: ACTIVE | Noted: 2021-12-09

## 2024-01-18 ENCOUNTER — APPOINTMENT (OUTPATIENT)
Age: 74
End: 2024-01-18
Payer: COMMERCIAL

## 2024-01-18 PROCEDURE — CLD3: CUSTOM

## 2024-01-18 PROCEDURE — CUD3: CUSTOM

## 2024-02-08 ENCOUNTER — APPOINTMENT (OUTPATIENT)
Age: 74
End: 2024-02-08
Payer: COMMERCIAL

## 2024-02-08 PROCEDURE — CUD4: CUSTOM

## 2024-02-28 ENCOUNTER — APPOINTMENT (OUTPATIENT)
Dept: INTERNAL MEDICINE | Facility: CLINIC | Age: 74
End: 2024-02-28
Payer: MEDICARE

## 2024-02-28 VITALS
BODY MASS INDEX: 18.95 KG/M2 | WEIGHT: 111 LBS | TEMPERATURE: 98.2 F | HEIGHT: 64 IN | HEART RATE: 67 BPM | RESPIRATION RATE: 14 BRPM | SYSTOLIC BLOOD PRESSURE: 102 MMHG | DIASTOLIC BLOOD PRESSURE: 57 MMHG

## 2024-02-28 DIAGNOSIS — R60.0 LOCALIZED EDEMA: ICD-10-CM

## 2024-02-28 DIAGNOSIS — M19.049 PRIMARY OSTEOARTHRITIS, UNSPECIFIED HAND: ICD-10-CM

## 2024-02-28 PROCEDURE — 99214 OFFICE O/P EST MOD 30 MIN: CPT

## 2024-02-28 PROCEDURE — G2211 COMPLEX E/M VISIT ADD ON: CPT | Mod: NC,1L

## 2024-02-28 RX ORDER — METHYLPREDNISOLONE 4 MG/1
4 TABLET ORAL
Qty: 1 | Refills: 0 | Status: DISCONTINUED | COMMUNITY
Start: 2023-12-06 | End: 2024-02-28

## 2024-02-28 RX ORDER — FLASH GLUCOSE SENSOR
KIT MISCELLANEOUS
Qty: 6 | Refills: 3 | Status: ACTIVE | OUTPATIENT
Start: 2024-02-28

## 2024-02-28 RX ORDER — FLASH GLUCOSE SENSOR
KIT MISCELLANEOUS
Qty: 1 | Refills: 0 | Status: ACTIVE | OUTPATIENT
Start: 2024-02-28

## 2024-02-28 NOTE — ASSESSMENT
[FreeTextEntry1] : right hand swelling pain - saw ortho recommended PT did Xray neg  - right dorsal 3rd MCPJ edema s/p medrol dose pack and meloxicam no help  -doing PT now no help  -rx for diclofenac gel 1 % send   HX fall trip- 10/2023 - went to ER - did Xrays - no rib fractures - left shoulde and rt Chest wall pain on and off since - takes tylenol as needed motrin  c/o shoulder pain Left > rt  - PT referral placed hx left shoulder adhesive capsulitis   hx difficulty walking - 7/2022 - gait and stiffness rigidity r/o Parkinsons - no tremors saw -neurology 1/5/23 - recommended PT - doing PT not helping  DM - Hgbaic-6.6 11/23 -- on Metformin 1000 pm and 1000 am and Trajanta 5 qd -meds refilled -free style avery send  - Monitor Accu-Cheks daily,fasting and post prandial 2 hrs, ophthalmology followup, podiatry follow up. Monitor for signs of hypoglycemia. Check hemoglobin A1c, urine for microalbumin. Continue 1800 kcal ADA diet, cut rice, pasta, sugar, sweet, soda, juices, exercise daily 30 minutes , loose weight. -losartan 25 - rx renewed 2/28/24   Atherosclerosis aorta on CT 7/2020 -on simvastatin 20 qhs rx renewed LDL-50 11/2023  .b/l knee pain s/p gel inj -right knee pain swelling saw ortho did tap dx pseudo gout s/p steroid shot 6/2020 -avoid squatting , kneeling and twisting - cont PT - walking with rollator - letter for access a ride given  HX GERD- saw GASTRO did CT chest / abd pelvis 7/2020 reviewed - lesion sclerotic left thigh unchanged from 2006 -on omeprazole 40 po daily 30 minutes prior to breakfast 1 month trial -Educated patient lifestyle modification, advice to avoid fried food, greasy oily and spicy foods, avoid tomato, orange, lemon , or caffeinated beverages. -Avoid reclining upto 3 hours after meals -Followup gastro  weight loss- underweight -pt lost weight has not been approved for glucerna - as not approved - last visit 3/2022 - restart rx glucerna- to see nutritionist  anemia- resolved - colonoscope 4/2019 and EGD - neg -oral cancer 2004 rx with sx and chemo , followed by ENT yearly - change PPI to omeprazole 40 daily from BID dosing  hx ch lower back pain / radiculopathy - advised to avoid pulling pushing , lifting , carrying weights , bending etc - do warm compresses -back brace given  Osteoporosis - 2019 - followed by endo dexa  9/2023  - osteoporosis - started Zolendronic acid infusion 10/2023  - cont Calcium 500-600 mg daily -cont vitamin d 1000 units daily - do weight bearing exercises: walking with weights, stair climbing , weight training , jogging, aerobics etc -repeat test 2 yrs for a f/u  hypertension -102/57  -stable ( - discontinue HCTZ 10/2020 ) on losartan to 50 mg daily-change to 25 qd 2/28/24  - monitor Bp at home  BPH -saw 4/2023 -tamsulosin 0.4 mg po BID - increase frequency- d/c HCTZ - f/u urologist referral placed  HCM Prevnar 13 given 2017 pneumococcal 23 -2015 colonoscope- 4/17/19 due 7-10 yrs flu vaccine given 10/20/23 at [facility]  hep c-neg shringrex 5/2019 - second dose 8/2019. complete. Moderna 1/25/21, 2/22/21 , booster Moderna 10/13/21 , 6/2022 , 10/10/22.10/20/23

## 2024-02-28 NOTE — HISTORY OF PRESENT ILLNESS
[Spouse] : spouse [de-identified] : Patient is a 73 year y/o male with a history of diabetes, arthritis, GERD, anemia, BPH, HTN, HLD, Mouth cancer in 2004 (now resolved), with complaints of joint and back pain, and memory loss. Seen today for f/u on ch issues   h/o  Difficulty walking- since 7/2022  - was podiatrist -was told to get power wheel chair - he gave prescription for motarised wheel chair - but insurance did not cover  -no bowel or bladder incontinence - requesting motorized wheelchair rx  c/o pain in left thumb base pain and wrist pain > rt wrist , cannot open bottles - saw ortho recommended PT dx arthritis  -left shoulder pain - knee pain -saw rheum 3/2022  - finished PT - pain better flares on and off - uses local pain cream -Voltaren gel   h/o DM x 10 yrs  -saw Endo - AIC 6.6 11/23 - his metformin was changed to 1000 am 500 PN QD , - continue Tradjenta 5mg daily  -saw eye doctor -3/22/23 - stable  - retina specialist in past - did eye 1/3rd? hole in retina, s/p lazer rx - cataract rt eye was advising sx  -on statin 20 mg simvastatin, on arb losartan 50  - saw podiatrist Dx with feet neuropathy recommended diabetic shoes   oral cancer 2004 rx with sx and chemo , followed by ENT yearly stable  - weight loss - no appetite - was on glucerna last year needs refill - lost weight since last visit cannot tolerate spicy food - weight was stable while on glucerna off since last visit 3/2022   GERD- still acting up - saw gastro did CT abd /pelvis/ chest 7/2020-reviewed with pt  -saw gastro- repeated EGd neg mild gastritis and colonoscope 4/2019 neg - due 7 yrs  -on omeprazole 40 ch twice daily , slight better   right knee pain - ongoing  - swelling saw ortho 6/2020 did arthrocentesis- did steroid shot  - dx as arthritis - pseudogout - finished therapy 2 x week - walking with rollator   HTN- on HCTZ 25 , losartan 50 , home readings good- 120/70  - no cp or dizzy spells   c/o lower back and b/l knee pain seeing ortho got B/l gel injections  - feels slight better  lower back pain - needs rx for brace - feels pain   Anemia- taking Glucerna - weight loss still on going as per pt needs rx   saw cardio did EKG- did stress test 11/2021 results pending has appt with Dr roman for results  - Did colonoscope 4/17/19 due repeat 7 yrs   saw urologist 4/2023 ok BPH on tamsulocin 0.4 BID

## 2024-02-29 ENCOUNTER — APPOINTMENT (OUTPATIENT)
Age: 74
End: 2024-02-29
Payer: COMMERCIAL

## 2024-02-29 LAB
ALBUMIN SERPL ELPH-MCNC: 3.8 G/DL
ANION GAP SERPL CALC-SCNC: 7 MMOL/L
BUN SERPL-MCNC: 17 MG/DL
CALCIUM SERPL-MCNC: 9.2 MG/DL
CHLORIDE SERPL-SCNC: 104 MMOL/L
CHOLEST SERPL-MCNC: 125 MG/DL
CO2 SERPL-SCNC: 28 MMOL/L
CREAT SERPL-MCNC: 0.86 MG/DL
CREAT SPEC-SCNC: 20 MG/DL
EGFR: 91 ML/MIN/1.73M2
ESTIMATED AVERAGE GLUCOSE: 140 MG/DL
GLUCOSE SERPL-MCNC: 114 MG/DL
HBA1C MFR BLD HPLC: 6.5 %
HDLC SERPL-MCNC: 67 MG/DL
LDLC SERPL CALC-MCNC: 47 MG/DL
MICROALBUMIN 24H UR DL<=1MG/L-MCNC: <1.2 MG/DL
MICROALBUMIN/CREAT 24H UR-RTO: NORMAL MG/G
NONHDLC SERPL-MCNC: 58 MG/DL
PHOSPHATE SERPL-MCNC: 3.4 MG/DL
POTASSIUM SERPL-SCNC: 5 MMOL/L
SODIUM SERPL-SCNC: 139 MMOL/L
TRIGL SERPL-MCNC: 44 MG/DL

## 2024-02-29 PROCEDURE — CUD4: CUSTOM | Mod: 1L

## 2024-02-29 PROCEDURE — CLD4: CUSTOM | Mod: 1L

## 2024-03-21 ENCOUNTER — APPOINTMENT (OUTPATIENT)
Dept: ORTHOPEDIC SURGERY | Facility: CLINIC | Age: 74
End: 2024-03-21
Payer: MEDICARE

## 2024-03-21 ENCOUNTER — APPOINTMENT (OUTPATIENT)
Age: 74
End: 2024-03-21
Payer: COMMERCIAL

## 2024-03-21 PROCEDURE — CUD5: CUSTOM

## 2024-03-21 PROCEDURE — 99204 OFFICE O/P NEW MOD 45 MIN: CPT | Mod: 25

## 2024-03-21 PROCEDURE — 20605 DRAIN/INJ JOINT/BURSA W/O US: CPT | Mod: RT

## 2024-03-21 PROCEDURE — 20600 DRAIN/INJ JOINT/BURSA W/O US: CPT | Mod: F2

## 2024-03-21 PROCEDURE — 73130 X-RAY EXAM OF HAND: CPT | Mod: 26,LT,RT

## 2024-03-28 ENCOUNTER — APPOINTMENT (OUTPATIENT)
Age: 74
End: 2024-03-28

## 2024-04-02 ENCOUNTER — APPOINTMENT (OUTPATIENT)
Dept: ORTHOPEDIC SURGERY | Facility: CLINIC | Age: 74
End: 2024-04-02

## 2024-04-09 NOTE — ASU DISCHARGE PLAN (ADULT/PEDIATRIC). - TELE NUMBER
PHYSICAL THERAPY - DAILY TREATMENT NOTE (updated 3/23)      Date: 2024          Patient Name:  Citlali Yip :  1987   Medical   Diagnosis:  Acute pain of right shoulder [M25.511]  Acute pain of right knee [M25.561] Treatment Diagnosis:  M25.561  RIGHT KNEE PAIN  and M25.511  RIGHT SHOULDER PAIN    Referral Source:  Margi Painting APRN * Insurance:   Payor: [facility] MEDICAID / Plan: [facility] PLAN([facility]) / Product Type: *No Product type* /                     Patient  verified yes     Visit #   Current  / Total 12 24   Time   In / Out 7:30 A 8:22 A   Total Treatment Time 52   Total Timed Codes 42         SUBJECTIVE    Pain Level (0-10 scale): 4    Any medication changes, allergies to medications, adverse drug reactions, diagnosis change, or new procedure performed?: [x] No    [] Yes (see summary sheet for update)  Medications: Verified on Patient Summary List    Subjective functional status/changes:     Pt reports overall she is doing much better, some continued pain while lifting groceries in her R shoulder, stiffness in her R knee with prolonged sitting or transitional movements.    OBJECTIVE      Therapeutic Procedures:  Tx Min Billable or 1:1 Min (if diff from Tx Min) Procedure, Rationale, Specifics   27  [id number] Therapeutic Exercise (timed):  increase ROM, strength, coordination, balance, and proprioception to improve patient's ability to progress to PLOF and address remaining functional goals. (see flow sheet as applicable)     Details if applicable:     15  42015 Manual Therapy (timed):  decrease pain, increase ROM, increase tissue extensibility, and decrease trigger points to improve patient's ability to progress to PLOF and address remaining functional goals.  The manual therapy interventions were performed at a separate and distinct time from the therapeutic activities interventions . (see flow sheet as applicable)     Details if applicable:  PROM R shoulder all planes to  [phone]

## 2024-04-18 ENCOUNTER — APPOINTMENT (OUTPATIENT)
Dept: ENDOCRINOLOGY | Facility: CLINIC | Age: 74
End: 2024-04-18

## 2024-04-24 ENCOUNTER — APPOINTMENT (OUTPATIENT)
Age: 74
End: 2024-04-24
Payer: COMMERCIAL

## 2024-04-24 ENCOUNTER — APPOINTMENT (OUTPATIENT)
Dept: ORTHOPEDIC SURGERY | Facility: CLINIC | Age: 74
End: 2024-04-24
Payer: MEDICARE

## 2024-04-24 PROCEDURE — D5410: CPT

## 2024-04-24 PROCEDURE — 99214 OFFICE O/P EST MOD 30 MIN: CPT | Mod: 25

## 2024-04-25 LAB
BASOPHILS # BLD AUTO: 0.03 K/UL
BASOPHILS NFR BLD AUTO: 0.3 %
CRP SERPL-MCNC: 32 MG/L
EOSINOPHIL # BLD AUTO: 0.17 K/UL
EOSINOPHIL NFR BLD AUTO: 1.8 %
ERYTHROCYTE [SEDIMENTATION RATE] IN BLOOD BY WESTERGREN METHOD: 41 MM/HR
HCT VFR BLD CALC: 35.2 %
HGB BLD-MCNC: 11.8 G/DL
IMM GRANULOCYTES NFR BLD AUTO: 0.5 %
LYMPHOCYTES # BLD AUTO: 1.08 K/UL
LYMPHOCYTES NFR BLD AUTO: 11.6 %
MAN DIFF?: NORMAL
MCHC RBC-ENTMCNC: 28 PG
MCHC RBC-ENTMCNC: 33.5 GM/DL
MCV RBC AUTO: 83.6 FL
MONOCYTES # BLD AUTO: 0.66 K/UL
MONOCYTES NFR BLD AUTO: 7.1 %
NEUTROPHILS # BLD AUTO: 7.33 K/UL
NEUTROPHILS NFR BLD AUTO: 78.7 %
PLATELET # BLD AUTO: 271 K/UL
RBC # BLD: 4.21 M/UL
RBC # FLD: 12.4 %
WBC # FLD AUTO: 9.32 K/UL

## 2024-04-26 ENCOUNTER — NON-APPOINTMENT (OUTPATIENT)
Age: 74
End: 2024-04-26

## 2024-04-26 LAB — ANA SER IF-ACNC: NEGATIVE

## 2024-04-27 LAB
CCP AB SER IA-ACNC: <8 UNITS
RF+CCP IGG SER-IMP: NEGATIVE

## 2024-04-30 LAB
RF IGA SER-ACNC: <5 U
RF IGG SER-ACNC: <5 U
RF IGM SER-ACNC: <5 U

## 2024-05-01 ENCOUNTER — APPOINTMENT (OUTPATIENT)
Dept: ENDOCRINOLOGY | Facility: CLINIC | Age: 74
End: 2024-05-01

## 2024-05-01 VITALS
BODY MASS INDEX: 18.1 KG/M2 | SYSTOLIC BLOOD PRESSURE: 100 MMHG | WEIGHT: 106 LBS | OXYGEN SATURATION: 99 % | HEIGHT: 64 IN | HEART RATE: 70 BPM | DIASTOLIC BLOOD PRESSURE: 60 MMHG

## 2024-05-01 PROCEDURE — 99214 OFFICE O/P EST MOD 30 MIN: CPT

## 2024-05-02 ENCOUNTER — APPOINTMENT (OUTPATIENT)
Dept: MRI IMAGING | Facility: CLINIC | Age: 74
End: 2024-05-02
Payer: MEDICARE

## 2024-05-02 ENCOUNTER — OUTPATIENT (OUTPATIENT)
Dept: OUTPATIENT SERVICES | Facility: HOSPITAL | Age: 74
LOS: 1 days | End: 2024-05-02
Payer: COMMERCIAL

## 2024-05-02 DIAGNOSIS — M19.041 PRIMARY OSTEOARTHRITIS, RIGHT HAND: ICD-10-CM

## 2024-05-02 DIAGNOSIS — M19.042 PRIMARY OSTEOARTHRITIS, LEFT HAND: ICD-10-CM

## 2024-05-02 DIAGNOSIS — Z98.49 CATARACT EXTRACTION STATUS, UNSPECIFIED EYE: Chronic | ICD-10-CM

## 2024-05-02 PROCEDURE — 73218 MRI UPPER EXTREMITY W/O DYE: CPT

## 2024-05-03 ENCOUNTER — APPOINTMENT (OUTPATIENT)
Dept: RHEUMATOLOGY | Facility: CLINIC | Age: 74
End: 2024-05-03
Payer: MEDICARE

## 2024-05-03 ENCOUNTER — RESULT REVIEW (OUTPATIENT)
Age: 74
End: 2024-05-03

## 2024-05-03 VITALS
DIASTOLIC BLOOD PRESSURE: 53 MMHG | TEMPERATURE: 97.2 F | BODY MASS INDEX: 18.13 KG/M2 | HEART RATE: 68 BPM | SYSTOLIC BLOOD PRESSURE: 96 MMHG | HEIGHT: 64 IN | OXYGEN SATURATION: 99 % | RESPIRATION RATE: 16 BRPM | WEIGHT: 106.2 LBS

## 2024-05-03 DIAGNOSIS — M19.041 PRIMARY OSTEOARTHRITIS, RIGHT HAND: ICD-10-CM

## 2024-05-03 DIAGNOSIS — M11.20 OTHER CHONDROCALCINOSIS, UNSPECIFIED SITE: ICD-10-CM

## 2024-05-03 LAB — 25(OH)D3 SERPL-MCNC: 46.9 NG/ML

## 2024-05-03 PROCEDURE — 73218 MRI UPPER EXTREMITY W/O DYE: CPT | Mod: 26,LT,76

## 2024-05-03 PROCEDURE — G2211 COMPLEX E/M VISIT ADD ON: CPT

## 2024-05-03 PROCEDURE — 99214 OFFICE O/P EST MOD 30 MIN: CPT

## 2024-05-03 RX ORDER — OMEPRAZOLE 20 MG/1
20 CAPSULE, DELAYED RELEASE ORAL
Refills: 0 | Status: ACTIVE | COMMUNITY

## 2024-05-03 RX ORDER — METFORMIN HYDROCHLORIDE 500 MG/1
500 TABLET, COATED ORAL
Refills: 0 | Status: ACTIVE | COMMUNITY

## 2024-05-03 RX ORDER — IBUPROFEN 600 MG/1
600 TABLET, FILM COATED ORAL
Qty: 90 | Refills: 0 | Status: DISCONTINUED | COMMUNITY
Start: 2024-03-21 | End: 2024-05-03

## 2024-05-03 RX ORDER — SIMVASTATIN 20 MG/5ML
20 SUSPENSION ORAL
Refills: 0 | Status: DISCONTINUED | COMMUNITY
End: 2024-05-03

## 2024-05-03 RX ORDER — METHYLPREDNISOLONE 4 MG/1
4 TABLET ORAL
Qty: 1 | Refills: 0 | Status: DISCONTINUED | COMMUNITY
Start: 2024-04-24 | End: 2024-05-03

## 2024-05-03 RX ORDER — TIZANIDINE 2 MG/1
2 TABLET ORAL
Qty: 28 | Refills: 0 | Status: DISCONTINUED | COMMUNITY
Start: 2023-12-19 | End: 2024-05-03

## 2024-05-03 RX ORDER — LINAGLIPTIN 5 MG/1
5 TABLET, FILM COATED ORAL
Refills: 0 | Status: DISCONTINUED | COMMUNITY
End: 2024-05-03

## 2024-05-05 ENCOUNTER — APPOINTMENT (OUTPATIENT)
Dept: MRI IMAGING | Facility: CLINIC | Age: 74
End: 2024-05-05

## 2024-05-06 ENCOUNTER — OUTPATIENT (OUTPATIENT)
Dept: OUTPATIENT SERVICES | Facility: HOSPITAL | Age: 74
LOS: 1 days | End: 2024-05-06
Payer: COMMERCIAL

## 2024-05-06 ENCOUNTER — APPOINTMENT (OUTPATIENT)
Dept: CT IMAGING | Facility: IMAGING CENTER | Age: 74
End: 2024-05-06
Payer: MEDICARE

## 2024-05-06 ENCOUNTER — RX RENEWAL (OUTPATIENT)
Age: 74
End: 2024-05-06

## 2024-05-06 DIAGNOSIS — Z98.49 CATARACT EXTRACTION STATUS, UNSPECIFIED EYE: Chronic | ICD-10-CM

## 2024-05-06 DIAGNOSIS — R91.8 OTHER NONSPECIFIC ABNORMAL FINDING OF LUNG FIELD: ICD-10-CM

## 2024-05-06 PROCEDURE — 71250 CT THORAX DX C-: CPT | Mod: 26

## 2024-05-06 PROCEDURE — 71250 CT THORAX DX C-: CPT

## 2024-05-06 RX ORDER — LOSARTAN POTASSIUM 50 MG/1
50 TABLET, FILM COATED ORAL DAILY
Qty: 90 | Refills: 0 | Status: ACTIVE | COMMUNITY
Start: 1900-01-01 | End: 1900-01-01

## 2024-05-06 NOTE — HISTORY OF PRESENT ILLNESS
[FreeTextEntry1] : patient developed sudden onset of bilateral hand pain and swelling for the last 2 weeks -  had x-rays in the past with signs of chondrocalcinosis with 2nd and 3rd osteoarthritis changes given medrol pack by ortho with minimal relief hand x-rays done in12/2023 [facility] soft tissue swelling and collapsed proximal portion of the lunate bone with sclerotic changes and chondrocalcinosis labs done with negative RF and CCP/ RASHI ESR at 41, crp at 32 had previous ct of the chest with scattered calcified granulomas and non-calcified hx of mouth cancer in 2004 with surgery and radiation, under  yearly monitoring.  Dexa scan done in 09/2023 with osteoporosis over the spine -2.5 - pending zolendronic acid weight loss of about 20lbs - endo thought to be related to metformin Denies any fevers, chill, rashes, weight loss,fatigue, hair loss, joint pains, dry eyes or mouth, mouth sores, Raynaud's. chest pain or SOB, GI or , numbness/tingling

## 2024-05-06 NOTE — PHYSICAL EXAM
[FreeTextEntry1] : bilateral hand swelling with difficult making a fist - has diffused swelling - no overt synovitis - all other joints with full ROM

## 2024-05-06 NOTE — ASSESSMENT
[FreeTextEntry1] :  1. bilateral hand swelling for the last 2 weeks - previous x-ays with chondrocalcinosis - currently with highly elevated esr and crp - RF and CCP are negative.  add missing serologies start prednisone 10 mg to help with symptoms consider MRI after labs if no positive serologies Gout X pseudogout X inflammatory arthropathy X pre-malignancy syndrome 2. pulmonary nodules - last Ct scan in 2020 - previous hx of granulomatous disease - send for new CT scan   patient to call in 1 week to discuss results and next steps  f/u 10 weeks

## 2024-05-07 ENCOUNTER — RX RENEWAL (OUTPATIENT)
Age: 74
End: 2024-05-07

## 2024-05-08 ENCOUNTER — APPOINTMENT (OUTPATIENT)
Age: 74
End: 2024-05-08

## 2024-05-08 PROCEDURE — D5421: CPT

## 2024-05-16 ENCOUNTER — APPOINTMENT (OUTPATIENT)
Dept: RHEUMATOLOGY | Facility: CLINIC | Age: 74
End: 2024-05-16
Payer: MEDICARE

## 2024-05-16 VITALS
BODY MASS INDEX: 18.1 KG/M2 | RESPIRATION RATE: 16 BRPM | TEMPERATURE: 98.1 F | DIASTOLIC BLOOD PRESSURE: 54 MMHG | WEIGHT: 106 LBS | HEART RATE: 69 BPM | HEIGHT: 64 IN | SYSTOLIC BLOOD PRESSURE: 90 MMHG | OXYGEN SATURATION: 98 %

## 2024-05-16 DIAGNOSIS — M11.231 OTHER CHONDROCALCINOSIS, RIGHT WRIST: ICD-10-CM

## 2024-05-16 DIAGNOSIS — M11.262 OTHER CHONDROCALCINOSIS, LEFT KNEE: ICD-10-CM

## 2024-05-16 LAB
CRP SERPL-MCNC: 15 MG/L
DSDNA AB SER-ACNC: <1 IU/ML
ENA RNP AB SER IA-ACNC: <0.2 AL
ENA SM AB SER IA-ACNC: <0.2 AL
ENA SS-A AB SER IA-ACNC: <0.2 AL
ENA SS-B AB SER IA-ACNC: <0.2 AL
ERYTHROCYTE [SEDIMENTATION RATE] IN BLOOD BY WESTERGREN METHOD: 13 MM/HR
URATE SERPL-MCNC: 3.7 MG/DL

## 2024-05-16 PROCEDURE — 99214 OFFICE O/P EST MOD 30 MIN: CPT

## 2024-05-16 PROCEDURE — G2211 COMPLEX E/M VISIT ADD ON: CPT

## 2024-05-16 NOTE — ASSESSMENT
[FreeTextEntry1] : 1. bilateral hand swelling for the last 2 weeks MRi consistent with pseudogout - improved significant on prednisone - switch to colchicine also possible carpal tunnel - send for brace 2. emphysema with old granulomatous disease - referral to pulmonology  I reviewed previous labs results with patients. Diagnosis and Prognosis discussed Continue with current medications medications refilled education provided on colchicine F/u 2 months

## 2024-05-16 NOTE — PHYSICAL EXAM
[General Appearance - Alert] : alert [General Appearance - In No Acute Distress] : in no acute distress [Neck Appearance] : the appearance of the neck was normal [Neck Cervical Mass (___cm)] : no neck mass was observed [Jugular Venous Distention Increased] : there was no jugular-venous distention [Thyroid Diffuse Enlargement] : the thyroid was not enlarged [Thyroid Nodule] : there were no palpable thyroid nodules [Auscultation Breath Sounds / Voice Sounds] : lungs were clear to auscultation bilaterally [Heart Rate And Rhythm] : heart rate was normal and rhythm regular [Heart Sounds] : normal S1 and S2 [Heart Sounds Gallop] : no gallops [Murmurs] : no murmurs [Heart Sounds Pericardial Friction Rub] : no pericardial rub [Full Pulse] : the pedal pulses are present [Edema] : there was no peripheral edema [Bowel Sounds] : normal bowel sounds [Abdomen Soft] : soft [Abdomen Tenderness] : non-tender [Abdomen Mass (___ Cm)] : no abdominal mass palpated [Cervical Lymph Nodes Enlarged Posterior Bilaterally] : posterior cervical [Cervical Lymph Nodes Enlarged Anterior Bilaterally] : anterior cervical [Supraclavicular Lymph Nodes Enlarged Bilaterally] : supraclavicular [No CVA Tenderness] : no ~M costovertebral angle tenderness [No Spinal Tenderness] : no spinal tenderness [Abnormal Walk] : normal gait [Nail Clubbing] : no clubbing  or cyanosis of the fingernails [Motor Tone] : muscle strength and tone were normal [FreeTextEntry1] : no swelling today- no overt synovitis - all other joints with full ROM [Skin Color & Pigmentation] : normal skin color and pigmentation [Skin Turgor] : normal skin turgor [] : no rash [Oriented To Time, Place, And Person] : oriented to person, place, and time [Impaired Insight] : insight and judgment were intact [Affect] : the affect was normal

## 2024-05-16 NOTE — HISTORY OF PRESENT ILLNESS
[___ Week(s) Ago] : [unfilled] week(s) ago [FreeTextEntry1] : no improvement from prednisone MRI of the hands consistent with pseudogout Ct scan of the chest with multiple rib fractures - old and new, emphysema changes as well as multiple nodules that are consistent old granulomatous diseases abs for rheumatoid arthritis and or other auto-immune diseases

## 2024-05-16 NOTE — REVIEW OF SYSTEMS
[Feeling Poorly] : feeling poorly [Feeling Tired] : feeling tired [Recent Weight Loss (___ Lbs)] : recent [unfilled] ~Ulb weight loss [As Noted in HPI] : as noted in HPI [Joint Pain] : joint pain [Joint Swelling] : joint swelling [Joint Stiffness] : joint stiffness [Negative] : Heme/Lymph

## 2024-05-17 PROBLEM — M11.262 PSEUDOGOUT OF LEFT KNEE: Status: ACTIVE | Noted: 2020-12-21

## 2024-05-17 PROBLEM — M11.231 PSEUDOGOUT OF RIGHT WRIST: Status: ACTIVE | Noted: 2024-03-21

## 2024-05-21 ENCOUNTER — APPOINTMENT (OUTPATIENT)
Dept: PULMONOLOGY | Facility: CLINIC | Age: 74
End: 2024-05-21
Payer: MEDICARE

## 2024-05-21 VITALS
BODY MASS INDEX: 18.1 KG/M2 | HEIGHT: 64 IN | HEART RATE: 69 BPM | DIASTOLIC BLOOD PRESSURE: 60 MMHG | SYSTOLIC BLOOD PRESSURE: 100 MMHG | OXYGEN SATURATION: 98 % | WEIGHT: 106 LBS

## 2024-05-21 DIAGNOSIS — R91.8 OTHER NONSPECIFIC ABNORMAL FINDING OF LUNG FIELD: ICD-10-CM

## 2024-05-21 DIAGNOSIS — F17.290 NICOTINE DEPENDENCE, OTHER TOBACCO PRODUCT, UNCOMPLICATED: ICD-10-CM

## 2024-05-21 PROCEDURE — 99204 OFFICE O/P NEW MOD 45 MIN: CPT | Mod: 25

## 2024-05-21 PROCEDURE — ZZZZZ: CPT

## 2024-05-21 PROCEDURE — 94729 DIFFUSING CAPACITY: CPT

## 2024-05-21 PROCEDURE — 94060 EVALUATION OF WHEEZING: CPT

## 2024-05-21 PROCEDURE — 94726 PLETHYSMOGRAPHY LUNG VOLUMES: CPT

## 2024-05-21 NOTE — HISTORY OF PRESENT ILLNESS
[Former] : former [TextBox_4] : 73 year old male with PMH significant for diabetes, arthritis, GERD, anemia, BPH, HTN, HLD, oral cancer (mouth) in 2oo4.   History  obtained through  [id number].  REferred for abnormal Chest CT   CT Chest 5/6/2024: COMPARISON: Chest CT 11/6/2023 FINDINGS: Lungs/Airways/Pleura: The central airways are patent, with mucous secretions at the jin and bronchus intermedius. No pleural effusion. Mild apical emphysema is present, with several upper lobe predominant ill-defined calcified and noncalcified nodules, unchanged when compared to the prior study. Mosaic lung parenchyma. Mediastinum/Lymph nodes: No thoracic adenopathy. Sky and Vessels: The great vessels are normal in size. The heart is normal in size. No pericardial effusion. Coronary arterial and aortic valvular calcification.  Upper Abdomen: Bilateral adrenal thickening.  Osseous structures and Soft Tissues: Numerous old bilateral anterior and left posterior rib fractures; several of the right anterior rib fractures demonstrate interval sclerosis compared to November 2023, suggesting interval healing.  IMPRESSION: Stable ill-defined, upper lobe predominant calcified and noncalcified nodules on a background of mosaicism and emphysema, may be secondary to an old granulomatous process.  Numerous old bilateral rib fractures, several demonstrating new sclerosis likely due to interval healing.  Patient denies shortness of breath, cough, weight loss, no fevers or sweats.   Denies difficulty swallowing. Denies pneumonia or bronchitis, never used inhalers.    Patient is a former smoker who quit 6 months ago but he has been vaping 2 cartridges a day and is addicted.  Wife spoke to me at patients request and she is concerned about his vaping. He denies all pulmonary symptoms, has had no systemic symptoms.   [TextBox_11] : ,3 [TextBox_13] : 20 [YearQuit] : 2023

## 2024-05-21 NOTE — PHYSICAL EXAM
[No Acute Distress] : no acute distress [Normal Oropharynx] : normal oropharynx [Normal Appearance] : normal appearance [No Neck Mass] : no neck mass [Normal Rate/Rhythm] : normal rate/rhythm [Normal S1, S2] : normal s1, s2 [No Murmurs] : no murmurs [No Resp Distress] : no resp distress [Clear to Auscultation Bilaterally] : clear to auscultation bilaterally [No Abnormalities] : no abnormalities [No Clubbing] : no clubbing [No Cyanosis] : no cyanosis [No Edema] : no edema [Oriented x3] : oriented x3 [Normal Affect] : normal affect [TextBox_11] : Has facial droop on the left secondary to treatment for oral cancer.

## 2024-05-21 NOTE — ASSESSMENT
[FreeTextEntry1] : 74 year old male with diabetes, arthritis, gERD, anemia, HTN, HLD< and history of oral cancer.  He has a history of an abnormal chest CT noted since 2020 with bilateral calcified nad non calcified sub cm nodules and mild emphysema. He stopped smoking 6 months ago and is referred for evaluation.  His wife is concerned about excessive vaping.  He denies all pulmonary symptoms. PFTs today are consistent with an obstructive ventilatory impariment with positive bronchodilator response in terms of FVC Plan: - will review 2020 CT and ask radiology to compare with current cT from 5/2024.  If nodules are unchanged they are not likely to be malignant and will recommend interval F/U CT if needed. - strongly advised to stop vaping. - referred to [facility].  aDvised to continue using patches he has at home.   - As he is asymptomatic will not prescribe inhalers today

## 2024-05-24 RX ORDER — PREDNISONE 10 MG/1
10 TABLET ORAL
Qty: 30 | Refills: 1 | Status: ACTIVE | COMMUNITY
Start: 2024-05-03 | End: 1900-01-01

## 2024-05-24 RX ORDER — COLCHICINE 0.6 MG/1
0.6 TABLET ORAL DAILY
Qty: 30 | Refills: 2 | Status: DISCONTINUED | COMMUNITY
Start: 2024-05-16 | End: 2024-05-24

## 2024-05-29 ENCOUNTER — APPOINTMENT (OUTPATIENT)
Dept: INTERNAL MEDICINE | Facility: CLINIC | Age: 74
End: 2024-05-29
Payer: MEDICARE

## 2024-05-29 VITALS
RESPIRATION RATE: 15 BRPM | HEIGHT: 64 IN | SYSTOLIC BLOOD PRESSURE: 108 MMHG | DIASTOLIC BLOOD PRESSURE: 62 MMHG | HEART RATE: 75 BPM | OXYGEN SATURATION: 97 % | TEMPERATURE: 97.6 F

## 2024-05-29 DIAGNOSIS — E78.5 HYPERLIPIDEMIA, UNSPECIFIED: ICD-10-CM

## 2024-05-29 DIAGNOSIS — M19.042 PRIMARY OSTEOARTHRITIS, LEFT HAND: ICD-10-CM

## 2024-05-29 DIAGNOSIS — E11.9 TYPE 2 DIABETES MELLITUS W/OUT COMPLICATIONS: ICD-10-CM

## 2024-05-29 DIAGNOSIS — I10 ESSENTIAL (PRIMARY) HYPERTENSION: ICD-10-CM

## 2024-05-29 PROCEDURE — 99214 OFFICE O/P EST MOD 30 MIN: CPT

## 2024-05-29 PROCEDURE — G2211 COMPLEX E/M VISIT ADD ON: CPT | Mod: NC

## 2024-05-29 NOTE — HISTORY OF PRESENT ILLNESS
[Spouse] : spouse [de-identified] : Patient is a 74 year y/o male with a history of diabetes, arthritis, GERD, anemia, BPH, HTN, HLD, Mouth cancer in 2004 (now resolved), with complaints of joint and back pain, and memory loss. Seen today for f/u on ch issues   hand swelling OA b/l / CTS  - saw rheum gave brace - and prednisone 10 mg qd  MRI hand Pseudo gout   h/o  Difficulty walking- since 7/2022  - was podiatrist -was told to get power wheel chair - he gave prescription for motarised wheel chair - but insurance did not cover  -no bowel or bladder incontinence - requesting motorized wheelchair rx  c/o pain in left thumb base pain and wrist pain > rt wrist , cannot open bottles - saw ortho recommended PT dx arthritis  -left shoulder pain - knee pain -saw rheum 3/2022  - finished PT - pain better flares on and off - uses local pain cream -Voltaren gel   h/o DM x 10 yrs  -saw Endo - AIC 6.6 11/23 - his metformin was changed to 1000 am 500 PN QD , - continue Tradjenta 5mg daily  -saw eye doctor -3/22/23 - stable  - retina specialist in past - did eye 1/3rd? hole in retina, s/p lazer rx - cataract rt eye was advising sx  -on statin 20 mg simvastatin, on arb losartan 50  - saw podiatrist Dx with feet neuropathy recommended diabetic shoes   oral cancer 2004 rx with sx and chemo , followed by ENT yearly stable  - weight loss - no appetite - was on glucerna last year needs refill - lost weight since last visit cannot tolerate spicy food - weight was stable while on glucerna off since last visit 3/2022   GERD- still acting up - saw gastro did CT abd /pelvis/ chest 7/2020-reviewed with pt  -saw gastro- repeated EGd neg mild gastritis and colonoscope 4/2019 neg - due 7 yrs  -on omeprazole 40 ch twice daily , slight better   right knee pain - ongoing  - swelling saw ortho 6/2020 did arthrocentesis- did steroid shot  - dx as arthritis - pseudogout - finished therapy 2 x week - walking with rollator   HTN- on HCTZ 25 , losartan 50 , home readings good- 120/70  - no cp or dizzy spells   c/o lower back and b/l knee pain seeing ortho got B/l gel injections  - feels slight better  lower back pain - needs rx for brace - feels pain   Anemia- taking Glucerna - weight loss still on going as per pt needs rx   saw cardio did EKG- did stress test 11/2021 results pending has appt with Dr roman for results  - Did colonoscope 4/17/19 due repeat 7 yrs   saw urologist 4/2023 ok BPH on tamsulocin 0.4 BID

## 2024-05-29 NOTE — REVIEW OF SYSTEMS
[Joint Pain] : joint pain [Joint Stiffness] : joint stiffness [Joint Swelling] : joint swelling [Negative] : Gastrointestinal [FreeTextEntry9] : b/l hand swelling

## 2024-05-29 NOTE — ASSESSMENT
[FreeTextEntry1] : Emphysema/Pulm nodule - saw pulm 5/21/24  CT lungs 5/15/24 -IMPRESSION: Stable ill-defined, upper lobe predominant calcified and noncalcified nodules on a background of mosaicism and emphysema, may be secondary to an old granulomatous process. Numerous old bilateral rib fractures, several demonstrating new sclerosis likely due to interval healing. PFT 5/21/24   hand swelling OA b/l / CTS  - saw rheum gave brace - and prednisone 10 mg qd - 5/16/24 - gave cholchicine 0.6 qd MRI hand dx Pseudo gout  - saw ortho recommended PT did Xray neg  - right dorsal 3rd MCPJ edema s/p medrol dose pack and meloxicam no help  -did PT now no help  -rx for diclofenac gel 1 % send   HX fall trip- 10/2023 - went to ER - did Xrays - no rib fractures - left shoulde and rt Chest wall pain on and off since - takes tylenol as needed motrin  c/o shoulder pain Left > rt  - PT referral placed hx left shoulder adhesive capsulitis   hx difficulty walking - 7/2022 - gait and stiffness rigidity r/o Parkinsons - no tremors saw -neurology 1/5/23 - recommended PT - doing PT not helping  DM - Hgbaic-6.6 11/23 -- endo changed to Metformin 750 qd am and Trajanta 5 qd -meds refilled -free style avery send  - Monitor Accu-Cheks daily,fasting and post prandial 2 hrs, ophthalmology followup, podiatry follow up. Monitor for signs of hypoglycemia. Check hemoglobin A1c, urine for microalbumin. Continue 1800 kcal ADA diet, cut rice, pasta, sugar, sweet, soda, juices, exercise daily 30 minutes , loose weight. -losartan 25 - rx renewed 2/28/24   Atherosclerosis aorta on CT 7/2020 -on simvastatin 20 qhs rx renewed LDL-50 11/2023  .b/l knee pain s/p gel inj -right knee pain swelling saw ortho did tap dx pseudo gout s/p steroid shot 6/2020 -avoid squatting , kneeling and twisting - cont PT - walking with rollator - letter for access a ride given  HX GERD- saw GASTRO did CT chest / abd pelvis 7/2020 reviewed - lesion sclerotic left thigh unchanged from 2006 -on omeprazole 40 po daily 30 minutes prior to breakfast 1 month trial -Educated patient lifestyle modification, advice to avoid fried food, greasy oily and spicy foods, avoid tomato, orange, lemon , or caffeinated beverages. -Avoid reclining upto 3 hours after meals -Followup gastro  weight loss- underweight -pt lost weight has not been approved for glucerna - as not approved - last visit 3/2022 - restart rx glucerna- to see nutritionist  anemia- resolved - colonoscope 4/2019 and EGD - neg -oral cancer 2004 rx with sx and chemo , followed by ENT yearly - change PPI to omeprazole 40 daily from BID dosing  hx ch lower back pain / radiculopathy - advised to avoid pulling pushing , lifting , carrying weights , bending etc - do warm compresses -back brace given  Osteoporosis - 2019 - followed by endo dexa  9/2023  - osteoporosis - started Zolendronic acid infusion 10/2023  - cont Calcium 500-600 mg daily -cont vitamin d 1000 units daily - do weight bearing exercises: walking with weights, stair climbing , weight training , jogging, aerobics etc -repeat test 2 yrs for a f/u  hypertension -102/57  -stable ( - discontinue HCTZ 10/2020 ) on losartan to 50 mg daily-change to 25 qd 2/28/24  - monitor Bp at home  BPH -saw 4/2023 -tamsulosin 0.4 mg po BID - increase frequency- d/c HCTZ - f/u urologist referral placed  HCM Prevnar 13 given 2017 pneumococcal 23 -2015 colonoscope- 4/17/19 due 7-10 yrs flu vaccine given 10/20/23 at [facility]  hep c-neg shringrex 5/2019 - second dose 8/2019. complete. Moderna 1/25/21, 2/22/21 , booster Moderna 10/13/21 , 6/2022 , 10/10/22.10/20/23

## 2024-05-30 LAB
ALBUMIN SERPL ELPH-MCNC: 3.9 G/DL
ALP BLD-CCNC: 47 U/L
ALT SERPL-CCNC: 22 U/L
ANION GAP SERPL CALC-SCNC: 13 MMOL/L
AST SERPL-CCNC: 22 U/L
BILIRUB SERPL-MCNC: 0.2 MG/DL
BUN SERPL-MCNC: 19 MG/DL
CALCIUM SERPL-MCNC: 9.1 MG/DL
CHLORIDE SERPL-SCNC: 102 MMOL/L
CHOLEST SERPL-MCNC: 141 MG/DL
CO2 SERPL-SCNC: 24 MMOL/L
CREAT SERPL-MCNC: 0.78 MG/DL
CREAT SPEC-SCNC: 33 MG/DL
EGFR: 93 ML/MIN/1.73M2
ESTIMATED AVERAGE GLUCOSE: 166 MG/DL
GLUCOSE SERPL-MCNC: 133 MG/DL
HBA1C MFR BLD HPLC: 7.4 %
HDLC SERPL-MCNC: 78 MG/DL
LDLC SERPL CALC-MCNC: 52 MG/DL
MICROALBUMIN 24H UR DL<=1MG/L-MCNC: <1.2 MG/DL
MICROALBUMIN/CREAT 24H UR-RTO: NORMAL MG/G
NONHDLC SERPL-MCNC: 63 MG/DL
POTASSIUM SERPL-SCNC: 4 MMOL/L
PROT SERPL-MCNC: 5.4 G/DL
SODIUM SERPL-SCNC: 139 MMOL/L
TRIGL SERPL-MCNC: 50 MG/DL

## 2024-05-31 ENCOUNTER — RX RENEWAL (OUTPATIENT)
Age: 74
End: 2024-05-31

## 2024-06-04 ENCOUNTER — NON-APPOINTMENT (OUTPATIENT)
Age: 74
End: 2024-06-04

## 2024-06-05 ENCOUNTER — APPOINTMENT (OUTPATIENT)
Age: 74
End: 2024-06-05
Payer: COMMERCIAL

## 2024-06-05 ENCOUNTER — NON-APPOINTMENT (OUTPATIENT)
Age: 74
End: 2024-06-05

## 2024-06-05 ENCOUNTER — APPOINTMENT (OUTPATIENT)
Dept: CARE COORDINATION | Facility: HOME HEALTH | Age: 74
End: 2024-06-05
Payer: MEDICARE

## 2024-06-05 VITALS — BODY MASS INDEX: 18.1 KG/M2 | WEIGHT: 106 LBS | HEIGHT: 64 IN

## 2024-06-05 DIAGNOSIS — I70.0 ATHEROSCLEROSIS OF AORTA: ICD-10-CM

## 2024-06-05 DIAGNOSIS — E11.65 TYPE 2 DIABETES MELLITUS WITH HYPERGLYCEMIA: ICD-10-CM

## 2024-06-05 DIAGNOSIS — E11.21 TYPE 2 DIABETES MELLITUS WITH DIABETIC NEPHROPATHY: ICD-10-CM

## 2024-06-05 DIAGNOSIS — J43.9 EMPHYSEMA, UNSPECIFIED: ICD-10-CM

## 2024-06-05 DIAGNOSIS — Z13.31 ENCOUNTER FOR SCREENING FOR DEPRESSION: ICD-10-CM

## 2024-06-05 DIAGNOSIS — M81.0 AGE-RELATED OSTEOPOROSIS W/OUT CURRENT PATHOLOGICAL FRACTURE: ICD-10-CM

## 2024-06-05 PROCEDURE — G0444 DEPRESSION SCREEN ANNUAL: CPT | Mod: 93,59

## 2024-06-05 PROCEDURE — 99350 HOME/RES VST EST HIGH MDM 60: CPT | Mod: 25

## 2024-06-05 PROCEDURE — CUD6: CUSTOM

## 2024-06-05 PROCEDURE — 98960 EDU&TRN PT SELF-MGMT NQHP 1: CPT

## 2024-06-05 PROCEDURE — CLD6: CUSTOM

## 2024-06-05 RX ORDER — NICOTINE 21 MG/24HR
14 PATCH, TRANSDERMAL 24 HOURS TRANSDERMAL
Qty: 28 | Refills: 0 | Status: COMPLETED | COMMUNITY
Start: 2022-07-28 | End: 2024-06-05

## 2024-06-05 RX ORDER — OMEPRAZOLE 40 MG/1
40 CAPSULE, DELAYED RELEASE ORAL
Qty: 90 | Refills: 0 | Status: COMPLETED | COMMUNITY
Start: 2020-07-21 | End: 2024-06-05

## 2024-06-06 NOTE — PLAN
[FreeTextEntry1] : Patient seen in home, enforced w/ pt the need for daily weight/bp monitoring/blood glucose monitoring, low salt diet and educated pt to avoid processed/canned food and limit take out, increase vegetable/fiber and fruit intake (portion control).  Daily exercise w/ easy to reach realistic goals.  Continue w/ current regimen and medication as ordered.  Adhere to follow up w/ pcp/endo/opth/nephro/dpm.  Pt advised to call with any questions/concerns.  Total time spent w/ patient 60mins. Discussed POCT monitoring with pt >10 mins.  Depression screening time spent >15 mins.

## 2024-06-06 NOTE — REVIEW OF SYSTEMS
[Incontinence] : incontinence [Joint Pain] : joint pain [Joint Stiffness] : joint stiffness [Back Pain] : back pain [Unsteady Walk] : ataxia [Memory Loss] : memory loss [Negative] : Psychiatric [FreeTextEntry8] : w/ pull up [FreeTextEntry9] : right knee pain

## 2024-06-06 NOTE — HEALTH RISK ASSESSMENT
[No] : No [No falls in past year] : Patient reported no falls in the past year [Assistive Device] : Patient uses an assistive device [Little interest or pleasure doing things] : 1) Little interest or pleasure doing things [Feeling down, depressed, or hopeless] : 2) Feeling down, depressed, or hopeless [0] : 2) Feeling down, depressed, or hopeless: Not at all (0) [PHQ-2 Negative - No further assessment needed] : PHQ-2 Negative - No further assessment needed [I have developed a follow-up plan documented below in the note.] : I have developed a follow-up plan documented below in the note. [Time Spent: ___ Minutes] : I spent [unfilled] minutes performing a depression screening for this patient. [With Patient/Caregiver] : , with patient/caregiver [Designated Healthcare Proxy] : Designated healthcare proxy [Name: ___] : Health Care Proxy's Name: [unfilled]  [Relationship: ___] : Relationship: [unfilled] [I will adhere to the patient's wishes.] : I will adhere to the patient's wishes. [Time Spent: ___ minutes] : Time Spent: [unfilled] minutes [Former] : Former [20 or more] : 20 or more [< 15 Years] : < 15 Years [Audit-CScore] : 0 [de-identified] : cane, walker and wheelchair [SHX2Ozmac] : 0 [AdvancecareDate] : 06/24 [FreeTextEntry4] : hcp completed,content not discloed.

## 2024-06-06 NOTE — PHYSICAL EXAM
[Normal Sclera/Conjunctiva] : normal sclera/conjunctiva [Normal Outer Ear/Nose] : the outer ears and nose were normal in appearance [Supple] : supple [No Respiratory Distress] : no respiratory distress  [No Accessory Muscle Use] : no accessory muscle use [Soft] : abdomen soft [Non Tender] : non-tender [Non-distended] : non-distended [No Masses] : no abdominal mass palpated [Normal] : no rash [de-identified] : lbp [de-identified] : right knee pain [de-identified] : poor gait [de-identified] : poor memory and flat affect

## 2024-06-06 NOTE — COUNSELING
[Fall prevention counseling provided] : Fall prevention counseling provided [Adequate lighting] : Adequate lighting [No throw rugs] : No throw rugs [Use proper foot wear] : Use proper foot wear [Use recommended devices] : Use recommended devices [Behavioral health counseling provided] : Behavioral health counseling provided [Sleep ___ hours/day] : Sleep [unfilled] hours/day [Engage in a relaxing activity] : Engage in a relaxing activity [Plan in advance] : Plan in advance [No] : Not willing to quit smoking [Potential consequences of obesity discussed] : Potential consequences of obesity discussed [Benefits of weight loss discussed] : Benefits of weight loss discussed [Encouraged to maintain food diary] : Encouraged to maintain food diary [Encouraged to increase physical activity] : Encouraged to increase physical activity [Encouraged to use exercise tracking device] : Encouraged to use exercise tracking device [Weigh Self Weekly] : weigh self weekly [Decrease Portions] : decrease portions [Keep Food Diary] : keep food diary [Needs reinforcement, provided] : Patient needs reinforcement on understanding of disease, goals and obesity follow-up plan; reinforcement was provided [FreeTextEntry1] : w/ assistive devices use, wheelchair, cane and walker

## 2024-06-06 NOTE — HISTORY OF PRESENT ILLNESS
[Spouse] : spouse [FreeTextEntry1] : This visit was provided via telehealth using real-time 2-way audio visual technology. The patient, OMEGA DOUGLAS was located at [facility], [address] [place] [address], at the time of the visit.   The provider, John GALARZA, was located at [facility] at the time of the visit.  The patient, OMEGA DOUGLAS and provider participated in the telehealth encounter.  Verbal consent for telehealth services was given at time prior to the start of visit.  [de-identified] : HFI. This is OMEGA DOUGLAS 74 year English, M, spouse (Karen Douglas) presented for virtual visit as stated above.  Recently reported vitals in flow sheet. Medication reconciliation performed.  The patient reported h/o: T2DM, arthritis, GERD, anemia, BPH, HTN, HLD, oral cancer 2004 s/p chemo (in remission), lbp, OP, and right knee pain recent labs 05/24: A1C 7.5, glu 133, protein 5.4, Bun 19, eGFR93 POCT 110 OMEGA DOUGLAS, is seen via telecommunication as stated above, appears pleasant and in nad.  Spouse is present. Has active home care services, w/ [facility] scheduled:42-43 hrs a week. AAO (see PE). Patient denies any feeling of depression, and HI/SI. Patient denies any f/c, sob, cp, dizziness, lightheadedness, abnormal bruising/bleeding, n/v/d, or abdominal pain.  Annual Disease Management Low Dose CT Lung Cancer Screening: Up to Date Yes Date (05/24) Retinal Exam Up to Date: Yes Date (01/24)   Comprehensive Health Assessment- Preventative Care Screening Bone Density:   Up to Date Yes Date of Last Exam (09/23)  Patient Reported Results: Normal/Abnormal; Visit Provider Reviewed Results: Abnormal   Colonoscopy:  Up to Date Yes Date of Last Exam (04/17), diverticulosis   HIV: Offered, Declined/Previously Tested Date (MM/YY) Comments: Hepatitis C: Offered, Declined/Previously Tested Date (MM/YY) Comments:   Health Risk Assessment Mental Status: Yes  Cognitive Impairment Screening: needs reminder   ADL/IADL Assessment ADLS: Some Assistance Needed   IADLs:  Some Assistance Needed  Social Status Living Situation: With Significant Other  Employment Status: retired  Education: Less Than High School, High School, Some College, College, Graduate School Relationship Status:  Sexual History: Sexually Active (No), High Risk Behavior Home Environment: Feels Safe At Home (Yes), Physical Abuse (No), Sexual Abuse (No), Psychological Abuse (No), Neglect or Abandonment (No), Domestic Partner Abuse (No), Caregiver Abuse (No)     Hearing: Reports changes in hearing (No) Vision: Reports changes in vision (No); Reports normal functional visual acuity (i.e. able to read medication bottles) (Yes) Dental Health: Reports changes in dental health (No) Home Safety: Smoke Detector (Yes), Carbon Monoxide Detector (Yes), Guns at Home (Yes/No), Safety Elements Use in Home (Yes/No) General Safety Concerns   Comments:

## 2024-06-12 ENCOUNTER — RX RENEWAL (OUTPATIENT)
Age: 74
End: 2024-06-12

## 2024-06-12 RX ORDER — TAMSULOSIN HYDROCHLORIDE 0.4 MG/1
0.4 CAPSULE ORAL
Qty: 60 | Refills: 5 | Status: ACTIVE | COMMUNITY
Start: 2023-04-14 | End: 1900-01-01

## 2024-06-20 ENCOUNTER — APPOINTMENT (OUTPATIENT)
Dept: ENDOCRINOLOGY | Facility: CLINIC | Age: 74
End: 2024-06-20

## 2024-06-24 ENCOUNTER — APPOINTMENT (OUTPATIENT)
Dept: ENDOCRINOLOGY | Facility: CLINIC | Age: 74
End: 2024-06-24

## 2024-06-25 ENCOUNTER — APPOINTMENT (OUTPATIENT)
Dept: RHEUMATOLOGY | Facility: CLINIC | Age: 74
End: 2024-06-25
Payer: MEDICARE

## 2024-06-25 ENCOUNTER — APPOINTMENT (OUTPATIENT)
Dept: RHEUMATOLOGY | Facility: CLINIC | Age: 74
End: 2024-06-25

## 2024-06-25 VITALS
DIASTOLIC BLOOD PRESSURE: 67 MMHG | RESPIRATION RATE: 16 BRPM | HEART RATE: 81 BPM | TEMPERATURE: 97.1 F | BODY MASS INDEX: 18.1 KG/M2 | SYSTOLIC BLOOD PRESSURE: 97 MMHG | OXYGEN SATURATION: 98 % | WEIGHT: 106 LBS | HEIGHT: 64 IN

## 2024-06-25 DIAGNOSIS — M05.9 RHEUMATOID ARTHRITIS WITH RHEUMATOID FACTOR, UNSPECIFIED: ICD-10-CM

## 2024-06-25 PROCEDURE — 99214 OFFICE O/P EST MOD 30 MIN: CPT

## 2024-06-25 PROCEDURE — G2211 COMPLEX E/M VISIT ADD ON: CPT

## 2024-06-25 RX ORDER — FOLIC ACID 1 MG/1
1 TABLET ORAL DAILY
Qty: 1 | Refills: 3 | Status: ACTIVE | COMMUNITY
Start: 2024-06-25 | End: 1900-01-01

## 2024-06-25 RX ORDER — PREDNISONE 10 MG/1
10 TABLET ORAL
Qty: 30 | Refills: 1 | Status: ACTIVE | COMMUNITY
Start: 2024-06-25 | End: 1900-01-01

## 2024-06-25 RX ORDER — METHOTREXATE 2.5 MG/1
2.5 TABLET ORAL
Qty: 16 | Refills: 1 | Status: ACTIVE | COMMUNITY
Start: 2024-06-25 | End: 1900-01-01

## 2024-06-26 RX ORDER — BLOOD-GLUCOSE METER
W/DEVICE KIT MISCELLANEOUS
Qty: 1 | Refills: 0 | Status: ACTIVE | COMMUNITY
Start: 2024-06-26 | End: 1900-01-01

## 2024-06-27 ENCOUNTER — APPOINTMENT (OUTPATIENT)
Age: 74
End: 2024-06-27
Payer: MEDICARE

## 2024-06-27 PROCEDURE — 41116 EXCISION OF MOUTH LESION: CPT

## 2024-06-27 PROCEDURE — 41110 EXCISION OF TONGUE LESION: CPT

## 2024-07-02 ENCOUNTER — APPOINTMENT (OUTPATIENT)
Dept: OTOLARYNGOLOGY | Facility: CLINIC | Age: 74
End: 2024-07-02
Payer: MEDICARE

## 2024-07-02 VITALS
OXYGEN SATURATION: 97 % | HEART RATE: 86 BPM | WEIGHT: 106 LBS | BODY MASS INDEX: 18.1 KG/M2 | SYSTOLIC BLOOD PRESSURE: 100 MMHG | HEIGHT: 64 IN | DIASTOLIC BLOOD PRESSURE: 68 MMHG

## 2024-07-02 DIAGNOSIS — D00.07 CARCINOMA IN SITU OF TONGUE: ICD-10-CM

## 2024-07-02 DIAGNOSIS — K13.70 UNSPECIFIED LESIONS OF ORAL MUCOSA: ICD-10-CM

## 2024-07-02 PROCEDURE — 99214 OFFICE O/P EST MOD 30 MIN: CPT | Mod: 25

## 2024-07-02 PROCEDURE — 31575 DIAGNOSTIC LARYNGOSCOPY: CPT

## 2024-07-24 ENCOUNTER — RX RENEWAL (OUTPATIENT)
Age: 74
End: 2024-07-24

## 2024-07-25 ENCOUNTER — APPOINTMENT (OUTPATIENT)
Dept: RHEUMATOLOGY | Facility: CLINIC | Age: 74
End: 2024-07-25
Payer: MEDICARE

## 2024-07-25 VITALS
BODY MASS INDEX: 18.78 KG/M2 | DIASTOLIC BLOOD PRESSURE: 67 MMHG | HEART RATE: 89 BPM | WEIGHT: 110 LBS | OXYGEN SATURATION: 98 % | SYSTOLIC BLOOD PRESSURE: 109 MMHG | HEIGHT: 64 IN

## 2024-07-25 DIAGNOSIS — M05.9 RHEUMATOID ARTHRITIS WITH RHEUMATOID FACTOR, UNSPECIFIED: ICD-10-CM

## 2024-07-25 PROCEDURE — 99214 OFFICE O/P EST MOD 30 MIN: CPT

## 2024-07-25 PROCEDURE — G2211 COMPLEX E/M VISIT ADD ON: CPT

## 2024-07-25 NOTE — PHYSICAL EXAM
[General Appearance - Alert] : alert [General Appearance - In No Acute Distress] : in no acute distress [Neck Appearance] : the appearance of the neck was normal [Neck Cervical Mass (___cm)] : no neck mass was observed [Jugular Venous Distention Increased] : there was no jugular-venous distention [Thyroid Diffuse Enlargement] : the thyroid was not enlarged [Thyroid Nodule] : there were no palpable thyroid nodules [Auscultation Breath Sounds / Voice Sounds] : lungs were clear to auscultation bilaterally [Heart Rate And Rhythm] : heart rate was normal and rhythm regular [Heart Sounds] : normal S1 and S2 [Heart Sounds Gallop] : no gallops [Murmurs] : no murmurs [Heart Sounds Pericardial Friction Rub] : no pericardial rub [Full Pulse] : the pedal pulses are present [Edema] : there was no peripheral edema [Bowel Sounds] : normal bowel sounds [Abdomen Soft] : soft [Abdomen Tenderness] : non-tender [Abdomen Mass (___ Cm)] : no abdominal mass palpated [Cervical Lymph Nodes Enlarged Posterior Bilaterally] : posterior cervical [Cervical Lymph Nodes Enlarged Anterior Bilaterally] : anterior cervical [Supraclavicular Lymph Nodes Enlarged Bilaterally] : supraclavicular [No CVA Tenderness] : no ~M costovertebral angle tenderness [No Spinal Tenderness] : no spinal tenderness [Abnormal Walk] : normal gait [Nail Clubbing] : no clubbing  or cyanosis of the fingernails [Motor Tone] : muscle strength and tone were normal [Skin Color & Pigmentation] : normal skin color and pigmentation [Skin Turgor] : normal skin turgor [] : no rash [Oriented To Time, Place, And Person] : oriented to person, place, and time [Impaired Insight] : insight and judgment were intact [Affect] : the affect was normal [FreeTextEntry1] : no swelling today- no overt synovitis -

## 2024-07-25 NOTE — HISTORY OF PRESENT ILLNESS
[___ Month(s) Ago] : [unfilled] month(s) ago [FreeTextEntry1] : on methotrexate 10 mg weekly and folic acid - denies any side effects feels well today with no complains had a tongue biopsy recently - pending f/u with ENT

## 2024-07-25 NOTE — ASSESSMENT
[FreeTextEntry1] : 1.CPPD arthropathy -  on methotrexate weekly and unknown medication (patient reports label fell off and does not know the name- possibly prednisone and or colchicine)  will call pharmacy to confirm 2. emphysema with old granulomatous disease - referral to pulmonology  I reviewed previous labs results with patients. Diagnosis and Prognosis discussed Continue with current medications medications refilled  F/u 2 months

## 2024-07-26 LAB
ALBUMIN SERPL ELPH-MCNC: 4 G/DL
ALP BLD-CCNC: 55 U/L
ALT SERPL-CCNC: 21 U/L
ANION GAP SERPL CALC-SCNC: 14 MMOL/L
AST SERPL-CCNC: 21 U/L
BASOPHILS # BLD AUTO: 0.05 K/UL
BASOPHILS NFR BLD AUTO: 0.5 %
BILIRUB SERPL-MCNC: 0.3 MG/DL
BUN SERPL-MCNC: 18 MG/DL
CALCIUM SERPL-MCNC: 9.3 MG/DL
CHLORIDE SERPL-SCNC: 103 MMOL/L
CO2 SERPL-SCNC: 23 MMOL/L
CREAT SERPL-MCNC: 0.83 MG/DL
CRP SERPL-MCNC: 7 MG/L
EGFR: 91 ML/MIN/1.73M2
EOSINOPHIL # BLD AUTO: 0.12 K/UL
EOSINOPHIL NFR BLD AUTO: 1.1 %
GLUCOSE SERPL-MCNC: 164 MG/DL
HCT VFR BLD CALC: 39.8 %
HGB BLD-MCNC: 12.7 G/DL
IMM GRANULOCYTES NFR BLD AUTO: 0.5 %
LYMPHOCYTES # BLD AUTO: 1.33 K/UL
LYMPHOCYTES NFR BLD AUTO: 12.2 %
MAN DIFF?: NORMAL
MCHC RBC-ENTMCNC: 28 PG
MCHC RBC-ENTMCNC: 31.9 GM/DL
MCV RBC AUTO: 87.7 FL
MONOCYTES # BLD AUTO: 0.89 K/UL
MONOCYTES NFR BLD AUTO: 8.1 %
NEUTROPHILS # BLD AUTO: 8.49 K/UL
NEUTROPHILS NFR BLD AUTO: 77.6 %
PLATELET # BLD AUTO: 219 K/UL
POTASSIUM SERPL-SCNC: 4.1 MMOL/L
PROT SERPL-MCNC: 5.9 G/DL
RBC # BLD: 4.54 M/UL
RBC # FLD: 15 %
SODIUM SERPL-SCNC: 140 MMOL/L
WBC # FLD AUTO: 10.94 K/UL

## 2024-08-05 ENCOUNTER — RX RENEWAL (OUTPATIENT)
Age: 74
End: 2024-08-05

## 2024-08-12 ENCOUNTER — APPOINTMENT (OUTPATIENT)
Dept: RADIOLOGY | Facility: IMAGING CENTER | Age: 74
End: 2024-08-12
Payer: MEDICARE

## 2024-08-12 ENCOUNTER — APPOINTMENT (OUTPATIENT)
Dept: INTERNAL MEDICINE | Facility: CLINIC | Age: 74
End: 2024-08-12
Payer: MEDICARE

## 2024-08-12 VITALS
TEMPERATURE: 98.2 F | DIASTOLIC BLOOD PRESSURE: 65 MMHG | BODY MASS INDEX: 17.75 KG/M2 | OXYGEN SATURATION: 97 % | SYSTOLIC BLOOD PRESSURE: 108 MMHG | HEART RATE: 77 BPM | WEIGHT: 104 LBS | HEIGHT: 64 IN

## 2024-08-12 DIAGNOSIS — E78.5 HYPERLIPIDEMIA, UNSPECIFIED: ICD-10-CM

## 2024-08-12 DIAGNOSIS — E11.9 TYPE 2 DIABETES MELLITUS W/OUT COMPLICATIONS: ICD-10-CM

## 2024-08-12 DIAGNOSIS — M54.41 LUMBAGO WITH SCIATICA, RIGHT SIDE: ICD-10-CM

## 2024-08-12 DIAGNOSIS — I10 ESSENTIAL (PRIMARY) HYPERTENSION: ICD-10-CM

## 2024-08-12 DIAGNOSIS — M05.9 RHEUMATOID ARTHRITIS WITH RHEUMATOID FACTOR, UNSPECIFIED: ICD-10-CM

## 2024-08-12 LAB — HBA1C MFR BLD HPLC: 7.4

## 2024-08-12 PROCEDURE — 83036 HEMOGLOBIN GLYCOSYLATED A1C: CPT | Mod: QW

## 2024-08-12 PROCEDURE — 73502 X-RAY EXAM HIP UNI 2-3 VIEWS: CPT | Mod: 26,RT

## 2024-08-12 PROCEDURE — 72100 X-RAY EXAM L-S SPINE 2/3 VWS: CPT | Mod: 26

## 2024-08-12 PROCEDURE — 99214 OFFICE O/P EST MOD 30 MIN: CPT | Mod: 25

## 2024-08-12 NOTE — ASSESSMENT
[FreeTextEntry1] : saw ENT 7/2/24 - did Bx under tongue - precancerous lesion - f/u surgery has appt 8/13/24  hx oral cancer 2004 rx with sx and chemo , followed by ENT yearly stable  - weight loss - no appetite - was on glucerna last year needs refill - lost weight since last visit cannot tolerate spicy food - weight was stable while on glucerna  6/27/24 tongue BX precancerous cells -adv surgery   lower back pain rad to rt hip  -hx ch lower back pain / radiculopathy - advised to avoid pulling pushing , lifting , carrying weights , bending etc - do warm compresses -back brace given - get xray L spine and Rt hip  - PT referral   Emphysema/Pulm nodule - saw pulm 5/21/24  CT lungs 5/15/24 -IMPRESSION: Stable ill-defined, upper lobe predominant calcified and noncalcified nodules on a background of mosaicism and emphysema, may be secondary to an old granulomatous process. Numerous old bilateral rib fractures, several demonstrating new sclerosis likely due to interval healing. PFT 5/21/24   hand swelling OA b/l / CTS  - saw rheum gave brace - and prednisone 10 mg qd - 5/16/24 - gave cholchicine 0.6 qd MRI hand dx Pseudo gout  -Rheumatoid - arthritis - started on methotrexate 2.5 4 tab q weeklyt with FA qd  - saw ortho recommended PT did Xray neg  - right dorsal 3rd MCPJ edema s/p medrol dose pack and meloxicam no help  -did PT now no help  -rx for diclofenac gel 1 % send   HX fall trip- 10/2023 - went to ER - did Xrays - no rib fractures - left shoulde and rt Chest wall pain on and off since - takes tylenol as needed motrin  c/o shoulder pain Left > rt  - PT referral placed hx left shoulder adhesive capsulitis   hx difficulty walking - 7/2022 - gait and stiffness rigidity r/o Parkinsons - no tremors saw -neurology 1/5/23 - recommended PT - doing PT not helping  DM - Hgbaic-6.6 11/23-->6.5 2/24 -->  7.4 5/24 --> poc aiv 8/12/24 --> 7.4 went up - non comp with diet and meds  -- endo Metformin 750 bid  and Trajanta 5 qd -meds refilled -free style avery send  - Monitor Accu-Cheks daily,fasting and post prandial 2 hrs, ophthalmology followup, podiatry follow up. Monitor for signs of hypoglycemia. Check hemoglobin A1c, urine for microalbumin. Continue 1800 kcal ADA diet, cut rice, pasta, sugar, sweet, soda, juices, exercise daily 30 minutes , loose weight. -losartan 25 - rx renewed 2/28/24   Atherosclerosis aorta on CT 7/2020 -on simvastatin 20 qhs rx renewed LDL-52 5/2024  .b/l knee pain s/p gel inj -right knee pain swelling saw ortho did tap dx pseudo gout s/p steroid shot 6/2020 -avoid squatting , kneeling and twisting - cont PT - walking with rollator - letter for access a ride given  HX GERD- saw GASTRO did CT chest / abd pelvis 7/2020 reviewed - lesion sclerotic left thigh unchanged from 2006 -on omeprazole 40 po daily 30 minutes prior to breakfast 1 month trial -Educated patient lifestyle modification, advice to avoid fried food, greasy oily and spicy foods, avoid tomato, orange, lemon , or caffeinated beverages. -Avoid reclining upto 3 hours after meals -Followup gastro  weight loss- underweight -pt lost weight has not been approved for glucerna - as not approved - last visit 3/2022 - restart rx glucerna- to see nutritionist  anemia- resolved - colonoscope 4/2019 and EGD - neg -oral cancer 2004 rx with sx and chemo , followed by ENT yearly - change PPI to omeprazole 40 daily from BID dosing  Osteoporosis - 2019 - followed by endo dexa  9/2023  - osteoporosis - started Zolendronic acid infusion 10/2023  - cont Calcium 500-600 mg daily -cont vitamin d 1000 units daily - do weight bearing exercises: walking with weights, stair climbing , weight training , jogging, aerobics etc -repeat test 2 yrs for a f/u  hypertension -102/57  -stable ( - discontinue HCTZ 10/2020 ) on losartan to 50 mg daily-change to 25 qd 2/28/24  - monitor Bp at home  BPH -saw 4/2023 -tamsulosin 0.4 mg po BID - increase frequency- d/c HCTZ - f/u urologist referral placed  HCM Prevnar 13 given 2017 pneumococcal 23 -2015 colonoscope- 4/17/19 due 7-10 yrs flu vaccine given 10/20/23 at [facility]  hep c-neg shringrex 5/2019 - second dose 8/2019. complete. Moderna 1/25/21, 2/22/21 , booster Moderna 10/13/21 , 6/2022 , 10/10/22.10/20/23

## 2024-08-12 NOTE — HISTORY OF PRESENT ILLNESS
[Spouse] : spouse [de-identified] : Patient is a 74 year y/o male with a history of diabetes, arthritis, GERD, anemia, BPH, HTN, HLD, Mouth cancer in 2004 (now resolved), with complaints of joint and back pain, and memory loss. Seen today for f/u on ch issues   saw ENT 7/2/24 - did Bx under tongue - precancerous lesion - f/u surgery has appt 8/13/24  hx oral cancer 2004 rx with sx and chemo , followed by ENT yearly stable  - weight loss - no appetite - was on glucerna last year needs refill - lost weight since last visit cannot tolerate spicy food - weight was stable while on glucerna off since last visit 3/2022   hand swelling OA b/l / CTS  - saw rheum gave brace - s/p prednisone 10 mg - on methotrexate 2.5  4 tab q wekly FA 1 qd  MRI hand Pseudo gout   h/o  Difficulty walking- since 7/2022  - was podiatrist -was told to get power wheel chair - he gave prescription for motarised wheel chair - but insurance did not cover  -no bowel or bladder incontinence - requesting motorized wheelchair rx  c/o pain in left thumb base pain and wrist pain > rt wrist , cannot open bottles - saw ortho recommended PT dx arthritis  -left shoulder pain - knee pain -saw rheum 3/2022  - finished PT - pain better flares on and off - uses local pain cream -Voltaren gel   h/o DM x 10 yrs  -saw Endo - AIC 6.6 11/23 - his metformin was changed to 1000 am 500 PN QD , - continue Tradjenta 5mg daily  -saw eye doctor -3/22/23 - stable  - retina specialist in past - did eye 1/3rd? hole in retina, s/p lazer rx - cataract rt eye was advising sx  -on statin 20 mg simvastatin, on arb losartan 50  - saw podiatrist Dx with feet neuropathy recommended diabetic shoes   GERD- still acting up - saw gastro did CT abd /pelvis/ chest 7/2020-reviewed with pt  -saw gastro- repeated EGd neg mild gastritis and colonoscope 4/2019 neg - due 7 yrs  -on omeprazole 40 ch twice daily , slight better   right knee pain - ongoing  - swelling saw ortho 6/2020 did arthrocentesis- did steroid shot  - dx as arthritis - pseudogout - finished therapy 2 x week - walking with rollator   HTN- on HCTZ 25 , losartan 50 , home readings good- 120/70  - no cp or dizzy spells   c/o lower back and b/l knee pain seeing ortho got B/l gel injections  - feels slight better  lower back pain - needs rx for brace - feels pain   Anemia- taking Glucerna - weight loss still on going as per pt needs rx   saw cardio did EKG- did stress test 11/2021 results pending has appt with Dr roman for results  - Did colonoscope 4/17/19 due repeat 7 yrs   saw urologist 4/2023 ok BPH on tamsulocin 0.4 BID

## 2024-08-12 NOTE — HISTORY OF PRESENT ILLNESS
[Spouse] : spouse [de-identified] : Patient is a 74 year y/o male with a history of diabetes, arthritis, GERD, anemia, BPH, HTN, HLD, Mouth cancer in 2004 (now resolved), with complaints of joint and back pain, and memory loss. Seen today for f/u on ch issues   saw ENT 7/2/24 - did Bx under tongue - precancerous lesion - f/u surgery has appt 8/13/24  hx oral cancer 2004 rx with sx and chemo , followed by ENT yearly stable  - weight loss - no appetite - was on glucerna last year needs refill - lost weight since last visit cannot tolerate spicy food - weight was stable while on glucerna off since last visit 3/2022   hand swelling OA b/l / CTS  - saw rheum gave brace - s/p prednisone 10 mg - on methotrexate 2.5  4 tab q wekly FA 1 qd  MRI hand Pseudo gout   h/o  Difficulty walking- since 7/2022  - was podiatrist -was told to get power wheel chair - he gave prescription for motarised wheel chair - but insurance did not cover  -no bowel or bladder incontinence - requesting motorized wheelchair rx  c/o pain in left thumb base pain and wrist pain > rt wrist , cannot open bottles - saw ortho recommended PT dx arthritis  -left shoulder pain - knee pain -saw rheum 3/2022  - finished PT - pain better flares on and off - uses local pain cream -Voltaren gel   h/o DM x 10 yrs  -saw Endo - AIC 6.6 11/23 - his metformin was changed to 1000 am 500 PN QD , - continue Tradjenta 5mg daily  -saw eye doctor -3/22/23 - stable  - retina specialist in past - did eye 1/3rd? hole in retina, s/p lazer rx - cataract rt eye was advising sx  -on statin 20 mg simvastatin, on arb losartan 50  - saw podiatrist Dx with feet neuropathy recommended diabetic shoes   GERD- still acting up - saw gastro did CT abd /pelvis/ chest 7/2020-reviewed with pt  -saw gastro- repeated EGd neg mild gastritis and colonoscope 4/2019 neg - due 7 yrs  -on omeprazole 40 ch twice daily , slight better   right knee pain - ongoing  - swelling saw ortho 6/2020 did arthrocentesis- did steroid shot  - dx as arthritis - pseudogout - finished therapy 2 x week - walking with rollator   HTN- on HCTZ 25 , losartan 50 , home readings good- 120/70  - no cp or dizzy spells   c/o lower back and b/l knee pain seeing ortho got B/l gel injections  - feels slight better  lower back pain - needs rx for brace - feels pain   Anemia- taking Glucerna - weight loss still on going as per pt needs rx   saw cardio did EKG- did stress test 11/2021 results pending has appt with Dr roman for results  - Did colonoscope 4/17/19 due repeat 7 yrs   saw urologist 4/2023 ok BPH on tamsulocin 0.4 BID

## 2024-08-12 NOTE — PHYSICAL EXAM
[No Acute Distress] : no acute distress [No Respiratory Distress] : no respiratory distress  [Normal Rate] : normal rate  [Regular Rhythm] : with a regular rhythm [Normal] : soft, non-tender, non-distended, no masses palpated, no HSM and normal bowel sounds

## 2024-08-12 NOTE — ASSESSMENT
[FreeTextEntry1] : saw ENT 7/2/24 - did Bx under tongue - precancerous lesion - f/u surgery has appt 8/13/24  hx oral cancer 2004 rx with sx and chemo , followed by ENT yearly stable  - weight loss - no appetite - was on glucerna last year needs refill - lost weight since last visit cannot tolerate spicy food - weight was stable while on glucerna  6/27/24 tongue BX precancerous cells -adv surgery   lower back pain rad to rt hip  -hx ch lower back pain / radiculopathy - advised to avoid pulling pushing , lifting , carrying weights , bending etc - do warm compresses -back brace given - get xray L spine and Rt hip  - PT referral   Emphysema/Pulm nodule - saw pulm 5/21/24  CT lungs 5/15/24 -IMPRESSION: Stable ill-defined, upper lobe predominant calcified and noncalcified nodules on a background of mosaicism and emphysema, may be secondary to an old granulomatous process. Numerous old bilateral rib fractures, several demonstrating new sclerosis likely due to interval healing. PFT 5/21/24   hand swelling OA b/l / CTS  - saw rheum gave brace - and prednisone 10 mg qd - 5/16/24 - gave cholchicine 0.6 qd MRI hand dx Pseudo gout  -Rheumatoid - arthritis - started on methotrexate 2.5 4 tab q weeklyt with FA qd  - saw ortho recommended PT did Xray neg  - right dorsal 3rd MCPJ edema s/p medrol dose pack and meloxicam no help  -did PT now no help  -rx for diclofenac gel 1 % send   HX fall trip- 10/2023 - went to ER - did Xrays - no rib fractures - left shoulde and rt Chest wall pain on and off since - takes tylenol as needed motrin  c/o shoulder pain Left > rt  - PT referral placed hx left shoulder adhesive capsulitis   hx difficulty walking - 7/2022 - gait and stiffness rigidity r/o Parkinsons - no tremors saw -neurology 1/5/23 - recommended PT - doing PT not helping  DM - Hgbaic-6.6 11/23-->6.5 2/24 -->  7.4 5/24 --> poc aiv 8/12/24 --> 7.4 went up - non comp with diet and meds  -- endo Metformin 750 bid  and Trajanta 5 qd -meds refilled -free style avery send  - Monitor Accu-Cheks daily,fasting and post prandial 2 hrs, ophthalmology followup, podiatry follow up. Monitor for signs of hypoglycemia. Check hemoglobin A1c, urine for microalbumin. Continue 1800 kcal ADA diet, cut rice, pasta, sugar, sweet, soda, juices, exercise daily 30 minutes , loose weight. -losartan 25 - rx renewed 2/28/24   Atherosclerosis aorta on CT 7/2020 -on simvastatin 20 qhs rx renewed LDL-52 5/2024  .b/l knee pain s/p gel inj -right knee pain swelling saw ortho did tap dx pseudo gout s/p steroid shot 6/2020 -avoid squatting , kneeling and twisting - cont PT - walking with rollator - letter for access a ride given  HX GERD- saw GASTRO did CT chest / abd pelvis 7/2020 reviewed - lesion sclerotic left thigh unchanged from 2006 -on omeprazole 40 po daily 30 minutes prior to breakfast 1 month trial -Educated patient lifestyle modification, advice to avoid fried food, greasy oily and spicy foods, avoid tomato, orange, lemon , or caffeinated beverages. -Avoid reclining upto 3 hours after meals -Followup gastro  weight loss- underweight -pt lost weight has not been approved for glucerna - as not approved - last visit 3/2022 - restart rx glucerna- to see nutritionist  anemia- resolved - colonoscope 4/2019 and EGD - neg -oral cancer 2004 rx with sx and chemo , followed by ENT yearly - change PPI to omeprazole 40 daily from BID dosing  Osteoporosis - 2019 - followed by endo dexa  9/2023  - osteoporosis - started Zolendronic acid infusion 10/2023  - cont Calcium 500-600 mg daily -cont vitamin d 1000 units daily - do weight bearing exercises: walking with weights, stair climbing , weight training , jogging, aerobics etc -repeat test 2 yrs for a f/u  hypertension -102/57  -stable ( - discontinue HCTZ 10/2020 ) on losartan to 50 mg daily-change to 25 qd 2/28/24  - monitor Bp at home  BPH -saw 4/2023 -tamsulosin 0.4 mg po BID - increase frequency- d/c HCTZ - f/u urologist referral placed  HCM Prevnar 13 given 2017 pneumococcal 23 -2015 colonoscope- 4/17/19 due 7-10 yrs flu vaccine given 10/20/23 at [facility]  hep c-neg shringrex 5/2019 - second dose 8/2019. complete. Moderna 1/25/21, 2/22/21 , booster Moderna 10/13/21 , 6/2022 , 10/10/22.10/20/23

## 2024-08-13 ENCOUNTER — APPOINTMENT (OUTPATIENT)
Dept: OTOLARYNGOLOGY | Facility: CLINIC | Age: 74
End: 2024-08-13
Payer: MEDICARE

## 2024-08-13 DIAGNOSIS — K14.9 DISEASE OF TONGUE, UNSPECIFIED: ICD-10-CM

## 2024-08-13 DIAGNOSIS — K13.70 UNSPECIFIED LESIONS OF ORAL MUCOSA: ICD-10-CM

## 2024-08-13 DIAGNOSIS — Z85.819 PERSONAL HISTORY OF MALIGNANT NEOPLASM OF UNSPECIFIED SITE OF LIP, ORAL CAVITY, AND PHARYNX: ICD-10-CM

## 2024-08-13 PROCEDURE — 99214 OFFICE O/P EST MOD 30 MIN: CPT | Mod: 25

## 2024-08-13 PROCEDURE — 31575 DIAGNOSTIC LARYNGOSCOPY: CPT

## 2024-08-13 NOTE — PHYSICAL EXAM
[Normal] : no rashes [Laryngoscopy Performed] : laryngoscopy was performed, see procedure section for findings [de-identified] : Posttreatment changes.  No LAD.  There is some fullness overlying the right parotid soft tissues, no TTP. [FreeTextEntry1] : Posttreatment changes, the mandible is quite atrophic, there is an area of thickened mucosa along the left buccal area, well defined, no ulceration, no TTP, no bleeding.  The area of irritation along the right maxillary alveolus is an area where there may be a retained molar or its root?  There are two leukoplakic lesions along the anterior ventral tongue which are stable - largest one is approx. 4mm.

## 2024-08-13 NOTE — PROCEDURE
[Trismus] : trismus preventing mirror examination [None] : none [Flexible Endoscope] : examined with the flexible endoscope [Serial Number: ___] : Serial Number: [unfilled] [de-identified] : After patient consents, a FFL is performed.  No lesions in the NPx, OPx, HPx or larynx.  Expected posttreatment changes, VC are mobile, airway patent.

## 2024-08-13 NOTE — HISTORY OF PRESENT ILLNESS
[de-identified] : 74 year old male Hx of oral cancer s/p composite resection and reconstruction using RFFF and FFF followed by RT in 2004. History of biopsy of mandibular gingiva on 03/27/2020 showing no dysplasia. Last CT chest/neck 07/25/2020. PT has new dentures about 2 months ago but not a good fit.  Continues to follow up to oral surgery.  Reports noticing 2 white lesions on tongue possible irritate from the denture s/p tongue biopsy 6/28/24- hyperparakerartosis and acanthosis, mild dysplasia.  Pt is here to f/u on the lesion along the tongue, pt has no c.o and no pain, no bleeding, but sometime burning sensation at the tongue area with spicy food.  Denies dysphagia, odynophagia, aspirations, dyspnea, dysphonia

## 2024-08-15 ENCOUNTER — TRANSCRIPTION ENCOUNTER (OUTPATIENT)
Age: 74
End: 2024-08-15

## 2024-08-29 RX ORDER — PREDNISONE 10 MG/1
10 TABLET ORAL
Qty: 20 | Refills: 1 | Status: ACTIVE | COMMUNITY
Start: 2024-08-29 | End: 1900-01-01

## 2024-09-05 ENCOUNTER — APPOINTMENT (OUTPATIENT)
Age: 74
End: 2024-09-05
Payer: MEDICAID

## 2024-09-05 PROCEDURE — CLD6: CUSTOM

## 2024-09-05 PROCEDURE — CUD6: CUSTOM

## 2024-09-11 ENCOUNTER — APPOINTMENT (OUTPATIENT)
Dept: ENDOCRINOLOGY | Facility: CLINIC | Age: 74
End: 2024-09-11
Payer: MEDICARE

## 2024-09-11 VITALS
BODY MASS INDEX: 17.93 KG/M2 | DIASTOLIC BLOOD PRESSURE: 50 MMHG | SYSTOLIC BLOOD PRESSURE: 90 MMHG | HEIGHT: 64 IN | OXYGEN SATURATION: 98 % | HEART RATE: 70 BPM | WEIGHT: 105 LBS

## 2024-09-11 DIAGNOSIS — M81.0 AGE-RELATED OSTEOPOROSIS W/OUT CURRENT PATHOLOGICAL FRACTURE: ICD-10-CM

## 2024-09-11 DIAGNOSIS — E11.9 TYPE 2 DIABETES MELLITUS W/OUT COMPLICATIONS: ICD-10-CM

## 2024-09-11 DIAGNOSIS — E78.5 HYPERLIPIDEMIA, UNSPECIFIED: ICD-10-CM

## 2024-09-11 DIAGNOSIS — I10 ESSENTIAL (PRIMARY) HYPERTENSION: ICD-10-CM

## 2024-09-11 DIAGNOSIS — E11.65 TYPE 2 DIABETES MELLITUS WITH HYPERGLYCEMIA: ICD-10-CM

## 2024-09-11 PROCEDURE — G2211 COMPLEX E/M VISIT ADD ON: CPT

## 2024-09-11 PROCEDURE — 99215 OFFICE O/P EST HI 40 MIN: CPT

## 2024-09-11 RX ADMIN — ZOLEDRONIC ACID 0 MG/100ML: 5 INJECTION, SOLUTION INTRAVENOUS at 00:00

## 2024-10-16 ENCOUNTER — APPOINTMENT (OUTPATIENT)
Age: 74
End: 2024-10-16
Payer: MEDICAID

## 2024-10-16 PROCEDURE — CLD6: CUSTOM

## 2024-10-16 PROCEDURE — CUD6: CUSTOM

## 2024-10-17 ENCOUNTER — APPOINTMENT (OUTPATIENT)
Dept: RHEUMATOLOGY | Facility: CLINIC | Age: 74
End: 2024-10-17
Payer: MEDICARE

## 2024-10-17 VITALS
DIASTOLIC BLOOD PRESSURE: 72 MMHG | WEIGHT: 110 LBS | SYSTOLIC BLOOD PRESSURE: 129 MMHG | BODY MASS INDEX: 18.78 KG/M2 | HEIGHT: 64 IN | OXYGEN SATURATION: 98 % | HEART RATE: 81 BPM

## 2024-10-17 DIAGNOSIS — M11.20 OTHER CHONDROCALCINOSIS, UNSPECIFIED SITE: ICD-10-CM

## 2024-10-17 PROCEDURE — 99214 OFFICE O/P EST MOD 30 MIN: CPT

## 2024-10-17 PROCEDURE — G2211 COMPLEX E/M VISIT ADD ON: CPT

## 2024-10-17 RX ORDER — PREDNISONE 10 MG/1
10 TABLET ORAL
Qty: 30 | Refills: 0 | Status: ACTIVE | COMMUNITY
Start: 2024-10-17 | End: 1900-01-01

## 2024-10-18 ENCOUNTER — RX RENEWAL (OUTPATIENT)
Age: 74
End: 2024-10-18

## 2024-10-18 LAB
ALBUMIN SERPL ELPH-MCNC: 4 G/DL
ALP BLD-CCNC: 57 U/L
ALT SERPL-CCNC: 25 U/L
ANION GAP SERPL CALC-SCNC: 12 MMOL/L
AST SERPL-CCNC: 20 U/L
BASOPHILS # BLD AUTO: 0.04 K/UL
BASOPHILS NFR BLD AUTO: 0.4 %
BILIRUB SERPL-MCNC: 0.2 MG/DL
BUN SERPL-MCNC: 15 MG/DL
CALCIUM SERPL-MCNC: 9.6 MG/DL
CHLORIDE SERPL-SCNC: 102 MMOL/L
CO2 SERPL-SCNC: 26 MMOL/L
CREAT SERPL-MCNC: 0.76 MG/DL
CRP SERPL-MCNC: <3 MG/L
EGFR: 94 ML/MIN/1.73M2
EOSINOPHIL # BLD AUTO: 0.12 K/UL
EOSINOPHIL NFR BLD AUTO: 1.3 %
ERYTHROCYTE [SEDIMENTATION RATE] IN BLOOD BY WESTERGREN METHOD: 5 MM/HR
GLUCOSE SERPL-MCNC: 197 MG/DL
HCT VFR BLD CALC: 43.6 %
HGB BLD-MCNC: 13.7 G/DL
IMM GRANULOCYTES NFR BLD AUTO: 0.8 %
LYMPHOCYTES # BLD AUTO: 0.89 K/UL
LYMPHOCYTES NFR BLD AUTO: 9.6 %
MAN DIFF?: NORMAL
MCHC RBC-ENTMCNC: 28.4 PG
MCHC RBC-ENTMCNC: 31.4 GM/DL
MCV RBC AUTO: 90.3 FL
MONOCYTES # BLD AUTO: 0.58 K/UL
MONOCYTES NFR BLD AUTO: 6.3 %
NEUTROPHILS # BLD AUTO: 7.53 K/UL
NEUTROPHILS NFR BLD AUTO: 81.6 %
PLATELET # BLD AUTO: 208 K/UL
POTASSIUM SERPL-SCNC: 4.6 MMOL/L
PROT SERPL-MCNC: 5.7 G/DL
RBC # BLD: 4.83 M/UL
RBC # FLD: 14.5 %
SODIUM SERPL-SCNC: 140 MMOL/L
WBC # FLD AUTO: 9.23 K/UL

## 2024-10-20 ENCOUNTER — NON-APPOINTMENT (OUTPATIENT)
Age: 74
End: 2024-10-20

## 2024-10-23 DIAGNOSIS — M19.049 PRIMARY OSTEOARTHRITIS, UNSPECIFIED HAND: ICD-10-CM

## 2024-11-08 ENCOUNTER — APPOINTMENT (OUTPATIENT)
Dept: OTOLARYNGOLOGY | Facility: CLINIC | Age: 74
End: 2024-11-08

## 2024-11-29 ENCOUNTER — NON-APPOINTMENT (OUTPATIENT)
Age: 74
End: 2024-11-29

## 2024-12-02 ENCOUNTER — RX RENEWAL (OUTPATIENT)
Age: 74
End: 2024-12-02

## 2024-12-02 ENCOUNTER — APPOINTMENT (OUTPATIENT)
Dept: INTERNAL MEDICINE | Facility: CLINIC | Age: 74
End: 2024-12-02
Payer: MEDICARE

## 2024-12-02 ENCOUNTER — APPOINTMENT (OUTPATIENT)
Dept: RHEUMATOLOGY | Facility: CLINIC | Age: 74
End: 2024-12-02
Payer: MEDICARE

## 2024-12-02 VITALS
HEART RATE: 120 BPM | TEMPERATURE: 98.7 F | BODY MASS INDEX: 18.44 KG/M2 | WEIGHT: 108 LBS | OXYGEN SATURATION: 97 % | DIASTOLIC BLOOD PRESSURE: 73 MMHG | SYSTOLIC BLOOD PRESSURE: 152 MMHG | HEIGHT: 64 IN

## 2024-12-02 VITALS
SYSTOLIC BLOOD PRESSURE: 134 MMHG | OXYGEN SATURATION: 98 % | HEART RATE: 69 BPM | RESPIRATION RATE: 16 BRPM | DIASTOLIC BLOOD PRESSURE: 69 MMHG

## 2024-12-02 VITALS
DIASTOLIC BLOOD PRESSURE: 64 MMHG | SYSTOLIC BLOOD PRESSURE: 126 MMHG | HEART RATE: 93 BPM | TEMPERATURE: 98.2 F | OXYGEN SATURATION: 97 % | RESPIRATION RATE: 16 BRPM

## 2024-12-02 VITALS — DIASTOLIC BLOOD PRESSURE: 56 MMHG | SYSTOLIC BLOOD PRESSURE: 115 MMHG

## 2024-12-02 DIAGNOSIS — Z00.00 ENCOUNTER FOR GENERAL ADULT MEDICAL EXAMINATION W/OUT ABNORMAL FINDINGS: ICD-10-CM

## 2024-12-02 PROCEDURE — 96374 THER/PROPH/DIAG INJ IV PUSH: CPT

## 2024-12-02 PROCEDURE — G0439: CPT

## 2024-12-02 RX ORDER — DICLOFENAC SODIUM 10 MG/G
1 GEL TOPICAL
Qty: 1 | Refills: 0 | Status: ACTIVE | COMMUNITY
Start: 2024-12-02 | End: 1900-01-01

## 2024-12-03 ENCOUNTER — EMERGENCY (EMERGENCY)
Facility: HOSPITAL | Age: 74
LOS: 1 days | Discharge: HOME | End: 2024-12-03
Attending: EMERGENCY MEDICINE | Admitting: EMERGENCY MEDICINE
Payer: MEDICARE

## 2024-12-03 ENCOUNTER — APPOINTMENT (OUTPATIENT)
Dept: PULMONOLOGY | Facility: CLINIC | Age: 74
End: 2024-12-03
Payer: MEDICARE

## 2024-12-03 ENCOUNTER — APPOINTMENT (OUTPATIENT)
Dept: OTOLARYNGOLOGY | Facility: CLINIC | Age: 74
End: 2024-12-03
Payer: MEDICARE

## 2024-12-03 VITALS
RESPIRATION RATE: 19 BRPM | DIASTOLIC BLOOD PRESSURE: 63 MMHG | SYSTOLIC BLOOD PRESSURE: 130 MMHG | OXYGEN SATURATION: 96 % | HEART RATE: 109 BPM | TEMPERATURE: 98 F

## 2024-12-03 VITALS
OXYGEN SATURATION: 97 % | SYSTOLIC BLOOD PRESSURE: 125 MMHG | HEART RATE: 98 BPM | WEIGHT: 108 LBS | DIASTOLIC BLOOD PRESSURE: 67 MMHG | BODY MASS INDEX: 18.44 KG/M2 | HEIGHT: 64 IN

## 2024-12-03 VITALS
RESPIRATION RATE: 18 BRPM | OXYGEN SATURATION: 96 % | HEART RATE: 106 BPM | DIASTOLIC BLOOD PRESSURE: 70 MMHG | WEIGHT: 106.04 LBS | SYSTOLIC BLOOD PRESSURE: 105 MMHG

## 2024-12-03 DIAGNOSIS — K14.9 DISEASE OF TONGUE, UNSPECIFIED: ICD-10-CM

## 2024-12-03 DIAGNOSIS — Z85.819 PERSONAL HISTORY OF MALIGNANT NEOPLASM OF UNSPECIFIED SITE OF LIP, ORAL CAVITY, AND PHARYNX: ICD-10-CM

## 2024-12-03 DIAGNOSIS — R05.1 ACUTE COUGH: ICD-10-CM

## 2024-12-03 DIAGNOSIS — K13.70 UNSPECIFIED LESIONS OF ORAL MUCOSA: ICD-10-CM

## 2024-12-03 DIAGNOSIS — J90 PLEURAL EFFUSION, NOT ELSEWHERE CLASSIFIED: ICD-10-CM

## 2024-12-03 DIAGNOSIS — Z98.49 CATARACT EXTRACTION STATUS, UNSPECIFIED EYE: Chronic | ICD-10-CM

## 2024-12-03 PROBLEM — J15.9 COMMUNITY ACQUIRED BACTERIAL PNEUMONIA: Status: ACTIVE | Noted: 2024-12-03

## 2024-12-03 PROBLEM — S22.49XA MULTIPLE RIB FRACTURES: Status: ACTIVE | Noted: 2024-12-03

## 2024-12-03 PROCEDURE — 99285 EMERGENCY DEPT VISIT HI MDM: CPT | Mod: GC,25

## 2024-12-03 PROCEDURE — 76604 US EXAM CHEST: CPT

## 2024-12-03 PROCEDURE — 99214 OFFICE O/P EST MOD 30 MIN: CPT | Mod: 25

## 2024-12-03 PROCEDURE — 99285 EMERGENCY DEPT VISIT HI MDM: CPT

## 2024-12-03 PROCEDURE — 76604 US EXAM CHEST: CPT | Mod: 26,GC

## 2024-12-03 PROCEDURE — 74176 CT ABD & PELVIS W/O CONTRAST: CPT | Mod: 26,MC

## 2024-12-03 PROCEDURE — 31575 DIAGNOSTIC LARYNGOSCOPY: CPT

## 2024-12-03 PROCEDURE — 93010 ELECTROCARDIOGRAM REPORT: CPT

## 2024-12-03 PROCEDURE — 99215 OFFICE O/P EST HI 40 MIN: CPT | Mod: 25

## 2024-12-03 PROCEDURE — 71250 CT THORAX DX C-: CPT | Mod: 26,MC

## 2024-12-03 RX ORDER — BENZONATATE 100 MG/1
100 CAPSULE ORAL 3 TIMES DAILY
Qty: 90 | Refills: 0 | Status: COMPLETED | COMMUNITY
Start: 2024-12-02 | End: 2024-12-03

## 2024-12-03 RX ORDER — AZITHROMYCIN 250 MG/1
1 TABLET, FILM COATED ORAL
Qty: 4 | Refills: 0
Start: 2024-12-03 | End: 2024-12-06

## 2024-12-03 RX ORDER — AMOXICILLIN/POTASSIUM CLAV 250-125 MG
1 TABLET ORAL ONCE
Refills: 0 | Status: COMPLETED | OUTPATIENT
Start: 2024-12-03 | End: 2024-12-03

## 2024-12-03 RX ORDER — FLUTICASONE PROPIONATE 50 UG/1
50 SPRAY, METERED NASAL
Refills: 0 | Status: ACTIVE | COMMUNITY

## 2024-12-03 RX ORDER — CETIRIZINE HYDROCHLORIDE 10 MG/1
10 TABLET, COATED ORAL
Refills: 0 | Status: ACTIVE | COMMUNITY

## 2024-12-03 RX ORDER — LIDOCAINE 40 MG/G
1 CREAM TOPICAL
Qty: 2 | Refills: 0
Start: 2024-12-03 | End: 2024-12-06

## 2024-12-03 RX ORDER — OXYCODONE HYDROCHLORIDE 30 MG/1
1 TABLET ORAL
Qty: 9 | Refills: 0
Start: 2024-12-03 | End: 2024-12-05

## 2024-12-03 RX ORDER — CELECOXIB 100 MG/1
100 CAPSULE ORAL
Refills: 0 | Status: ACTIVE | COMMUNITY

## 2024-12-03 RX ORDER — IPRATROPIUM BROMIDE AND ALBUTEROL SULFATE 2.5; .5 MG/3ML; MG/3ML
3 SOLUTION RESPIRATORY (INHALATION) ONCE
Refills: 0 | Status: DISCONTINUED | OUTPATIENT
Start: 2024-12-03 | End: 2024-12-03

## 2024-12-03 RX ORDER — OXYCODONE HYDROCHLORIDE 30 MG/1
5 TABLET ORAL ONCE
Refills: 0 | Status: DISCONTINUED | OUTPATIENT
Start: 2024-12-03 | End: 2024-12-03

## 2024-12-03 RX ORDER — AZITHROMYCIN 250 MG/1
500 TABLET, FILM COATED ORAL ONCE
Refills: 0 | Status: COMPLETED | OUTPATIENT
Start: 2024-12-03 | End: 2024-12-03

## 2024-12-03 RX ORDER — AMOXICILLIN/POTASSIUM CLAV 250-125 MG
875 TABLET ORAL
Qty: 10 | Refills: 0
Start: 2024-12-03 | End: 2024-12-07

## 2024-12-03 RX ADMIN — Medication 1 TABLET(S): at 19:29

## 2024-12-03 RX ADMIN — AZITHROMYCIN 500 MILLIGRAM(S): 250 TABLET, FILM COATED ORAL at 19:29

## 2024-12-03 RX ADMIN — OXYCODONE HYDROCHLORIDE 5 MILLIGRAM(S): 30 TABLET ORAL at 19:29

## 2024-12-03 NOTE — PROVIDER CONTACT NOTE (OTHER) - ASSESSMENT
Pt is discharged. Met with pt, he asked to call a taxi. SW spoke with his wife at [phone], she asked to send pt via taxi, she said she will pay $9;85 RAMANDEEP arranged [email or web address] taxi [phone]. RAMANDEEP put him in the taxi with no incidents. Pt is discharged. Met with pt, RAMANDEEP offered to call an [employer], but he did not want to wait. So, he asked to call a taxi. RAMANDEEP spoke with his wife at [phone], she asked to send pt via taxi, she said she will pay $9;85 RAMANDEEP arranged [employer] taxi [phone]. RAMANDEEP put him in the taxi with no incidents.

## 2024-12-03 NOTE — ED ADULT NURSE REASSESSMENT NOTE - NS ED NURSE REASSESS COMMENT FT1
Received patient in room 27, awaiting CT scan. Patient resting in stretcher, no distress noted. Patient is A&Ox3, airway patent, breathing unlabored and even. Side rails up and safety maintained. Fall precaution in place. Call bells within reach. Family at the bedside.

## 2024-12-03 NOTE — ED ADULT NURSE NOTE - OBJECTIVE STATEMENT
pt received to spot 27. pt is AxO 3 and ambulatory. pt c/o right sided chest pain, diagnosed with right sided rib fractures. respirations even and unlabored. pt denies headaches, dizziness, n/v/d, abdominal pain, SOB, chest pain, or fever like symptoms. Pt normal sinus on the tele monitor. Report given to primary RN JULES. Hx of HTN, DM. Plan of care ongoing.
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
## 2024-12-03 NOTE — ED ADULT TRIAGE NOTE - CHIEF COMPLAINT QUOTE
Pt brought in by EMS for cough, with right sided chest pain. pt had been diagnosed by MD for right sided multiple rib fractures. pt denies headaches, dizziness, SOB. past hx of DM, HTN, HLD. Denies blood thinners.

## 2024-12-03 NOTE — ED PROVIDER NOTE - CLINICAL SUMMARY MEDICAL DECISION MAKING FREE TEXT BOX
74 year y/o male with a history of RA on methotrexate, diabetes, arthritis, GERD, anemia, BPH, HTN, HLD, Mouth cancer in 2004 (now resolved), presenting to ED with complaints of cough and shortness of breath.  Seen by pulmonology today and sent in for concerns of pathologic fracture. 74 year y/o male with a history of RA on methotrexate, diabetes, arthritis, GERD, anemia, BPH, HTN, HLD, Mouth cancer in 2004 (now resolved), presenting to ED with complaints of cough and shortness of breath.  Seen by pulmonology today and sent in for concerns of pathologic fracture. history of Stable ill-defined, upper lobe predominant calcified and noncalcified nodules on and emphysema. Patient has bilateral wheezing with decreased breath sound on the R. Abdominal mass that patient reports is chronic, no pain to palpation. Pulm critical care at bedside shortly after arrival as they were informed by patients pulmonologist. Spoke with pulm crit care will order non con CT chest and abdomen and pelvis to eval for malignancy in the setting of pathologic fracture.
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
## 2024-12-03 NOTE — ED PROVIDER NOTE - PHYSICAL EXAMINATION
GENERAL: Awake, alert, mildly increased wob  HEENT: NC/AT, moist mucous membranes,   LUNGS: bilateral wheezing with decreased breath sounds on R  CARDIAC: RRR, no m/r/g  ABDOMEN: protruding abdominal mass, soft, non-tender  BACK: No midline spinal tenderness,   NEURO: A&Ox3. Moving all extremities.  SKIN: Warm and dry.   PSYCH: Normal affect.

## 2024-12-03 NOTE — ED PROVIDER NOTE - NSFOLLOWUPINSTRUCTIONS_ED_ALL_ED_FT
Community-Acquired Pneumonia, Adult  Pneumonia is a lung infection that causes inflammation and the buildup of mucus and fluids in the lungs. This may cause coughing and difficulty breathing. Community-acquired pneumonia is pneumonia that develops in people who are not, and have not recently been, in a hospital or other health care facility.    Usually, pneumonia develops as a result of an illness that is caused by a virus, such as the common cold and the flu (influenza). It can also be caused by bacteria or fungi. While the common cold and influenza can pass from person to person (are contagious), pneumonia itself is not considered contagious.    What are the causes?  The human body, showing how a virus travels from the air to a person's lungs.   This condition may be caused by:  Viruses.  Bacteria.  Fungi.  What increases the risk?  The following factors may make you more likely to develop this condition:  Being over age 65 or having certain medical conditions, such as:  A long-term (chronic) disease, such as: chronic obstructive pulmonary disease (COPD), asthma, heart failure, diabetes, or kidney disease.  A condition that increases the risk of breathing in (aspirating) mucus and other fluids from your mouth and nose.  A weakened body defense system (immune system).  Having had your spleen removed (splenectomy). The spleen is the organ that helps fight germs and infections.  Not cleaning your teeth and gums well (poor dental hygiene).  Using tobacco products.  Traveling to places where germs that cause pneumonia are present or being near certain animals or animal habitats that could have germs that cause pneumonia.  What are the signs or symptoms?  Symptoms of this condition include:  A dry cough or a wet (productive) cough.  A fever, sweating, or chills.  Chest pain, especially when breathing deeply or coughing.  Fast breathing, difficulty breathing, or shortness of breath.  Tiredness (fatigue) and muscle aches.  How is this diagnosed?  An outline of a person's body and an image of an X-ray of the person's chest.   This condition may be diagnosed based on your medical history or a physical exam. You may also have tests, including:  Imaging, such as a chest X-ray or lung ultrasound.  Tests of:  The level of oxygen and other gases in your blood.  Mucus from your lungs (sputum).  Fluid around your lungs (pleural fluid).  Your urine.  How is this treated?  Treatment for this condition depends on many factors, such as the cause of your pneumonia, your medicines, and other medical conditions that you have.    For most adults, pneumonia may be treated at home. In some cases, treatment must happen in a hospital and may include:  Medicines that are given by mouth (orally) or through an IV, including:  Antibiotic medicines, if bacteria caused the pneumonia.  Medicines that kill viruses (antiviral medicines), if a virus caused the pneumonia.  Oxygen therapy.  Severe pneumonia, although rare, may require the following treatments:  Mechanical ventilation.This procedure uses a machine to help you breathe if you cannot breathe well on your own or maintain a safe level of blood oxygen.  Thoracentesis. This procedure removes any buildup of pleural fluid to help with breathing.  Follow these instructions at home:  A comparison of three sample cups showing dark yellow, yellow, and pale yellow urine.  Medicines    Take over-the-counter and prescription medicines only as told by your health care provider.  Take cough medicine only if you have trouble sleeping. Cough medicine can prevent your body from removing mucus from your lungs.  If you were prescribed antibiotics, take them as told by your health care provider. Do not stop taking the antibiotic even if you start to feel better.  Lifestyle    A sign showing that a person should not drink alcohol.  A sign showing that a person should not smoke.  Do not drink alcohol.  Do not use any products that contain nicotine or tobacco. These products include cigarettes, chewing tobacco, and vaping devices, such as e-cigarettes. If you need help quitting, ask your health care provider.  Eat a healthy diet. This includes plenty of vegetables, fruits, whole grains, low-fat dairy products, and lean protein.  General instructions    Rest a lot and get at least 8 hours of sleep each night.  Sleep in a partly upright position at night. Place a few pillows under your head or sleep in a reclining chair.  Return to your normal activities as told by your health care provider. Ask your health care provider what activities are safe for you.  Drink enough fluid to keep your urine pale yellow. This helps to thin the mucus in your lungs.  If your throat is sore, gargle with a mixture of salt and water 3–4 times a day or as needed. To make salt water, completely dissolve ½–1 tsp (3–6 g) of salt in 1 cup (237 mL) of warm water.  Keep all follow-up visits.  How is this prevented?  You can lower your risk of developing community-acquired pneumonia by:  Getting the pneumonia vaccine. There are different types and schedules of pneumonia vaccines. Ask your health care provider which option is best for you. Consider getting the pneumonia vaccine if:  You are older than 65 years of age.  You are 19–65 years of age and are receiving cancer treatment, have chronic lung disease, or have other medical conditions that affect your immune system. Ask your health care provider if this applies to you.  Getting your influenza vaccine every year. Ask your health care provider which type of vaccine is best for you.  Getting regular dental checkups.  Washing your hands often with soap and water for at least 20 seconds. If soap and water are not available, use hand .  Contact a health care provider if:  You have a fever.  You have trouble sleeping because you cannot control your cough with cough medicine.  Get help right away if:  Your shortness of breath becomes worse.  Your chest pain increases.  Your sickness becomes worse, especially if you are an older adult or have a weak immune system.  You cough up blood.  These symptoms may be an emergency. Get help right away. Call 911.  Do not wait to see if the symptoms will go away.  Do not drive yourself to the hospital.  Summary  Pneumonia is an infection of the lungs.  Community-acquired pneumonia develops in people who have not been in the hospital. It can be caused by bacteria, viruses, or fungi.  This condition may be treated with antibiotics or antiviral medicines.  Severe pneumonia may require a hospital stay and treatment to help with breathing. You were prescribed antibiotics. Please complete entire course.    You may take 500-1000 mg acetaminophen every 6 hours, as needed for pain.  You may take 600 mg ibuprofen every 8 hours, with food, as needed for pain.     Follow up with your primary physician in 1-2 days. If needed call [phone] to find a primary care physician or call  [phone] to schedule an appointment with the general medicine.     Community-Acquired Pneumonia, Adult  Pneumonia is a lung infection that causes inflammation and the buildup of mucus and fluids in the lungs. This may cause coughing and difficulty breathing. Community-acquired pneumonia is pneumonia that develops in people who are not, and have not recently been, in a hospital or other health care facility.    Usually, pneumonia develops as a result of an illness that is caused by a virus, such as the common cold and the flu (influenza). It can also be caused by bacteria or fungi. While the common cold and influenza can pass from person to person (are contagious), pneumonia itself is not considered contagious.    What are the causes?  The human body, showing how a virus travels from the air to a person's lungs.   This condition may be caused by:  Viruses.  Bacteria.  Fungi.  What increases the risk?  The following factors may make you more likely to develop this condition:  Being over age 65 or having certain medical conditions, such as:  A long-term (chronic) disease, such as: chronic obstructive pulmonary disease (COPD), asthma, heart failure, diabetes, or kidney disease.  A condition that increases the risk of breathing in (aspirating) mucus and other fluids from your mouth and nose.  A weakened body defense system (immune system).  Having had your spleen removed (splenectomy). The spleen is the organ that helps fight germs and infections.  Not cleaning your teeth and gums well (poor dental hygiene).  Using tobacco products.  Traveling to places where germs that cause pneumonia are present or being near certain animals or animal habitats that could have germs that cause pneumonia.  What are the signs or symptoms?  Symptoms of this condition include:  A dry cough or a wet (productive) cough.  A fever, sweating, or chills.  Chest pain, especially when breathing deeply or coughing.  Fast breathing, difficulty breathing, or shortness of breath.  Tiredness (fatigue) and muscle aches.  How is this diagnosed?  An outline of a person's body and an image of an X-ray of the person's chest.   This condition may be diagnosed based on your medical history or a physical exam. You may also have tests, including:  Imaging, such as a chest X-ray or lung ultrasound.  Tests of:  The level of oxygen and other gases in your blood.  Mucus from your lungs (sputum).  Fluid around your lungs (pleural fluid).  Your urine.  How is this treated?  Treatment for this condition depends on many factors, such as the cause of your pneumonia, your medicines, and other medical conditions that you have.    For most adults, pneumonia may be treated at home. In some cases, treatment must happen in a hospital and may include:  Medicines that are given by mouth (orally) or through an IV, including:  Antibiotic medicines, if bacteria caused the pneumonia.  Medicines that kill viruses (antiviral medicines), if a virus caused the pneumonia.  Oxygen therapy.  Severe pneumonia, although rare, may require the following treatments:  Mechanical ventilation.This procedure uses a machine to help you breathe if you cannot breathe well on your own or maintain a safe level of blood oxygen.  Thoracentesis. This procedure removes any buildup of pleural fluid to help with breathing.  Follow these instructions at home:  A comparison of three sample cups showing dark yellow, yellow, and pale yellow urine.  Medicines    Take over-the-counter and prescription medicines only as told by your health care provider.  Take cough medicine only if you have trouble sleeping. Cough medicine can prevent your body from removing mucus from your lungs.  If you were prescribed antibiotics, take them as told by your health care provider. Do not stop taking the antibiotic even if you start to feel better.  Lifestyle    A sign showing that a person should not drink alcohol.  A sign showing that a person should not smoke.  Do not drink alcohol.  Do not use any products that contain nicotine or tobacco. These products include cigarettes, chewing tobacco, and vaping devices, such as e-cigarettes. If you need help quitting, ask your health care provider.  Eat a healthy diet. This includes plenty of vegetables, fruits, whole grains, low-fat dairy products, and lean protein.  General instructions    Rest a lot and get at least 8 hours of sleep each night.  Sleep in a partly upright position at night. Place a few pillows under your head or sleep in a reclining chair.  Return to your normal activities as told by your health care provider. Ask your health care provider what activities are safe for you.  Drink enough fluid to keep your urine pale yellow. This helps to thin the mucus in your lungs.  If your throat is sore, gargle with a mixture of salt and water 3–4 times a day or as needed. To make salt water, completely dissolve ½–1 tsp (3–6 g) of salt in 1 cup (237 mL) of warm water.  Keep all follow-up visits.  How is this prevented?  You can lower your risk of developing community-acquired pneumonia by:  Getting the pneumonia vaccine. There are different types and schedules of pneumonia vaccines. Ask your health care provider which option is best for you. Consider getting the pneumonia vaccine if:  You are older than 65 years of age.  You are 19–65 years of age and are receiving cancer treatment, have chronic lung disease, or have other medical conditions that affect your immune system. Ask your health care provider if this applies to you.  Getting your influenza vaccine every year. Ask your health care provider which type of vaccine is best for you.  Getting regular dental checkups.  Washing your hands often with soap and water for at least 20 seconds. If soap and water are not available, use hand .  Contact a health care provider if:  You have a fever.  You have trouble sleeping because you cannot control your cough with cough medicine.  Get help right away if:  Your shortness of breath becomes worse.  Your chest pain increases.  Your sickness becomes worse, especially if you are an older adult or have a weak immune system.  You cough up blood.  These symptoms may be an emergency. Get help right away. Call 911.  Do not wait to see if the symptoms will go away.  Do not drive yourself to the hospital.  Summary  Pneumonia is an infection of the lungs.  Community-acquired pneumonia develops in people who have not been in the hospital. It can be caused by bacteria, viruses, or fungi.  This condition may be treated with antibiotics or antiviral medicines.  Severe pneumonia may require a hospital stay and treatment to help with breathing.

## 2024-12-03 NOTE — CHART NOTE - NSCHARTNOTEFT_GEN_A_CORE
: Rachael Orellana    INDICATION: pleural effusion    PROCEDURE:  [ ] LIMITED ECHO  [x] LIMITED CHEST  [ ] LIMITED RETROPERITONEAL  [ ] LIMITED ABDOMINAL  [ ] LIMITED DVT  [ ] NEEDLE GUIDANCE VASCULAR  [ ] NEEDLE GUIDANCE THORACENTESIS  [ ] NEEDLE GUIDANCE PARACENTESIS  [ ] NEEDLE GUIDANCE PERICARDIOCENTESIS  [ ] OTHER    FINDINGS:  right: small pleural effusion surrounding consolidated lung       INTERPRETATION:  Small R pleural effusion     Images uploaded on [employer]

## 2024-12-03 NOTE — ED PROVIDER NOTE - PROGRESS NOTE DETAILS
Kay Gaines DO (PGY-1): CT reveals Acute right posterolateral third through ninth rib fractures new identified to suggest pathologic fractures. There is no pneumothorax, New tree-in-bud opacities predominantly lower lobes and right middle lobe consistent with small airways infection/impaction. Vitals stable, not hypoxic.   Spoke with Rachael Orellana of Pulm Crit Care in regards to disposition. Informed patient can be discharged on abx for pneumonia and she will contact his pulmonologist to schedule outpatient follow up. Kay Gaines DO (PGY-1): Will give patient 1st dose of abx and oxy for pain control, will re-eval pain after oxy and likely dc with senior care. Spoke to patients wife and she would like him to go home via ambulette. Luis Roberts MD PGY2: Patient reassessed, stable. Pain improving. Patient still noted to be tachycardic but stable for discharge. SW contacted for transport home, will coordinate. Rx for abx sent. Patient informed to follow up with PCP and pulm. Lab and imaging results were discussed with the patient. Shared decision making was held between provider and patient with regards to disposition decision. All questions and concerns were addressed. Patient is agreeable to disposition plan.

## 2024-12-03 NOTE — ED ADULT NURSE REASSESSMENT NOTE - NS ED NURSE REASSESS COMMENT FT1
Pt received from PREVIOUS RN on stretcher, A/Ox4 answers all questions appropriately. Pt denies chest pain and SOB during reassessment, breathing is even and unlabored on room air. Abdomen is soft and non-distended. Pt ambulates independently at baseline, moves all four extremities on command, skin is intact. Pt resting comfortably at the bedside, No apparent acute visible distress noted on reassessment. Vital signs stable, NKA to medications. Pending dispo

## 2024-12-03 NOTE — ED ADULT NURSE REASSESSMENT NOTE - NSFALLUNIVINTERV_ED_ALL_ED
Bed/Stretcher in lowest position, wheels locked, appropriate side rails in place/Call bell, personal items and telephone in reach/Instruct patient to call for assistance before getting out of bed/chair/stretcher/Non-slip footwear applied when patient is off stretcher/[place] to call system/Physically safe environment - no spills, clutter or unnecessary equipment/Purposeful proactive rounding/Room/bathroom lighting operational, light cord in reach

## 2024-12-03 NOTE — ED PROVIDER NOTE - CARE PROVIDER_API CALL
Sánchez Michel  Pulmonary Disease  [address], [facility] [address]  [place] [address]  Phone: [phone]  Fax: [phone]  Follow Up Time: 4-6 Days

## 2024-12-03 NOTE — CONSULT NOTE ADULT - ATTENDING COMMENTS
R pleuritic chest pain, recently sustained fall with resultant rib fracture. Sent to the ED for evaluation of cough and pleuritic pain.   Pain likely related to pleuritic pain and rib fracture. Pneumonia is possibility.   Follow up CT. Pain control

## 2024-12-03 NOTE — ED ADULT NURSE NOTE - NSFALLUNIVINTERV_ED_ALL_ED
Bed/Stretcher in lowest position, wheels locked, appropriate side rails in place/Call bell, personal items and telephone in reach/Instruct patient to call for assistance before getting out of bed/chair/stretcher/Non-slip footwear applied when patient is off stretcher/[place] to call system/Physically safe environment - no spills, clutter or unnecessary equipment/Purposeful proactive rounding/Room/bathroom lighting operational, light cord in reach

## 2024-12-03 NOTE — CONSULT NOTE ADULT - ASSESSMENT
73 yo M with PMH osteoporosis, HTN, HLD, DM, GERD who presents with R pleuritic chest pain. He recently returned from a 1 month trip to [place] where he reports falling on a train. Sent to the ED for further evaluation of cough and pleuritic pain.     # R pleuritic pain  # R rib fractures   Right rib fracture in the setting of a fall, now with pleuritic pain and small pleural effusion. Pleural effusion may be due to atelectasis secondary to splinting, less likely hemorraghic from trauma.  - ct chest to r/o pneumonia  - ctap given rib fractures (pathologic fracture?)  - pain control      Will need outpatient follow up with Dr. Michel.   Prior to discharge:  Please email: [email or web address] to setup an appointment prior to discharge. Please email on the day prior to anticipated discharge. The patient will be given a telehealth appointment in 1-2 days following discharge. Include the name, , hospital of discharge, expected date of discharge, diagnosis, and preferred phone number. Please include the date and time of the appointment on the patient's discharge summary and that the appointment is telehealth.    [facility]  [address] [place] [address]  [phone]        Rachael Orellana MD  Pulmonary and Critical Care Fellow

## 2024-12-03 NOTE — ED PROVIDER NOTE - ATTENDING CONTRIBUTION TO CARE
74 year y/o male with a history of RA on methotrexate, diabetes, arthritis, GERD, anemia, BPH, HTN, HLD, Mouth cancer in 2004 (now resolved), presenting to ED with complaints of cough and shortness of breath.  Patient recently traveled to [facility] where he was on a bumpy bus ride.  After the bus ride he endorsed right-sided chest pain.  He was seen at urgent care 11/29 where chest x-ray revealed acute rib fractures of ribs 3 through 9.  Patient followed up with pulmonology who recommended he come to the emergency department.  He endorses right-sided chest pain associated with cough and inspiration.  Endorses nonproductive cough.  Denies fever, chills, abdominal pain.

## 2024-12-03 NOTE — ED ADULT NURSE REASSESSMENT NOTE - NS ED NURSE REASSESS COMMENT FT1
Per ED Provider, Pt cleared to be d/c home. A/Ox4 ambulated w/ steady gait. Vital signs stable. Pt taken to ED Waiting Room w/ ED , Pt to be picked up by his wife.

## 2024-12-03 NOTE — ED PROVIDER NOTE - PATIENT PORTAL LINK FT
You can access the FollowMyHealth Patient Portal offered by [facility] by registering at the following website: [email or web address]. By joining [facility]’s FollowMyHealth portal, you will also be able to view your health information using other applications (apps) compatible with our system.

## 2024-12-03 NOTE — ED ADULT NURSE NOTE - PATIENT'S PREFERRED PRONOUN
hypercapnic respiratory failure- improving   -Most likely secondary to fluid overload/diastolic CHF exacerbation  -Patient is on BiPAP, repeat ABG shows improvement, weaned off BIPAP        Acute on chronic diastolic CHF exacerbation- now compensated   -Most likely secondary to uncontrolled hypertension  -Patient's weight on discharge was 175 pounds and on admission is 181 pounds  -Continue IV Lasix> po today   -monitor I/o and weight  - pro arun ok      CKD 3  - baseline creat 1.6 - 2  - monitor with diuresis      Uncontrolled HTN- now controlled  - resume home meds  - will optimize based on BP reading     MRSA colonization  -repeat swab     DM2  -ct home meds  -ssi     H/o seizure  - ct home meds     esbl in urine - repeat      Goals of care.       Discussed with the patient's brother. He understands the condition is deteriorating. He states that the last time he spoke to she wanted everything to be done. Explained to him that it seems like she has some underlying dementia and has a poor quality of life. Will get palliative care to see the patient           Consults: pulmonary/intensive care    Imaging Studies:  Ct Chest Wo Contrast    Result Date: 5/21/2019  EXAMINATION: CT OF THE CHEST WITHOUT CONTRAST 5/21/2019 12:32 am TECHNIQUE: CT of the chest was performed without the administration of intravenous contrast. Multiplanar reformatted images are provided for review. Dose modulation, iterative reconstruction, and/or weight based adjustment of the mA/kV was utilized to reduce the radiation dose to as low as reasonably achievable. COMPARISON: 11/23/2018 HISTORY: ORDERING SYSTEM PROVIDED HISTORY: sob, tachy, cough. r/o consolidation TECHNOLOGIST PROVIDED HISTORY: Ordering Physician Provided Reason for Exam: sob, tachy, cough. r/o consolidation Acuity: Acute Relevant Medical/Surgical History: hx pneumonia, diabetes, hypertension FINDINGS: Mediastinum: The heart size is normal.  There is trace pericardial fluid. Coronary artery calcifications are again seen. Aortic caliber is within normal limits. Small mediastinal lymph nodes are likely reactive. Lungs/pleura: There is no pneumothorax or pleural effusion. There are secretions in the lower lobe airways bilaterally. There are new ill-defined centrilobular nodules in the left lower lobe. The 4 mm nodule in the posterior left apex is unchanged since 02/15/2011 and is therefore benign. Upper Abdomen: Imaging of the upper abdomen discloses no significant abnormality. Soft Tissues/Bones: No acute findings. 1. Ill-defined centrilobular nodules in the left lower lobe are likely infectious or inflammatory. 2. Coronary artery disease. Xr Chest Portable    Result Date: 6/1/2019  EXAMINATION: ONE XRAY VIEW OF THE CHEST 6/1/2019 7:33 am COMPARISON: 05/21/2019 radiograph HISTORY: ORDERING SYSTEM PROVIDED HISTORY: Shortness of breath TECHNOLOGIST PROVIDED HISTORY: Reason for exam:->Shortness of breath Ordering Physician Provided Reason for Exam: sob Acuity: Acute Type of Exam: Initial Relevant Medical/Surgical History: hypoxia FINDINGS: The heart and mediastinum are normal.  Mild perihilar opacities have slightly increased centrally. The lungs are otherwise clear. There are no skeletal findings. Mildly increased perihilar opacities may represent vascular congestion or a developing viral infection. Xr Chest Portable    Result Date: 5/21/2019  EXAMINATION: ONE XRAY VIEW OF THE CHEST 5/21/2019 12:00 am COMPARISON: 11/25/2018 HISTORY: ORDERING SYSTEM PROVIDED HISTORY: sob TECHNOLOGIST PROVIDED HISTORY: Reason for exam:->sob Ordering Physician Provided Reason for Exam: sob Acuity: Acute Relevant Medical/Surgical History: hx chf, pneumonia, diabetes FINDINGS: The lungs are clear. The costophrenic angles are sharp. The cardiomediastinal silhouette is within normal limits. There is no discernible pneumothorax.      Negative portable chest.       Other Significant Diagnostic Studies: As described above    Treatments: As described above    Disposition: [facility]    Discharge Medications:  N/A      35 Minutes spent on patient evaluation, counseling and discharge planning.      Signed:  Tito Lui MD  6/19/2019, 11:07 PM Him/He

## 2024-12-04 ENCOUNTER — NON-APPOINTMENT (OUTPATIENT)
Age: 74
End: 2024-12-04

## 2024-12-04 RX ORDER — LIDOCAINE 40 MG/G
4 PATCH TOPICAL
Qty: 1 | Refills: 3 | Status: ACTIVE | COMMUNITY
Start: 2024-12-04 | End: 1900-01-01

## 2024-12-04 RX ORDER — DICLOFENAC SODIUM 10 MG/G
1 GEL TOPICAL
Qty: 1 | Refills: 3 | Status: ACTIVE | COMMUNITY
Start: 2024-12-04 | End: 1900-01-01

## 2024-12-05 LAB
25(OH)D3 SERPL-MCNC: 31.4 NG/ML
ALBUMIN SERPL ELPH-MCNC: 3.3 G/DL
ALP BLD-CCNC: 95 U/L
ALT SERPL-CCNC: 41 U/L
ANION GAP SERPL CALC-SCNC: 12 MMOL/L
APPEARANCE: CLEAR
AST SERPL-CCNC: 31 U/L
BACTERIA: NEGATIVE /HPF
BASOPHILS # BLD AUTO: 0.04 K/UL
BASOPHILS NFR BLD AUTO: 0.2 %
BILIRUB SERPL-MCNC: 0.4 MG/DL
BILIRUBIN URINE: NEGATIVE
BLOOD URINE: NEGATIVE
BUN SERPL-MCNC: 15 MG/DL
CALCIUM SERPL-MCNC: 8.5 MG/DL
CAST: 0 /LPF
CHLORIDE SERPL-SCNC: 100 MMOL/L
CHOLEST SERPL-MCNC: 128 MG/DL
CO2 SERPL-SCNC: 23 MMOL/L
COLOR: YELLOW
CREAT SERPL-MCNC: 0.9 MG/DL
EGFR: 89 ML/MIN/1.73M2
EOSINOPHIL # BLD AUTO: 0.33 K/UL
EOSINOPHIL NFR BLD AUTO: 1.9 %
EPITHELIAL CELLS: 0 /HPF
ESTIMATED AVERAGE GLUCOSE: 177 MG/DL
GLUCOSE QUALITATIVE U: NEGATIVE MG/DL
GLUCOSE SERPL-MCNC: 114 MG/DL
HBA1C MFR BLD HPLC: 7.8 %
HCT VFR BLD CALC: 36.4 %
HDLC SERPL-MCNC: 71 MG/DL
HGB BLD-MCNC: 11.7 G/DL
IMM GRANULOCYTES NFR BLD AUTO: 0.6 %
KETONES URINE: ABNORMAL MG/DL
LDLC SERPL CALC-MCNC: 42 MG/DL
LEUKOCYTE ESTERASE URINE: NEGATIVE
LYMPHOCYTES # BLD AUTO: 0.86 K/UL
LYMPHOCYTES NFR BLD AUTO: 5 %
MAN DIFF?: NORMAL
MCHC RBC-ENTMCNC: 27.3 PG
MCHC RBC-ENTMCNC: 32.1 G/DL
MCV RBC AUTO: 85 FL
MICROSCOPIC-UA: NORMAL
MONOCYTES # BLD AUTO: 0.76 K/UL
MONOCYTES NFR BLD AUTO: 4.4 %
NEUTROPHILS # BLD AUTO: 15.11 K/UL
NEUTROPHILS NFR BLD AUTO: 87.9 %
NITRITE URINE: NEGATIVE
NONHDLC SERPL-MCNC: 57 MG/DL
PH URINE: 6.5
PLATELET # BLD AUTO: 337 K/UL
POTASSIUM SERPL-SCNC: 4.8 MMOL/L
PROT SERPL-MCNC: 5.2 G/DL
PROTEIN URINE: NORMAL MG/DL
PSA FREE FLD-MCNC: 12 %
PSA FREE SERPL-MCNC: 0.31 NG/ML
PSA SERPL-MCNC: 2.65 NG/ML
RBC # BLD: 4.28 M/UL
RBC # FLD: 14.4 %
RED BLOOD CELLS URINE: 0 /HPF
SODIUM SERPL-SCNC: 135 MMOL/L
SPECIFIC GRAVITY URINE: 1.02
TRIGL SERPL-MCNC: 75 MG/DL
TSH SERPL-ACNC: 1.91 UIU/ML
UROBILINOGEN URINE: 0.2 MG/DL
VIT B12 SERPL-MCNC: 1271 PG/ML
WBC # FLD AUTO: 17.2 K/UL
WHITE BLOOD CELLS URINE: 0 /HPF

## 2024-12-10 ENCOUNTER — APPOINTMENT (OUTPATIENT)
Dept: INTERNAL MEDICINE | Facility: CLINIC | Age: 74
End: 2024-12-10
Payer: MEDICARE

## 2024-12-10 VITALS
TEMPERATURE: 97.8 F | BODY MASS INDEX: 18.44 KG/M2 | WEIGHT: 108 LBS | HEART RATE: 99 BPM | DIASTOLIC BLOOD PRESSURE: 76 MMHG | OXYGEN SATURATION: 98 % | SYSTOLIC BLOOD PRESSURE: 168 MMHG | HEIGHT: 64 IN

## 2024-12-10 VITALS — DIASTOLIC BLOOD PRESSURE: 78 MMHG | SYSTOLIC BLOOD PRESSURE: 160 MMHG

## 2024-12-10 DIAGNOSIS — S22.49XA MULTIPLE FRACTURES OF RIBS, UNSPECIFIED SIDE, INITIAL ENCOUNTER FOR CLOSED FRACTURE: ICD-10-CM

## 2024-12-10 DIAGNOSIS — I10 ESSENTIAL (PRIMARY) HYPERTENSION: ICD-10-CM

## 2024-12-10 DIAGNOSIS — J15.9 UNSPECIFIED BACTERIAL PNEUMONIA: ICD-10-CM

## 2024-12-10 DIAGNOSIS — E11.9 TYPE 2 DIABETES MELLITUS W/OUT COMPLICATIONS: ICD-10-CM

## 2024-12-10 DIAGNOSIS — J43.9 EMPHYSEMA, UNSPECIFIED: ICD-10-CM

## 2024-12-10 PROCEDURE — 99214 OFFICE O/P EST MOD 30 MIN: CPT | Mod: 25

## 2024-12-10 RX ORDER — OXYCODONE 5 MG/1
5 TABLET ORAL
Qty: 20 | Refills: 0 | Status: ACTIVE | COMMUNITY
Start: 2024-12-10 | End: 1900-01-01

## 2024-12-10 RX ORDER — BENZONATATE 100 MG/1
100 CAPSULE ORAL 3 TIMES DAILY
Qty: 42 | Refills: 0 | Status: ACTIVE | COMMUNITY
Start: 2024-12-10 | End: 1900-01-01

## 2024-12-11 LAB
ALBUMIN SERPL ELPH-MCNC: 3.2 G/DL
ALP BLD-CCNC: 116 U/L
ALT SERPL-CCNC: 34 U/L
ANION GAP SERPL CALC-SCNC: 9 MMOL/L
AST SERPL-CCNC: 41 U/L
BASOPHILS # BLD AUTO: 0.04 K/UL
BASOPHILS NFR BLD AUTO: 0.4 %
BILIRUB SERPL-MCNC: <0.2 MG/DL
BUN SERPL-MCNC: 9 MG/DL
CALCIUM SERPL-MCNC: 8.9 MG/DL
CHLORIDE SERPL-SCNC: 106 MMOL/L
CO2 SERPL-SCNC: 24 MMOL/L
CREAT SERPL-MCNC: 0.76 MG/DL
EGFR: 94 ML/MIN/1.73M2
EOSINOPHIL # BLD AUTO: 0.22 K/UL
EOSINOPHIL NFR BLD AUTO: 2.5 %
GLUCOSE SERPL-MCNC: 218 MG/DL
HCT VFR BLD CALC: 36.4 %
HGB BLD-MCNC: 11.2 G/DL
IMM GRANULOCYTES NFR BLD AUTO: 0.4 %
LYMPHOCYTES # BLD AUTO: 0.86 K/UL
LYMPHOCYTES NFR BLD AUTO: 9.6 %
MAN DIFF?: NORMAL
MCHC RBC-ENTMCNC: 27.5 PG
MCHC RBC-ENTMCNC: 30.8 G/DL
MCV RBC AUTO: 89.2 FL
MONOCYTES # BLD AUTO: 0.66 K/UL
MONOCYTES NFR BLD AUTO: 7.4 %
NEUTROPHILS # BLD AUTO: 7.12 K/UL
NEUTROPHILS NFR BLD AUTO: 79.7 %
PLATELET # BLD AUTO: 405 K/UL
POTASSIUM SERPL-SCNC: 5 MMOL/L
PROT SERPL-MCNC: 5.4 G/DL
RBC # BLD: 4.08 M/UL
RBC # FLD: 14.4 %
SODIUM SERPL-SCNC: 139 MMOL/L
WBC # FLD AUTO: 8.94 K/UL

## 2024-12-13 ENCOUNTER — APPOINTMENT (OUTPATIENT)
Dept: PULMONOLOGY | Facility: CLINIC | Age: 74
End: 2024-12-13

## 2024-12-13 VITALS
RESPIRATION RATE: 17 BRPM | HEART RATE: 88 BPM | WEIGHT: 108 LBS | HEIGHT: 64 IN | OXYGEN SATURATION: 99 % | BODY MASS INDEX: 18.44 KG/M2

## 2024-12-13 PROCEDURE — G2211 COMPLEX E/M VISIT ADD ON: CPT

## 2024-12-13 PROCEDURE — 99215 OFFICE O/P EST HI 40 MIN: CPT

## 2024-12-16 NOTE — H&P ADULT - PROBLEM SELECTOR PLAN 7
bertrand placed given renal failure, unclear if retaining. Will c/w tamsulosin and do trial of void once ange improved. none bertrand placed given renal failure, unclear if retaining. Will c/w tamsulosin and do trial of void once HEIDI improved.

## 2024-12-18 DIAGNOSIS — R07.89 OTHER CHEST PAIN: ICD-10-CM

## 2024-12-18 RX ORDER — PEN NEEDLE, DIABETIC 33 GX5/32"
NEEDLE, DISPOSABLE MISCELLANEOUS
Qty: 1 | Refills: 0 | Status: ACTIVE | COMMUNITY
Start: 2024-12-18 | End: 1900-01-01

## 2024-12-20 NOTE — ED ADULT TRIAGE NOTE - NS ED TRIAGE AVPU SCALE
Chief complaint:   Chief Complaint   Patient presents with    Video Visit    Follow-up       Vitals:  There were no vitals taken for this visit.    HISTORY OF PRESENT ILLNESS     Cardiovascular Prevention    Your risk of a heart attack or stroke within 10 years is {Risk:66075320} at 14%.    - Currently prescribed a Moderate Intensity Statin  - Current blood pressure is There were no BP taken for this visit.  - A systolic of 130 lowers your risk to.......10.1%  Risk Enhancing Factors:  - Family History of a Premature Stroke or Heart Attack: {M<55, F<65 YES/NO:63}  Shared Decision Making:  - Discuss a healthy diet, exercise and/or sleep to improve blood pressure, cholesterol and blood sugar  - Plan: ***          Other significant problems:  Patient Active Problem List    Diagnosis Date Noted    Follow-up exam 11/05/2024     Priority: Low    Acute vaginitis 11/05/2024     Priority: Low    At increased risk for cardiovascular disease 11/05/2024     Priority: Low    Yeast infection 11/05/2024     Priority: Low    Medicare annual wellness visit, subsequent 09/25/2024     Priority: Low    Spasm of right trapezius muscle 09/25/2024     Priority: Low    Menopause 09/25/2024     Priority: Low    Osteopenia 09/25/2024     Priority: Low    Nasal congestion 09/25/2024     Priority: Low    Acute pain of right knee 11/07/2023     Priority: Low    Bilateral foot pain 11/07/2023     Priority: Low    Plantar fasciitis, bilateral 11/07/2023     Priority: Low    Hyponatremia 03/20/2023     Priority: Low    Colon cancer screening 03/20/2023     Priority: Low    Pre-op evaluation 03/20/2023     Priority: Low    Sensorineural hearing loss of both ears 02/28/2023     Priority: Low    Tinnitus of both ears 02/28/2023     Priority: Low    Pain of right lower leg 12/19/2022     Priority: Low    Strain of Achilles tendon, initial encounter 12/19/2022     Priority: Low    Welcome to Medicare preventive visit 10/24/2022     Priority: Low     Screening for osteoporosis 10/24/2022     Priority: Low    Other decreased white blood cell count 10/24/2022     Priority: Low    Paresthesia of both hands 10/24/2022     Priority: Low    Breast tenderness in female 10/24/2022     Priority: Low    Pap smear for cervical cancer screening 10/24/2022     Priority: Low    Vitamin D insufficiency 10/24/2022     Priority: Low    Dyspareunia in female 10/24/2022     Priority: Low    Impacted cerumen of right ear 04/11/2022     Priority: Low    Paresthesia of right arm 04/11/2022     Priority: Low    Accidental fall 04/11/2022     Priority: Low    Skin tag 10/13/2020     Priority: Low    Liver enzyme elevation 10/13/2020     Priority: Low    Mixed hyperlipidemia 10/13/2020     Priority: Low    Screening for colon cancer 01/31/2020     Priority: Low    Cervical cancer screening 01/31/2020     Priority: Low    Osteoporosis 01/31/2020     Priority: Low    Vaginal atrophy 01/31/2020     Priority: Low    Hematuria 03/24/2016     Priority: Low     UA 3-2016      Vitamin D deficiency 02/25/2016     Priority: Low     Lab 2-2016      Chronic constipation 02/25/2016     Priority: Low     Per patient report and visit 2-2016      Anxiety 02/25/2016     Priority: Low     Pt report and exam 2-2016      Benign essential HTN 02/25/2016     Priority: Low     Pt report and exam 2-2016      Bilateral low back pain 02/25/2016     Priority: Low     Chronic - pt report and exam 2-2016         PAST MEDICAL, FAMILY AND SOCIAL HISTORY     Medications:  Current Outpatient Medications   Medication Sig Dispense Refill    rosuvastatin (CRESTOR) 5 MG tablet Take 1 tablet by mouth daily. 90 tablet 3    amlodipine-benazepril (LOTREL) 5-40 MG per capsule Take 1 capsule by mouth daily. 90 capsule 3    estrogens conjugated (PREMARIN) vaginal cream insert 1 g intravaginally daily for 1 week then twice a week thereafter 30 g 12    Multiple Vitamin (MULTIVITAMIN ADULT PO)       Cholecalciferol (VITAMIN D3) 5000  units capsule Take 1 capsule by mouth daily. 30 capsule      No current facility-administered medications for this visit.       Allergies:  ALLERGIES:   Allergen Reactions    Hctz [Hydrochlorothiazide] DIZZINESS       Past Medical  History/Surgeries:  Past Medical History:   Diagnosis Date    Abnormal Pap smear of cervix     +HPV    Delayed emergence from anesthesia     HTN (hypertension)     PONV (postoperative nausea and vomiting)     Postmenopausal atrophic vaginitis     Renal calculus, right     Uterine fibroids affecting pregnancy (CMD)        Past Surgical History:   Procedure Laterality Date    Abdomen surgery      Breast biopsy Left     per pt left benign in her 20's    Colonoscopy N/A 06/12/2017    Dr. Palmer nonspecific erythema in the distal rectum.  5 year repeat.    Hysterectomy  2006    Open access colonoscopy  03/21/2023    Us guide breast biopsy single lesion left Left     per patient left benign       Family History:  Family History   Problem Relation Age of Onset    Postmenopausal breast cancer Mother 73    Heart disease Mother     Hypertension Mother     Stroke Mother     Hypertension Father     Cancer, Prostate Brother 57       Social History:  Social History     Tobacco Use    Smoking status: Never    Smokeless tobacco: Never   Substance Use Topics    Alcohol use: Yes     Alcohol/week: 0.0 standard drinks of alcohol     Comment: occassionally       REVIEW OF SYSTEMS     Review of Systems    PHYSICAL EXAM     Physical Exam    ASSESSMENT/PLAN     ***   Alert-The patient is alert, awake and responds to voice. The patient is oriented to time, place, and person. The triage nurse is able to obtain subjective information.

## 2024-12-31 ENCOUNTER — RX RENEWAL (OUTPATIENT)
Age: 74
End: 2024-12-31

## 2025-01-01 ENCOUNTER — NON-APPOINTMENT (OUTPATIENT)
Age: 75
End: 2025-01-01

## 2025-01-02 ENCOUNTER — APPOINTMENT (OUTPATIENT)
Dept: RADIOLOGY | Facility: IMAGING CENTER | Age: 75
End: 2025-01-02
Payer: MEDICARE

## 2025-01-02 ENCOUNTER — APPOINTMENT (OUTPATIENT)
Age: 75
End: 2025-01-02
Payer: MEDICARE

## 2025-01-02 ENCOUNTER — OUTPATIENT (OUTPATIENT)
Dept: OUTPATIENT SERVICES | Facility: HOSPITAL | Age: 75
LOS: 1 days | End: 2025-01-02
Payer: COMMERCIAL

## 2025-01-02 VITALS
WEIGHT: 103 LBS | OXYGEN SATURATION: 97 % | DIASTOLIC BLOOD PRESSURE: 62 MMHG | BODY MASS INDEX: 17.58 KG/M2 | HEART RATE: 78 BPM | RESPIRATION RATE: 16 BRPM | HEIGHT: 64 IN | SYSTOLIC BLOOD PRESSURE: 114 MMHG

## 2025-01-02 DIAGNOSIS — Z85.819 PERSONAL HISTORY OF MALIGNANT NEOPLASM OF UNSPECIFIED SITE OF LIP, ORAL CAVITY, AND PHARYNX: ICD-10-CM

## 2025-01-02 DIAGNOSIS — Z98.49 CATARACT EXTRACTION STATUS, UNSPECIFIED EYE: Chronic | ICD-10-CM

## 2025-01-02 DIAGNOSIS — S22.49XA MULTIPLE FRACTURES OF RIBS, UNSPECIFIED SIDE, INITIAL ENCOUNTER FOR CLOSED FRACTURE: ICD-10-CM

## 2025-01-02 DIAGNOSIS — M80.00XD AGE-RELATED OSTEOPOROSIS WITH CURRENT PATHOLOGICAL FRACTURE, UNSPECIFIED SITE, SUBSEQUENT ENCOUNTER FOR FRACTURE WITH ROUTINE HEALING: ICD-10-CM

## 2025-01-02 DIAGNOSIS — R05.1 ACUTE COUGH: ICD-10-CM

## 2025-01-02 DIAGNOSIS — J98.4 OTHER DISORDERS OF LUNG: ICD-10-CM

## 2025-01-02 DIAGNOSIS — E11.9 TYPE 2 DIABETES MELLITUS W/OUT COMPLICATIONS: ICD-10-CM

## 2025-01-02 DIAGNOSIS — J90 PLEURAL EFFUSION, NOT ELSEWHERE CLASSIFIED: ICD-10-CM

## 2025-01-02 DIAGNOSIS — I10 ESSENTIAL (PRIMARY) HYPERTENSION: ICD-10-CM

## 2025-01-02 DIAGNOSIS — K21.9 GASTRO-ESOPHAGEAL REFLUX DISEASE W/OUT ESOPHAGITIS: ICD-10-CM

## 2025-01-02 DIAGNOSIS — J43.9 EMPHYSEMA, UNSPECIFIED: ICD-10-CM

## 2025-01-02 DIAGNOSIS — R63.4 ABNORMAL WEIGHT LOSS: ICD-10-CM

## 2025-01-02 DIAGNOSIS — K14.9 DISEASE OF TONGUE, UNSPECIFIED: ICD-10-CM

## 2025-01-02 PROCEDURE — 99214 OFFICE O/P EST MOD 30 MIN: CPT | Mod: 25

## 2025-01-02 PROCEDURE — 71048 X-RAY EXAM CHEST 4+ VIEWS: CPT

## 2025-01-02 PROCEDURE — 71048 X-RAY EXAM CHEST 4+ VIEWS: CPT | Mod: 26

## 2025-01-02 RX ORDER — DICLOFENAC SODIUM 50 MG/1
50 TABLET, DELAYED RELEASE ORAL AT BEDTIME
Qty: 30 | Refills: 0 | Status: ACTIVE | COMMUNITY
Start: 2025-01-02 | End: 1900-01-01

## 2025-01-03 LAB
ALBUMIN SERPL ELPH-MCNC: 3.6 G/DL
ALP BLD-CCNC: 59 U/L
ALT SERPL-CCNC: 20 U/L
ANION GAP SERPL CALC-SCNC: 9 MMOL/L
AST SERPL-CCNC: 28 U/L
BASOPHILS # BLD AUTO: 0.05 K/UL
BASOPHILS NFR BLD AUTO: 0.9 %
BILIRUB SERPL-MCNC: 0.2 MG/DL
BUN SERPL-MCNC: 21 MG/DL
CALCIUM SERPL-MCNC: 9.3 MG/DL
CHLORIDE SERPL-SCNC: 99 MMOL/L
CO2 SERPL-SCNC: 28 MMOL/L
CREAT SERPL-MCNC: 0.75 MG/DL
EGFR: 94 ML/MIN/1.73M2
EOSINOPHIL # BLD AUTO: 0.17 K/UL
EOSINOPHIL NFR BLD AUTO: 3.1 %
GLUCOSE SERPL-MCNC: 116 MG/DL
HCT VFR BLD CALC: 39.3 %
HGB BLD-MCNC: 12.2 G/DL
IMM GRANULOCYTES NFR BLD AUTO: 0.4 %
LYMPHOCYTES # BLD AUTO: 1.11 K/UL
LYMPHOCYTES NFR BLD AUTO: 20 %
MAN DIFF?: NORMAL
MCHC RBC-ENTMCNC: 27.4 PG
MCHC RBC-ENTMCNC: 31 G/DL
MCV RBC AUTO: 88.1 FL
MONOCYTES # BLD AUTO: 0.54 K/UL
MONOCYTES NFR BLD AUTO: 9.7 %
NEUTROPHILS # BLD AUTO: 3.66 K/UL
NEUTROPHILS NFR BLD AUTO: 65.9 %
PLATELET # BLD AUTO: 311 K/UL
POTASSIUM SERPL-SCNC: 4.6 MMOL/L
PROT SERPL-MCNC: 5.7 G/DL
RBC # BLD: 4.46 M/UL
RBC # FLD: 15.2 %
SODIUM SERPL-SCNC: 137 MMOL/L
WBC # FLD AUTO: 5.55 K/UL

## 2025-01-03 RX ORDER — ALBUTEROL SULFATE 2.5 MG/3ML
(2.5 MG/3ML) SOLUTION RESPIRATORY (INHALATION) TWICE DAILY
Qty: 60 | Refills: 11 | Status: ACTIVE | COMMUNITY
Start: 2025-01-03 | End: 1900-01-01

## 2025-01-06 LAB
M TB IFN-G BLD-IMP: NEGATIVE
QUANTIFERON TB PLUS MITOGEN MINUS NIL: >10 IU/ML
QUANTIFERON TB PLUS NIL: 0.04 IU/ML
QUANTIFERON TB PLUS TB1 MINUS NIL: 0.08 IU/ML
QUANTIFERON TB PLUS TB2 MINUS NIL: 0.06 IU/ML

## 2025-01-08 ENCOUNTER — NON-APPOINTMENT (OUTPATIENT)
Age: 75
End: 2025-01-08

## 2025-01-08 ENCOUNTER — APPOINTMENT (OUTPATIENT)
Dept: OPHTHALMOLOGY | Facility: CLINIC | Age: 75
End: 2025-01-08
Payer: MEDICARE

## 2025-01-08 PROCEDURE — 92134 CPTRZ OPH DX IMG PST SGM RTA: CPT

## 2025-01-08 PROCEDURE — 92014 COMPRE OPH EXAM EST PT 1/>: CPT | Mod: 25

## 2025-01-16 ENCOUNTER — APPOINTMENT (OUTPATIENT)
Dept: ENDOCRINOLOGY | Facility: CLINIC | Age: 75
End: 2025-01-16
Payer: MEDICARE

## 2025-01-16 ENCOUNTER — APPOINTMENT (OUTPATIENT)
Dept: PULMONOLOGY | Facility: CLINIC | Age: 75
End: 2025-01-16

## 2025-01-16 VITALS
OXYGEN SATURATION: 97 % | WEIGHT: 103 LBS | DIASTOLIC BLOOD PRESSURE: 64 MMHG | BODY MASS INDEX: 17.58 KG/M2 | SYSTOLIC BLOOD PRESSURE: 94 MMHG | HEART RATE: 65 BPM | HEIGHT: 64 IN

## 2025-01-16 VITALS — HEIGHT: 64 IN

## 2025-01-16 DIAGNOSIS — E78.5 HYPERLIPIDEMIA, UNSPECIFIED: ICD-10-CM

## 2025-01-16 DIAGNOSIS — I10 ESSENTIAL (PRIMARY) HYPERTENSION: ICD-10-CM

## 2025-01-16 DIAGNOSIS — M85.80 OTHER SPECIFIED DISORDERS OF BONE DENSITY AND STRUCTURE, UNSPECIFIED SITE: ICD-10-CM

## 2025-01-16 DIAGNOSIS — E11.9 TYPE 2 DIABETES MELLITUS W/OUT COMPLICATIONS: ICD-10-CM

## 2025-01-16 DIAGNOSIS — M80.00XD AGE-RELATED OSTEOPOROSIS WITH CURRENT PATHOLOGICAL FRACTURE, UNSPECIFIED SITE, SUBSEQUENT ENCOUNTER FOR FRACTURE WITH ROUTINE HEALING: ICD-10-CM

## 2025-01-16 DIAGNOSIS — E11.65 TYPE 2 DIABETES MELLITUS WITH HYPERGLYCEMIA: ICD-10-CM

## 2025-01-16 PROCEDURE — 99215 OFFICE O/P EST HI 40 MIN: CPT

## 2025-01-16 RX ORDER — LANCING DEVICE
EACH MISCELLANEOUS
Qty: 120 | Refills: 8 | Status: ACTIVE | COMMUNITY
Start: 2025-01-16 | End: 1900-01-01

## 2025-01-16 RX ORDER — CHOLECALCIFEROL (VITAMIN D3) 10(400)/ML
DROPS ORAL
Qty: 2 | Refills: 3 | Status: ACTIVE | COMMUNITY
Start: 2025-01-16 | End: 1900-01-01

## 2025-01-16 RX ORDER — CHROMIUM 200 MCG
25 MCG TABLET ORAL DAILY
Qty: 90 | Refills: 2 | Status: ACTIVE | COMMUNITY
Start: 2025-01-16 | End: 1900-01-01

## 2025-01-16 RX ORDER — LANCING DEVICE
EACH MISCELLANEOUS
Qty: 1 | Refills: 1 | Status: ACTIVE | COMMUNITY
Start: 2025-01-16 | End: 1900-01-01

## 2025-01-20 ENCOUNTER — OUTPATIENT (OUTPATIENT)
Dept: OUTPATIENT SERVICES | Facility: HOSPITAL | Age: 75
LOS: 1 days | End: 2025-01-20
Payer: COMMERCIAL

## 2025-01-20 ENCOUNTER — APPOINTMENT (OUTPATIENT)
Dept: CT IMAGING | Facility: IMAGING CENTER | Age: 75
End: 2025-01-20
Payer: MEDICARE

## 2025-01-20 DIAGNOSIS — Z98.49 CATARACT EXTRACTION STATUS, UNSPECIFIED EYE: Chronic | ICD-10-CM

## 2025-01-20 DIAGNOSIS — J98.4 OTHER DISORDERS OF LUNG: ICD-10-CM

## 2025-01-20 PROCEDURE — 71260 CT THORAX DX C+: CPT

## 2025-01-20 PROCEDURE — 71260 CT THORAX DX C+: CPT | Mod: 26

## 2025-01-21 ENCOUNTER — APPOINTMENT (OUTPATIENT)
Dept: RHEUMATOLOGY | Facility: CLINIC | Age: 75
End: 2025-01-21

## 2025-01-24 ENCOUNTER — NON-APPOINTMENT (OUTPATIENT)
Age: 75
End: 2025-01-24

## 2025-01-27 ENCOUNTER — APPOINTMENT (OUTPATIENT)
Dept: INTERNAL MEDICINE | Facility: CLINIC | Age: 75
End: 2025-01-27
Payer: MEDICARE

## 2025-01-27 VITALS
BODY MASS INDEX: 18.78 KG/M2 | SYSTOLIC BLOOD PRESSURE: 105 MMHG | HEIGHT: 64 IN | OXYGEN SATURATION: 97 % | DIASTOLIC BLOOD PRESSURE: 66 MMHG | WEIGHT: 110 LBS | TEMPERATURE: 97.9 F | HEART RATE: 90 BPM

## 2025-01-27 DIAGNOSIS — I10 ESSENTIAL (PRIMARY) HYPERTENSION: ICD-10-CM

## 2025-01-27 DIAGNOSIS — E78.5 HYPERLIPIDEMIA, UNSPECIFIED: ICD-10-CM

## 2025-01-27 DIAGNOSIS — J43.9 EMPHYSEMA, UNSPECIFIED: ICD-10-CM

## 2025-01-27 DIAGNOSIS — R05.1 ACUTE COUGH: ICD-10-CM

## 2025-01-27 DIAGNOSIS — Z02.89 ENCOUNTER FOR OTHER ADMINISTRATIVE EXAMINATIONS: ICD-10-CM

## 2025-01-27 DIAGNOSIS — M54.41 LUMBAGO WITH SCIATICA, RIGHT SIDE: ICD-10-CM

## 2025-01-27 DIAGNOSIS — J15.9 UNSPECIFIED BACTERIAL PNEUMONIA: ICD-10-CM

## 2025-01-27 DIAGNOSIS — U07.1 COVID-19: ICD-10-CM

## 2025-01-27 DIAGNOSIS — M80.00XD AGE-RELATED OSTEOPOROSIS WITH CURRENT PATHOLOGICAL FRACTURE, UNSPECIFIED SITE, SUBSEQUENT ENCOUNTER FOR FRACTURE WITH ROUTINE HEALING: ICD-10-CM

## 2025-01-27 PROCEDURE — G0009: CPT

## 2025-01-27 PROCEDURE — 99214 OFFICE O/P EST MOD 30 MIN: CPT | Mod: 25

## 2025-01-27 PROCEDURE — 90677 PCV20 VACCINE IM: CPT

## 2025-01-28 ENCOUNTER — RX RENEWAL (OUTPATIENT)
Age: 75
End: 2025-01-28

## 2025-02-06 ENCOUNTER — APPOINTMENT (OUTPATIENT)
Age: 75
End: 2025-02-06
Payer: MEDICARE

## 2025-02-06 VITALS
TEMPERATURE: 97.2 F | SYSTOLIC BLOOD PRESSURE: 127 MMHG | HEART RATE: 99 BPM | BODY MASS INDEX: 18.3 KG/M2 | HEIGHT: 64 IN | DIASTOLIC BLOOD PRESSURE: 72 MMHG | RESPIRATION RATE: 16 BRPM | WEIGHT: 107.2 LBS | OXYGEN SATURATION: 94 %

## 2025-02-06 DIAGNOSIS — R91.8 OTHER NONSPECIFIC ABNORMAL FINDING OF LUNG FIELD: ICD-10-CM

## 2025-02-06 DIAGNOSIS — S22.49XA MULTIPLE FRACTURES OF RIBS, UNSPECIFIED SIDE, INITIAL ENCOUNTER FOR CLOSED FRACTURE: ICD-10-CM

## 2025-02-06 DIAGNOSIS — J90 PLEURAL EFFUSION, NOT ELSEWHERE CLASSIFIED: ICD-10-CM

## 2025-02-06 DIAGNOSIS — F17.290 NICOTINE DEPENDENCE, OTHER TOBACCO PRODUCT, UNCOMPLICATED: ICD-10-CM

## 2025-02-06 DIAGNOSIS — M05.9 RHEUMATOID ARTHRITIS WITH RHEUMATOID FACTOR, UNSPECIFIED: ICD-10-CM

## 2025-02-06 PROCEDURE — 99214 OFFICE O/P EST MOD 30 MIN: CPT

## 2025-03-05 ENCOUNTER — APPOINTMENT (OUTPATIENT)
Dept: INTERNAL MEDICINE | Facility: CLINIC | Age: 75
End: 2025-03-05
Payer: MEDICARE

## 2025-03-05 VITALS
OXYGEN SATURATION: 97 % | TEMPERATURE: 98.2 F | SYSTOLIC BLOOD PRESSURE: 105 MMHG | WEIGHT: 109 LBS | HEART RATE: 77 BPM | HEIGHT: 64 IN | BODY MASS INDEX: 18.61 KG/M2 | DIASTOLIC BLOOD PRESSURE: 65 MMHG

## 2025-03-05 DIAGNOSIS — M05.9 RHEUMATOID ARTHRITIS WITH RHEUMATOID FACTOR, UNSPECIFIED: ICD-10-CM

## 2025-03-05 DIAGNOSIS — R07.89 OTHER CHEST PAIN: ICD-10-CM

## 2025-03-05 DIAGNOSIS — E11.9 TYPE 2 DIABETES MELLITUS W/OUT COMPLICATIONS: ICD-10-CM

## 2025-03-05 DIAGNOSIS — S22.49XA MULTIPLE FRACTURES OF RIBS, UNSPECIFIED SIDE, INITIAL ENCOUNTER FOR CLOSED FRACTURE: ICD-10-CM

## 2025-03-05 DIAGNOSIS — I10 ESSENTIAL (PRIMARY) HYPERTENSION: ICD-10-CM

## 2025-03-05 DIAGNOSIS — E78.5 HYPERLIPIDEMIA, UNSPECIFIED: ICD-10-CM

## 2025-03-05 PROCEDURE — 99214 OFFICE O/P EST MOD 30 MIN: CPT

## 2025-03-05 PROCEDURE — G2211 COMPLEX E/M VISIT ADD ON: CPT

## 2025-03-07 LAB
ALBUMIN SERPL ELPH-MCNC: 3.4 G/DL
ALP BLD-CCNC: 44 U/L
ALT SERPL-CCNC: 17 U/L
ANION GAP SERPL CALC-SCNC: 10 MMOL/L
AST SERPL-CCNC: 23 U/L
BILIRUB SERPL-MCNC: 0.2 MG/DL
BUN SERPL-MCNC: 19 MG/DL
CALCIUM SERPL-MCNC: 8.8 MG/DL
CHLORIDE SERPL-SCNC: 105 MMOL/L
CO2 SERPL-SCNC: 26 MMOL/L
CREAT SERPL-MCNC: 0.81 MG/DL
EGFRCR SERPLBLD CKD-EPI 2021: 92 ML/MIN/1.73M2
ESTIMATED AVERAGE GLUCOSE: 148 MG/DL
GLUCOSE SERPL-MCNC: 133 MG/DL
HBA1C MFR BLD HPLC: 6.8 %
POTASSIUM SERPL-SCNC: 4.2 MMOL/L
PROT SERPL-MCNC: 5.1 G/DL
SODIUM SERPL-SCNC: 141 MMOL/L

## 2025-03-19 ENCOUNTER — NON-APPOINTMENT (OUTPATIENT)
Age: 75
End: 2025-03-19

## 2025-03-20 ENCOUNTER — NON-APPOINTMENT (OUTPATIENT)
Age: 75
End: 2025-03-20

## 2025-03-26 ENCOUNTER — APPOINTMENT (OUTPATIENT)
Dept: UROLOGY | Facility: CLINIC | Age: 75
End: 2025-03-26

## 2025-03-26 ENCOUNTER — EMERGENCY (EMERGENCY)
Facility: HOSPITAL | Age: 75
LOS: 1 days | Discharge: HOME | End: 2025-03-26
Attending: STUDENT IN AN ORGANIZED HEALTH CARE EDUCATION/TRAINING PROGRAM | Admitting: STUDENT IN AN ORGANIZED HEALTH CARE EDUCATION/TRAINING PROGRAM
Payer: MEDICARE

## 2025-03-26 VITALS
DIASTOLIC BLOOD PRESSURE: 57 MMHG | OXYGEN SATURATION: 98 % | SYSTOLIC BLOOD PRESSURE: 121 MMHG | RESPIRATION RATE: 17 BRPM | HEART RATE: 83 BPM | TEMPERATURE: 98 F

## 2025-03-26 VITALS
SYSTOLIC BLOOD PRESSURE: 74 MMHG | WEIGHT: 145.06 LBS | RESPIRATION RATE: 17 BRPM | HEART RATE: 94 BPM | DIASTOLIC BLOOD PRESSURE: 48 MMHG | OXYGEN SATURATION: 92 %

## 2025-03-26 LAB
A1C WITH ESTIMATED AVERAGE GLUCOSE RESULT: 6.8 % — HIGH (ref 4–5.6)
ALBUMIN SERPL ELPH-MCNC: 3.3 G/DL — SIGNIFICANT CHANGE UP (ref 3.3–5)
ALP SERPL-CCNC: 43 U/L — SIGNIFICANT CHANGE UP (ref 40–120)
ALT FLD-CCNC: 18 U/L — SIGNIFICANT CHANGE UP (ref 4–41)
ANION GAP SERPL CALC-SCNC: 18 MMOL/L — HIGH (ref 7–14)
APAP SERPL-MCNC: <10 UG/ML — LOW (ref 15–25)
APTT BLD: 30.1 SEC — SIGNIFICANT CHANGE UP (ref 24.5–35.6)
AST SERPL-CCNC: 28 U/L — SIGNIFICANT CHANGE UP (ref 4–40)
BASOPHILS # BLD AUTO: 0.03 K/UL — SIGNIFICANT CHANGE UP (ref 0–0.2)
BASOPHILS NFR BLD AUTO: 0.4 % — SIGNIFICANT CHANGE UP (ref 0–2)
BILIRUB SERPL-MCNC: <0.2 MG/DL — SIGNIFICANT CHANGE UP (ref 0.2–1.2)
BUN SERPL-MCNC: 18 MG/DL — SIGNIFICANT CHANGE UP (ref 7–23)
CALCIUM SERPL-MCNC: 8.8 MG/DL — SIGNIFICANT CHANGE UP (ref 8.4–10.5)
CHLORIDE SERPL-SCNC: 102 MMOL/L — SIGNIFICANT CHANGE UP (ref 98–107)
CO2 SERPL-SCNC: 18 MMOL/L — LOW (ref 22–31)
CREAT SERPL-MCNC: 0.8 MG/DL — SIGNIFICANT CHANGE UP (ref 0.5–1.3)
EGFR: 92 ML/MIN/1.73M2 — SIGNIFICANT CHANGE UP
EGFR: 92 ML/MIN/1.73M2 — SIGNIFICANT CHANGE UP
EOSINOPHIL # BLD AUTO: 0.23 K/UL — SIGNIFICANT CHANGE UP (ref 0–0.5)
EOSINOPHIL NFR BLD AUTO: 2.8 % — SIGNIFICANT CHANGE UP (ref 0–6)
ESTIMATED AVERAGE GLUCOSE: 148 — SIGNIFICANT CHANGE UP
ETHANOL SERPL-MCNC: 226 MG/DL — HIGH
FLUAV AG NPH QL: SIGNIFICANT CHANGE UP
FLUBV AG NPH QL: SIGNIFICANT CHANGE UP
GAS PNL BLDV: SIGNIFICANT CHANGE UP
GLUCOSE SERPL-MCNC: 142 MG/DL — HIGH (ref 70–99)
HCT VFR BLD CALC: 37.2 % — LOW (ref 39–50)
HGB BLD-MCNC: 11.9 G/DL — LOW (ref 13–17)
IANC: 5.83 K/UL — SIGNIFICANT CHANGE UP (ref 1.8–7.4)
IMM GRANULOCYTES NFR BLD AUTO: 0.4 % — SIGNIFICANT CHANGE UP (ref 0–0.9)
INR BLD: 0.9 RATIO — SIGNIFICANT CHANGE UP (ref 0.85–1.16)
LYMPHOCYTES # BLD AUTO: 1.4 K/UL — SIGNIFICANT CHANGE UP (ref 1–3.3)
LYMPHOCYTES # BLD AUTO: 16.9 % — SIGNIFICANT CHANGE UP (ref 13–44)
MCHC RBC-ENTMCNC: 28.2 PG — SIGNIFICANT CHANGE UP (ref 27–34)
MCHC RBC-ENTMCNC: 32 G/DL — SIGNIFICANT CHANGE UP (ref 32–36)
MCV RBC AUTO: 88.2 FL — SIGNIFICANT CHANGE UP (ref 80–100)
MONOCYTES # BLD AUTO: 0.77 K/UL — SIGNIFICANT CHANGE UP (ref 0–0.9)
MONOCYTES NFR BLD AUTO: 9.3 % — SIGNIFICANT CHANGE UP (ref 2–14)
NEUTROPHILS # BLD AUTO: 5.83 K/UL — SIGNIFICANT CHANGE UP (ref 1.8–7.4)
NEUTROPHILS NFR BLD AUTO: 70.2 % — SIGNIFICANT CHANGE UP (ref 43–77)
NRBC # BLD AUTO: 0 K/UL — SIGNIFICANT CHANGE UP (ref 0–0)
NRBC # FLD: 0 K/UL — SIGNIFICANT CHANGE UP (ref 0–0)
NRBC BLD AUTO-RTO: 0 /100 WBCS — SIGNIFICANT CHANGE UP (ref 0–0)
PLATELET # BLD AUTO: 204 K/UL — SIGNIFICANT CHANGE UP (ref 150–400)
POTASSIUM SERPL-MCNC: 3.8 MMOL/L — SIGNIFICANT CHANGE UP (ref 3.5–5.3)
POTASSIUM SERPL-SCNC: 3.8 MMOL/L — SIGNIFICANT CHANGE UP (ref 3.5–5.3)
PROT SERPL-MCNC: 4.9 G/DL — LOW (ref 6–8.3)
PROTHROM AB SERPL-ACNC: 10.7 SEC — SIGNIFICANT CHANGE UP (ref 9.9–13.4)
RBC # BLD: 4.22 M/UL — SIGNIFICANT CHANGE UP (ref 4.2–5.8)
RBC # FLD: 14 % — SIGNIFICANT CHANGE UP (ref 10.3–14.5)
RSV RNA NPH QL NAA+NON-PROBE: SIGNIFICANT CHANGE UP
SALICYLATES SERPL-MCNC: <0.3 MG/DL — LOW (ref 15–30)
SARS-COV-2 RNA SPEC QL NAA+PROBE: SIGNIFICANT CHANGE UP
SODIUM SERPL-SCNC: 138 MMOL/L — SIGNIFICANT CHANGE UP (ref 135–145)
SOURCE RESPIRATORY: SIGNIFICANT CHANGE UP
WBC # BLD: 8.29 K/UL — SIGNIFICANT CHANGE UP (ref 3.8–10.5)
WBC # FLD AUTO: 8.29 K/UL — SIGNIFICANT CHANGE UP (ref 3.8–10.5)

## 2025-03-26 RX ADMIN — Medication 1000 MILLILITER(S): at 13:56

## 2025-03-26 RX ADMIN — Medication 1000 MILLILITER(S): at 12:33

## 2025-03-26 NOTE — ED PROVIDER NOTE - ATTENDING APP SHARED VISIT CONTRIBUTION OF CARE
I, Raudel Narayan, DO personally saw the patient with YAEL.  I have personally performed a face to face diagnostic evaluation on this patient.  I have reviewed the YAEL note and agree with the history, exam, and plan of care, except as noted.  I personally saw the patient and performed a substantive portion of the visit including all aspects of the medical decision making.

## 2025-03-26 NOTE — ED PROVIDER NOTE - PROGRESS NOTE DETAILS
PA VIANNEY: Patient reassessed, sitting comfortably in chair in NAD, denies any complaints. States feeling better, symptoms improved, wants to go home. Discussed CT findings with patient, especially femoral lesion, advised patient to stay in hospital for MRI for further evaluation, potential fracture, pt understands risk, preferred to go home and f/u outpatient. Discussed with attending, patient can be discharged home to f/u with PCP and ortho. The patient was given verbal and written discharge instructions. Specifically, instructions when to return to the ED and when to seek follow-up from their pcp was discussed. Any specialty follow-up was discussed, including how to make an appointment.  Instructions were discussed in simple, plain language and was understood by the patient. The patient understands that should their symptoms worsen or any new symptoms arise, they should return to the ED immediately for further evaluation. All pt's questions were answered. Patient verbalizes understanding. Patient initially read to the ED with low blood pressure.  FAST exam performed immediately which was negative.  Patient placed for imaging and lab work to evaluate for potential trauma although low concern at this time just given general history and overall history and physical exam.  IV fluids given and blood pressure responded to that.  Patient feels much better.  Pending CT of the chest abdomen pelvis.  Chest x-ray showed old rib fractures unclear if it is old or new rib fractures.

## 2025-03-26 NOTE — ED ADULT NURSE REASSESSMENT NOTE - NS ED NURSE REASSESS COMMENT FT1
Pt at baseline mental status, resting in stretcher. Spontaneous respirations are even and unlabored, no acute distress noted. Normal sinus noted on cardiac monitor. Repeat labs collected and sent.
Break RN: Pt received in stretcher outside room 24. Pt resting comfortably and offered no complains at present. respiration even and non-labored. in NAD. Hematoma noted on right sided forehead. Pending CT r result

## 2025-03-26 NOTE — ED PROVIDER NOTE - PATIENT PORTAL LINK FT
You can access the FollowMyHealth Patient Portal offered by [facility] by registering at the following website: [email or web address]. By joining [employer]’s FollowMyHealth portal, you will also be able to view your health information using other applications (apps) compatible with our system.

## 2025-03-26 NOTE — ED PROVIDER NOTE - NSFOLLOWUPINSTRUCTIONS_ED_ALL_ED_FT
Rest, drink plenty of fluids.  Advance activity as tolerated.  Continue all previously prescribed medications as directed.  Follow up with your primary care physician in 48-72 hours- bring copies of your results.  Return to the ER for worsening or persistent symptoms, and/or ANY NEW OR CONCERNING SYMPTOMS. If you have issues obtaining follow up, please call: [phone] to obtain a doctor or specialist who takes your insurance in your area.     Take Tylenol 650mg (Two 325 mg pills) every 4-6 hours as needed for pain.     Our Discharge Lounge will contact you for appointment with Orthopedic.

## 2025-03-26 NOTE — PROVIDER CONTACT NOTE (OTHER) - ASSESSMENT
Met with pt, he is alert, oriented x3, ambulatory. Pt said he will walk, [place] front of [place]. SW spoke with his wife Darshana, she said he walks everywhere and he will be ok.

## 2025-03-26 NOTE — ED PROVIDER NOTE - PHYSICAL EXAMINATION
Head: No steps offs, bruising or hematoma noted throughout the skull. No otorrhea, rhinorrhea. No raccoon's sign or mcneil's sign present.  Neck: Pt able to rotate neck 45 degrees to the left and right w/o difficulty or pain. No pain on palpation of the mid cervical spine. No step offs present.  Chest: No pain on palpation of the ribs. No pain on deep inspiration. No obvious deformities or bruising  Extremities: No obvious fractures or deformities. Sensation intact throughout. Full RoM.    Constitutional: NAD AAOx3  Eyes: PERRLA EOMI  Head: Normocephalic atraumatic  Mouth: MMM  Cardiac: regular rate   Resp: No respiratory distress.   GI: Abd s/nt/nd  Neuro: CN2-12 intact, strength is 5/5 in all extremities.   Skin: No visible rashes

## 2025-03-26 NOTE — ED ADULT TRIAGE NOTE - CHIEF COMPLAINT QUOTE
pt picked up by EMS roaming in the street, pt found to be intoxicated. pt noted to be hypotensive in triage, unable to tolerate PO temp. charge RN Called. pt brought directly to room #24

## 2025-03-26 NOTE — ED PROVIDER NOTE - OBJECTIVE STATEMENT
74 yo M BIBEMS from the street, found intoxicated. Pt noted to be hypotensive. Pt is alert, awake, having full conversation. 74 yo M BIBEMS from the street, found intoxicated. Pt noted to be hypotensive. Pt is alert, awake, having full conversation.EMS reports that bystanders saw the patient had a mechanical fall.  EMS arrived and found the patient facedown awake alert hesitant to go to the hospital without any complaints.  Patient complaining to me of right sided chest pain.  He denies headache nausea vomiting difficulty breathing shortness of breath abdominal pain or extremity pain.  He endorses drinking a pint of vodka earlier this morning.

## 2025-03-26 NOTE — ED ADULT NURSE NOTE - OBJECTIVE STATEMENT
MORRIS RN: pt. received to room 24 awake alert and responsive presenting s/p fall. pt. states he feels fine and is in no pain, As per EMS, pt. tripped and fell on his face, witnessed via bystander. No obvious trauma noted, abrasion noted superior to the R eyebrow. NAD noted at this time. respirations even and unlabored on RA. pt. states his name, no ID to confirm at this time. respirations even and unlabored, noted to be hypoxic lowest 90, placed on 2L supplemental O2 via NC with improvement. NSR noted on bedside cardiac monitor. 18g IV placed in the R Wrist. labs drawn and sent. rectal temp as noted. FS as noted.  undressed and placed in hospital gown. comfort measures provided. safety precautions maintained. report endorsed to primary RN.

## 2025-03-28 ENCOUNTER — APPOINTMENT (OUTPATIENT)
Dept: INTERNAL MEDICINE | Facility: CLINIC | Age: 75
End: 2025-03-28
Payer: MEDICARE

## 2025-03-28 VITALS
DIASTOLIC BLOOD PRESSURE: 78 MMHG | WEIGHT: 111 LBS | OXYGEN SATURATION: 97 % | TEMPERATURE: 98.4 F | BODY MASS INDEX: 18.95 KG/M2 | HEIGHT: 64 IN | SYSTOLIC BLOOD PRESSURE: 138 MMHG | HEART RATE: 84 BPM

## 2025-03-28 DIAGNOSIS — R93.89 ABNORMAL FINDINGS ON DIAGNOSTIC IMAGING OF OTHER SPECIFIED BODY STRUCTURES: ICD-10-CM

## 2025-03-28 PROCEDURE — 99214 OFFICE O/P EST MOD 30 MIN: CPT

## 2025-04-09 ENCOUNTER — APPOINTMENT (OUTPATIENT)
Dept: OPHTHALMOLOGY | Facility: CLINIC | Age: 75
End: 2025-04-09

## 2025-04-10 ENCOUNTER — OUTPATIENT (OUTPATIENT)
Dept: OUTPATIENT SERVICES | Facility: HOSPITAL | Age: 75
LOS: 1 days | End: 2025-04-10
Payer: COMMERCIAL

## 2025-04-10 ENCOUNTER — APPOINTMENT (OUTPATIENT)
Dept: MRI IMAGING | Facility: IMAGING CENTER | Age: 75
End: 2025-04-10
Payer: MEDICARE

## 2025-04-10 DIAGNOSIS — Z98.49 CATARACT EXTRACTION STATUS, UNSPECIFIED EYE: Chronic | ICD-10-CM

## 2025-04-10 DIAGNOSIS — R93.89 ABNORMAL FINDINGS ON DIAGNOSTIC IMAGING OF OTHER SPECIFIED BODY STRUCTURES: ICD-10-CM

## 2025-04-10 DIAGNOSIS — Z00.8 ENCOUNTER FOR OTHER GENERAL EXAMINATION: ICD-10-CM

## 2025-04-10 PROCEDURE — 73720 MRI LWR EXTREMITY W/O&W/DYE: CPT | Mod: 26,LT

## 2025-04-10 PROCEDURE — A9585: CPT

## 2025-04-10 PROCEDURE — 73720 MRI LWR EXTREMITY W/O&W/DYE: CPT

## 2025-04-11 DIAGNOSIS — M85.00 FIBROUS DYSPLASIA (MONOSTOTIC), UNSPECIFIED SITE: ICD-10-CM

## 2025-04-18 ENCOUNTER — APPOINTMENT (OUTPATIENT)
Dept: ENDOCRINOLOGY | Facility: CLINIC | Age: 75
End: 2025-04-18
Payer: MEDICARE

## 2025-04-18 VITALS — OXYGEN SATURATION: 98 % | HEART RATE: 83 BPM | SYSTOLIC BLOOD PRESSURE: 140 MMHG | DIASTOLIC BLOOD PRESSURE: 80 MMHG

## 2025-04-18 VITALS — BODY MASS INDEX: 18.78 KG/M2 | HEIGHT: 64 IN | WEIGHT: 110 LBS

## 2025-04-18 DIAGNOSIS — I10 ESSENTIAL (PRIMARY) HYPERTENSION: ICD-10-CM

## 2025-04-18 DIAGNOSIS — E11.65 TYPE 2 DIABETES MELLITUS WITH HYPERGLYCEMIA: ICD-10-CM

## 2025-04-18 DIAGNOSIS — R79.89 OTHER SPECIFIED ABNORMAL FINDINGS OF BLOOD CHEMISTRY: ICD-10-CM

## 2025-04-18 DIAGNOSIS — E11.9 TYPE 2 DIABETES MELLITUS W/OUT COMPLICATIONS: ICD-10-CM

## 2025-04-18 DIAGNOSIS — E78.5 HYPERLIPIDEMIA, UNSPECIFIED: ICD-10-CM

## 2025-04-18 DIAGNOSIS — M80.00XD AGE-RELATED OSTEOPOROSIS WITH CURRENT PATHOLOGICAL FRACTURE, UNSPECIFIED SITE, SUBSEQUENT ENCOUNTER FOR FRACTURE WITH ROUTINE HEALING: ICD-10-CM

## 2025-04-18 PROCEDURE — 99215 OFFICE O/P EST HI 40 MIN: CPT | Mod: 25

## 2025-04-18 PROCEDURE — 77085 DXA BONE DENSITY AXL VRT FX: CPT

## 2025-04-18 PROCEDURE — ZZZZZ: CPT

## 2025-04-23 ENCOUNTER — APPOINTMENT (OUTPATIENT)
Dept: ORTHOPEDIC SURGERY | Facility: CLINIC | Age: 75
End: 2025-04-23
Payer: MEDICARE

## 2025-04-23 PROCEDURE — 99214 OFFICE O/P EST MOD 30 MIN: CPT | Mod: 25

## 2025-04-23 PROCEDURE — 73502 X-RAY EXAM HIP UNI 2-3 VIEWS: CPT

## 2025-05-06 ENCOUNTER — APPOINTMENT (OUTPATIENT)
Age: 75
End: 2025-05-06

## 2025-05-06 DIAGNOSIS — M25.511 PAIN IN RIGHT SHOULDER: ICD-10-CM

## 2025-05-06 DIAGNOSIS — M25.512 PAIN IN RIGHT SHOULDER: ICD-10-CM

## 2025-05-19 ENCOUNTER — APPOINTMENT (OUTPATIENT)
Age: 75
End: 2025-05-19
Payer: MEDICAID

## 2025-05-19 PROCEDURE — NTX: CUSTOM

## 2025-05-21 ENCOUNTER — APPOINTMENT (OUTPATIENT)
Dept: INTERNAL MEDICINE | Facility: CLINIC | Age: 75
End: 2025-05-21
Payer: MEDICARE

## 2025-05-21 VITALS
DIASTOLIC BLOOD PRESSURE: 66 MMHG | OXYGEN SATURATION: 98 % | WEIGHT: 108 LBS | BODY MASS INDEX: 18.44 KG/M2 | HEART RATE: 84 BPM | SYSTOLIC BLOOD PRESSURE: 116 MMHG | HEIGHT: 64 IN | TEMPERATURE: 97.7 F

## 2025-05-21 DIAGNOSIS — E11.65 TYPE 2 DIABETES MELLITUS WITH HYPERGLYCEMIA: ICD-10-CM

## 2025-05-21 DIAGNOSIS — G47.8 OTHER SLEEP DISORDERS: ICD-10-CM

## 2025-05-21 PROCEDURE — 99213 OFFICE O/P EST LOW 20 MIN: CPT

## 2025-05-27 ENCOUNTER — TRANSCRIPTION ENCOUNTER (OUTPATIENT)
Age: 75
End: 2025-05-27

## 2025-05-28 ENCOUNTER — RX RENEWAL (OUTPATIENT)
Age: 75
End: 2025-05-28

## 2025-05-28 ENCOUNTER — TRANSCRIPTION ENCOUNTER (OUTPATIENT)
Age: 75
End: 2025-05-28

## 2025-05-29 RX ORDER — METFORMIN HYDROCHLORIDE 1000 MG/1
1000 TABLET, FILM COATED, EXTENDED RELEASE ORAL
Qty: 180 | Refills: 3 | Status: COMPLETED | COMMUNITY
Start: 2025-05-21 | End: 2025-05-29

## 2025-05-30 ENCOUNTER — APPOINTMENT (OUTPATIENT)
Age: 75
End: 2025-05-30
Payer: MEDICARE

## 2025-05-30 PROCEDURE — D9310: CPT

## 2025-05-30 RX ORDER — METFORMIN HYDROCHLORIDE 1000 MG/1
1000 TABLET, COATED ORAL
Qty: 180 | Refills: 3 | Status: ACTIVE | COMMUNITY
Start: 2025-05-29 | End: 1900-01-01

## 2025-06-02 ENCOUNTER — APPOINTMENT (OUTPATIENT)
Dept: INTERNAL MEDICINE | Facility: CLINIC | Age: 75
End: 2025-06-02

## 2025-06-03 ENCOUNTER — APPOINTMENT (OUTPATIENT)
Dept: OTOLARYNGOLOGY | Facility: CLINIC | Age: 75
End: 2025-06-03
Payer: MEDICARE

## 2025-06-03 VITALS
WEIGHT: 108 LBS | HEIGHT: 64 IN | BODY MASS INDEX: 18.44 KG/M2 | DIASTOLIC BLOOD PRESSURE: 80 MMHG | SYSTOLIC BLOOD PRESSURE: 144 MMHG

## 2025-06-03 DIAGNOSIS — Z85.819 PERSONAL HISTORY OF MALIGNANT NEOPLASM OF UNSPECIFIED SITE OF LIP, ORAL CAVITY, AND PHARYNX: ICD-10-CM

## 2025-06-03 DIAGNOSIS — K14.9 DISEASE OF TONGUE, UNSPECIFIED: ICD-10-CM

## 2025-06-03 PROCEDURE — 99214 OFFICE O/P EST MOD 30 MIN: CPT | Mod: 25

## 2025-06-03 PROCEDURE — 31575 DIAGNOSTIC LARYNGOSCOPY: CPT

## 2025-06-04 ENCOUNTER — APPOINTMENT (OUTPATIENT)
Dept: INTERNAL MEDICINE | Facility: CLINIC | Age: 75
End: 2025-06-04

## 2025-06-13 ENCOUNTER — APPOINTMENT (OUTPATIENT)
Dept: INTERNAL MEDICINE | Facility: CLINIC | Age: 75
End: 2025-06-13
Payer: MEDICARE

## 2025-06-13 VITALS
OXYGEN SATURATION: 95 % | WEIGHT: 108 LBS | HEART RATE: 83 BPM | HEIGHT: 64 IN | TEMPERATURE: 98 F | DIASTOLIC BLOOD PRESSURE: 60 MMHG | SYSTOLIC BLOOD PRESSURE: 97 MMHG | RESPIRATION RATE: 16 BRPM | BODY MASS INDEX: 18.44 KG/M2

## 2025-06-13 PROBLEM — Z71.6 ENCOUNTER FOR SMOKING CESSATION COUNSELING: Status: ACTIVE | Noted: 2025-06-13

## 2025-06-13 PROCEDURE — G2211 COMPLEX E/M VISIT ADD ON: CPT

## 2025-06-13 PROCEDURE — 99214 OFFICE O/P EST MOD 30 MIN: CPT

## 2025-06-13 RX ORDER — BUPROPION HYDROCHLORIDE 150 MG/1
150 TABLET, FILM COATED, EXTENDED RELEASE ORAL
Qty: 180 | Refills: 1 | Status: ACTIVE | COMMUNITY
Start: 2025-06-13 | End: 1900-01-01

## 2025-06-20 ENCOUNTER — APPOINTMENT (OUTPATIENT)
Age: 75
End: 2025-06-20

## 2025-06-20 PROCEDURE — 99213 OFFICE O/P EST LOW 20 MIN: CPT

## 2025-06-23 ENCOUNTER — RX RENEWAL (OUTPATIENT)
Age: 75
End: 2025-06-23

## 2025-06-27 ENCOUNTER — APPOINTMENT (OUTPATIENT)
Dept: CT IMAGING | Facility: IMAGING CENTER | Age: 75
End: 2025-06-27

## 2025-06-27 ENCOUNTER — OUTPATIENT (OUTPATIENT)
Dept: OUTPATIENT SERVICES | Facility: HOSPITAL | Age: 75
LOS: 1 days | End: 2025-06-27
Payer: MEDICAID

## 2025-06-27 DIAGNOSIS — Z98.49 CATARACT EXTRACTION STATUS, UNSPECIFIED EYE: Chronic | ICD-10-CM

## 2025-06-27 DIAGNOSIS — Z00.8 ENCOUNTER FOR OTHER GENERAL EXAMINATION: ICD-10-CM

## 2025-06-27 PROCEDURE — 70487 CT MAXILLOFACIAL W/DYE: CPT | Mod: 26

## 2025-06-27 PROCEDURE — 70487 CT MAXILLOFACIAL W/DYE: CPT

## 2025-07-02 ENCOUNTER — APPOINTMENT (OUTPATIENT)
Dept: INTERNAL MEDICINE | Facility: CLINIC | Age: 75
End: 2025-07-02
Payer: MEDICARE

## 2025-07-02 VITALS
WEIGHT: 106 LBS | TEMPERATURE: 97.7 F | BODY MASS INDEX: 18.1 KG/M2 | DIASTOLIC BLOOD PRESSURE: 71 MMHG | SYSTOLIC BLOOD PRESSURE: 110 MMHG | OXYGEN SATURATION: 97 % | HEIGHT: 64 IN | HEART RATE: 69 BPM

## 2025-07-02 PROBLEM — S49.92XA INJURY OF LEFT SHOULDER: Status: RESOLVED | Noted: 2023-12-19 | Resolved: 2025-07-02

## 2025-07-02 PROBLEM — J43.9 COPD WITH EMPHYSEMA: Status: ACTIVE | Noted: 2024-05-16
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
## 2025-07-02 PROBLEM — M54.50 CHRONIC BILATERAL LOW BACK PAIN, UNSPECIFIED WHETHER SCIATICA PRESENT: Status: ACTIVE | Noted: 2025-07-02

## 2025-07-02 PROCEDURE — 99214 OFFICE O/P EST MOD 30 MIN: CPT | Mod: 25

## 2025-07-06 ENCOUNTER — INPATIENT (INPATIENT)
Facility: HOSPITAL | Age: 75
LOS: 2 days | Discharge: HOME | End: 2025-07-09
Attending: STUDENT IN AN ORGANIZED HEALTH CARE EDUCATION/TRAINING PROGRAM | Admitting: STUDENT IN AN ORGANIZED HEALTH CARE EDUCATION/TRAINING PROGRAM
Payer: MEDICARE

## 2025-07-06 VITALS
HEIGHT: 64 IN | OXYGEN SATURATION: 98 % | RESPIRATION RATE: 18 BRPM | HEART RATE: 107 BPM | DIASTOLIC BLOOD PRESSURE: 47 MMHG | WEIGHT: 106.04 LBS | SYSTOLIC BLOOD PRESSURE: 81 MMHG | TEMPERATURE: 99 F

## 2025-07-06 DIAGNOSIS — Z98.49 CATARACT EXTRACTION STATUS, UNSPECIFIED EYE: Chronic | ICD-10-CM

## 2025-07-06 DIAGNOSIS — A41.9 SEPSIS, UNSPECIFIED ORGANISM: ICD-10-CM

## 2025-07-06 LAB
ADD ON TEST-SPECIMEN IN LAB: SIGNIFICANT CHANGE UP
ALBUMIN SERPL ELPH-MCNC: 3 G/DL — LOW (ref 3.3–5)
ALP SERPL-CCNC: 55 U/L — SIGNIFICANT CHANGE UP (ref 40–120)
ALT FLD-CCNC: 30 U/L — SIGNIFICANT CHANGE UP (ref 4–41)
ANION GAP SERPL CALC-SCNC: 10 MMOL/L — SIGNIFICANT CHANGE UP (ref 7–14)
ANION GAP SERPL CALC-SCNC: 14 MMOL/L — SIGNIFICANT CHANGE UP (ref 7–14)
APPEARANCE UR: CLEAR — SIGNIFICANT CHANGE UP
APTT BLD: 28.1 SEC — SIGNIFICANT CHANGE UP (ref 26.1–36.8)
AST SERPL-CCNC: 73 U/L — HIGH (ref 4–40)
BACTERIA # UR AUTO: NEGATIVE /HPF — SIGNIFICANT CHANGE UP
BASOPHILS # BLD AUTO: 0.02 K/UL — SIGNIFICANT CHANGE UP (ref 0–0.2)
BASOPHILS # BLD MANUAL: 0 K/UL — SIGNIFICANT CHANGE UP (ref 0–0.2)
BASOPHILS NFR BLD AUTO: 0.3 % — SIGNIFICANT CHANGE UP (ref 0–2)
BASOPHILS NFR BLD MANUAL: 0 % — SIGNIFICANT CHANGE UP (ref 0–2)
BILIRUB SERPL-MCNC: 0.2 MG/DL — SIGNIFICANT CHANGE UP (ref 0.2–1.2)
BILIRUB UR-MCNC: NEGATIVE — SIGNIFICANT CHANGE UP
BLOOD GAS VENOUS COMPREHENSIVE RESULT: SIGNIFICANT CHANGE UP
BLOOD GAS VENOUS COMPREHENSIVE RESULT: SIGNIFICANT CHANGE UP
BUN SERPL-MCNC: 28 MG/DL — HIGH (ref 7–23)
BUN SERPL-MCNC: 33 MG/DL — HIGH (ref 7–23)
C DIFF GDH STL QL: NEGATIVE — SIGNIFICANT CHANGE UP
C DIFF GDH STL QL: SIGNIFICANT CHANGE UP
CALCIUM SERPL-MCNC: 6.5 MG/DL — CRITICAL LOW (ref 8.4–10.5)
CALCIUM SERPL-MCNC: 8.4 MG/DL — SIGNIFICANT CHANGE UP (ref 8.4–10.5)
CHLORIDE SERPL-SCNC: 102 MMOL/L — SIGNIFICANT CHANGE UP (ref 98–107)
CHLORIDE SERPL-SCNC: 107 MMOL/L — SIGNIFICANT CHANGE UP (ref 98–107)
CO2 SERPL-SCNC: 11 MMOL/L — LOW (ref 22–31)
CO2 SERPL-SCNC: 13 MMOL/L — LOW (ref 22–31)
COLOR SPEC: YELLOW — SIGNIFICANT CHANGE UP
CREAT SERPL-MCNC: 1.18 MG/DL — SIGNIFICANT CHANGE UP (ref 0.5–1.3)
CREAT SERPL-MCNC: 1.48 MG/DL — HIGH (ref 0.5–1.3)
DIFF PNL FLD: NEGATIVE — SIGNIFICANT CHANGE UP
EGFR: 49 ML/MIN/1.73M2 — LOW
EGFR: 49 ML/MIN/1.73M2 — LOW
EGFR: 64 ML/MIN/1.73M2 — SIGNIFICANT CHANGE UP
EGFR: 64 ML/MIN/1.73M2 — SIGNIFICANT CHANGE UP
EOSINOPHIL # BLD AUTO: 0 K/UL — SIGNIFICANT CHANGE UP (ref 0–0.5)
EOSINOPHIL # BLD MANUAL: 0 K/UL — SIGNIFICANT CHANGE UP (ref 0–0.5)
EOSINOPHIL NFR BLD AUTO: 0 % — SIGNIFICANT CHANGE UP (ref 0–6)
EOSINOPHIL NFR BLD MANUAL: 0 % — SIGNIFICANT CHANGE UP (ref 0–6)
EPI CELLS # UR: PRESENT
FLUAV AG NPH QL: SIGNIFICANT CHANGE UP
FLUBV AG NPH QL: SIGNIFICANT CHANGE UP
GIANT PLATELETS BLD QL SMEAR: PRESENT
GLUCOSE SERPL-MCNC: 175 MG/DL — HIGH (ref 70–99)
GLUCOSE SERPL-MCNC: 187 MG/DL — HIGH (ref 70–99)
GLUCOSE UR QL: NEGATIVE MG/DL — SIGNIFICANT CHANGE UP
HCT VFR BLD CALC: 44.7 % — SIGNIFICANT CHANGE UP (ref 39–50)
HGB BLD-MCNC: 14 G/DL — SIGNIFICANT CHANGE UP (ref 13–17)
IMM GRANULOCYTES # BLD AUTO: 0.02 K/UL — SIGNIFICANT CHANGE UP (ref 0–0.07)
IMM GRANULOCYTES NFR BLD AUTO: 0.3 % — SIGNIFICANT CHANGE UP (ref 0–0.9)
INR BLD: <0.9 RATIO — LOW (ref 0.85–1.16)
KETONES UR QL: NEGATIVE MG/DL — SIGNIFICANT CHANGE UP
LEUKOCYTE ESTERASE UR-ACNC: NEGATIVE — SIGNIFICANT CHANGE UP
LYMPHOCYTES # BLD AUTO: 0.56 K/UL — LOW (ref 1–3.3)
LYMPHOCYTES # BLD MANUAL: 0.17 K/UL — LOW (ref 1–3.3)
LYMPHOCYTES NFR BLD AUTO: 8.5 % — LOW (ref 13–44)
LYMPHOCYTES NFR BLD MANUAL: 2.6 % — LOW (ref 13–44)
MANUAL METAMYELOCYTE #: 0.06 K/UL — HIGH (ref 0–0)
MANUAL NEUTROPHIL BANDS #: 0.73 K/UL — SIGNIFICANT CHANGE UP (ref 0–0.84)
MANUAL SMEAR VERIFICATION: SIGNIFICANT CHANGE UP
MCHC RBC-ENTMCNC: 27.7 PG — SIGNIFICANT CHANGE UP (ref 27–34)
MCHC RBC-ENTMCNC: 31.3 G/DL — LOW (ref 32–36)
MCV RBC AUTO: 88.5 FL — SIGNIFICANT CHANGE UP (ref 80–100)
METAMYELOCYTES # FLD: 0.9 % — HIGH (ref 0–0)
METAMYELOCYTES NFR BLD: 0.9 % — HIGH (ref 0–0)
MONOCYTES # BLD AUTO: 0.29 K/UL — SIGNIFICANT CHANGE UP (ref 0–0.9)
MONOCYTES # BLD MANUAL: 0.17 K/UL — SIGNIFICANT CHANGE UP (ref 0–0.9)
MONOCYTES NFR BLD AUTO: 4.4 % — SIGNIFICANT CHANGE UP (ref 2–14)
MONOCYTES NFR BLD MANUAL: 2.6 % — SIGNIFICANT CHANGE UP (ref 2–14)
NEUTROPHILS # BLD AUTO: 5.73 K/UL — SIGNIFICANT CHANGE UP (ref 1.8–7.4)
NEUTROPHILS # BLD MANUAL: 5.48 K/UL — SIGNIFICANT CHANGE UP (ref 1.8–7.4)
NEUTROPHILS NFR BLD AUTO: 86.5 % — HIGH (ref 43–77)
NEUTROPHILS NFR BLD MANUAL: 82.8 % — HIGH (ref 43–77)
NEUTS BAND # BLD: 11.1 % — CRITICAL HIGH (ref 0–8)
NEUTS BAND NFR BLD: 11.1 % — CRITICAL HIGH (ref 0–8)
NITRITE UR-MCNC: NEGATIVE — SIGNIFICANT CHANGE UP
NRBC # BLD AUTO: 0 K/UL — SIGNIFICANT CHANGE UP (ref 0–0)
NRBC # FLD: 0 K/UL — SIGNIFICANT CHANGE UP (ref 0–0)
NRBC BLD AUTO-RTO: 0 /100 WBCS — SIGNIFICANT CHANGE UP (ref 0–0)
PH UR: 6 — SIGNIFICANT CHANGE UP (ref 5–8)
PLAT MORPH BLD: NORMAL — SIGNIFICANT CHANGE UP
PLATELET # BLD AUTO: 172 K/UL — SIGNIFICANT CHANGE UP (ref 150–400)
PLATELET COUNT - ESTIMATE: NORMAL — SIGNIFICANT CHANGE UP
PMV BLD: 10.8 FL — SIGNIFICANT CHANGE UP (ref 7–13)
POTASSIUM SERPL-MCNC: 4.1 MMOL/L — SIGNIFICANT CHANGE UP (ref 3.5–5.3)
POTASSIUM SERPL-MCNC: 4.7 MMOL/L — SIGNIFICANT CHANGE UP (ref 3.5–5.3)
POTASSIUM SERPL-SCNC: 4.1 MMOL/L — SIGNIFICANT CHANGE UP (ref 3.5–5.3)
POTASSIUM SERPL-SCNC: 4.7 MMOL/L — SIGNIFICANT CHANGE UP (ref 3.5–5.3)
PROT SERPL-MCNC: 6 G/DL — SIGNIFICANT CHANGE UP (ref 6–8.3)
PROT UR-MCNC: 100 MG/DL
PROTHROM AB SERPL-ACNC: 9.5 SEC — LOW (ref 9.9–13.4)
RBC # BLD: 5.05 M/UL — SIGNIFICANT CHANGE UP (ref 4.2–5.8)
RBC # FLD: 13.9 % — SIGNIFICANT CHANGE UP (ref 10.3–14.5)
RBC BLD AUTO: NORMAL — SIGNIFICANT CHANGE UP
RBC CASTS # UR COMP ASSIST: <5 /HPF — HIGH (ref 0–4)
RSV RNA NPH QL NAA+NON-PROBE: SIGNIFICANT CHANGE UP
SARS-COV-2 RNA SPEC QL NAA+PROBE: SIGNIFICANT CHANGE UP
SMUDGE CELLS # BLD: PRESENT
SODIUM SERPL-SCNC: 128 MMOL/L — LOW (ref 135–145)
SODIUM SERPL-SCNC: 129 MMOL/L — LOW (ref 135–145)
SOURCE RESPIRATORY: SIGNIFICANT CHANGE UP
SP GR SPEC: 1.02 — SIGNIFICANT CHANGE UP (ref 1–1.03)
UROBILINOGEN FLD QL: 0.2 MG/DL — SIGNIFICANT CHANGE UP (ref 0.2–1)
WBC # BLD: 6.62 K/UL — SIGNIFICANT CHANGE UP (ref 3.8–10.5)
WBC # FLD AUTO: 6.62 K/UL — SIGNIFICANT CHANGE UP (ref 3.8–10.5)
WBC UR QL: <5 /HPF — SIGNIFICANT CHANGE UP (ref 0–5)

## 2025-07-06 PROCEDURE — 74177 CT ABD & PELVIS W/CONTRAST: CPT | Mod: 26

## 2025-07-06 PROCEDURE — 99291 CRITICAL CARE FIRST HOUR: CPT | Mod: GC

## 2025-07-06 PROCEDURE — 71045 X-RAY EXAM CHEST 1 VIEW: CPT | Mod: 26

## 2025-07-06 PROCEDURE — 86077 PHYS BLOOD BANK SERV XMATCH: CPT

## 2025-07-06 PROCEDURE — 99291 CRITICAL CARE FIRST HOUR: CPT

## 2025-07-06 RX ORDER — SODIUM CHLORIDE 9 G/1000ML
1000 INJECTION, SOLUTION INTRAVENOUS ONCE
Refills: 0 | Status: COMPLETED | OUTPATIENT
Start: 2025-07-06 | End: 2025-07-06

## 2025-07-06 RX ORDER — PIPERACILLIN-TAZO-DEXTROSE,ISO 3.375G/5
3.38 IV SOLUTION, PIGGYBACK PREMIX FROZEN(ML) INTRAVENOUS ONCE
Refills: 0 | Status: COMPLETED | OUTPATIENT
Start: 2025-07-06 | End: 2025-07-06

## 2025-07-06 RX ORDER — CALCIUM GLUCONATE 20 MG/ML
2 INJECTION, SOLUTION INTRAVENOUS ONCE
Refills: 0 | Status: COMPLETED | OUTPATIENT
Start: 2025-07-06 | End: 2025-07-06

## 2025-07-06 RX ORDER — NOREPINEPHRINE BITARTRATE 8 MG
0.05 SOLUTION INTRAVENOUS
Qty: 8 | Refills: 0 | Status: DISCONTINUED | OUTPATIENT
Start: 2025-07-06 | End: 2025-07-07

## 2025-07-06 RX ORDER — ACETAMINOPHEN 500 MG/5ML
725 LIQUID (ML) ORAL ONCE
Refills: 0 | Status: COMPLETED | OUTPATIENT
Start: 2025-07-06 | End: 2025-07-06

## 2025-07-06 RX ORDER — VANCOMYCIN HCL IN 5 % DEXTROSE 1.5G/250ML
1000 PLASTIC BAG, INJECTION (ML) INTRAVENOUS ONCE
Refills: 0 | Status: COMPLETED | OUTPATIENT
Start: 2025-07-06 | End: 2025-07-06

## 2025-07-06 RX ADMIN — CALCIUM GLUCONATE 200 GRAM(S): 20 INJECTION, SOLUTION INTRAVENOUS at 20:40

## 2025-07-06 RX ADMIN — Medication 250 MILLIGRAM(S): at 17:42

## 2025-07-06 RX ADMIN — SODIUM CHLORIDE 1000 MILLILITER(S): 9 INJECTION, SOLUTION INTRAVENOUS at 23:27

## 2025-07-06 RX ADMIN — Medication 200 GRAM(S): at 16:37

## 2025-07-06 RX ADMIN — Medication 1000 MILLILITER(S): at 18:14

## 2025-07-06 RX ADMIN — Medication 290 MILLIGRAM(S): at 17:11

## 2025-07-06 RX ADMIN — Medication 500 MILLILITER(S): at 19:29

## 2025-07-06 RX ADMIN — Medication 1000 MILLILITER(S): at 16:37

## 2025-07-06 RX ADMIN — NOREPINEPHRINE BITARTRATE 4.51 MICROGRAM(S)/KG/MIN: 8 SOLUTION at 20:09

## 2025-07-06 RX ADMIN — Medication 725 MILLIGRAM(S): at 18:00

## 2025-07-06 NOTE — PATIENT PROFILE ADULT - PUBLIC BENEFITS
PROGRESS NOTE



DATE OF SERVICE:

09/02/2020



REASON FOR FOLLOWUP:

Right foot cellulitis.



INTERVAL HISTORY:

The patient is currently afebrile.  Patient is breathing comfortably.  Patient denies

having any chest pain or cough.  No nausea.  No abdominal pain.  No pain to the right

foot.



PHYSICAL EXAMINATION:

Blood pressure 110/57, pulse 95, temperature 98, she is 100% on room air.

General description is a middle-aged female up in the bed in no distress.

RESPIRATORY SYSTEM: Unlabored breathing, clear to auscultation anteriorly.

HEART:  S1, S2.  Regular rate and rhythm.

ABDOMEN:  Soft, no tenderness.

Right foot did have minimal swelling and redness, no drainage.



LABS:

Vanco level is 12.5.  Urine with Staph epidermidis.



DIAGNOSTIC IMPRESSION AND PLAN:

Patient admitted to the hospital with mental status changes and question of a seizure

disorder, in this patient who did have a right foot cellulitis and UTI urine has been

Staph epi.  Patient is covered with vancomycin to continue. Will monitor her clinical

course and kidney function closely.  Continue supportive care.  Jose the area of the

redness right foot.





MMODL / IJN: [id number] / Job#: [id number] no

## 2025-07-06 NOTE — H&P ADULT - ASSESSMENT
ASSESSMENT  SABRINA DOUGLAS is a 75y male with PMH HTN, HLD, T2DM, tongue cancer s/p radiation in the hospital for transferred to the MICU for septic shock requiring pressors.    PLAN  =====Neurologic=====  #Baseline mental status  - A&Ox4 at baseline    =====Pulmonary=====  Patient breathing comfortably on room air    =====Cardiovascular=====  #Septic Shock  - Patient w/ soft pressures requires Levo pressor support  - Patient s/p 2.5L NS in the ED without significant improvement  - Downtrending lactate 1.9->0.8    Plan:  > C/w broad spectrum Abx as per below  > Continue to evaluate for need for further pressors    =====Gastroenterology=====  #Diarrhea  - Patient with profuse diarrhea with episodes every 30-60 minutes    Plan:  > Zofran 4mg PRN  > Careful IV and PO fluid resuscitation     #Diet  - Diet as tolerated    =====Renal/=====  #Acute Kidney Injury  #Hyponitremia   - Patient with initial Cr 1.48, BUN 33 with improvement s/p fluids to Cr 1.18, BUN 28  - Hyponitremia likely in setting of hypovolemia iso diffuse diarrhea with reduced PO intake    Plan:  > F/u CMP  > F/u urine studies    #Electrolytes  - Maintain K>4, Phos>3, Mag>2, iCal>1    =====Endocrine=====  - While NPO, FSBG and SAUL q6h    =====Infectious Disease=====  #Septic Shock  - Patient hypotensive requiring pressors  - Lethargic and febrile at home with profuse diarrhea and abdominal pain  - CT abdomen demonstrating    =====Heme/Onc=====  #DVT Ppx  - Lovenox 40mg SQ qd    =====Ethics=====  FULL CODE ASSESSMENT  SABRINA DOUGLAS is a 75y male with PMH HTN, HLD, T2DM, tongue cancer s/p radiation in the hospital for transferred to the MICU for septic shock requiring pressors.    PLAN  =====Neurologic=====  #Baseline mental status  - A&Ox4 at baseline    =====Pulmonary=====  Patient breathing comfortably on room air    =====Cardiovascular=====  #Septic Shock  - Patient w/ soft pressures requires Levo pressor support  - Patient s/p 2.5L NS in the ED without significant improvement  - Downtrending lactate 1.9->0.8    Plan:  > C/w broad spectrum Abx as per below  > Continue to evaluate for need for further pressors    =====Gastroenterology=====  #Diarrhea  - Patient with profuse diarrhea with episodes every 30-60 minutes    Plan:  > Zofran 4mg PRN  > Careful IV and PO fluid resuscitation     #Diet  - Diet as tolerated    =====Renal/=====  #Acute Kidney Injury  #Hyponitremia   - Patient with initial Cr 1.48, BUN 33 with improvement s/p fluids to Cr 1.18, BUN 28  - Hyponitremia likely in setting of hypovolemia iso diffuse diarrhea with reduced PO intake    Plan:  > F/u CMP  > F/u urine studies    #Electrolytes  - Maintain K>4, Phos>3, Mag>2, iCal>1    =====Endocrine=====  - While NPO, FSBG and SAUL q6h    =====Infectious Disease=====  #Septic Shock  - Patient hypotensive requiring pressors  - Lethargic and febrile at home with profuse diarrhea and abdominal pain  - CT abdomen demonstrating    Plan:  > C/w Zosyn 3.375mg  > F/u Blood and Stool cultures    =====Heme/Onc=====  #DVT Ppx  - Lovenox 40mg SQ qd    =====Ethics=====  FULL CODE INCOMPLETE    ASSESSMENT  SABRINA DOUGLAS is a 75y male with PMH HTN, HLD, T2DM, tongue cancer s/p radiation in the hospital for transferred to the MICU for septic shock requiring pressors.    PLAN  =====Neurologic=====  #Baseline mental status  - A&Ox4 at baseline    =====Pulmonary=====  Patient breathing comfortably on room air    =====Cardiovascular=====  #Septic Shock  - Patient w/ soft pressures requires Levo pressor support  - Patient s/p 2.5L NS in the ED without significant improvement  - Downtrending lactate 1.9->0.8    Plan:  > C/w broad spectrum Abx as per below  > Continue to evaluate for need for further pressors    #HTN  #HLD  - Home Regimen: Amlodipine 5mg    =====Gastroenterology=====  #Diarrhea  - Patient with profuse diarrhea with episodes every 30-60 minutes    Plan:  > Zofran 4mg PRN  > Careful IV and PO fluid resuscitation     #Diet  - Diet as tolerated    =====Renal/=====  #Acute Kidney Injury  #Hyponitremia   - Patient with initial Cr 1.48, BUN 33 with improvement s/p fluids to Cr 1.18, BUN 28  - Hyponitremia likely in setting of hypovolemia iso diffuse diarrhea with reduced PO intake    Plan:  > F/u CMP  > F/u urine studies    #Electrolytes  - Maintain K>4, Phos>3, Mag>2, iCal>1    =====Endocrine=====  #T2DM  - While NPO, FSBG and SAUL q6h  - Home Regimen: Metformin 1000mg BID  =====Infectious Disease=====  #Septic Shock  - Patient hypotensive requiring pressors  - Lethargic and febrile at home with profuse diarrhea and abdominal pain  - CT abdomen demonstrating    Plan:  > C/w Zosyn 3.375mg  > F/u Blood and Stool cultures    =====Heme/Onc=====  #DVT Ppx  - Lovenox 40mg SQ qd    =====Ethics=====  FULL CODE INCOMPLETE    ASSESSMENT  SABRINA DOUGLAS is a 75y male with PMH HTN, HLD, T2DM, tongue cancer s/p radiation in the hospital for transferred to the MICU for septic shock requiring pressors.    PLAN  =====Neurologic=====  #Baseline mental status  - A&Ox4 at baseline    =====Pulmonary=====  Patient breathing comfortably on room air    =====Cardiovascular=====  #Septic Shock  - Patient w/ soft pressures requires Levo pressor support  - Patient s/p 2.5L NS in the ED without significant improvement  - Downtrending lactate 1.9->0.8    Plan:  > C/w broad spectrum Abx as per below  > Continue to evaluate for need for further pressors    #HTN  #HLD  - Home Regimen: Not currently on antihypertensive medications. Simvastatin 20mg     Plan:  > Continue with Atorvastatin 20mg    =====Gastroenterology=====  #Diarrhea  - Patient with profuse diarrhea with episodes every 30-60 minutes    Plan:  > Zofran 4mg PRN  > Careful IV and PO fluid resuscitation     #Diet  - Diet as tolerated    =====Renal/=====  #Acute Kidney Injury  #Hyponitremia   - Patient with initial Cr 1.48, BUN 33 with improvement s/p fluids to Cr 1.18, BUN 28  - Hyponitremia likely in setting of hypovolemia iso diffuse diarrhea with reduced PO intake    Plan:  > F/u CMP  > F/u urine studies    #Electrolytes  - Maintain K>4, Phos>3, Mag>2, iCal>1    =====Endocrine=====  #T2DM  - While NPO, FSBG and SAUL q6h  - Home Regimen: Metformin 1000mg BID  =====Infectious Disease=====  #Septic Shock  - Patient hypotensive requiring pressors  - Lethargic and febrile at home with profuse diarrhea and abdominal pain  - CT abdomen demonstrating    Plan:  > C/w Zosyn 3.375mg  > F/u Blood and Stool cultures    =====Heme/Onc=====  #DVT Ppx  - Lovenox 40mg SQ qd    =====Ethics=====  FULL CODE INCOMPLETE    ASSESSMENT  SABRINA DOUGLAS is a 75y male with PMH HTN, HLD, T2DM, tongue cancer s/p radiation in the hospital for transferred to the MICU for septic shock requiring pressors.    PLAN  =====Neurologic=====  #Baseline mental status  - A&Ox4 at baseline    =====Pulmonary=====  Patient breathing comfortably on room air    =====Cardiovascular=====  #Septic Shock  - Patient w/ soft pressures requires Levo pressor support  - Patient s/p 2.5L NS in the ED without significant improvement  - Downtrending lactate 1.9->0.8    Plan:  > C/w broad spectrum Abx as per below  > Continue to evaluate for need for further pressors    #HTN  #HLD  - Home Regimen: Not currently on antihypertensive medications. Simvastatin 20mg     Plan:  > Continue with Atorvastatin 20mg    =====Gastroenterology=====  #Diarrhea  - Patient with profuse diarrhea with episodes every 30-60 minutes    Plan:  > Zofran 4mg PRN  > Careful IV and PO fluid resuscitation     #Hepatic Lesion  CTAP 7/6: "Questionable 1.5 cm ovoid enhancing structure in hepatic segment 7 in the setting of artifact, not definitively seen on prior study. Nonemergent liver ultrasound is recommended for further evaluation."    Plan:  - Continue to monitor outpatient or inpatient if clinically indicated      #Diet  - Diet as tolerated    =====Renal/=====  #Acute Kidney Injury  #Hyponatremia   - Patient with initial Cr 1.48, BUN 33 with improvement s/p fluids to Cr 1.18, BUN 28  - Hyponitremia likely in setting of hypovolemia iso diffuse diarrhea with reduced PO intake    Plan:  > F/u CMP  > F/u urine studies    #NAGMA  - Likely in setting of diarrhea    Plan:  - Continue IVF as above    #Electrolytes  - Maintain K>4, Phos>3, Mag>2, iCal>1    =====Endocrine=====  #T2DM  - While NPO, FSBG and SAUL q6h  - Home Regimen: Metformin 1000mg BID    =====Infectious Disease=====  #Septic Shock  - Patient hypotensive requiring pressors  - Lethargic and febrile at home with profuse diarrhea and abdominal pain  - CT abdomen suggestive of infectious versus inflammatory enterocolitis/diarrheal disease. Underdistended stomach with questionable distal wall thickening (inflammatory gastritis and/or ulcer   disease)      A 6 cm in greatest dimension unicameral bone cyst versus aneurysmal bone   cyst, unchanged when remeasured. Nonemergent MRI is again recommended for   further evaluation.      Plan:  > C/w Zosyn 3.375mg  > F/u Blood  > Stool/C.diff cultures    =====Heme/Onc=====  #DVT Ppx  - Lovenox 40mg SQ qd    =====Ethics=====  FULL CODE ASSESSMENT  SABRINA DOUGLAS is a 75y male with PMH HTN, HLD, T2DM, tongue cancer s/p radiation in the hospital for transferred to the MICU for septic shock requiring pressors. Etiology likely enterocolitis.    PLAN  =====Neurologic=====  #Baseline mental status  - A&Ox4 at baseline    =====Pulmonary=====  #No active issues  Patient breathing comfortably on room air    =====Cardiovascular=====  #Septic Shock  - Patient w/ soft pressures requires Levo pressor support  - Patient s/p 2.5L NS in the ED without significant improvement  - Downtrending lactate 1.9->0.8    Plan:  > C/w broad spectrum Abx as per below  > Continue to evaluate for need for further pressors    #HTN  #HLD  - Home Regimen: Not currently on antihypertensive medications. Simvastatin 20mg     Plan:  > Continue with Atorvastatin 20mg  > Continue Aspirin    =====Gastroenterology=====  #Diarrhea  - Patient with profuse diarrhea with episodes every 30-60 minutes    Plan:  > Zofran 4mg PRN  > IVF resuscitation    #Hepatic Lesion  CTAP 7/6: "Questionable 1.5 cm ovoid enhancing structure in hepatic segment 7 in the setting of artifact, not definitively seen on prior study. Nonemergent liver ultrasound is recommended for further evaluation."    Plan:  - Continue to monitor outpatient or inpatient if clinically indicated    #Diet  - Diet as tolerated    =====Renal/=====  #Acute Kidney Injury  #Hyponatremia   - Patient with initial Cr 1.48, BUN 33 with initial improvement s/p fluids to Cr 1.18, BUN 28  - Hyponatremia likely in setting of hypovolemia iso diffuse diarrhea with reduced PO intake    Plan:  > Continue to trend  > IVF as above  > F/u urine studies    #NAGMA  - Likely in setting of diarrhea    Plan:  - Continue IVF as above    #Electrolytes  - Maintain K>4, Phos>3, Mag>2, iCal>1    =====Endocrine=====  #T2DM  - Home Regimen: Metformin 1000mg BID, Trajenta 5mg daily  - A1c 6.8% (3/2025)    Plan:  > F/u A1c  > SAUL    =====Infectious Disease=====  #Septic Shock  #Enterocolitis  - Patient hypotensive requiring pressors  - Lethargic and febrile at home with profuse diarrhea and abdominal pain  - CT abdomen suggestive of infectious versus inflammatory enterocolitis/diarrheal disease. Underdistended stomach with questionable distal wall thickening (inflammatory gastritis and/or ulcer disease)  - No leukocytosis, but bandemia  - C. diff negative    Plan:  > C/w ciprofloxacin 500mg bid (7/7-7/11) and flagyl 500mg bid  > F/u Blood cultures  > F/u GI PCR    =====MUSCULOSKELETAL=====  #Bone Cysts  CT 7/6: "6 cm in greatest dimension unicameral bone cyst versus aneurysmal bone   cyst, unchanged when remeasured. Nonemergent MRI is again recommended for   further evaluation."    Plan:  - Follow up outpatient MRI    =====Heme/Onc=====  #DVT Ppx  - Lovenox 40mg SQ qd    =====Ethics=====  FULL CODE ASSESSMENT  SABRINA DOUGLAS is a 75y male with PMH HTN, HLD, BPH, T2DM, tongue cancer s/p radiation in the hospital for transferred to the MICU for septic shock requiring pressors. Etiology likely enterocolitis.    PLAN  =====Neurologic=====  #Baseline mental status, no active issues  - A&Ox4 at baseline    =====Pulmonary=====  #No active issues  Patient breathing comfortably on room air    =====Cardiovascular=====  #Septic Shock  - Patient w/ soft pressures requires Levo pressor support  - Patient s/p 4L on admission  - Downtrending lactate 1.9->0.8    Plan:  > Continue levo, MAP goal >65  > C/w abx as per below    #HTN  #HLD  - Home Regimen: Not currently on antihypertensive medications. On Simvastatin 20mg     Plan:  > Continue with Atorvastatin 10mg  > Continue Aspirin 81mg    =====Gastroenterology=====  #Diarrhea  #Enterocolitis  - Patient with profuse diarrhea with episodes every 30-60 minutes    Plan:  > Abx as below  > Zofran 4mg PRN  > IVF resuscitation    #Hepatic Lesion  CTAP 7/6: "Questionable 1.5 cm ovoid enhancing structure in hepatic segment 7 in the setting of artifact, not definitively seen on prior study. Nonemergent liver ultrasound is recommended for further evaluation."    Plan:  > Continue to monitor outpatient or inpatient if clinically indicated    #GERD  Home regimen: Omeprazole 40mg daily    Plan:  > Continue pantoprazole 40mg daily    #Diet  #Weight loss  > Regular diet (CC), can change to soft and bite-sized if patient unable to tolerate (due to dentures)    =====Renal/=====  #Acute Kidney Injury  #Hyponatremia   - Patient with initial Cr 1.48, BUN 33 with initial improvement s/p fluids to Cr 1.18, BUN 28  - Hyponatremia likely in setting of hypovolemia iso diffuse diarrhea with reduced PO intake    Plan:  > Continue to trend  > IVF as above  > F/u urine studies    #NAGMA  - Likely in setting of diarrhea    Plan:  > Continue IVF as above  > Sodium bicarb gtt 150mEq at 100 mL/hr    #BPH  Home regimen: Tamsulosin 0.4mg qhs    Plan:  > Continue tamsulosin 0.4mg qhs    #Electrolytes  - Maintain K>4, Phos>3, Mag>2, iCal>1    =====Endocrine=====  #T2DM  - Home Regimen: Metformin 1000mg BID, Trajenta 5mg daily  - A1c 6.8% (3/2025)    Plan:  > CC diet  > F/u A1c  > SAUL    =====Infectious Disease=====  #Septic Shock  #Enterocolitis  - Patient hypotensive requiring pressors  - Lethargic and febrile at home with profuse diarrhea and abdominal pain  - CT abdomen suggestive of infectious versus inflammatory enterocolitis/diarrheal disease. Underdistended stomach with questionable distal wall thickening (inflammatory gastritis and/or ulcer disease)  - No leukocytosis, but bandemia  - C. diff negative    Plan:  > C/w ciprofloxacin 500mg bid (7/7-7/11) and flagyl 500mg bid  > F/u Blood cultures  > F/u GI PCR    =====Musculoskeletal=====  #Bone Cysts  CT 7/6: "6 cm in greatest dimension unicameral bone cyst versus aneurysmal bone   cyst, unchanged when remeasured. Nonemergent MRI is again recommended for   further evaluation."    Plan:  - Follow up outpatient MRI    =====Heme/Onc=====  #DVT Ppx  - Heparin 5000units q8    =====Ethics=====  - Pending confirmation of Full Code

## 2025-07-06 NOTE — ED PROVIDER NOTE - ATTENDING CONTRIBUTION TO CARE
Attending MD Frias:  I performed a history and physical exam of the patient and discussed their management with the resident. I reviewed the resident's note and agree with the documented findings and plan of care. My medical decision making and observations are found above.    Attending MD Frias.  Agree with above.  Pt is a 76 yo male with pmhx of gastritis, BPH, tongue CA, HLD, DMII, HTN who presents to ED with complaint of generalized weakness x 2-3 days now resulting in new inability to stand/ambulate without assistance. Pt wife endorses frequent diarrhea, dysuria, inc urinary freq.  Pt was noted by wife to be hypotensive at home today c/w worsening generalized weakness.  Pt has also progressed to complaining of severe diffuse body pain over back/chest/legs.  TTP over back/extremities.  No focal neuro deficits compared with most recent baseline.  On exam pt with diffuse abd'l TTP.  Noted to be rectally febrile to 102.5.  Sepsis protocol commenced.    Critical care billing:  Upon my evaluation, this patient had a high probability of imminent or life-threatening deterioration due to sepsis, hypotension, which required my direct attention, intervention, and personal management.  The patient has a  medical condition that impairs one or more vital organ systems.  Frequent personal assessment and adjustment of medical interventions was performed.      I have personally provided 35 minutes of critical care time exclusive of time spent on separately billable procedures. Time includes review of laboratory data, radiology results, discussion with consultants, patient and family; monitoring for potential decompensation, as well as time spent retrieving data and reviewing the chart and documenting the visit. Interventions were performed as documented above.

## 2025-07-06 NOTE — ED PROVIDER NOTE - OBJECTIVE STATEMENT
75-year-old male with past medical history of hypertension, hyperlipidemia, diabetes, tongue cancer status post radiation, presents to the ED complaining of generalized weakness, fever, diarrhea.  Wife reports multiple episodes of diarrhea approximately every 30 minutes to 1 hour over the last day.  Today patient also started complaining of throat pain with difficulty swallowing as well as burning abdominal pain, lower extremity pain, and diffuse back pain.  Denies falls or trauma.  At baseline patient independent with ADLs, however more recently wife is needed to assist.  Wife reports 20 pound weight loss in the last few months.  States he has been eating less because his dentures do not fit properly and this patient unable to tolerate solids.  Denies chest pain, difficulty breathing.  No recent antibiotics or travel.  No sick contacts.

## 2025-07-06 NOTE — ED PROVIDER NOTE - PHYSICAL EXAMINATION
GEN: Elderly thin male, NAD  EYES: normal conjunctiva, perrl  ENT: NCAT, MMM, dentures in place. Left labial droop (consistent with established baseline s/p mouth cancer, no acute change). No mouth sores. No tonsillar edema or exudates. Uvula midline. Trachea midline  CHEST/LUNGS: Non-tachypneic, CTAB, bilateral breath sounds  CARDIAC: Tachycardic, normal perfusion  ABDOMEN: Soft, diffuse abdominal ttp, No rebound/guarding  MSK: No edema, no gross deformity of extremities  BACK: Diffuse tenderness to palpation of thoracic and lumbar spine and paraspinal muscles.   SKIN: No rashes, no petechiae, no vesicles  NEURO: CN grossly intact, no focal motor or sensory deficits  PSYCH: Alert, appropriate, cooperative, with capacity and insight

## 2025-07-06 NOTE — ED PROVIDER NOTE - PROGRESS NOTE DETAILS
Saint Juve, DO (PGY3): Patient signed out to me at shift change.  Briefly, he is a 75-year-old male, with a history of hypertension, hyperlipidemia, tongue cancer status post radiation, who presented for generalized weakness, fever, diarrhea.  On arrival patient found to have rectal temp of 0.7.  Sepsis order workup initiated.  Patient was diffusely tender in the abdomen.  CT abdomen pelvis ordered.  Patient status post 2 L of fluid, Zosyn, Vanco.  Labs with no significant leukocytosis or anemia.  Slightly hyponatremic to 129 creatinine 1.48 with a baseline of 0.8.  Lactate normal.  Viral swab negative.  Patient reassessed.  Hypotensive with systolic 85.  POCUS showing IVC 1.2 to 1.3 cm, however, not much respiratory variation.  Will give another of 500 cc and reassess.  Will consider pressors if not improved. Saint Juve, DO (PGY3): received call from lab. Patient with 15% bands. Pending CTAP Saint Juve, DO (PGY3): s/p 500cc bolus. Persistently hypotensive. Starting pressors. MICU to come evaluate patient in the ED Saint Juve, DO (PGY3): seen and evaluated by MICU. Patient admitted.

## 2025-07-06 NOTE — PATIENT PROFILE ADULT - FALL HARM RISK - RISK INTERVENTIONS

## 2025-07-06 NOTE — ED PROVIDER NOTE - CLINICAL SUMMARY MEDICAL DECISION MAKING FREE TEXT BOX
75-year-old male with history of hypertension, hyperlipidemia, diabetes, tongue cancer s/p radiation presented to the ED with wife for generalized weakness, body aches, fever, diarrhea.  Vomiting.  On exam patient elderly appearing but nontoxic.  Vitals notable for rectal temp of 102.7.  Hypotension of 80s over 40s.  And tachycardia.  Not hypoxic on room air.  exam notable for diffuse abdominal tenderness to palpation as well as diffuse tenderness to his paraspinal muscles.   Will evaluate for infectious etiology with labs, cultures, urine, chest x-ray, RVP.  Will evaluate for metabolic/electrolyte derangements given decreased p.o. intake and diarrhea.  Will hydrate with fluids, Tylenol, empiric antibiotics and admit.

## 2025-07-06 NOTE — H&P ADULT - HISTORY OF PRESENT ILLNESS
Patient 76yo male PMH HTN, HLD, T2DM, tongue cancer s/p radiation presenting for a few days of generalized weakness, fever, and diarrhea associated with throat pain, difficulty swallowing, and burning abdominal pain. Per patient and wife at bedside, patient has had multiple episodes of loose nonbloody diarrhea every 30-60 minutes over the last day. Over the same time period, patient has also had increased fatigue and generalized weakness. Patient denies any recent travel or sick contacts. Wife also denies any recent antibiotics use. Patient also endorses     Patient independent at baseline with most ADLs however has required additional assistance over the last few weeks-months. Wife also endorses approximately 20lb weight loss over the last few months. Wife attributes some of the weight loss to reduced PO intake given poorly fitted dentures limiting his ability to consume solid foods.     In the ED, patient with rectal temp 102.5F, tachycardic 107, and 98% on RA and hypotensive at 81/47. No leukocytosis, but bandemia of 11.1%. Patient with hyponatremia (129), BUN 33, Cr 1.48 (prior ~0.8-1 in 2018). VBG 7.23/42/28/18. Lactate WNL. RVP negative. UA withou CXR without focal consolidations. Received Ofirmev 725mg, Zosyn 3.375g, Vancomycin 1g, and 2L of fluids. POCUS 1.2-1.3cm without respiratory variation. Gave another 500cc of fluids without adequate response and levo was started along with calcium gluconate for hypocalcemia of 6.5. MICU consulted for septic shock requiring pressors.   Patient 74yo male PMH HTN (not on meds), HLD, BPH, T2DM, tongue cancer s/p radiation presenting for a few days of generalized weakness, fever, and diarrhea associated with throat pain, difficulty swallowing, and burning abdominal pain. Per patient and wife at bedside, patient has had multiple episodes of loose nonbloody diarrhea every 30-60 minutes over the last day. Over the same time period, patient has also had increased fatigue and generalized weakness. Patient denies any recent travel or sick contacts. Wife also denies any recent antibiotics use.    Patient independent at baseline with most ADLs however has required additional assistance over the last few weeks-months. Wife also endorses approximately 20lb weight loss over the last few months. Wife attributes some of the weight loss to reduced PO intake given poorly fitted dentures limiting his ability to consume solid foods.     In the ED, patient with rectal temp 102.5F, tachycardic 107, and 98% on RA and hypotensive at 81/47. No leukocytosis, but bandemia of 11.1%. Patient with hyponatremia (129), BUN 33, Cr 1.48 (prior ~0.8-1 in 2018). VBG 7.23/42/28/18. Lactate WNL. RVP negative. UA with proteinuria but with negative nitrite and leukocytes. CXR without focal consolidations. CT with enterocolitis vs diarrheal disease and possible gastroenteritis. Received Ofirmev 725mg, Zosyn 3.375g, Vancomycin 1g, and 2L of fluids. POCUS 1.2-1.3cm without respiratory variation. Gave another 500cc of fluids without adequate response and levo was started along with calcium gluconate for hypocalcemia of 6.5. MICU consulted for septic shock requiring pressors.   Patient 74yo male PMH HTN (not on meds), HLD, BPH, T2DM, tongue cancer s/p radiation presenting for a few days of generalized weakness, fever, and diarrhea associated with throat pain, difficulty swallowing, and burning abdominal pain. Per chart review, patient has had multiple episodes of loose nonbloody diarrhea every 30-60 minutes over the last day. Over the same time period, patient has also had increased fatigue and generalized weakness. Patient denies any recent travel or sick contacts. Wife also denies any recent antibiotics use. At the time of examination, patient denying abdominal pain.    Patient independent at baseline with most ADLs however has required additional assistance over the last few weeks-months. Wife also endorses approximately 20lb weight loss over the last few months. Wife attributes some of the weight loss to reduced PO intake given poorly fitted dentures limiting his ability to consume solid foods.     In the ED, patient with rectal temp 102.5F, tachycardic 107, and 98% on RA and hypotensive at 81/47. No leukocytosis, but bandemia of 11.1%. Patient with hyponatremia (129), BUN 33, Cr 1.48 (prior ~0.8-1 in 2018). VBG 7.23/42/28/18. Lactate WNL. RVP negative. UA with proteinuria but with negative nitrite and leukocytes. CXR without focal consolidations. CT with enterocolitis vs diarrheal disease and possible gastroenteritis. Received Ofirmev 725mg, Zosyn 3.375g, Vancomycin 1g, and 2L of fluids. POCUS 1.2-1.3cm without respiratory variation. Gave another 500cc of fluids without adequate response and levo was started along with calcium gluconate for hypocalcemia of 6.5. MICU consulted for septic shock requiring pressors.

## 2025-07-06 NOTE — H&P ADULT - NSHPLABSRESULTS_GEN_ALL_CORE
LABS: Personally reviewed labs, imaging, and ECG                          14.0   6.62  )-----------( 172      ( 2025 14:43 )             44.7       07-    128[L]  |  107  |  28[H]  ----------------------------<  187[H]  4.7   |  11[L]  |  1.18    Ca    6.5[LL]      2025 18:54    TPro  6.0  /  Alb  3.0[L]  /  TBili  0.2  /  DBili  x   /  AST  73[H]  /  ALT  30  /  AlkPhos  55  07-06       LIVER FUNCTIONS - ( 2025 14:43 )  Alb: 3.0 g/dL / Pro: 6.0 g/dL / ALK PHOS: 55 U/L / ALT: 30 U/L / AST: 73 U/L / GGT: x                    Urinalysis Basic - ( 2025 20:24 )    Color: Yellow / Appearance: Clear / S.024 / pH: x  Gluc: x / Ketone: x  / Bili: Negative / Urobili: 0.2 mg/dL   Blood: x / Protein: 100 mg/dL / Nitrite: Negative   Leuk Esterase: Negative / RBC: x / WBC x   Sq Epi: x / Non Sq Epi: x / Bacteria: x        PT/INR - ( 2025 14:43 )   PT: 9.5 sec;   INR: <0.90 ratio         PTT - ( 2025 14:43 )  PTT:28.1 sec    Lactate Trend            CAPILLARY BLOOD GLUCOSE      POCT Blood Glucose.: 204 mg/dL (2025 15:55)            RADIOLOGY & ADDITIONAL TESTS:

## 2025-07-06 NOTE — ED ADULT NURSE REASSESSMENT NOTE - NS ED NURSE REASSESS COMMENT FT1
Resident aware of pt's blood pressure, will await orders, will continue to monitor. Pt mentating well.

## 2025-07-06 NOTE — H&P ADULT - ATTENDING COMMENTS
75y male with PMH HTN, HLD, BPH, T2DM, tongue cancer s/p radiation in the hospital for transferred to the MICU for septic shock requiring vasopressors likely 2/2 enterocolitis and hypovolemia.   septic shock likely 2/2 enterocolitis and possible component of hypovolemia, patient with diarrhea and decreased PO intake with weight loss   will give more IVF,  patient with b/l  A line predominant and collapsable IVC  nonanion gap metabolic acidosis, replace potassium and start HCO3 gtt, serial BMP with mag and phos and VBG  titrate levophed to maintain MAP>65  patient with bandemia, pancx, lactate negative, C diff and GI PCR, cipro/flagyl   TTE, trend CE  DVT ppx heparin   Full code

## 2025-07-06 NOTE — ED ADULT NURSE NOTE - NSFALLRISKINTERV_ED_ALL_ED

## 2025-07-06 NOTE — ED ADULT NURSE NOTE - OBJECTIVE STATEMENT
Pt received to [facility] presents with sore throat, and fever x few days, was known to by hypotensive in triage, currently normotensive. a&ox3, ambulatory with assistance, skin intact, respirations even and unlabored, abd soft and non-distended, nontender to palpation. Endorsing lower back pain upon palpation. febrile rectally. 20g Iv placed in left arm labs drawn and sent medicated as per ordered.

## 2025-07-06 NOTE — H&P ADULT - PATIENT'S PREFERRED PRONOUN
Received request via: Pharmacy    Was the patient seen in the last year in this department? Yes    Does the patient have an active prescription (recently filled or refills available) for medication(s) requested? No    Does the patient have [facility] and need 100 day supply (blood pressure, diabetes and cholesterol meds only)? Patient does not have SCP  
Him/He

## 2025-07-06 NOTE — ED ADULT TRIAGE NOTE - RESPIRATORY RATE (BREATHS/MIN)
Left message for patient with benign pathology results   
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       
18

## 2025-07-06 NOTE — H&P ADULT - NSHPREVIEWOFSYSTEMS_GEN_ALL_CORE
REVIEW OF SYSTEMS:    CONSTITUTIONAL: + weakness, + chills  EYES/ENT: No visual changes;  No vertigo or throat pain   NECK: No pain or stiffness  RESPIRATORY: No cough, wheezing, hemoptysis; No shortness of breath  CARDIOVASCULAR: No chest pain or palpitations  GASTROINTESTINAL: Burning abdominal pain, nausea, loose stool nonbloody diarrhea.  GENITOURINARY: No dysuria, frequency or hematuria  NEUROLOGICAL: No numbness or weakness  SKIN: No itching, rashes

## 2025-07-06 NOTE — H&P ADULT - NSHPPHYSICALEXAM_GEN_ALL_CORE
VITALS:   T(C): 36.7 (07-06-25 @ 20:00), Max: 39.2 (07-06-25 @ 16:48)  HR: 78 (07-06-25 @ 20:00) (78 - 107)  BP: 88/55 (07-06-25 @ 20:00) (81/47 - 114/60)  RR: 14 (07-06-25 @ 20:00) (14 - 18)  SpO2: 98% (07-06-25 @ 20:00) (98% - 99%)    GENERAL: NAD, Mildly ill appearing  HEAD:  Atraumatic, Normocephalic  EYES: EOMI, PERRLA, conjunctiva and sclera clear  ENT: Mildly dry mucous membranes  NECK: Supple, No JVD  CHEST/LUNG: Clear to auscultation bilaterally; No rales, rhonchi, wheezing, or rubs. Unlabored respirations  HEART: Regular rate and rhythm; No murmurs, rubs, or gallops  ABDOMEN: BSx4; []. Head catheter in place  EXTREMITIES:  2+ Peripheral Pulses, brisk capillary refill. No clubbing, cyanosis, or edema  NERVOUS SYSTEM:  A&Ox3, no focal deficits   SKIN: No rashes or lesions. Mildly pale VITALS:   T(C): 36.7 (07-06-25 @ 20:00), Max: 39.2 (07-06-25 @ 16:48)  HR: 78 (07-06-25 @ 20:00) (78 - 107)  BP: 88/55 (07-06-25 @ 20:00) (81/47 - 114/60)  RR: 14 (07-06-25 @ 20:00) (14 - 18)  SpO2: 98% (07-06-25 @ 20:00) (98% - 99%)    GENERAL: NAD, Thin, Mildly ill appearing  HEAD:  Atraumatic, Normocephalic  EYES: EOMI, PERRLA, conjunctiva and sclera clear  ENT: Mildly dry mucous membranes  NECK: Supple, No JVD  CHEST/LUNG: Clear to auscultation bilaterally; No rales, rhonchi, wheezing, or rubs. Unlabored respirations  HEART: Regular rate and rhythm; No murmurs, rubs, or gallops  ABDOMEN: BSx4; soft, nondistended, nontender  GENITOURINARY: Head catheter in place, yellow urine  EXTREMITIES:  2+ Peripheral Pulses, brisk capillary refill. No clubbing, cyanosis, or edema  NERVOUS SYSTEM:  A&Ox3, no focal deficits   SKIN: No rashes or lesions. Mildly pale

## 2025-07-06 NOTE — ED ADULT NURSE REASSESSMENT NOTE - NS ED NURSE REASSESS COMMENT FT1
Resident at bedside ultrasounding pt, pt BP remains low, resident aware, pt mentating well, will continue to monitor.

## 2025-07-06 NOTE — ED ADULT TRIAGE NOTE - CHIEF COMPLAINT QUOTE
pt endorses body aches, sore throat, abdominal pain, and generalized weakness x2 days. pt endorses diarrhea, dysuria, increased urinary frequency. pt noted to be hypotensive in triage; pt denies lightheadedness. phx DM, HTN, HLD, osteoporosis.

## 2025-07-06 NOTE — ED ADULT NURSE REASSESSMENT NOTE - NS ED NURSE REASSESS COMMENT FT1
Resident made aware of pt's blood pressure, pt mentating well, antibiotics in progress, will continue to monitor.

## 2025-07-07 LAB
ALBUMIN SERPL ELPH-MCNC: 2.5 G/DL — LOW (ref 3.3–5)
ALBUMIN SERPL ELPH-MCNC: 3 G/DL — LOW (ref 3.3–5)
ALP SERPL-CCNC: 41 U/L — SIGNIFICANT CHANGE UP (ref 40–120)
ALP SERPL-CCNC: 47 U/L — SIGNIFICANT CHANGE UP (ref 40–120)
ALT FLD-CCNC: 32 U/L — SIGNIFICANT CHANGE UP (ref 4–41)
ALT FLD-CCNC: 33 U/L — SIGNIFICANT CHANGE UP (ref 4–41)
ANION GAP SERPL CALC-SCNC: 10 MMOL/L — SIGNIFICANT CHANGE UP (ref 7–14)
ANION GAP SERPL CALC-SCNC: 12 MMOL/L — SIGNIFICANT CHANGE UP (ref 7–14)
APPEARANCE UR: CLEAR — SIGNIFICANT CHANGE UP
APTT BLD: 28 SEC — SIGNIFICANT CHANGE UP (ref 26.1–36.8)
AST SERPL-CCNC: 56 U/L — HIGH (ref 4–40)
AST SERPL-CCNC: 60 U/L — HIGH (ref 4–40)
BASE EXCESS BLDV CALC-SCNC: -3.9 MMOL/L — LOW (ref -2–3)
BASOPHILS # BLD AUTO: 0.01 K/UL — SIGNIFICANT CHANGE UP (ref 0–0.2)
BASOPHILS NFR BLD AUTO: 0.2 % — SIGNIFICANT CHANGE UP (ref 0–2)
BILIRUB SERPL-MCNC: <0.2 MG/DL — SIGNIFICANT CHANGE UP (ref 0.2–1.2)
BILIRUB SERPL-MCNC: <0.2 MG/DL — SIGNIFICANT CHANGE UP (ref 0.2–1.2)
BILIRUB UR-MCNC: NEGATIVE — SIGNIFICANT CHANGE UP
BLD GP AB SCN SERPL QL: POSITIVE — SIGNIFICANT CHANGE UP
BLOOD GAS VENOUS COMPREHENSIVE RESULT: SIGNIFICANT CHANGE UP
BUN SERPL-MCNC: 15 MG/DL — SIGNIFICANT CHANGE UP (ref 7–23)
BUN SERPL-MCNC: 24 MG/DL — HIGH (ref 7–23)
CALCIUM SERPL-MCNC: 7.6 MG/DL — LOW (ref 8.4–10.5)
CALCIUM SERPL-MCNC: 8.4 MG/DL — SIGNIFICANT CHANGE UP (ref 8.4–10.5)
CHLORIDE SERPL-SCNC: 106 MMOL/L — SIGNIFICANT CHANGE UP (ref 98–107)
CHLORIDE SERPL-SCNC: 106 MMOL/L — SIGNIFICANT CHANGE UP (ref 98–107)
CO2 BLDV-SCNC: 22 MMOL/L — SIGNIFICANT CHANGE UP (ref 22–26)
CO2 SERPL-SCNC: 15 MMOL/L — LOW (ref 22–31)
CO2 SERPL-SCNC: 18 MMOL/L — LOW (ref 22–31)
COLOR SPEC: YELLOW — SIGNIFICANT CHANGE UP
CREAT ?TM UR-MCNC: 33 MG/DL — SIGNIFICANT CHANGE UP
CREAT SERPL-MCNC: 0.83 MG/DL — SIGNIFICANT CHANGE UP (ref 0.5–1.3)
CREAT SERPL-MCNC: 1.26 MG/DL — SIGNIFICANT CHANGE UP (ref 0.5–1.3)
DIFF PNL FLD: ABNORMAL
EGFR: 59 ML/MIN/1.73M2 — LOW
EGFR: 59 ML/MIN/1.73M2 — LOW
EGFR: 91 ML/MIN/1.73M2 — SIGNIFICANT CHANGE UP
EGFR: 91 ML/MIN/1.73M2 — SIGNIFICANT CHANGE UP
EOSINOPHIL # BLD AUTO: 0 K/UL — SIGNIFICANT CHANGE UP (ref 0–0.5)
EOSINOPHIL NFR BLD AUTO: 0 % — SIGNIFICANT CHANGE UP (ref 0–6)
EPEC DNA STL QL NAA+PROBE: DETECTED
EPI CELLS # UR: PRESENT
GI PCR PANEL: DETECTED
GLUCOSE BLDC GLUCOMTR-MCNC: 104 MG/DL — HIGH (ref 70–99)
GLUCOSE BLDC GLUCOMTR-MCNC: 123 MG/DL — HIGH (ref 70–99)
GLUCOSE BLDC GLUCOMTR-MCNC: 151 MG/DL — HIGH (ref 70–99)
GLUCOSE BLDC GLUCOMTR-MCNC: 88 MG/DL — SIGNIFICANT CHANGE UP (ref 70–99)
GLUCOSE SERPL-MCNC: 116 MG/DL — HIGH (ref 70–99)
GLUCOSE SERPL-MCNC: 128 MG/DL — HIGH (ref 70–99)
GLUCOSE UR QL: NEGATIVE MG/DL — SIGNIFICANT CHANGE UP
HCO3 BLDV-SCNC: 21 MMOL/L — LOW (ref 22–29)
HCT VFR BLD CALC: 43.5 % — SIGNIFICANT CHANGE UP (ref 39–50)
HGB BLD-MCNC: 14.1 G/DL — SIGNIFICANT CHANGE UP (ref 13–17)
IMM GRANULOCYTES # BLD AUTO: 0.02 K/UL — SIGNIFICANT CHANGE UP (ref 0–0.07)
IMM GRANULOCYTES NFR BLD AUTO: 0.4 % — SIGNIFICANT CHANGE UP (ref 0–0.9)
INR BLD: <0.9 RATIO — LOW (ref 0.85–1.16)
KETONES UR QL: NEGATIVE MG/DL — SIGNIFICANT CHANGE UP
LEUKOCYTE ESTERASE UR-ACNC: NEGATIVE — SIGNIFICANT CHANGE UP
LYMPHOCYTES # BLD AUTO: 0.74 K/UL — LOW (ref 1–3.3)
LYMPHOCYTES NFR BLD AUTO: 14.5 % — SIGNIFICANT CHANGE UP (ref 13–44)
MAGNESIUM SERPL-MCNC: 1.5 MG/DL — LOW (ref 1.6–2.6)
MAGNESIUM SERPL-MCNC: 2 MG/DL — SIGNIFICANT CHANGE UP (ref 1.6–2.6)
MCHC RBC-ENTMCNC: 28.8 PG — SIGNIFICANT CHANGE UP (ref 27–34)
MCHC RBC-ENTMCNC: 32.4 G/DL — SIGNIFICANT CHANGE UP (ref 32–36)
MCV RBC AUTO: 88.8 FL — SIGNIFICANT CHANGE UP (ref 80–100)
MONOCYTES # BLD AUTO: 0.19 K/UL — SIGNIFICANT CHANGE UP (ref 0–0.9)
MONOCYTES NFR BLD AUTO: 3.7 % — SIGNIFICANT CHANGE UP (ref 2–14)
NEUTROPHILS # BLD AUTO: 4.14 K/UL — SIGNIFICANT CHANGE UP (ref 1.8–7.4)
NEUTROPHILS NFR BLD AUTO: 81.2 % — HIGH (ref 43–77)
NITRITE UR-MCNC: NEGATIVE — SIGNIFICANT CHANGE UP
NRBC # BLD AUTO: 0 K/UL — SIGNIFICANT CHANGE UP (ref 0–0)
NRBC # FLD: 0 K/UL — SIGNIFICANT CHANGE UP (ref 0–0)
NRBC BLD AUTO-RTO: 0 /100 WBCS — SIGNIFICANT CHANGE UP (ref 0–0)
OSMOLALITY UR: 486 MOSM/KG — SIGNIFICANT CHANGE UP (ref 50–1200)
PCO2 BLDV: 36 MMHG — LOW (ref 42–55)
PH BLDV: 7.37 — SIGNIFICANT CHANGE UP (ref 7.32–7.43)
PH UR: 6 — SIGNIFICANT CHANGE UP (ref 5–8)
PHOSPHATE SERPL-MCNC: 1.4 MG/DL — LOW (ref 2.5–4.5)
PHOSPHATE SERPL-MCNC: 2.6 MG/DL — SIGNIFICANT CHANGE UP (ref 2.5–4.5)
PLATELET # BLD AUTO: 175 K/UL — SIGNIFICANT CHANGE UP (ref 150–400)
PMV BLD: 10.2 FL — SIGNIFICANT CHANGE UP (ref 7–13)
PO2 BLDV: 51 MMHG — HIGH (ref 25–45)
POTASSIUM SERPL-MCNC: 3.1 MMOL/L — LOW (ref 3.5–5.3)
POTASSIUM SERPL-MCNC: 3.7 MMOL/L — SIGNIFICANT CHANGE UP (ref 3.5–5.3)
POTASSIUM SERPL-SCNC: 3.1 MMOL/L — LOW (ref 3.5–5.3)
POTASSIUM SERPL-SCNC: 3.7 MMOL/L — SIGNIFICANT CHANGE UP (ref 3.5–5.3)
POTASSIUM UR-SCNC: 17.3 MMOL/L — SIGNIFICANT CHANGE UP
PROT ?TM UR-MCNC: 18 MG/DL — SIGNIFICANT CHANGE UP
PROT SERPL-MCNC: 4.5 G/DL — LOW (ref 6–8.3)
PROT SERPL-MCNC: 5.3 G/DL — LOW (ref 6–8.3)
PROT UR-MCNC: 30 MG/DL
PROT/CREAT UR-RTO: 0.6 RATIO — HIGH (ref 0–0.2)
PROTHROM AB SERPL-ACNC: 9.4 SEC — LOW (ref 9.9–13.4)
RBC # BLD: 4.9 M/UL — SIGNIFICANT CHANGE UP (ref 4.2–5.8)
RBC # FLD: 14.1 % — SIGNIFICANT CHANGE UP (ref 10.3–14.5)
RBC CASTS # UR COMP ASSIST: SIGNIFICANT CHANGE UP /HPF (ref 0–4)
RH IG SCN BLD-IMP: POSITIVE — SIGNIFICANT CHANGE UP
SALMONELLA DNA STL QL NAA+PROBE: DETECTED
SAO2 % BLDV: 85.4 % — SIGNIFICANT CHANGE UP (ref 67–88)
SODIUM SERPL-SCNC: 133 MMOL/L — LOW (ref 135–145)
SODIUM SERPL-SCNC: 134 MMOL/L — LOW (ref 135–145)
SODIUM UR-SCNC: 88 MMOL/L — SIGNIFICANT CHANGE UP
SP GR SPEC: 1.03 — SIGNIFICANT CHANGE UP (ref 1–1.03)
UROBILINOGEN FLD QL: 0.2 MG/DL — SIGNIFICANT CHANGE UP (ref 0.2–1)
UUN UR-MCNC: 377.8 MG/DL — SIGNIFICANT CHANGE UP
WBC # BLD: 5.1 K/UL — SIGNIFICANT CHANGE UP (ref 3.8–10.5)
WBC # FLD AUTO: 5.1 K/UL — SIGNIFICANT CHANGE UP (ref 3.8–10.5)
WBC UR QL: SIGNIFICANT CHANGE UP /HPF (ref 0–5)

## 2025-07-07 PROCEDURE — 99291 CRITICAL CARE FIRST HOUR: CPT | Mod: GC

## 2025-07-07 RX ORDER — METRONIDAZOLE 250 MG
500 TABLET ORAL EVERY 12 HOURS
Refills: 0 | Status: DISCONTINUED | OUTPATIENT
Start: 2025-07-07 | End: 2025-07-09

## 2025-07-07 RX ORDER — ONDANSETRON HCL/PF 4 MG/2 ML
4 VIAL (ML) INJECTION EVERY 8 HOURS
Refills: 0 | Status: DISCONTINUED | OUTPATIENT
Start: 2025-07-07 | End: 2025-07-09

## 2025-07-07 RX ORDER — DEXTROSE 50 % IN WATER 50 %
15 SYRINGE (ML) INTRAVENOUS ONCE
Refills: 0 | Status: DISCONTINUED | OUTPATIENT
Start: 2025-07-07 | End: 2025-07-09

## 2025-07-07 RX ORDER — HEPARIN SODIUM 1000 [USP'U]/ML
5000 INJECTION INTRAVENOUS; SUBCUTANEOUS EVERY 12 HOURS
Refills: 0 | Status: DISCONTINUED | OUTPATIENT
Start: 2025-07-07 | End: 2025-07-09

## 2025-07-07 RX ORDER — SODIUM CHLORIDE 9 G/1000ML
500 INJECTION, SOLUTION INTRAVENOUS ONCE
Refills: 0 | Status: COMPLETED | OUTPATIENT
Start: 2025-07-07 | End: 2025-07-07

## 2025-07-07 RX ORDER — ATORVASTATIN CALCIUM 80 MG/1
10 TABLET, FILM COATED ORAL AT BEDTIME
Refills: 0 | Status: DISCONTINUED | OUTPATIENT
Start: 2025-07-07 | End: 2025-07-09

## 2025-07-07 RX ORDER — ASPIRIN 325 MG
81 TABLET ORAL DAILY
Refills: 0 | Status: DISCONTINUED | OUTPATIENT
Start: 2025-07-07 | End: 2025-07-09

## 2025-07-07 RX ORDER — INSULIN LISPRO 100 U/ML
INJECTION, SOLUTION INTRAVENOUS; SUBCUTANEOUS
Refills: 0 | Status: DISCONTINUED | OUTPATIENT
Start: 2025-07-07 | End: 2025-07-09

## 2025-07-07 RX ORDER — TAMSULOSIN HYDROCHLORIDE 0.4 MG/1
0.4 CAPSULE ORAL AT BEDTIME
Refills: 0 | Status: DISCONTINUED | OUTPATIENT
Start: 2025-07-07 | End: 2025-07-09

## 2025-07-07 RX ORDER — SODIUM PHOSPHATE,DIBASIC DIHYD
30 POWDER (GRAM) MISCELLANEOUS ONCE
Refills: 0 | Status: COMPLETED | OUTPATIENT
Start: 2025-07-07 | End: 2025-07-07

## 2025-07-07 RX ORDER — DEXTROSE 50 % IN WATER 50 %
25 SYRINGE (ML) INTRAVENOUS ONCE
Refills: 0 | Status: DISCONTINUED | OUTPATIENT
Start: 2025-07-07 | End: 2025-07-09

## 2025-07-07 RX ORDER — SODIUM BICARBONATE 1 MEQ/ML
0.31 SYRINGE (ML) INTRAVENOUS
Qty: 150 | Refills: 0 | Status: DISCONTINUED | OUTPATIENT
Start: 2025-07-07 | End: 2025-07-07

## 2025-07-07 RX ORDER — SODIUM CHLORIDE 9 G/1000ML
1000 INJECTION, SOLUTION INTRAVENOUS
Refills: 0 | Status: DISCONTINUED | OUTPATIENT
Start: 2025-07-07 | End: 2025-07-09

## 2025-07-07 RX ORDER — CIPROFLOXACIN HCL 250 MG
500 TABLET ORAL EVERY 12 HOURS
Refills: 0 | Status: DISCONTINUED | OUTPATIENT
Start: 2025-07-07 | End: 2025-07-09

## 2025-07-07 RX ORDER — POTASSIUM PHOSPHATE, MONOBASIC POTASSIUM PHOSPHATE, DIBASIC INJECTION, 236; 224 MG/ML; MG/ML
15 SOLUTION, CONCENTRATE INTRAVENOUS ONCE
Refills: 0 | Status: DISCONTINUED | OUTPATIENT
Start: 2025-07-07 | End: 2025-07-07

## 2025-07-07 RX ORDER — DEXTROSE 50 % IN WATER 50 %
12.5 SYRINGE (ML) INTRAVENOUS ONCE
Refills: 0 | Status: DISCONTINUED | OUTPATIENT
Start: 2025-07-07 | End: 2025-07-09

## 2025-07-07 RX ORDER — HEPARIN SODIUM 1000 [USP'U]/ML
5000 INJECTION INTRAVENOUS; SUBCUTANEOUS EVERY 8 HOURS
Refills: 0 | Status: DISCONTINUED | OUTPATIENT
Start: 2025-07-07 | End: 2025-07-07

## 2025-07-07 RX ORDER — GLUCAGON 3 MG/1
1 POWDER NASAL ONCE
Refills: 0 | Status: DISCONTINUED | OUTPATIENT
Start: 2025-07-07 | End: 2025-07-09

## 2025-07-07 RX ORDER — SODIUM CHLORIDE 9 G/1000ML
1000 INJECTION, SOLUTION INTRAVENOUS ONCE
Refills: 0 | Status: COMPLETED | OUTPATIENT
Start: 2025-07-07 | End: 2025-07-07

## 2025-07-07 RX ORDER — MAGNESIUM SULFATE 500 MG/ML
2 SYRINGE (ML) INJECTION ONCE
Refills: 0 | Status: COMPLETED | OUTPATIENT
Start: 2025-07-07 | End: 2025-07-07

## 2025-07-07 RX ADMIN — ATORVASTATIN CALCIUM 10 MILLIGRAM(S): 80 TABLET, FILM COATED ORAL at 22:18

## 2025-07-07 RX ADMIN — Medication 500 MILLIGRAM(S): at 17:02

## 2025-07-07 RX ADMIN — Medication 100 MILLIEQUIVALENT(S): at 04:00

## 2025-07-07 RX ADMIN — Medication 81 MILLIGRAM(S): at 12:16

## 2025-07-07 RX ADMIN — Medication 1 APPLICATION(S): at 05:57

## 2025-07-07 RX ADMIN — HEPARIN SODIUM 5000 UNIT(S): 1000 INJECTION INTRAVENOUS; SUBCUTANEOUS at 17:02

## 2025-07-07 RX ADMIN — TAMSULOSIN HYDROCHLORIDE 0.4 MILLIGRAM(S): 0.4 CAPSULE ORAL at 22:18

## 2025-07-07 RX ADMIN — Medication 100 MEQ/KG/HR: at 02:01

## 2025-07-07 RX ADMIN — Medication 85 MILLIMOLE(S): at 09:46

## 2025-07-07 RX ADMIN — Medication 500 MILLIGRAM(S): at 05:57

## 2025-07-07 RX ADMIN — HEPARIN SODIUM 5000 UNIT(S): 1000 INJECTION INTRAVENOUS; SUBCUTANEOUS at 05:57

## 2025-07-07 RX ADMIN — Medication 25 GRAM(S): at 09:09

## 2025-07-07 RX ADMIN — NOREPINEPHRINE BITARTRATE 4.51 MICROGRAM(S)/KG/MIN: 8 SOLUTION at 00:19

## 2025-07-07 RX ADMIN — Medication 100 MILLIEQUIVALENT(S): at 01:30

## 2025-07-07 RX ADMIN — Medication 40 MILLIEQUIVALENT(S): at 09:14

## 2025-07-07 RX ADMIN — Medication 40 MILLIGRAM(S): at 08:54

## 2025-07-07 RX ADMIN — Medication 40 MILLIEQUIVALENT(S): at 14:50

## 2025-07-07 RX ADMIN — SODIUM CHLORIDE 500 MILLILITER(S): 9 INJECTION, SOLUTION INTRAVENOUS at 11:41

## 2025-07-07 RX ADMIN — SODIUM CHLORIDE 750 MILLILITER(S): 9 INJECTION, SOLUTION INTRAVENOUS at 01:34

## 2025-07-07 RX ADMIN — INSULIN LISPRO 1: 100 INJECTION, SOLUTION INTRAVENOUS; SUBCUTANEOUS at 12:18

## 2025-07-07 RX ADMIN — Medication 100 MILLIEQUIVALENT(S): at 03:07

## 2025-07-07 NOTE — DIETITIAN INITIAL EVALUATION ADULT - NAME AND PHONE
- Nursing to please document % PO intake of meals via nutrition flow sheet   - Monitor GI status, electrolytes, glucose

## 2025-07-07 NOTE — DIETITIAN INITIAL EVALUATION ADULT - NSFNSGIIOFT_GEN_A_CORE
Bowel movement (7/7).  No noted vomiting/constipation or diarrhea today. Bowel movement (7/7).  No noted vomiting/constipation.  + Diarrhea.

## 2025-07-07 NOTE — PROGRESS NOTE ADULT - ASSESSMENT
SABRINA DOUGLAS is a 75y male with PMH HTN, HLD, BPH, T2DM, tongue cancer s/p radiation in the hospital for transferred to the MICU for septic shock requiring pressors. Etiology likely enterocolitis.    PLAN  =====Neurologic=====  #Baseline mental status, no active issues  - A&Ox4 at baseline    =====Pulmonary=====  #No active issues  Patient breathing comfortably on room air    =====Cardiovascular=====  #Septic Shock  - Patient w/ soft pressures requires Levo pressor support  - Patient s/p 4L on admission  - Downtrending lactate 1.9->0.8    Plan:  > Continue levo, MAP goal >65  > C/w abx as per below    #HTN  #HLD  - Home Regimen: Not currently on antihypertensive medications. On Simvastatin 20mg     Plan:  > Continue with Atorvastatin 10mg  > Continue Aspirin 81mg    =====Gastroenterology=====  #Diarrhea  #Enterocolitis  - Patient with profuse diarrhea with episodes every 30-60 minutes    Plan:  > Abx as below  > Zofran 4mg PRN  > IVF resuscitation    #Hepatic Lesion  CTAP 7/6: "Questionable 1.5 cm ovoid enhancing structure in hepatic segment 7 in the setting of artifact, not definitively seen on prior study. Nonemergent liver ultrasound is recommended for further evaluation."    Plan:  > Continue to monitor outpatient or inpatient if clinically indicated    #GERD  Home regimen: Omeprazole 40mg daily    Plan:  > Continue pantoprazole 40mg daily    #Diet  #Weight loss  > Regular diet (CC), can change to soft and bite-sized if patient unable to tolerate (due to dentures)    =====Renal/=====  #Acute Kidney Injury  #Hyponatremia   - Patient with initial Cr 1.48, BUN 33 with initial improvement s/p fluids to Cr 1.18, BUN 28  - Hyponatremia likely in setting of hypovolemia iso diffuse diarrhea with reduced PO intake    Plan:  > Continue to trend  > IVF as above  > F/u urine studies    #NAGMA  - Likely in setting of diarrhea    Plan:  > Continue IVF as above  > Sodium bicarb gtt 150mEq at 100 mL/hr    #BPH  Home regimen: Tamsulosin 0.4mg qhs    Plan:  > Continue tamsulosin 0.4mg qhs    #Electrolytes  - Maintain K>4, Phos>3, Mag>2, iCal>1    =====Endocrine=====  #T2DM  - Home Regimen: Metformin 1000mg BID, Trajenta 5mg daily  - A1c 6.8% (3/2025)    Plan:  > CC diet  > F/u A1c  > SAUL    =====Infectious Disease=====  #Septic Shock  #Enterocolitis  - Patient hypotensive requiring pressors  - Lethargic and febrile at home with profuse diarrhea and abdominal pain  - CT abdomen suggestive of infectious versus inflammatory enterocolitis/diarrheal disease. Underdistended stomach with questionable distal wall thickening (inflammatory gastritis and/or ulcer disease)  - No leukocytosis, but bandemia  - C. diff negative    Plan:  > C/w ciprofloxacin 500mg bid (7/7-7/11) and flagyl 500mg bid  > F/u Blood cultures  > F/u GI PCR    =====Musculoskeletal=====  #Bone Cysts  CT 7/6: "6 cm in greatest dimension unicameral bone cyst versus aneurysmal bone   cyst, unchanged when remeasured. Nonemergent MRI is again recommended for   further evaluation."    Plan:  - Follow up outpatient MRI    =====Heme/Onc=====  #DVT Ppx  - Heparin 5000units q8    =====Ethics=====  - Pending confirmation of Full Code   SABRINA DOUGLAS is a 75y male with PMH HTN, HLD, BPH, T2DM, tongue cancer s/p radiation in the hospital for transferred to the MICU for septic shock requiring pressors. Etiology likely enterocolitis.    PLAN  =====Neurologic=====  #Baseline mental status, no active issues  - A&Ox4 at baseline    =====Pulmonary=====  #No active issues  Patient breathing comfortably on room air    =====Cardiovascular=====  #Septic Shock  - Patient w/ soft pressures initially requiring Levo pressor support, now off Levo and maintaining MAP > 65  - Patient s/p 4L on admission  - Downtrending lactate 1.9->0.8    Plan:  > DC levo, MAP goal >65  > C/w abx as per below    #HTN  #HLD  - Home Regimen: Not currently on antihypertensive medications. On Simvastatin 20mg     Plan:  > Continue with Atorvastatin 10mg QHS  > Continue Aspirin 81mg qday    =====Gastroenterology=====  #Diarrhea  #Enterocolitis  - Patient with profuse diarrhea with episodes every 30-60 minutes    Plan:  > Abx as below  > Zofran 4mg PRN  > IVF resuscitation with 1L LR, followed by reassessment of volume status    #Hepatic Lesion  CTAP 7/6: "Questionable 1.5 cm ovoid enhancing structure in hepatic segment 7 in the setting of artifact, not definitively seen on prior study. Nonemergent liver ultrasound is recommended for further evaluation."    Plan:  > Continue to monitor outpatient or inpatient if clinically indicated    #GERD  Home regimen: Omeprazole 40mg daily    Plan:  > Continue pantoprazole 40mg daily    #Diet  #Weight loss  > Regular diet (CC), can change to soft and bite-sized if patient unable to tolerate (due to dentures)    =====Renal/=====  #Acute Kidney Injury  #Hyponatremia   - Patient with initial Cr 1.48, BUN 33 with initial improvement s/p fluids to Cr 1.18, BUN 28; labs on 7/7 at 08:20 demonstrating Cr 0.83 and BUN 15  - Hyponatremia likely in setting of hypovolemia iso diffuse diarrhea with reduced PO intake  - Urine studies positive for elevated protein to 30 and elevated protein:Cr ratio of 0.6    Plan:  > Continue to trend  > IVF as above    #NAGMA  - Likely in setting of diarrhea  - Received sodium bicarb gtt 150mEq at 100 mL/hr    Plan:  > Continue IVF as above  > DC sodium bicarb    #BPH  Home regimen: Tamsulosin 0.4mg qhs    Plan:  > Continue tamsulosin 0.4mg qhs    #Electrolytes  - Maintain K>4, Phos>3, Mag>2, iCal>1    =====Endocrine=====  #T2DM  - Home Regimen: Metformin 1000mg BID, Trajenta 5mg daily  - A1c 6.8% (3/2025)    Plan:  > CC diet  > F/u A1c  > SAUL    =====Infectious Disease=====  #Septic Shock  #Enterocolitis  - Patient hypotensive requiring pressors  - Lethargic and febrile at home with profuse diarrhea and abdominal pain  - CT abdomen suggestive of infectious versus inflammatory enterocolitis/diarrheal disease. Underdistended stomach with questionable distal wall thickening (inflammatory gastritis and/or ulcer disease)  - No leukocytosis, but bandemia  - C. diff negative  - GI PCR positive for EPEC and Salmonella    Plan:  > C/w ciprofloxacin 500mg bid (7/7-7/11) and flagyl 500mg bid  > F/u Blood cultures    =====Musculoskeletal=====  #Bone Cysts  CT 7/6: "6 cm in greatest dimension unicameral bone cyst versus aneurysmal bone   cyst, unchanged when remeasured. Nonemergent MRI is again recommended for   further evaluation."    Plan:  - Follow up outpatient MRI    =====Heme/Onc=====  #DVT Ppx  - Heparin 5000units q12    =====Ethics=====  - Pending confirmation of Full Code

## 2025-07-07 NOTE — DIETITIAN INITIAL EVALUATION ADULT - ETIOLOGY
Chronic issues chewing with ill fitting dentures in setting of Hx of tongue Ca and prior weight loss.

## 2025-07-07 NOTE — PHYSICAL THERAPY INITIAL EVALUATION ADULT - ADDITIONAL COMMENTS
Pt lives [facility]  private house with wife, +3 steps to enter, resides on the first floor, was independent with all functional mobility and ADL performance without use of an assistive device prior to admission.     Pt left semi-supine in bed, all lines intact, all needs in reach, bed alarm set, in NAD. JENNIFER Hodges aware. Heart rate 81 beats per minute.

## 2025-07-07 NOTE — DIETITIAN INITIAL EVALUATION ADULT - EDUCATION DIETARY MODIFICATIONS
May 2018 - Moderate aortic stenosis, HARISH = 1.16 cm2, AVAi = 0.52 cm2/m2, peak velocity = 3.38 m/s, mean gradient = 30 mmHg   (2) meets goals/outcomes/verbalization

## 2025-07-07 NOTE — CHART NOTE - NSCHARTNOTEFT_GEN_A_CORE
MICU Transfer Note     -----Taras Morales PGY-2-----    Transfer from: MICU  Transfer to:  (X) Medicine    (  ) Telemetry    (  ) RCU    (  ) Palliative    (  ) Stroke Unit    (  ) _______________  Accepting Physician:   Bed Assignment:  Signout given to ______    MICU admitted for: Mixed Shock (Hypovolemic + Septic) iso EPEC/SALM enterocolitis     MICU Course:    Major To Dos  [ ]  [ ]     REVIEW OF SYSTEMS:  CONSTITUTIONAL:  Mild weakness, well appearing   EYES/ENT:  No visual changes; no facial pain, no hearring difficulties   NECK/SPINE:  No pain or stiffness  RESPIRATORY:  No cough, wheezing, hemoptysis; No shortness of breath  CARDIOVASCULAR:  No chest pain or palpitations  GASTROINTESTINAL:  Tolerates food well. No abdominal or epigastric pain. No nausea/vomiting ; No diarrhea/constipation. No melena/hematochezia.  GENITOURINARY:  No dysuria, frequency or hematuria  MUSCULOSKELETAL: No concerns with ambulation, No calf tenderness, no calf swelling   NEUROLOGICAL:  No numbness/weakness  PSYCH: Mood is appropriate   SKIN:  No itching, rashes    MEDICATIONS:  aspirin enteric coated 81 milliGRAM(s) Oral daily  atorvastatin 10 milliGRAM(s) Oral at bedtime  chlorhexidine 2% Cloths 1 Application(s) Topical <User Schedule>  ciprofloxacin     Tablet 500 milliGRAM(s) Oral every 12 hours  dextrose 5%. 1000 milliLiter(s) IV Continuous <Continuous>  dextrose 5%. 1000 milliLiter(s) IV Continuous <Continuous>  dextrose 50% Injectable 25 Gram(s) IV Push once  dextrose 50% Injectable 12.5 Gram(s) IV Push once  dextrose 50% Injectable 25 Gram(s) IV Push once  dextrose Oral Gel 15 Gram(s) Oral once  glucagon  Injectable 1 milliGRAM(s) IntraMuscular once  heparin   Injectable 5000 Unit(s) SubCutaneous every 12 hours  insulin lispro (ADMELOG) corrective regimen sliding scale   SubCutaneous three times a day before meals  lactated ringers Bolus 1000 milliLiter(s) IV Bolus once  metroNIDAZOLE    Tablet 500 milliGRAM(s) Oral every 12 hours  norepinephrine Infusion 0.05 MICROgram(s)/kG/Min IV Continuous <Continuous>  ondansetron Injectable 4 milliGRAM(s) IV Push every 8 hours PRN  pantoprazole    Tablet 40 milliGRAM(s) Oral before breakfast  potassium chloride    Tablet ER 40 milliEquivalent(s) Oral every 4 hours  tamsulosin 0.4 milliGRAM(s) Oral at bedtime      T(C): 37.1 (07-07-25 @ 08:00), Max: 39.2 (07-06-25 @ 16:48)  HR: 77 (07-07-25 @ 11:00) (72 - 107)  BP: 109/56 (07-07-25 @ 11:00) (81/47 - 149/70)  RR: 15 (07-07-25 @ 11:00) (12 - 22)  SpO2: 100% (07-07-25 @ 11:00) (97% - 100%)  Wt(kg): --Vital Signs Last 24 Hrs  T(C): 37.1 (07 Jul 2025 08:00), Max: 39.2 (06 Jul 2025 16:48)  T(F): 98.8 (07 Jul 2025 08:00), Max: 102.5 (06 Jul 2025 16:48)  HR: 77 (07 Jul 2025 11:00) (72 - 107)  BP: 109/56 (07 Jul 2025 11:00) (81/47 - 149/70)  BP(mean): 72 (07 Jul 2025 11:00) (59 - 95)  RR: 15 (07 Jul 2025 11:00) (12 - 22)  SpO2: 100% (07 Jul 2025 11:00) (97% - 100%)    Parameters below as of 07 Jul 2025 11:00  Patient On (Oxygen Delivery Method): room air        PHYSICAL EXAM:  GENERAL: No acute distress, resting comfortably   HEAD:  Atraumatic, normocephalic  EYES: Eye movements are appropriate, pupils responsive to light, conjunctiva and sclera clear  NECK: Supple, no swelling , JVD not appreciated   HEART: Regular rate and rhythm, no murmurs or other pathological heart sounds   LUNGS: Unlabored respirations. Clear to auscultation bilaterally, no crackles, wheezing, or rhonchi  ABDOMEN: Soft, nontender/ nondistended, +BS  EXTREMITIES: +2 pulses, moves appropriately w /5 strength, no edema   NEURO:  A&Ox3, CNs not assessed  PSYCH: Affect is appropriate   SKIN: No rashes or lesions   Lines:    Consultant(s) Notes Reviewed:  [x ] YES  [ ] NO  Care Discussed with Consultants/Other Providers [ x] YES  [ ] NO    LABS:                        14.1   5.10  )-----------( 175      ( 07 Jul 2025 00:21 )             43.5     07-07    134[L]  |  106  |  15  ----------------------------<  128[H]  3.1[L]   |  18[L]  |  0.83    Ca    7.6[L]      07 Jul 2025 08:20  Phos  1.4     07-07  Mg     1.50     07-07    TPro  4.5[L]  /  Alb  2.5[L]  /  TBili  <0.2  /  DBili  x   /  AST  56[H]  /  ALT  33  /  AlkPhos  41  07-07    PT/INR - ( 07 Jul 2025 00:21 )   PT: 9.4 sec;   INR: <0.90 ratio         PTT - ( 07 Jul 2025 00:21 )  PTT:28.0 sec  Urinalysis Basic - ( 07 Jul 2025 08:20 )    Color: x / Appearance: x / SG: x / pH: x  Gluc: 128 mg/dL / Ketone: x  / Bili: x / Urobili: x   Blood: x / Protein: x / Nitrite: x   Leuk Esterase: x / RBC: x / WBC x   Sq Epi: x / Non Sq Epi: x / Bacteria: x      CAPILLARY BLOOD GLUCOSE      POCT Blood Glucose.: 123 mg/dL (07 Jul 2025 08:17)  POCT Blood Glucose.: 204 mg/dL (06 Jul 2025 15:55)        Urinalysis Basic - ( 07 Jul 2025 08:20 )    Color: x / Appearance: x / SG: x / pH: x  Gluc: 128 mg/dL / Ketone: x  / Bili: x / Urobili: x   Blood: x / Protein: x / Nitrite: x   Leuk Esterase: x / RBC: x / WBC x   Sq Epi: x / Non Sq Epi: x / Bacteria: x        Urinalysis with Rflx Culture (collected 07-06-25 @ 20:24)      RADIOLOGY & ADDITIONAL TESTS:    Imaging Personally Reviewed:  [x ] YES  [ ] NO    =====Neurologic=====  Patient is AOx   - sedation:   - Titrate to RAAS -1-0    =====Cardiovascular=====  #SepticShock   Patient was admitted with a WBC ( ) and pressures of ( ) They recieved ( ) fluids and started on ( ).     Plan  - Titrate pressors for MAP > 65  - pressor:   - Cont. ab as below     =====Pulmonary=====  #AHRF (PNA)  Shortness of breath ISO of WBC. Most likely in the setting of a infectious PNA. Can also consider aspiration.  Chest X-ray showed ( )     - ventilation status:    =====GI=====  #Gastroenteritis    #Transaminitis  Elevation in transaminese in a cholestatic pattern. Most likely in the setting of sepsis. Can also consider acute pathology such as cholestitis.     Plan  - Order RUQ ultrasound  - Continue to monitor     =====Renal/=====  #Electrolytes  - bertrand:    =====Infectious Disease=====  Sepsis  WBC was ( ) on admission with a fever ( )  tmax. All in the setting of (Symptoms). Chest xray was significant for ( ), CT was noted for ( _ In terms of sources they include ( ). Patient recieved ( AB) in the ED. Blood cultures.   Antibiotic course :  Blood cultures:   MRSA:     - Continue with Antibiotic (Pip tazo, meropenum, ertapenum, ceftriaxone etc)   - FU blood cultures   - Continue shock treatment as above     =====Endocrine=====  No concerns presently.     =====Heme/Onc=====  - DVT PPX: Heparin 5000 units subq   /  Lovenox 40mg  / SCDs   / Holding     =====MICUGeneral=====  Lines:    Drips:    IVF   O2:     AB   Diet:    Pain:   DVT: MICU Transfer Note     -----Taras Morales PGY-2-----    Transfer from: MICU  Transfer to:  (X) Medicine    (  ) Telemetry    (  ) RCU    (  ) Palliative    (  ) Stroke Unit    (  ) _______________  Accepting Physician:   Bed Assignment:  Signout given to ______    MICU admitted for: Mixed Shock (Hypovolemic + Septic) iso EPEC/SALM enterocolitis     MICU Course:    Major To Dos  [] C/w ciprofloxacin 500mg bid (7/7-7/11) and flagyl 500mg bid  [] FU hepatic artifact w/RUQ US  [] Outpatient MRI for bone lesion     REVIEW OF SYSTEMS:  CONSTITUTIONAL:  Mild weakness, well appearing   EYES/ENT:  No visual changes; no facial pain, no hearring difficulties   NECK/SPINE:  No pain or stiffness  RESPIRATORY:  No cough, wheezing, hemoptysis; No shortness of breath  CARDIOVASCULAR:  No chest pain or palpitations  GASTROINTESTINAL:  Tolerates food well. No abdominal or epigastric pain. No nausea/vomiting ; No diarrhea/constipation. No melena/hematochezia.  GENITOURINARY:  No dysuria, frequency or hematuria  MUSCULOSKELETAL: No concerns with ambulation, No calf tenderness, no calf swelling   NEUROLOGICAL:  No numbness/weakness  PSYCH: Mood is appropriate   SKIN:  No itching, rashes    MEDICATIONS:  aspirin enteric coated 81 milliGRAM(s) Oral daily  atorvastatin 10 milliGRAM(s) Oral at bedtime  chlorhexidine 2% Cloths 1 Application(s) Topical <User Schedule>  ciprofloxacin     Tablet 500 milliGRAM(s) Oral every 12 hours  dextrose 5%. 1000 milliLiter(s) IV Continuous <Continuous>  dextrose 5%. 1000 milliLiter(s) IV Continuous <Continuous>  dextrose 50% Injectable 25 Gram(s) IV Push once  dextrose 50% Injectable 12.5 Gram(s) IV Push once  dextrose 50% Injectable 25 Gram(s) IV Push once  dextrose Oral Gel 15 Gram(s) Oral once  glucagon  Injectable 1 milliGRAM(s) IntraMuscular once  heparin   Injectable 5000 Unit(s) SubCutaneous every 12 hours  insulin lispro (ADMELOG) corrective regimen sliding scale   SubCutaneous three times a day before meals  lactated ringers Bolus 1000 milliLiter(s) IV Bolus once  metroNIDAZOLE    Tablet 500 milliGRAM(s) Oral every 12 hours  norepinephrine Infusion 0.05 MICROgram(s)/kG/Min IV Continuous <Continuous>  ondansetron Injectable 4 milliGRAM(s) IV Push every 8 hours PRN  pantoprazole    Tablet 40 milliGRAM(s) Oral before breakfast  potassium chloride    Tablet ER 40 milliEquivalent(s) Oral every 4 hours  tamsulosin 0.4 milliGRAM(s) Oral at bedtime      T(C): 37.1 (07-07-25 @ 08:00), Max: 39.2 (07-06-25 @ 16:48)  HR: 77 (07-07-25 @ 11:00) (72 - 107)  BP: 109/56 (07-07-25 @ 11:00) (81/47 - 149/70)  RR: 15 (07-07-25 @ 11:00) (12 - 22)  SpO2: 100% (07-07-25 @ 11:00) (97% - 100%)  Wt(kg): --Vital Signs Last 24 Hrs  T(C): 37.1 (07 Jul 2025 08:00), Max: 39.2 (06 Jul 2025 16:48)  T(F): 98.8 (07 Jul 2025 08:00), Max: 102.5 (06 Jul 2025 16:48)  HR: 77 (07 Jul 2025 11:00) (72 - 107)  BP: 109/56 (07 Jul 2025 11:00) (81/47 - 149/70)  BP(mean): 72 (07 Jul 2025 11:00) (59 - 95)  RR: 15 (07 Jul 2025 11:00) (12 - 22)  SpO2: 100% (07 Jul 2025 11:00) (97% - 100%)    Parameters below as of 07 Jul 2025 11:00  Patient On (Oxygen Delivery Method): room air        PHYSICAL EXAM:  GENERAL: No acute distress, resting comfortably   HEAD:  Atraumatic, normocephalic  EYES: Eye movements are appropriate, pupils responsive to light, conjunctiva and sclera clear  NECK: Supple, no swelling , JVD not appreciated   HEART: Regular rate and rhythm, no murmurs or other pathological heart sounds   LUNGS: Unlabored respirations. Clear to auscultation bilaterally, no crackles, wheezing, or rhonchi  ABDOMEN: Soft, nontender/ nondistended, +BS  EXTREMITIES: +2 pulses, moves appropriately w /5 strength, no edema   NEURO:  A&Ox3, CNs not assessed  PSYCH: Affect is appropriate   SKIN: No rashes or lesions   Lines:    Consultant(s) Notes Reviewed:  [x ] YES  [ ] NO  Care Discussed with Consultants/Other Providers [ x] YES  [ ] NO    LABS:                        14.1   5.10  )-----------( 175      ( 07 Jul 2025 00:21 )             43.5     07-07    134[L]  |  106  |  15  ----------------------------<  128[H]  3.1[L]   |  18[L]  |  0.83    Ca    7.6[L]      07 Jul 2025 08:20  Phos  1.4     07-07  Mg     1.50     07-07    TPro  4.5[L]  /  Alb  2.5[L]  /  TBili  <0.2  /  DBili  x   /  AST  56[H]  /  ALT  33  /  AlkPhos  41  07-07    PT/INR - ( 07 Jul 2025 00:21 )   PT: 9.4 sec;   INR: <0.90 ratio         PTT - ( 07 Jul 2025 00:21 )  PTT:28.0 sec  Urinalysis Basic - ( 07 Jul 2025 08:20 )    Color: x / Appearance: x / SG: x / pH: x  Gluc: 128 mg/dL / Ketone: x  / Bili: x / Urobili: x   Blood: x / Protein: x / Nitrite: x   Leuk Esterase: x / RBC: x / WBC x   Sq Epi: x / Non Sq Epi: x / Bacteria: x      CAPILLARY BLOOD GLUCOSE      POCT Blood Glucose.: 123 mg/dL (07 Jul 2025 08:17)  POCT Blood Glucose.: 204 mg/dL (06 Jul 2025 15:55)        Urinalysis Basic - ( 07 Jul 2025 08:20 )    Color: x / Appearance: x / SG: x / pH: x  Gluc: 128 mg/dL / Ketone: x  / Bili: x / Urobili: x   Blood: x / Protein: x / Nitrite: x   Leuk Esterase: x / RBC: x / WBC x   Sq Epi: x / Non Sq Epi: x / Bacteria: x        Urinalysis with Rflx Culture (collected 07-06-25 @ 20:24)      RADIOLOGY & ADDITIONAL TESTS:    Imaging Personally Reviewed:  [x ] YES  [ ] NO    SABRINA DOUGLAS is a 75y male with PMH HTN, HLD, BPH, T2DM, tongue cancer s/p radiation in the hospital for transferred to the MICU for septic shock requiring pressors. Etiology likely enterocolitis.    PLAN  =====Neurologic=====  #Baseline mental status, no active issues  - A&Ox4 at baseline    =====Pulmonary=====  #No active issues  Patient breathing comfortably on room air    =====Cardiovascular=====  #Septic Shock  - Patient w/ soft pressures initially requiring Levo pressor support, now off Levo and maintaining MAP > 65  - Patient s/p 4L on admission  - Downtrending lactate 1.9->0.8    Plan:  > DC levo, MAP goal >65  > C/w abx as per below    #HTN  #HLD  - Home Regimen: Not currently on antihypertensive medications. On Simvastatin 20mg     Plan:  > Continue with Atorvastatin 10mg QHS  > Continue Aspirin 81mg qday    =====Gastroenterology=====  #Diarrhea  #Enterocolitis  - Patient with profuse diarrhea with episodes every 30-60 minutes    Plan:  > Abx as below  > Zofran 4mg PRN  > IVF resuscitation with 1L LR, followed by reassessment of volume status    #Hepatic Lesion  CTAP 7/6: "Questionable 1.5 cm ovoid enhancing structure in hepatic segment 7 in the setting of artifact, not definitively seen on prior study. Nonemergent liver ultrasound is recommended for further evaluation."    Plan:  > Continue to monitor outpatient or inpatient if clinically indicated    #GERD  Home regimen: Omeprazole 40mg daily    Plan:  > Continue pantoprazole 40mg daily    #Diet  #Weight loss  > Regular diet (CC), can change to soft and bite-sized if patient unable to tolerate (due to dentures)    =====Renal/=====  #Acute Kidney Injury  #Hyponatremia   - Patient with initial Cr 1.48, BUN 33 with initial improvement s/p fluids to Cr 1.18, BUN 28; labs on 7/7 at 08:20 demonstrating Cr 0.83 and BUN 15  - Hyponatremia likely in setting of hypovolemia iso diffuse diarrhea with reduced PO intake  - Urine studies positive for elevated protein to 30 and elevated protein:Cr ratio of 0.6    Plan:  > Continue to trend  > IVF as above    #NAGMA  - Likely in setting of diarrhea  - Received sodium bicarb gtt 150mEq at 100 mL/hr    Plan:  > Continue IVF as above  > DC sodium bicarb    #BPH  Home regimen: Tamsulosin 0.4mg qhs    Plan:  > Continue tamsulosin 0.4mg qhs    #Electrolytes  - Maintain K>4, Phos>3, Mag>2, iCal>1    =====Endocrine=====  #T2DM  - Home Regimen: Metformin 1000mg BID, Trajenta 5mg daily  - A1c 6.8% (3/2025)    Plan:  > CC diet  > F/u A1c  > SAUL    =====Infectious Disease=====  #Septic Shock  #Enterocolitis  - Patient hypotensive requiring pressors  - Lethargic and febrile at home with profuse diarrhea and abdominal pain  - CT abdomen suggestive of infectious versus inflammatory enterocolitis/diarrheal disease. Underdistended stomach with questionable distal wall thickening (inflammatory gastritis and/or ulcer disease)  - No leukocytosis, but bandemia  - C. diff negative  - GI PCR positive for EPEC and Salmonella    Plan:  > C/w ciprofloxacin 500mg bid (7/7-7/11) and flagyl 500mg bid  > F/u Blood cultures    =====Musculoskeletal=====  #Bone Cysts  CT 7/6: "6 cm in greatest dimension unicameral bone cyst versus aneurysmal bone   cyst, unchanged when remeasured. Nonemergent MRI is again recommended for   further evaluation."    Plan:  - Follow up outpatient MRI    =====Heme/Onc=====  #DVT Ppx  - Heparin 5000units q12    =====Ethics=====  - Pending confirmation of Full Code MICU Transfer Note     -----Taras Jorgeal PGY-2-----    Transfer from: MICU  Transfer to:  (X) Medicine    (  ) Telemetry    (  ) RCU    (  ) Palliative    (  ) Stroke Unit    (  ) _______________  Accepting Physician:   Bed Assignment:  Signout given to ______    MICU admitted for: Mixed Shock (Hypovolemic + Septic) iso EPEC/SALM enterocolitis     H/P  75-year-old male with PMH of HTN (not on meds), HLD, BPH, T2DM, and history of tongue cancer s/p radiation, presenting with several days of generalized weakness, fever, diarrhea, throat pain, dysphagia, and burning abdominal pain. Chart review revealed multiple episodes of loose, non-bloody diarrhea occurring every 30–60 minutes over the prior day, accompanied by increased fatigue and weakness. No recent travel or sick contacts; wife denies recent antibiotic use. At presentation, patient denied ongoing abdominal pain.     He is typically independent with ADLs but has required increased assistance in recent weeks to months. Wife also reports ~20 lb weight loss, attributed to decreased PO intake due to poorly fitted dentures limiting solid food consumption.     In the ED, he was febrile to 102.5°F, tachycardic to 107, hypotensive (81/47), and saturating well on RA. Labs notable for hyponatremia (Na 129), HEIDI (Cr 1.48; baseline 0.8–1), bandemia (11.1%) without leukocytosis, and VBG of 7.23/42/28/18. Lactate was normal. UA with proteinuria but no evidence of infection. RVP negative. CXR was clear. CT abdomen/pelvis concerning for enterocolitis vs infectious gastroenteritis. He received IV acetaminophen, broad-spectrum antibiotics (Zosyn, vancomycin), and fluids. POCUS IVC was borderline collapsible (1.2–1.3 cm), and patient remained hypotensive after 2.5L IVF. Levofloxacin was initiated, and calcium gluconate given for hypocalcemia (Ca 6.5). MICU consulted for septic shock requiring pressors.     MICU Course:    Patient admitted on vasopressors and started on bicarbonate drip. Blood cultures grew Salmonella, and stool testing positive for EPEC; antibiotics narrowed to ciprofloxacin and metronidazole. He received additional fluid bolus and clinically improved, returning to baseline functional and hemodynamic status.        Major To Dos  [] C/w ciprofloxacin 500mg bid (7/7-7/11) and flagyl 500mg bid  [] FU hepatic artifact w/RUQ US  [] Outpatient MRI for bone lesion     REVIEW OF SYSTEMS:  CONSTITUTIONAL:  Mild weakness, well appearing   EYES/ENT:  No visual changes; no facial pain, no hearring difficulties   NECK/SPINE:  No pain or stiffness  RESPIRATORY:  No cough, wheezing, hemoptysis; No shortness of breath  CARDIOVASCULAR:  No chest pain or palpitations  GASTROINTESTINAL:  Tolerates food well. No abdominal or epigastric pain. No nausea/vomiting ; No diarrhea/constipation. No melena/hematochezia.  GENITOURINARY:  No dysuria, frequency or hematuria  MUSCULOSKELETAL: No concerns with ambulation, No calf tenderness, no calf swelling   NEUROLOGICAL:  No numbness/weakness  PSYCH: Mood is appropriate   SKIN:  No itching, rashes    MEDICATIONS:  aspirin enteric coated 81 milliGRAM(s) Oral daily  atorvastatin 10 milliGRAM(s) Oral at bedtime  chlorhexidine 2% Cloths 1 Application(s) Topical <User Schedule>  ciprofloxacin     Tablet 500 milliGRAM(s) Oral every 12 hours  dextrose 5%. 1000 milliLiter(s) IV Continuous <Continuous>  dextrose 5%. 1000 milliLiter(s) IV Continuous <Continuous>  dextrose 50% Injectable 25 Gram(s) IV Push once  dextrose 50% Injectable 12.5 Gram(s) IV Push once  dextrose 50% Injectable 25 Gram(s) IV Push once  dextrose Oral Gel 15 Gram(s) Oral once  glucagon  Injectable 1 milliGRAM(s) IntraMuscular once  heparin   Injectable 5000 Unit(s) SubCutaneous every 12 hours  insulin lispro (ADMELOG) corrective regimen sliding scale   SubCutaneous three times a day before meals  lactated ringers Bolus 1000 milliLiter(s) IV Bolus once  metroNIDAZOLE    Tablet 500 milliGRAM(s) Oral every 12 hours  norepinephrine Infusion 0.05 MICROgram(s)/kG/Min IV Continuous <Continuous>  ondansetron Injectable 4 milliGRAM(s) IV Push every 8 hours PRN  pantoprazole    Tablet 40 milliGRAM(s) Oral before breakfast  potassium chloride    Tablet ER 40 milliEquivalent(s) Oral every 4 hours  tamsulosin 0.4 milliGRAM(s) Oral at bedtime      T(C): 37.1 (07-07-25 @ 08:00), Max: 39.2 (07-06-25 @ 16:48)  HR: 77 (07-07-25 @ 11:00) (72 - 107)  BP: 109/56 (07-07-25 @ 11:00) (81/47 - 149/70)  RR: 15 (07-07-25 @ 11:00) (12 - 22)  SpO2: 100% (07-07-25 @ 11:00) (97% - 100%)  Wt(kg): --Vital Signs Last 24 Hrs  T(C): 37.1 (07 Jul 2025 08:00), Max: 39.2 (06 Jul 2025 16:48)  T(F): 98.8 (07 Jul 2025 08:00), Max: 102.5 (06 Jul 2025 16:48)  HR: 77 (07 Jul 2025 11:00) (72 - 107)  BP: 109/56 (07 Jul 2025 11:00) (81/47 - 149/70)  BP(mean): 72 (07 Jul 2025 11:00) (59 - 95)  RR: 15 (07 Jul 2025 11:00) (12 - 22)  SpO2: 100% (07 Jul 2025 11:00) (97% - 100%)    Parameters below as of 07 Jul 2025 11:00  Patient On (Oxygen Delivery Method): room air        PHYSICAL EXAM:  GENERAL: No acute distress, resting comfortably   HEAD:  Atraumatic, normocephalic  EYES: Eye movements are appropriate, pupils responsive to light, conjunctiva and sclera clear  NECK: Supple, no swelling , JVD not appreciated   HEART: Regular rate and rhythm, no murmurs or other pathological heart sounds   LUNGS: Unlabored respirations. Clear to auscultation bilaterally, no crackles, wheezing, or rhonchi  ABDOMEN: Soft, nontender/ nondistended, +BS  EXTREMITIES: +2 pulses, moves appropriately w /5 strength, no edema   NEURO:  A&Ox3, CNs not assessed  PSYCH: Affect is appropriate   SKIN: No rashes or lesions   Lines:    Consultant(s) Notes Reviewed:  [x ] YES  [ ] NO  Care Discussed with Consultants/Other Providers [ x] YES  [ ] NO    LABS:                        14.1   5.10  )-----------( 175      ( 07 Jul 2025 00:21 )             43.5     07-07    134[L]  |  106  |  15  ----------------------------<  128[H]  3.1[L]   |  18[L]  |  0.83    Ca    7.6[L]      07 Jul 2025 08:20  Phos  1.4     07-07  Mg     1.50     07-07    TPro  4.5[L]  /  Alb  2.5[L]  /  TBili  <0.2  /  DBili  x   /  AST  56[H]  /  ALT  33  /  AlkPhos  41  07-07    PT/INR - ( 07 Jul 2025 00:21 )   PT: 9.4 sec;   INR: <0.90 ratio         PTT - ( 07 Jul 2025 00:21 )  PTT:28.0 sec  Urinalysis Basic - ( 07 Jul 2025 08:20 )    Color: x / Appearance: x / SG: x / pH: x  Gluc: 128 mg/dL / Ketone: x  / Bili: x / Urobili: x   Blood: x / Protein: x / Nitrite: x   Leuk Esterase: x / RBC: x / WBC x   Sq Epi: x / Non Sq Epi: x / Bacteria: x      CAPILLARY BLOOD GLUCOSE      POCT Blood Glucose.: 123 mg/dL (07 Jul 2025 08:17)  POCT Blood Glucose.: 204 mg/dL (06 Jul 2025 15:55)        Urinalysis Basic - ( 07 Jul 2025 08:20 )    Color: x / Appearance: x / SG: x / pH: x  Gluc: 128 mg/dL / Ketone: x  / Bili: x / Urobili: x   Blood: x / Protein: x / Nitrite: x   Leuk Esterase: x / RBC: x / WBC x   Sq Epi: x / Non Sq Epi: x / Bacteria: x        Urinalysis with Rflx Culture (collected 07-06-25 @ 20:24)      RADIOLOGY & ADDITIONAL TESTS:    Imaging Personally Reviewed:  [x ] YES  [ ] NO    SABRINA DOUGLAS is a 75y male with PMH HTN, HLD, BPH, T2DM, tongue cancer s/p radiation in the hospital for transferred to the MICU for septic shock requiring pressors. Etiology likely enterocolitis.    PLAN  =====Neurologic=====  #Baseline mental status, no active issues  - A&Ox4 at baseline    =====Pulmonary=====  #No active issues  Patient breathing comfortably on room air    =====Cardiovascular=====  #Septic Shock  - Patient w/ soft pressures initially requiring Levo pressor support, now off Levo and maintaining MAP > 65  - Patient s/p 4L on admission  - Downtrending lactate 1.9->0.8    Plan:  > DC levo, MAP goal >65  > C/w abx as per below    #HTN  #HLD  - Home Regimen: Not currently on antihypertensive medications. On Simvastatin 20mg     Plan:  > Continue with Atorvastatin 10mg QHS  > Continue Aspirin 81mg qday    =====Gastroenterology=====  #Diarrhea  #Enterocolitis  - Patient with profuse diarrhea with episodes every 30-60 minutes    Plan:  > Abx as below  > Zofran 4mg PRN  > IVF resuscitation with 1L LR, followed by reassessment of volume status    #Hepatic Lesion  CTAP 7/6: "Questionable 1.5 cm ovoid enhancing structure in hepatic segment 7 in the setting of artifact, not definitively seen on prior study. Nonemergent liver ultrasound is recommended for further evaluation."    Plan:  > Continue to monitor outpatient or inpatient if clinically indicated    #GERD  Home regimen: Omeprazole 40mg daily    Plan:  > Continue pantoprazole 40mg daily    #Diet  #Weight loss  > Regular diet (CC), can change to soft and bite-sized if patient unable to tolerate (due to dentures)    =====Renal/=====  #Acute Kidney Injury  #Hyponatremia   - Patient with initial Cr 1.48, BUN 33 with initial improvement s/p fluids to Cr 1.18, BUN 28; labs on 7/7 at 08:20 demonstrating Cr 0.83 and BUN 15  - Hyponatremia likely in setting of hypovolemia iso diffuse diarrhea with reduced PO intake  - Urine studies positive for elevated protein to 30 and elevated protein:Cr ratio of 0.6    Plan:  > Continue to trend  > IVF as above    #NAGMA  - Likely in setting of diarrhea  - Received sodium bicarb gtt 150mEq at 100 mL/hr    Plan:  > Continue IVF as above  > DC sodium bicarb    #BPH  Home regimen: Tamsulosin 0.4mg qhs    Plan:  > Continue tamsulosin 0.4mg qhs    #Electrolytes  - Maintain K>4, Phos>3, Mag>2, iCal>1    =====Endocrine=====  #T2DM  - Home Regimen: Metformin 1000mg BID, Trajenta 5mg daily  - A1c 6.8% (3/2025)    Plan:  > CC diet  > F/u A1c  > SAUL    =====Infectious Disease=====  #Septic Shock  #Enterocolitis  - Patient hypotensive requiring pressors  - Lethargic and febrile at home with profuse diarrhea and abdominal pain  - CT abdomen suggestive of infectious versus inflammatory enterocolitis/diarrheal disease. Underdistended stomach with questionable distal wall thickening (inflammatory gastritis and/or ulcer disease)  - No leukocytosis, but bandemia  - C. diff negative  - GI PCR positive for EPEC and Salmonella    Plan:  > C/w ciprofloxacin 500mg bid (7/7-7/11) and flagyl 500mg bid  > F/u Blood cultures    =====Musculoskeletal=====  #Bone Cysts  CT 7/6: "6 cm in greatest dimension unicameral bone cyst versus aneurysmal bone   cyst, unchanged when remeasured. Nonemergent MRI is again recommended for   further evaluation."    Plan:  - Follow up outpatient MRI    =====Heme/Onc=====  #DVT Ppx  - Heparin 5000units q12    =====Ethics=====  - Pending confirmation of Full Code MICU Transfer Note     -----Taras Jorgeal PGY-2-----    Transfer from: MICU  Transfer to:  (X) Medicine    (  ) Telemetry    (  ) RCU    (  ) Palliative    (  ) Stroke Unit    (  ) _______________  Accepting Physician:   Bed Assignment:  Signout given to ______    MICU admitted for: Mixed Shock (Hypovolemic + Septic) iso EPEC/SALM enterocolitis     H/P  75-year-old male with PMH of HTN (not on meds), HLD, BPH, T2DM, and history of tongue cancer s/p radiation, presenting with several days of generalized weakness, fever, diarrhea, throat pain, dysphagia, and burning abdominal pain. Chart review revealed multiple episodes of loose, non-bloody diarrhea occurring every 30–60 minutes over the prior day, accompanied by increased fatigue and weakness. No recent travel or sick contacts; wife denies recent antibiotic use. At presentation, patient denied ongoing abdominal pain.     He is typically independent with ADLs but has required increased assistance in recent weeks to months. Wife also reports ~20 lb weight loss, attributed to decreased PO intake due to poorly fitted dentures limiting solid food consumption.     In the ED, he was febrile to 102.5°F, tachycardic to 107, hypotensive (81/47), and saturating well on RA. Labs notable for hyponatremia (Na 129), HEIDI (Cr 1.48; baseline 0.8–1), bandemia (11.1%) without leukocytosis, and VBG of 7.23/42/28/18. Lactate was normal. UA with proteinuria but no evidence of infection. RVP negative. CXR was clear. CT abdomen/pelvis concerning for enterocolitis vs infectious gastroenteritis. He received IV acetaminophen, broad-spectrum antibiotics (Zosyn, vancomycin), and fluids. POCUS IVC was borderline collapsible (1.2–1.3 cm), and patient remained hypotensive after 2.5L IVF. Levofloxacin was initiated, and calcium gluconate given for hypocalcemia (Ca 6.5). MICU consulted for septic shock requiring pressors.     MICU Course:    Patient admitted on vasopressors and started on bicarbonate drip. Blood cultures grew Salmonella, and stool testing positive for EPEC; antibiotics narrowed to ciprofloxacin and metronidazole. He received additional fluid bolus and clinically improved, returning to baseline functional and hemodynamic status. Head removed (placed in ED)       Major To Dos  [] C/w ciprofloxacin 500mg bid (7/7-7/11) and flagyl 500mg bid  [] TOV q8   [] FU hepatic artifact w/RUQ US  [] Outpatient MRI for bone lesion     REVIEW OF SYSTEMS:  CONSTITUTIONAL:  Mild weakness, well appearing   EYES/ENT:  No visual changes; no facial pain, no hearring difficulties   NECK/SPINE:  No pain or stiffness  RESPIRATORY:  No cough, wheezing, hemoptysis; No shortness of breath  CARDIOVASCULAR:  No chest pain or palpitations  GASTROINTESTINAL:  Tolerates food well. No abdominal or epigastric pain. No nausea/vomiting ; No diarrhea/constipation. No melena/hematochezia.  GENITOURINARY:  No dysuria, frequency or hematuria  MUSCULOSKELETAL: No concerns with ambulation, No calf tenderness, no calf swelling   NEUROLOGICAL:  No numbness/weakness  PSYCH: Mood is appropriate   SKIN:  No itching, rashes    MEDICATIONS:  aspirin enteric coated 81 milliGRAM(s) Oral daily  atorvastatin 10 milliGRAM(s) Oral at bedtime  chlorhexidine 2% Cloths 1 Application(s) Topical <User Schedule>  ciprofloxacin     Tablet 500 milliGRAM(s) Oral every 12 hours  dextrose 5%. 1000 milliLiter(s) IV Continuous <Continuous>  dextrose 5%. 1000 milliLiter(s) IV Continuous <Continuous>  dextrose 50% Injectable 25 Gram(s) IV Push once  dextrose 50% Injectable 12.5 Gram(s) IV Push once  dextrose 50% Injectable 25 Gram(s) IV Push once  dextrose Oral Gel 15 Gram(s) Oral once  glucagon  Injectable 1 milliGRAM(s) IntraMuscular once  heparin   Injectable 5000 Unit(s) SubCutaneous every 12 hours  insulin lispro (ADMELOG) corrective regimen sliding scale   SubCutaneous three times a day before meals  lactated ringers Bolus 1000 milliLiter(s) IV Bolus once  metroNIDAZOLE    Tablet 500 milliGRAM(s) Oral every 12 hours  norepinephrine Infusion 0.05 MICROgram(s)/kG/Min IV Continuous <Continuous>  ondansetron Injectable 4 milliGRAM(s) IV Push every 8 hours PRN  pantoprazole    Tablet 40 milliGRAM(s) Oral before breakfast  potassium chloride    Tablet ER 40 milliEquivalent(s) Oral every 4 hours  tamsulosin 0.4 milliGRAM(s) Oral at bedtime      T(C): 37.1 (07-07-25 @ 08:00), Max: 39.2 (07-06-25 @ 16:48)  HR: 77 (07-07-25 @ 11:00) (72 - 107)  BP: 109/56 (07-07-25 @ 11:00) (81/47 - 149/70)  RR: 15 (07-07-25 @ 11:00) (12 - 22)  SpO2: 100% (07-07-25 @ 11:00) (97% - 100%)  Wt(kg): --Vital Signs Last 24 Hrs  T(C): 37.1 (07 Jul 2025 08:00), Max: 39.2 (06 Jul 2025 16:48)  T(F): 98.8 (07 Jul 2025 08:00), Max: 102.5 (06 Jul 2025 16:48)  HR: 77 (07 Jul 2025 11:00) (72 - 107)  BP: 109/56 (07 Jul 2025 11:00) (81/47 - 149/70)  BP(mean): 72 (07 Jul 2025 11:00) (59 - 95)  RR: 15 (07 Jul 2025 11:00) (12 - 22)  SpO2: 100% (07 Jul 2025 11:00) (97% - 100%)    Parameters below as of 07 Jul 2025 11:00  Patient On (Oxygen Delivery Method): room air        PHYSICAL EXAM:  GENERAL: No acute distress, resting comfortably   HEAD:  Atraumatic, normocephalic  EYES: Eye movements are appropriate, pupils responsive to light, conjunctiva and sclera clear  NECK: Supple, no swelling , JVD not appreciated   HEART: Regular rate and rhythm, no murmurs or other pathological heart sounds   LUNGS: Unlabored respirations. Clear to auscultation bilaterally, no crackles, wheezing, or rhonchi  ABDOMEN: Soft, nontender/ nondistended, +BS  EXTREMITIES: +2 pulses, moves appropriately w /5 strength, no edema   NEURO:  A&Ox3, CNs not assessed  PSYCH: Affect is appropriate   SKIN: No rashes or lesions   Lines:    Consultant(s) Notes Reviewed:  [x ] YES  [ ] NO  Care Discussed with Consultants/Other Providers [ x] YES  [ ] NO    LABS:                        14.1   5.10  )-----------( 175      ( 07 Jul 2025 00:21 )             43.5     07-07    134[L]  |  106  |  15  ----------------------------<  128[H]  3.1[L]   |  18[L]  |  0.83    Ca    7.6[L]      07 Jul 2025 08:20  Phos  1.4     07-07  Mg     1.50     07-07    TPro  4.5[L]  /  Alb  2.5[L]  /  TBili  <0.2  /  DBili  x   /  AST  56[H]  /  ALT  33  /  AlkPhos  41  07-07    PT/INR - ( 07 Jul 2025 00:21 )   PT: 9.4 sec;   INR: <0.90 ratio         PTT - ( 07 Jul 2025 00:21 )  PTT:28.0 sec  Urinalysis Basic - ( 07 Jul 2025 08:20 )    Color: x / Appearance: x / SG: x / pH: x  Gluc: 128 mg/dL / Ketone: x  / Bili: x / Urobili: x   Blood: x / Protein: x / Nitrite: x   Leuk Esterase: x / RBC: x / WBC x   Sq Epi: x / Non Sq Epi: x / Bacteria: x      CAPILLARY BLOOD GLUCOSE      POCT Blood Glucose.: 123 mg/dL (07 Jul 2025 08:17)  POCT Blood Glucose.: 204 mg/dL (06 Jul 2025 15:55)        Urinalysis Basic - ( 07 Jul 2025 08:20 )    Color: x / Appearance: x / SG: x / pH: x  Gluc: 128 mg/dL / Ketone: x  / Bili: x / Urobili: x   Blood: x / Protein: x / Nitrite: x   Leuk Esterase: x / RBC: x / WBC x   Sq Epi: x / Non Sq Epi: x / Bacteria: x        Urinalysis with Rflx Culture (collected 07-06-25 @ 20:24)      RADIOLOGY & ADDITIONAL TESTS:    Imaging Personally Reviewed:  [x ] YES  [ ] NO    SABRINA DOUGLAS is a 75y male with PMH HTN, HLD, BPH, T2DM, tongue cancer s/p radiation in the hospital for transferred to the MICU for septic shock requiring pressors. Etiology likely enterocolitis.    PLAN  =====Neurologic=====  #Baseline mental status, no active issues  - A&Ox4 at baseline    =====Pulmonary=====  #No active issues  Patient breathing comfortably on room air    =====Cardiovascular=====  #Septic Shock  - Patient w/ soft pressures initially requiring Levo pressor support, now off Levo and maintaining MAP > 65  - Patient s/p 4L on admission  - Downtrending lactate 1.9->0.8    Plan:  > DC levo, MAP goal >65  > C/w abx as per below    #HTN  #HLD  - Home Regimen: Not currently on antihypertensive medications. On Simvastatin 20mg     Plan:  > Continue with Atorvastatin 10mg QHS  > Continue Aspirin 81mg qday    =====Gastroenterology=====  #Diarrhea  #Enterocolitis  - Patient with profuse diarrhea with episodes every 30-60 minutes    Plan:  > Abx as below  > Zofran 4mg PRN  > IVF resuscitation with 1L LR, followed by reassessment of volume status    #Hepatic Lesion  CTAP 7/6: "Questionable 1.5 cm ovoid enhancing structure in hepatic segment 7 in the setting of artifact, not definitively seen on prior study. Nonemergent liver ultrasound is recommended for further evaluation."    Plan:  > Continue to monitor outpatient or inpatient if clinically indicated    #GERD  Home regimen: Omeprazole 40mg daily    Plan:  > Continue pantoprazole 40mg daily    #Diet  #Weight loss  > Regular diet (CC), can change to soft and bite-sized if patient unable to tolerate (due to dentures)    =====Renal/=====  #Acute Kidney Injury  #Hyponatremia   - Patient with initial Cr 1.48, BUN 33 with initial improvement s/p fluids to Cr 1.18, BUN 28; labs on 7/7 at 08:20 demonstrating Cr 0.83 and BUN 15  - Hyponatremia likely in setting of hypovolemia iso diffuse diarrhea with reduced PO intake  - Urine studies positive for elevated protein to 30 and elevated protein:Cr ratio of 0.6    Plan:  > Continue to trend  > IVF as above    #NAGMA  - Likely in setting of diarrhea  - Received sodium bicarb gtt 150mEq at 100 mL/hr    Plan:  > Continue IVF as above  > DC sodium bicarb    #BPH  Home regimen: Tamsulosin 0.4mg qhs    Plan:  > Continue tamsulosin 0.4mg qhs    #Electrolytes  - Maintain K>4, Phos>3, Mag>2, iCal>1    =====Endocrine=====  #T2DM  - Home Regimen: Metformin 1000mg BID, Trajenta 5mg daily  - A1c 6.8% (3/2025)    Plan:  > CC diet  > F/u A1c  > SAUL    =====Infectious Disease=====  #Septic Shock  #Enterocolitis  - Patient hypotensive requiring pressors  - Lethargic and febrile at home with profuse diarrhea and abdominal pain  - CT abdomen suggestive of infectious versus inflammatory enterocolitis/diarrheal disease. Underdistended stomach with questionable distal wall thickening (inflammatory gastritis and/or ulcer disease)  - No leukocytosis, but bandemia  - C. diff negative  - GI PCR positive for EPEC and Salmonella    Plan:  > C/w ciprofloxacin 500mg bid (7/7-7/11) and flagyl 500mg bid  > F/u Blood cultures    =====Musculoskeletal=====  #Bone Cysts  CT 7/6: "6 cm in greatest dimension unicameral bone cyst versus aneurysmal bone   cyst, unchanged when remeasured. Nonemergent MRI is again recommended for   further evaluation."    Plan:  - Follow up outpatient MRI    =====Heme/Onc=====  #DVT Ppx  - Heparin 5000units q12    =====Ethics=====  - Pending confirmation of Full Code MICU Transfer Note     -----Tarasbel Jorgeal PGY-2-----    Transfer from: MICU  Transfer to:  (X) Medicine    (  ) Telemetry    (  ) RCU    (  ) Palliative    (  ) Stroke Unit    (  ) _______________  Accepting Physician: Jaymie Nails   Bed Assignment:  Signout given to ______    MICU admitted for: Mixed Shock (Hypovolemic + Septic) iso EPEC/SALM enterocolitis     H/P  75-year-old male with PMH of HTN (not on meds), HLD, BPH, T2DM, and history of tongue cancer s/p radiation, presenting with several days of generalized weakness, fever, diarrhea, throat pain, dysphagia, and burning abdominal pain. Chart review revealed multiple episodes of loose, non-bloody diarrhea occurring every 30–60 minutes over the prior day, accompanied by increased fatigue and weakness. No recent travel or sick contacts; wife denies recent antibiotic use. At presentation, patient denied ongoing abdominal pain.     He is typically independent with ADLs but has required increased assistance in recent weeks to months. Wife also reports ~20 lb weight loss, attributed to decreased PO intake due to poorly fitted dentures limiting solid food consumption.     In the ED, he was febrile to 102.5°F, tachycardic to 107, hypotensive (81/47), and saturating well on RA. Labs notable for hyponatremia (Na 129), HEIDI (Cr 1.48; baseline 0.8–1), bandemia (11.1%) without leukocytosis, and VBG of 7.23/42/28/18. Lactate was normal. UA with proteinuria but no evidence of infection. RVP negative. CXR was clear. CT abdomen/pelvis concerning for enterocolitis vs infectious gastroenteritis. He received IV acetaminophen, broad-spectrum antibiotics (Zosyn, vancomycin), and fluids. POCUS IVC was borderline collapsible (1.2–1.3 cm), and patient remained hypotensive after 2.5L IVF. Levofloxacin was initiated, and calcium gluconate given for hypocalcemia (Ca 6.5). MICU consulted for septic shock requiring pressors.     MICU Course:    Patient admitted on vasopressors and started on bicarbonate drip. Blood cultures grew Salmonella, and stool testing positive for EPEC; antibiotics narrowed to ciprofloxacin and metronidazole. He received additional fluid bolus and clinically improved, returning to baseline functional and hemodynamic status. Head removed (placed in ED)       Major To Dos  [] C/w ciprofloxacin 500mg bid (7/7-7/11) and flagyl 500mg bid  [] TOV q8   [] FU hepatic artifact w/RUQ US  [] Outpatient MRI for bone lesion     REVIEW OF SYSTEMS:  CONSTITUTIONAL:  Mild weakness, well appearing   EYES/ENT:  No visual changes; no facial pain, no hearring difficulties   NECK/SPINE:  No pain or stiffness  RESPIRATORY:  No cough, wheezing, hemoptysis; No shortness of breath  CARDIOVASCULAR:  No chest pain or palpitations  GASTROINTESTINAL:  Tolerates food well. No abdominal or epigastric pain. No nausea/vomiting ; No diarrhea/constipation. No melena/hematochezia.  GENITOURINARY:  No dysuria, frequency or hematuria  MUSCULOSKELETAL: No concerns with ambulation, No calf tenderness, no calf swelling   NEUROLOGICAL:  No numbness/weakness  PSYCH: Mood is appropriate   SKIN:  No itching, rashes    MEDICATIONS:  aspirin enteric coated 81 milliGRAM(s) Oral daily  atorvastatin 10 milliGRAM(s) Oral at bedtime  chlorhexidine 2% Cloths 1 Application(s) Topical <User Schedule>  ciprofloxacin     Tablet 500 milliGRAM(s) Oral every 12 hours  dextrose 5%. 1000 milliLiter(s) IV Continuous <Continuous>  dextrose 5%. 1000 milliLiter(s) IV Continuous <Continuous>  dextrose 50% Injectable 25 Gram(s) IV Push once  dextrose 50% Injectable 12.5 Gram(s) IV Push once  dextrose 50% Injectable 25 Gram(s) IV Push once  dextrose Oral Gel 15 Gram(s) Oral once  glucagon  Injectable 1 milliGRAM(s) IntraMuscular once  heparin   Injectable 5000 Unit(s) SubCutaneous every 12 hours  insulin lispro (ADMELOG) corrective regimen sliding scale   SubCutaneous three times a day before meals  lactated ringers Bolus 1000 milliLiter(s) IV Bolus once  metroNIDAZOLE    Tablet 500 milliGRAM(s) Oral every 12 hours  norepinephrine Infusion 0.05 MICROgram(s)/kG/Min IV Continuous <Continuous>  ondansetron Injectable 4 milliGRAM(s) IV Push every 8 hours PRN  pantoprazole    Tablet 40 milliGRAM(s) Oral before breakfast  potassium chloride    Tablet ER 40 milliEquivalent(s) Oral every 4 hours  tamsulosin 0.4 milliGRAM(s) Oral at bedtime      T(C): 37.1 (07-07-25 @ 08:00), Max: 39.2 (07-06-25 @ 16:48)  HR: 77 (07-07-25 @ 11:00) (72 - 107)  BP: 109/56 (07-07-25 @ 11:00) (81/47 - 149/70)  RR: 15 (07-07-25 @ 11:00) (12 - 22)  SpO2: 100% (07-07-25 @ 11:00) (97% - 100%)  Wt(kg): --Vital Signs Last 24 Hrs  T(C): 37.1 (07 Jul 2025 08:00), Max: 39.2 (06 Jul 2025 16:48)  T(F): 98.8 (07 Jul 2025 08:00), Max: 102.5 (06 Jul 2025 16:48)  HR: 77 (07 Jul 2025 11:00) (72 - 107)  BP: 109/56 (07 Jul 2025 11:00) (81/47 - 149/70)  BP(mean): 72 (07 Jul 2025 11:00) (59 - 95)  RR: 15 (07 Jul 2025 11:00) (12 - 22)  SpO2: 100% (07 Jul 2025 11:00) (97% - 100%)    Parameters below as of 07 Jul 2025 11:00  Patient On (Oxygen Delivery Method): room air        PHYSICAL EXAM:  GENERAL: No acute distress, resting comfortably   HEAD:  Atraumatic, normocephalic  EYES: Eye movements are appropriate, pupils responsive to light, conjunctiva and sclera clear  NECK: Supple, no swelling , JVD not appreciated   HEART: Regular rate and rhythm, no murmurs or other pathological heart sounds   LUNGS: Unlabored respirations. Clear to auscultation bilaterally, no crackles, wheezing, or rhonchi  ABDOMEN: Soft, nontender/ nondistended, +BS  EXTREMITIES: +2 pulses, moves appropriately w /5 strength, no edema   NEURO:  A&Ox3, CNs not assessed  PSYCH: Affect is appropriate   SKIN: No rashes or lesions   Lines:    Consultant(s) Notes Reviewed:  [x ] YES  [ ] NO  Care Discussed with Consultants/Other Providers [ x] YES  [ ] NO    LABS:                        14.1   5.10  )-----------( 175      ( 07 Jul 2025 00:21 )             43.5     07-07    134[L]  |  106  |  15  ----------------------------<  128[H]  3.1[L]   |  18[L]  |  0.83    Ca    7.6[L]      07 Jul 2025 08:20  Phos  1.4     07-07  Mg     1.50     07-07    TPro  4.5[L]  /  Alb  2.5[L]  /  TBili  <0.2  /  DBili  x   /  AST  56[H]  /  ALT  33  /  AlkPhos  41  07-07    PT/INR - ( 07 Jul 2025 00:21 )   PT: 9.4 sec;   INR: <0.90 ratio         PTT - ( 07 Jul 2025 00:21 )  PTT:28.0 sec  Urinalysis Basic - ( 07 Jul 2025 08:20 )    Color: x / Appearance: x / SG: x / pH: x  Gluc: 128 mg/dL / Ketone: x  / Bili: x / Urobili: x   Blood: x / Protein: x / Nitrite: x   Leuk Esterase: x / RBC: x / WBC x   Sq Epi: x / Non Sq Epi: x / Bacteria: x      CAPILLARY BLOOD GLUCOSE      POCT Blood Glucose.: 123 mg/dL (07 Jul 2025 08:17)  POCT Blood Glucose.: 204 mg/dL (06 Jul 2025 15:55)        Urinalysis Basic - ( 07 Jul 2025 08:20 )    Color: x / Appearance: x / SG: x / pH: x  Gluc: 128 mg/dL / Ketone: x  / Bili: x / Urobili: x   Blood: x / Protein: x / Nitrite: x   Leuk Esterase: x / RBC: x / WBC x   Sq Epi: x / Non Sq Epi: x / Bacteria: x        Urinalysis with Rflx Culture (collected 07-06-25 @ 20:24)      RADIOLOGY & ADDITIONAL TESTS:    Imaging Personally Reviewed:  [x ] YES  [ ] NO    SABRINA DOUGLAS is a 75y male with PMH HTN, HLD, BPH, T2DM, tongue cancer s/p radiation in the hospital for transferred to the MICU for septic shock requiring pressors. Etiology likely enterocolitis.    PLAN  =====Neurologic=====  #Baseline mental status, no active issues  - A&Ox4 at baseline    =====Pulmonary=====  #No active issues  Patient breathing comfortably on room air    =====Cardiovascular=====  #Septic Shock  - Patient w/ soft pressures initially requiring Levo pressor support, now off Levo and maintaining MAP > 65  - Patient s/p 4L on admission  - Downtrending lactate 1.9->0.8    Plan:  > DC levo, MAP goal >65  > C/w abx as per below    #HTN  #HLD  - Home Regimen: Not currently on antihypertensive medications. On Simvastatin 20mg     Plan:  > Continue with Atorvastatin 10mg QHS  > Continue Aspirin 81mg qday    =====Gastroenterology=====  #Diarrhea  #Enterocolitis  - Patient with profuse diarrhea with episodes every 30-60 minutes    Plan:  > Abx as below  > Zofran 4mg PRN  > IVF resuscitation with 1L LR, followed by reassessment of volume status    #Hepatic Lesion  CTAP 7/6: "Questionable 1.5 cm ovoid enhancing structure in hepatic segment 7 in the setting of artifact, not definitively seen on prior study. Nonemergent liver ultrasound is recommended for further evaluation."    Plan:  > Continue to monitor outpatient or inpatient if clinically indicated    #GERD  Home regimen: Omeprazole 40mg daily    Plan:  > Continue pantoprazole 40mg daily    #Diet  #Weight loss  > Regular diet (CC), can change to soft and bite-sized if patient unable to tolerate (due to dentures)    =====Renal/=====  #Acute Kidney Injury  #Hyponatremia   - Patient with initial Cr 1.48, BUN 33 with initial improvement s/p fluids to Cr 1.18, BUN 28; labs on 7/7 at 08:20 demonstrating Cr 0.83 and BUN 15  - Hyponatremia likely in setting of hypovolemia iso diffuse diarrhea with reduced PO intake  - Urine studies positive for elevated protein to 30 and elevated protein:Cr ratio of 0.6    Plan:  > Continue to trend  > IVF as above    #NAGMA  - Likely in setting of diarrhea  - Received sodium bicarb gtt 150mEq at 100 mL/hr    Plan:  > Continue IVF as above  > DC sodium bicarb    #BPH  Home regimen: Tamsulosin 0.4mg qhs    Plan:  > Continue tamsulosin 0.4mg qhs    #Electrolytes  - Maintain K>4, Phos>3, Mag>2, iCal>1    =====Endocrine=====  #T2DM  - Home Regimen: Metformin 1000mg BID, Trajenta 5mg daily  - A1c 6.8% (3/2025)    Plan:  > CC diet  > F/u A1c  > SAUL    =====Infectious Disease=====  #Septic Shock  #Enterocolitis  - Patient hypotensive requiring pressors  - Lethargic and febrile at home with profuse diarrhea and abdominal pain  - CT abdomen suggestive of infectious versus inflammatory enterocolitis/diarrheal disease. Underdistended stomach with questionable distal wall thickening (inflammatory gastritis and/or ulcer disease)  - No leukocytosis, but bandemia  - C. diff negative  - GI PCR positive for EPEC and Salmonella    Plan:  > C/w ciprofloxacin 500mg bid (7/7-7/11) and flagyl 500mg bid  > F/u Blood cultures    =====Musculoskeletal=====  #Bone Cysts  CT 7/6: "6 cm in greatest dimension unicameral bone cyst versus aneurysmal bone   cyst, unchanged when remeasured. Nonemergent MRI is again recommended for   further evaluation."    Plan:  - Follow up outpatient MRI    =====Heme/Onc=====  #DVT Ppx  - Heparin 5000units q12    =====Ethics=====  - Pending confirmation of Full Code MICU Transfer Note     -----Tarasbel Jorgeal PGY-2-----    Transfer from: MICU  Transfer to:  (X) Medicine    (  ) Telemetry    (  ) RCU    (  ) Palliative    (  ) Stroke Unit    (  ) _______________  Accepting Physician: Jaymie Nails   Bed Assignment:  Signout given to ______    MICU admitted for: Mixed Shock (Hypovolemic + Septic) iso EPEC/SALM enterocolitis     H/P  75-year-old male with PMH of HTN (not on meds), HLD, BPH, T2DM, and history of tongue cancer s/p radiation, presenting with several days of generalized weakness, fever, diarrhea, throat pain, dysphagia, and burning abdominal pain. Chart review revealed multiple episodes of loose, non-bloody diarrhea occurring every 30–60 minutes over the prior day, accompanied by increased fatigue and weakness. No recent travel or sick contacts; wife denies recent antibiotic use. At presentation, patient denied ongoing abdominal pain.     He is typically independent with ADLs but has required increased assistance in recent weeks to months. Wife also reports ~20 lb weight loss, attributed to decreased PO intake due to poorly fitted dentures limiting solid food consumption.     In the ED, he was febrile to 102.5°F, tachycardic to 107, hypotensive (81/47), and saturating well on RA. Labs notable for hyponatremia (Na 129), HEIDI (Cr 1.48; baseline 0.8–1), bandemia (11.1%) without leukocytosis, and VBG of 7.23/42/28/18. Lactate was normal. UA with proteinuria but no evidence of infection. RVP negative. CXR was clear. CT abdomen/pelvis concerning for enterocolitis vs infectious gastroenteritis. He received IV acetaminophen, broad-spectrum antibiotics (Zosyn, vancomycin), and fluids. POCUS IVC was borderline collapsible (1.2–1.3 cm), and patient remained hypotensive after 2.5L IVF. Levofloxacin was initiated, and calcium gluconate given for hypocalcemia (Ca 6.5). MICU consulted for septic shock requiring pressors.     MICU Course:    Patient admitted on vasopressors and started on bicarbonate drip. Blood cultures grew Salmonella, and stool testing positive for EPEC; antibiotics narrowed to ciprofloxacin and metronidazole. He received additional fluid bolus and clinically improved, returning to baseline functional and hemodynamic status. Head removed (placed in ED)       Major To Dos  [] C/w ciprofloxacin 500mg bid (7/7-7/11) and flagyl 500mg bid  [] TOV q8   [] FU hepatic artifact w/RUQ US  [] Outpatient MRI for bone lesion     REVIEW OF SYSTEMS:  CONSTITUTIONAL:  Mild weakness, well appearing   EYES/ENT:  No visual changes; no facial pain, no hearring difficulties   NECK/SPINE:  No pain or stiffness  RESPIRATORY:  No cough, wheezing, hemoptysis; No shortness of breath  CARDIOVASCULAR:  No chest pain or palpitations  GASTROINTESTINAL:  Tolerates food well. No abdominal or epigastric pain. No nausea/vomiting ; No diarrhea/constipation. No melena/hematochezia.  GENITOURINARY:  No dysuria, frequency or hematuria  MUSCULOSKELETAL: No concerns with ambulation, No calf tenderness, no calf swelling   NEUROLOGICAL:  No numbness/weakness  PSYCH: Mood is appropriate   SKIN:  No itching, rashes    MEDICATIONS:  aspirin enteric coated 81 milliGRAM(s) Oral daily  atorvastatin 10 milliGRAM(s) Oral at bedtime  chlorhexidine 2% Cloths 1 Application(s) Topical <User Schedule>  ciprofloxacin     Tablet 500 milliGRAM(s) Oral every 12 hours  dextrose 5%. 1000 milliLiter(s) IV Continuous <Continuous>  dextrose 5%. 1000 milliLiter(s) IV Continuous <Continuous>  dextrose 50% Injectable 25 Gram(s) IV Push once  dextrose 50% Injectable 12.5 Gram(s) IV Push once  dextrose 50% Injectable 25 Gram(s) IV Push once  dextrose Oral Gel 15 Gram(s) Oral once  glucagon  Injectable 1 milliGRAM(s) IntraMuscular once  heparin   Injectable 5000 Unit(s) SubCutaneous every 12 hours  insulin lispro (ADMELOG) corrective regimen sliding scale   SubCutaneous three times a day before meals  lactated ringers Bolus 1000 milliLiter(s) IV Bolus once  metroNIDAZOLE    Tablet 500 milliGRAM(s) Oral every 12 hours  norepinephrine Infusion 0.05 MICROgram(s)/kG/Min IV Continuous <Continuous>  ondansetron Injectable 4 milliGRAM(s) IV Push every 8 hours PRN  pantoprazole    Tablet 40 milliGRAM(s) Oral before breakfast  potassium chloride    Tablet ER 40 milliEquivalent(s) Oral every 4 hours  tamsulosin 0.4 milliGRAM(s) Oral at bedtime      T(C): 37.1 (07-07-25 @ 08:00), Max: 39.2 (07-06-25 @ 16:48)  HR: 77 (07-07-25 @ 11:00) (72 - 107)  BP: 109/56 (07-07-25 @ 11:00) (81/47 - 149/70)  RR: 15 (07-07-25 @ 11:00) (12 - 22)  SpO2: 100% (07-07-25 @ 11:00) (97% - 100%)  Wt(kg): --Vital Signs Last 24 Hrs  T(C): 37.1 (07 Jul 2025 08:00), Max: 39.2 (06 Jul 2025 16:48)  T(F): 98.8 (07 Jul 2025 08:00), Max: 102.5 (06 Jul 2025 16:48)  HR: 77 (07 Jul 2025 11:00) (72 - 107)  BP: 109/56 (07 Jul 2025 11:00) (81/47 - 149/70)  BP(mean): 72 (07 Jul 2025 11:00) (59 - 95)  RR: 15 (07 Jul 2025 11:00) (12 - 22)  SpO2: 100% (07 Jul 2025 11:00) (97% - 100%)    Parameters below as of 07 Jul 2025 11:00  Patient On (Oxygen Delivery Method): room air        PHYSICAL EXAM:  GENERAL: No acute distress, resting comfortably   HEAD:  Atraumatic, normocephalic  EYES: Eye movements are appropriate, pupils responsive to light, conjunctiva and sclera clear  NECK: Supple, no swelling , JVD not appreciated   HEART: Regular rate and rhythm, no murmurs or other pathological heart sounds   LUNGS: Unlabored respirations. Clear to auscultation bilaterally, no crackles, wheezing, or rhonchi  ABDOMEN: Soft, nontender/ nondistended, +BS  EXTREMITIES: +2 pulses, moves appropriately w /5 strength, no edema   NEURO:  A&Ox3, CNs not assessed  PSYCH: Affect is appropriate   SKIN: No rashes or lesions   Lines:    Consultant(s) Notes Reviewed:  [x ] YES  [ ] NO  Care Discussed with Consultants/Other Providers [ x] YES  [ ] NO    LABS:                        14.1   5.10  )-----------( 175      ( 07 Jul 2025 00:21 )             43.5     07-07    134[L]  |  106  |  15  ----------------------------<  128[H]  3.1[L]   |  18[L]  |  0.83    Ca    7.6[L]      07 Jul 2025 08:20  Phos  1.4     07-07  Mg     1.50     07-07    TPro  4.5[L]  /  Alb  2.5[L]  /  TBili  <0.2  /  DBili  x   /  AST  56[H]  /  ALT  33  /  AlkPhos  41  07-07    PT/INR - ( 07 Jul 2025 00:21 )   PT: 9.4 sec;   INR: <0.90 ratio         PTT - ( 07 Jul 2025 00:21 )  PTT:28.0 sec  Urinalysis Basic - ( 07 Jul 2025 08:20 )    Color: x / Appearance: x / SG: x / pH: x  Gluc: 128 mg/dL / Ketone: x  / Bili: x / Urobili: x   Blood: x / Protein: x / Nitrite: x   Leuk Esterase: x / RBC: x / WBC x   Sq Epi: x / Non Sq Epi: x / Bacteria: x      CAPILLARY BLOOD GLUCOSE      POCT Blood Glucose.: 123 mg/dL (07 Jul 2025 08:17)  POCT Blood Glucose.: 204 mg/dL (06 Jul 2025 15:55)        Urinalysis Basic - ( 07 Jul 2025 08:20 )    Color: x / Appearance: x / SG: x / pH: x  Gluc: 128 mg/dL / Ketone: x  / Bili: x / Urobili: x   Blood: x / Protein: x / Nitrite: x   Leuk Esterase: x / RBC: x / WBC x   Sq Epi: x / Non Sq Epi: x / Bacteria: x        Urinalysis with Rflx Culture (collected 07-06-25 @ 20:24)      RADIOLOGY & ADDITIONAL TESTS:    Imaging Personally Reviewed:  [x ] YES  [ ] NO    SABRINA DOUGLAS is a 75y male with PMH HTN, HLD, BPH, T2DM, tongue cancer s/p radiation in the hospital for transferred to the MICU for septic shock requiring pressors. Etiology likely enterocolitis.    PLAN  =====Neurologic=====  #Baseline mental status, no active issues  - A&Ox4 at baseline    =====Pulmonary=====  #No active issues  Patient breathing comfortably on room air    =====Cardiovascular=====  #Septic Shock  - Patient w/ soft pressures initially requiring Levo pressor support, now off Levo and maintaining MAP > 65  - Patient s/p 4L on admission  - Downtrending lactate 1.9->0.8    Plan:  > DC levo, MAP goal >65  > C/w abx as per below    #HTN  #HLD  - Home Regimen: Not currently on antihypertensive medications. On Simvastatin 20mg     Plan:  > Continue with Atorvastatin 10mg QHS  > Continue Aspirin 81mg qday    =====Gastroenterology=====  #Diarrhea  #Enterocolitis  - Patient with profuse diarrhea with episodes every 30-60 minutes    Plan:  > Abx as below  > Zofran 4mg PRN  > IVF resuscitation with 1L LR, followed by reassessment of volume status    #Hepatic Lesion  CTAP 7/6: "Questionable 1.5 cm ovoid enhancing structure in hepatic segment 7 in the setting of artifact, not definitively seen on prior study. Nonemergent liver ultrasound is recommended for further evaluation."    Plan:  > Continue to monitor outpatient or inpatient if clinically indicated    #GERD  Home regimen: Omeprazole 40mg daily    Plan:  > Continue pantoprazole 40mg daily    #Diet  #Weight loss  > Regular diet (CC), can change to soft and bite-sized if patient unable to tolerate (due to dentures)    =====Renal/=====  #Acute Kidney Injury  #Hyponatremia   - Patient with initial Cr 1.48, BUN 33 with initial improvement s/p fluids to Cr 1.18, BUN 28; labs on 7/7 at 08:20 demonstrating Cr 0.83 and BUN 15  - Hyponatremia likely in setting of hypovolemia iso diffuse diarrhea with reduced PO intake  - Urine studies positive for elevated protein to 30 and elevated protein:Cr ratio of 0.6    Plan:  > Continue to trend  > IVF as above    #NAGMA  - Likely in setting of diarrhea  - Received sodium bicarb gtt 150mEq at 100 mL/hr    Plan:  > Continue IVF as above  > DC sodium bicarb    #BPH  Home regimen: Tamsulosin 0.4mg qhs    Plan:  > Continue tamsulosin 0.4mg qhs    #Electrolytes  - Maintain K>4, Phos>3, Mag>2, iCal>1    =====Endocrine=====  #T2DM  - Home Regimen: Metformin 1000mg BID, Trajenta 5mg daily  - A1c 6.8% (3/2025)    Plan:  > CC diet  > F/u A1c  > SAUL    =====Infectious Disease=====  #Septic Shock  #Enterocolitis  - Patient hypotensive requiring pressors  - Lethargic and febrile at home with profuse diarrhea and abdominal pain  - CT abdomen suggestive of infectious versus inflammatory enterocolitis/diarrheal disease. Underdistended stomach with questionable distal wall thickening (inflammatory gastritis and/or ulcer disease)  - No leukocytosis, but bandemia  - C. diff negative  - GI PCR positive for EPEC and Salmonella    Plan:  > C/w ciprofloxacin 500mg bid (7/7-7/11) and flagyl 500mg bid  > F/u Blood cultures    =====Musculoskeletal=====  #Bone Cysts  CT 7/6: "6 cm in greatest dimension unicameral bone cyst versus aneurysmal bone   cyst, unchanged when remeasured. Nonemergent MRI is again recommended for   further evaluation."    Plan:  - Follow up outpatient MRI    =====Heme/Onc=====  #DVT Ppx  - Heparin 5000units q12    =====Ethics=====  - Full Code, confirmed by wife

## 2025-07-07 NOTE — DIETITIAN INITIAL EVALUATION ADULT - DIET TYPE
Lacto-Ovo vegetarian + [employer] Glucose Support 1.2, 250mL (1 container) PO 2x daily/consistent carbohydrate (no snacks)/minced and moist

## 2025-07-07 NOTE — PHYSICAL THERAPY INITIAL EVALUATION ADULT - PERTINENT HX OF CURRENT PROBLEM, REHAB EVAL
75 year old male with past medical history stated below, presents with diarrhea and fever secondary to Salmonella

## 2025-07-07 NOTE — DIETITIAN INITIAL EVALUATION ADULT - PERTINENT MEDS FT
MEDICATIONS  (STANDING):  aspirin enteric coated 81 milliGRAM(s) Oral daily  atorvastatin 10 milliGRAM(s) Oral at bedtime  chlorhexidine 2% Cloths 1 Application(s) Topical <User Schedule>  ciprofloxacin     Tablet 500 milliGRAM(s) Oral every 12 hours  dextrose 5%. 1000 milliLiter(s) (100 mL/Hr) IV Continuous <Continuous>  dextrose 5%. 1000 milliLiter(s) (50 mL/Hr) IV Continuous <Continuous>  dextrose 50% Injectable 25 Gram(s) IV Push once  dextrose 50% Injectable 12.5 Gram(s) IV Push once  dextrose 50% Injectable 25 Gram(s) IV Push once  dextrose Oral Gel 15 Gram(s) Oral once  glucagon  Injectable 1 milliGRAM(s) IntraMuscular once  heparin   Injectable 5000 Unit(s) SubCutaneous every 12 hours  insulin lispro (ADMELOG) corrective regimen sliding scale   SubCutaneous three times a day before meals  metroNIDAZOLE    Tablet 500 milliGRAM(s) Oral every 12 hours  norepinephrine Infusion 0.05 MICROgram(s)/kG/Min (4.51 mL/Hr) IV Continuous <Continuous>  pantoprazole    Tablet 40 milliGRAM(s) Oral before breakfast  potassium chloride    Tablet ER 40 milliEquivalent(s) Oral every 4 hours  tamsulosin 0.4 milliGRAM(s) Oral at bedtime    MEDICATIONS  (PRN):  ondansetron Injectable 4 milliGRAM(s) IV Push every 8 hours PRN Nausea

## 2025-07-07 NOTE — DIETITIAN INITIAL EVALUATION ADULT - OTHER INFO
Spoke with RN who was able to assist with Hamzah translation.   Met with Pt.    Stated usual body weight was 130lbs, 3 years ago, now usual body weight 108lbs.  Pt. attributes weight loss to Hx of tongue Ca.  Denies swallowing issues @ this time, but does eat "soft foods" at home.  Pt. amenable to softer food consistency, as well as PO supplement.  [employer]'s Glucose Support suggested and easy to chew diet consistency.  Advised on importance of high calorie/energy intake.    Pt. continues to have diarrhea.  No N/V.      When asked, Pt. replies he does self monitor blood glucose at home.  Values ~100mg/dL.  Denies hypoglycemia episodes.

## 2025-07-07 NOTE — DIETITIAN INITIAL EVALUATION ADULT - PERTINENT LABORATORY DATA
07-07    134[L]  |  106  |  15  ----------------------------<  128[H]  3.1[L]   |  18[L]  |  0.83    Ca    7.6[L]      07 Jul 2025 08:20  Phos  1.4     07-07  Mg     1.50     07-07  CAPILLARY BLOOD GLUCOSE  POCT Blood Glucose.: 123 mg/dL (07 Jul 2025 08:17)  POCT Blood Glucose.: 204 mg/dL (06 Jul 2025 15:55)    A1C with Estimated Average Glucose Result: 6.8 % (03-26-25 @ 12:30)   07-07    134[L]  |  106  |  15  ----------------------------<  128[H]  3.1[L]   |  18[L]  |  0.83    Ca    7.6[L]      07 Jul 2025 08:20  Phos  1.4     07-07  Mg     1.50     07-07    CAPILLARY BLOOD GLUCOSE  POCT Blood Glucose.: 123 mg/dL (07 Jul 2025 08:17)  POCT Blood Glucose.: 204 mg/dL (06 Jul 2025 15:55)    A1C with Estimated Average Glucose Result: 6.8 % (03-26-25 @ 12:30)

## 2025-07-07 NOTE — DIETITIAN INITIAL EVALUATION ADULT - MALNUTRITION
OB FOLLOW UP  CC- Here for care of pregnancy        Elaina Pena is a 21 y.o.  32w6d patient being seen today for her obstetrical follow up visit. Patient reports no complaints..     Her prenatal care is complicated by (and status) :   Poor fetal growth  Patient Active Problem List   Diagnosis    Prenatal care, antepartum    Mild tetrahydrocannabinol (THC) abuse    History of abnormal cervical Pap smear    Poor fetal growth affecting management of mother in third trimester    Maternal anemia in pregnancy, antepartum          TDAP status: received at last visit  Rhogam status: was not indicated  28 week labs: Reviewed  Ultrasound Today: Yes at PDC  Non Stress Test: Yes per PDC request for tachycardia      ROS -   Patient Reports : No Problems  Patient Denies: Loss of Fluid, Vaginal Spotting, Vision Changes, Headaches, Nausea , Vomiting , Contractions, and Epigastric pain  Fetal Movement : normal  All other systems reviewed and are negative.       The additional following portions of the patient's history were reviewed and updated as appropriate: allergies, current medications, past family history, past medical history, past social history, past surgical history, and problem list.    I have reviewed and agree with the HPI, ROS, and historical information as entered above. Janie Quevedo, APRN      /70   Wt 84.2 kg (185 lb 9.6 oz)   LMP 2022   BMI 29.51 kg/mý         EXAM:     Prenatal Vitals  BP: 104/70  Weight: 84.2 kg (185 lb 9.6 oz)   Fetal Heart Rate: NST/PDC               Urine Glucose Read-only: Negative  Urine Protein Read-only: (!) Trace           Assessment and Plan    Problem List Items Addressed This Visit          Gravid and     Poor fetal growth affecting management of mother in third trimester - Primary    Overview     23-AC 4%, HC 9%, EFW 28%  2023-PDC evaluation showed AC-F 3rd percentile and normal growth.     PDC: Mild but improved AC lag currently  7th%, lateral ventricles appear wnl. With continued AC lag recommended weekly  testing until next growth scan 23 SIUP is noted in cephalic presentation with biometry demonstrating stable but lagging growth. EFW overall measures at the 37th percentile. AC  measures at the 6th percentile. There is a normal amount of amniotic fluid. Placenta is posterior. BPP is 8/8. UA Dopplers are normal. F/U 3 weeks repeat growth scan 23 @PDC         Current Assessment & Plan     NST weekly per PDC for poor fetal growth.         Relevant Orders    POC Urinalysis Dipstick (Completed)       Hematology and Neoplasia    Maternal anemia in pregnancy, antepartum    Overview     11.2         Current Assessment & Plan     Ferrous Sulfate daily.         Relevant Medications    Ferrous Sulfate (IRON PO)       Mental Health    Mild tetrahydrocannabinol (THC) abuse    Overview     Positive at new OB visit on 2023  Positive on 23  Repeat at 28 week visit-positive            Pregnancy at 32w6d  Fetal status reassuring.  28 week labs reviewed.    Activity and Exercise discussed  Patient is on prenatal vitamins and ferrous sulfate for anemia.  Signs of labor reviewed such as contractions five minutes apart getting closer together and stronger, decreased fetal movement, vaginal bleeding, or leaking of fluid. Reviewed Pre-eclampsia signs/symptoms-Headache not relieved by tylenol or rest, chest pain, shortness of breath, right upper quadrant pain, blurry vision-go to labor and delivery if office is closed or call office if open, patient verbalized understanding.   PDC scan today SIUP is noted in cephalic presentation with biometry demonstrating stable but lagging growth. EFW overall measures at the 37th percentile. AC measures at the 6th percentile. There is a normal amount of amniotic fluid. Placenta is posterior. BPP is 8/8. UA Dopplers are normal. F/U 3 weeks repeat growth scan 23 @PDC  NST requested by PDC  per note for fetal tachycardia during scan however NST was reactive baseline 150 2 small ctx patient did not feel, no leaking bleeding pain.   Follow up next week for weekly NST for poor fetal growth.    Janie Quevedo, APRN  08/18/2023    [facility]

## 2025-07-07 NOTE — DIETITIAN INITIAL EVALUATION ADULT - NS FNS DIET ORDER
Diet, Regular:   Consistent Carbohydrate {No Snacks} (CSTCHO)  Lacto-Ovo Veg (Accepts Milk Prod., Eggs) (07-07-25 @ 03:36)

## 2025-07-07 NOTE — PROGRESS NOTE ADULT - SUBJECTIVE AND OBJECTIVE BOX
****[facility]ales Internal Medicine PGY-2*****    CHIEF COMPLAINT:    Overnight Events:  Interval Events:    OBJECTIVE:  ICU Vital Signs Last 24 Hrs  T(C): 36.9 (2025 22:45), Max: 39.2 (2025 16:48)  T(F): 98.4 (2025 22:45), Max: 102.5 (2025 16:48)  HR: 82 (2025 06:00) (72 - 107)  BP: 134/76 (2025 06:00) (81/47 - 149/70)  BP(mean): 91 (2025 06:00) (59 - 95)  ABP: --  ABP(mean): --  RR: 19 (2025 06:00) (12 - 21)  SpO2: 100% (2025 06:00) (98% - 100%)    O2 Parameters below as of 2025 04:00  Patient On (Oxygen Delivery Method): room air              - @ 07:01  -   @ 07:00  --------------------------------------------------------  IN: 405.6 mL / OUT: 900 mL / NET: -494.4 mL      CAPILLARY BLOOD GLUCOSE      POCT Blood Glucose.: 204 mg/dL (2025 15:55)      PHYSICAL EXAM  General:   Head:    Eyes:   Neck:   Cardiac:   Lungs:   Abdomen:   Extremities:   Neuro  Psych:   Skin:  Etc:      HOSPITAL MEDICATIONS:  MEDICATIONS  (STANDING):  aspirin enteric coated 81 milliGRAM(s) Oral daily  atorvastatin 10 milliGRAM(s) Oral at bedtime  chlorhexidine 2% Cloths 1 Application(s) Topical <User Schedule>  ciprofloxacin     Tablet 500 milliGRAM(s) Oral every 12 hours  dextrose 5%. 1000 milliLiter(s) (100 mL/Hr) IV Continuous <Continuous>  dextrose 5%. 1000 milliLiter(s) (50 mL/Hr) IV Continuous <Continuous>  dextrose 50% Injectable 25 Gram(s) IV Push once  dextrose 50% Injectable 12.5 Gram(s) IV Push once  dextrose 50% Injectable 25 Gram(s) IV Push once  dextrose Oral Gel 15 Gram(s) Oral once  glucagon  Injectable 1 milliGRAM(s) IntraMuscular once  heparin   Injectable 5000 Unit(s) SubCutaneous every 8 hours  insulin lispro (ADMELOG) corrective regimen sliding scale   SubCutaneous three times a day before meals  metroNIDAZOLE    Tablet 500 milliGRAM(s) Oral every 12 hours  norepinephrine Infusion 0.05 MICROgram(s)/kG/Min (4.51 mL/Hr) IV Continuous <Continuous>  pantoprazole    Tablet 40 milliGRAM(s) Oral before breakfast  sodium bicarbonate  Infusion 0.312 mEq/kG/Hr (100 mL/Hr) IV Continuous <Continuous>  tamsulosin 0.4 milliGRAM(s) Oral at bedtime    MEDICATIONS  (PRN):  ondansetron Injectable 4 milliGRAM(s) IV Push every 8 hours PRN Nausea      LABS:                        14.1   5.10  )-----------( 175      ( 2025 00:21 )             43.5     Hgb Trend: 14.1<--, 14.0<--      133[L]  |  106  |  24[H]  ----------------------------<  116[H]  3.7   |  15[L]  |  1.26    Ca    8.4      2025 00:21  Phos  2.6     -  Mg     2.00     -07    TPro  5.3[L]  /  Alb  3.0[L]  /  TBili  <0.2  /  DBili  x   /  AST  60[H]  /  ALT  32  /  AlkPhos  47      Creatinine Trend: 1.26<--, 1.18<--, 1.48<--  PT/INR - ( 2025 00:21 )   PT: 9.4 sec;   INR: <0.90 ratio         PTT - ( 2025 00:21 )  PTT:28.0 sec  Urinalysis Basic - ( 2025 06:46 )    Color: Yellow / Appearance: Clear / S.029 / pH: x  Gluc: x / Ketone: x  / Bili: Negative / Urobili: 0.2 mg/dL   Blood: x / Protein: 30 mg/dL / Nitrite: Negative   Leuk Esterase: Negative / RBC: 20-25 /HPF / WBC 0-5 /HPF   Sq Epi: x / Non Sq Epi: x / Bacteria: x        Venous Blood Gas:   @ 00:21  7.15/48/28/17/43.8  VBG Lactate: 1.3  Venous Blood Gas:   @ 18:54  7.26/33/75/15/97.6  VBG Lactate: 0.8  Venous Blood Gas:   @ 16:41  7.23/42/28/18/47.8  VBG Lactate: 1.9      MICROBIOLOGY:     Urinalysis with Rflx Culture (collected 2025 20:24)        RADIOLOGY:  [ ] Reviewed by me   ****[facility] Internal Medicine PGY-2*****    CHIEF COMPLAINT: Shock requiring pressors    Overnight Events:  Pt has been experiencing diarrhea every 30 minutes to 1 hour x1 day. Pt also endorses throat pain, difficulty swallowing, burning abdominal pain, LE pain, and diffuse back pain.    OBJECTIVE:  ICU Vital Signs Last 24 Hrs  T(C): 36.9 (2025 22:45), Max: 39.2 (2025 16:48)  T(F): 98.4 (2025 22:45), Max: 102.5 (2025 16:48)  HR: 82 (2025 06:00) (72 - 107)  BP: 134/76 (2025 06:00) (81/47 - 149/70)  BP(mean): 91 (2025 06:00) (59 - 95)  ABP: --  ABP(mean): --  RR: 19 (2025 06:00) (12 - 21)  SpO2: 100% (2025 06:00) (98% - 100%)    O2 Parameters below as of 2025 04:00  Patient On (Oxygen Delivery Method): room air              - @ 07:01  -   @ 07:00  --------------------------------------------------------  IN: 405.6 mL / OUT: 900 mL / NET: -494.4 mL      CAPILLARY BLOOD GLUCOSE      POCT Blood Glucose.: 204 mg/dL (2025 15:55)      PHYSICAL EXAM:  GENERAL: NAD, lying in bed comfortably  HEENT: NC/AT  NECK: Supple, No JVD  CHEST/LUNG: CTAB, no increased WOB  HEART: RRR, no m/r/g  ABDOMEN: soft, NT, ND, BS+  EXTREMITIES:  2+ peripheral pulses, no edema  NERVOUS SYSTEM:  A&Ox4  MSK: FROM all 4 extremities, full and equal strength  SKIN: No rashes or lesions      HOSPITAL MEDICATIONS:  MEDICATIONS  (STANDING):  aspirin enteric coated 81 milliGRAM(s) Oral daily  atorvastatin 10 milliGRAM(s) Oral at bedtime  chlorhexidine 2% Cloths 1 Application(s) Topical <User Schedule>  ciprofloxacin     Tablet 500 milliGRAM(s) Oral every 12 hours  dextrose 5%. 1000 milliLiter(s) (100 mL/Hr) IV Continuous <Continuous>  dextrose 5%. 1000 milliLiter(s) (50 mL/Hr) IV Continuous <Continuous>  dextrose 50% Injectable 25 Gram(s) IV Push once  dextrose 50% Injectable 12.5 Gram(s) IV Push once  dextrose 50% Injectable 25 Gram(s) IV Push once  dextrose Oral Gel 15 Gram(s) Oral once  glucagon  Injectable 1 milliGRAM(s) IntraMuscular once  heparin   Injectable 5000 Unit(s) SubCutaneous every 8 hours  insulin lispro (ADMELOG) corrective regimen sliding scale   SubCutaneous three times a day before meals  metroNIDAZOLE    Tablet 500 milliGRAM(s) Oral every 12 hours  norepinephrine Infusion 0.05 MICROgram(s)/kG/Min (4.51 mL/Hr) IV Continuous <Continuous>  pantoprazole    Tablet 40 milliGRAM(s) Oral before breakfast  sodium bicarbonate  Infusion 0.312 mEq/kG/Hr (100 mL/Hr) IV Continuous <Continuous>  tamsulosin 0.4 milliGRAM(s) Oral at bedtime    MEDICATIONS  (PRN):  ondansetron Injectable 4 milliGRAM(s) IV Push every 8 hours PRN Nausea      LABS:                        14.1   5.10  )-----------( 175      ( 2025 00:21 )             43.5     Hgb Trend: 14.1<--, 14.0<--  07    133[L]  |  106  |  24[H]  ----------------------------<  116[H]  3.7   |  15[L]  |  1.26    Ca    8.4      2025 00:21  Phos  2.6     07-07  Mg     2.00     07-    TPro  5.3[L]  /  Alb  3.0[L]  /  TBili  <0.2  /  DBili  x   /  AST  60[H]  /  ALT  32  /  AlkPhos  47      Creatinine Trend: 1.26<--, 1.18<--, 1.48<--  PT/INR - ( 2025 00:21 )   PT: 9.4 sec;   INR: <0.90 ratio         PTT - ( 2025 00:21 )  PTT:28.0 sec  Urinalysis Basic - ( 2025 06:46 )    Color: Yellow / Appearance: Clear / S.029 / pH: x  Gluc: x / Ketone: x  / Bili: Negative / Urobili: 0.2 mg/dL   Blood: x / Protein: 30 mg/dL / Nitrite: Negative   Leuk Esterase: Negative / RBC: 20-25 /HPF / WBC 0-5 /HPF   Sq Epi: x / Non Sq Epi: x / Bacteria: x        Venous Blood Gas:   @ 00:21  7.15/48/28/17/43.8  VBG Lactate: 1.3  Venous Blood Gas:   @ 18:54  7.26/33/75/15/97.6  VBG Lactate: 0.8  Venous Blood Gas:   @ 16:41  7.23/42/28/18/47.8  VBG Lactate: 1.9      MICROBIOLOGY:     Urinalysis with Rflx Culture (collected 2025 20:24)        RADIOLOGY:  [ ] Reviewed by me

## 2025-07-07 NOTE — DIETITIAN INITIAL EVALUATION ADULT - REASON FOR ADMISSION
- 76 y/o Hamzah speaking M with Hx of tongue Ca, DM2, HTN, HLD presenting with diarrhea, abdominal pain, back pain, difficulty swallowing and fever 2/2 salmonella.  Shock, likely septic vs hypovolemic, requiring pressors.  Admitted to MICU.  - 74 y/o Hamzah & Gujarati speaking M with Hx of tongue Ca, DM2, HTN, HLD presenting with diarrhea, abdominal pain, back pain, difficulty swallowing and fever 2/2 salmonella.  Shock, likely septic vs hypovolemic, requiring pressors.  Admitted to MICU.

## 2025-07-07 NOTE — DIETITIAN INITIAL EVALUATION ADULT - ORAL INTAKE PTA/DIET HISTORY
Per Pt., eats breakfast, lunch and dinner "soft, liquid foods".  Wife prepares meals.  Per RN, wife had stated she blenderizes food for Pt.  Has ill fitting dentures.  Drinks primarily water.  as well as Ensure 1x daily.  NKFA.   Decreased appetite PTA.

## 2025-07-08 ENCOUNTER — TRANSCRIPTION ENCOUNTER (OUTPATIENT)
Age: 75
End: 2025-07-08

## 2025-07-08 LAB
25(OH)D3 SERPL-MCNC: 52.6 NG/ML
A1C WITH ESTIMATED AVERAGE GLUCOSE RESULT: 7.1 % — HIGH (ref 4–5.6)
ALBUMIN SERPL ELPH-MCNC: 2.5 G/DL — LOW (ref 3.3–5)
ALBUMIN SERPL ELPH-MCNC: 3.8 G/DL
ALP BLD-CCNC: 40 U/L
ALP SERPL-CCNC: 41 U/L — SIGNIFICANT CHANGE UP (ref 40–120)
ALT FLD-CCNC: 38 U/L — SIGNIFICANT CHANGE UP (ref 4–41)
ALT SERPL-CCNC: 33 U/L
ANION GAP SERPL CALC-SCNC: 11 MMOL/L — SIGNIFICANT CHANGE UP (ref 7–14)
ANION GAP SERPL CALC-SCNC: 13 MMOL/L
APTT BLD: 31 SEC — SIGNIFICANT CHANGE UP (ref 26.1–36.8)
AST SERPL-CCNC: 33 U/L
AST SERPL-CCNC: 61 U/L — HIGH (ref 4–40)
BASE EXCESS BLDV CALC-SCNC: -2.9 MMOL/L — LOW (ref -2–3)
BASOPHILS # BLD AUTO: 0.01 K/UL — SIGNIFICANT CHANGE UP (ref 0–0.2)
BASOPHILS NFR BLD AUTO: 0.2 % — SIGNIFICANT CHANGE UP (ref 0–2)
BILIRUB SERPL-MCNC: 0.2 MG/DL
BILIRUB SERPL-MCNC: <0.2 MG/DL — SIGNIFICANT CHANGE UP (ref 0.2–1.2)
BUN SERPL-MCNC: 22 MG/DL
BUN SERPL-MCNC: 9 MG/DL — SIGNIFICANT CHANGE UP (ref 7–23)
CALCIUM SERPL-MCNC: 7.5 MG/DL — LOW (ref 8.4–10.5)
CALCIUM SERPL-MCNC: 9.2 MG/DL
CHLORIDE SERPL-SCNC: 104 MMOL/L
CHLORIDE SERPL-SCNC: 105 MMOL/L — SIGNIFICANT CHANGE UP (ref 98–107)
CHOLEST SERPL-MCNC: 133 MG/DL
CO2 BLDV-SCNC: 23 MMOL/L — SIGNIFICANT CHANGE UP (ref 22–26)
CO2 SERPL-SCNC: 19 MMOL/L — LOW (ref 22–31)
CO2 SERPL-SCNC: 24 MMOL/L
CORTIS AM PEAK SERPL-MCNC: 12.7 UG/DL — SIGNIFICANT CHANGE UP (ref 6–18.4)
CREAT SERPL-MCNC: 0.83 MG/DL — SIGNIFICANT CHANGE UP (ref 0.5–1.3)
CREAT SERPL-MCNC: 1 MG/DL
CREAT SPEC-SCNC: 43 MG/DL
EGFR: 91 ML/MIN/1.73M2 — SIGNIFICANT CHANGE UP
EGFR: 91 ML/MIN/1.73M2 — SIGNIFICANT CHANGE UP
EGFRCR SERPLBLD CKD-EPI 2021: 78 ML/MIN/1.73M2
EOSINOPHIL # BLD AUTO: 0.02 K/UL — SIGNIFICANT CHANGE UP (ref 0–0.5)
EOSINOPHIL NFR BLD AUTO: 0.5 % — SIGNIFICANT CHANGE UP (ref 0–6)
ESTIMATED AVERAGE GLUCOSE: 154 MG/DL
ESTIMATED AVERAGE GLUCOSE: 157 — SIGNIFICANT CHANGE UP
GAS PNL BLDV: SIGNIFICANT CHANGE UP
GLUCOSE BLDC GLUCOMTR-MCNC: 110 MG/DL — HIGH (ref 70–99)
GLUCOSE BLDC GLUCOMTR-MCNC: 143 MG/DL — HIGH (ref 70–99)
GLUCOSE BLDC GLUCOMTR-MCNC: 195 MG/DL — HIGH (ref 70–99)
GLUCOSE BLDC GLUCOMTR-MCNC: 93 MG/DL — SIGNIFICANT CHANGE UP (ref 70–99)
GLUCOSE BLDC GLUCOMTR-MCNC: 96 MG/DL — SIGNIFICANT CHANGE UP (ref 70–99)
GLUCOSE SERPL-MCNC: 96 MG/DL — SIGNIFICANT CHANGE UP (ref 70–99)
GLUCOSE SERPL-MCNC: 99 MG/DL
HBA1C MFR BLD HPLC: 7 %
HCO3 BLDV-SCNC: 22 MMOL/L — SIGNIFICANT CHANGE UP (ref 22–29)
HCT VFR BLD CALC: 35.8 % — LOW (ref 39–50)
HDLC SERPL-MCNC: 65 MG/DL
HGB BLD-MCNC: 11.7 G/DL — LOW (ref 13–17)
IMM GRANULOCYTES # BLD AUTO: 0.02 K/UL — SIGNIFICANT CHANGE UP (ref 0–0.07)
IMM GRANULOCYTES NFR BLD AUTO: 0.5 % — SIGNIFICANT CHANGE UP (ref 0–0.9)
INR BLD: 0.92 RATIO — SIGNIFICANT CHANGE UP (ref 0.85–1.16)
LDLC SERPL-MCNC: 55 MG/DL
LYMPHOCYTES # BLD AUTO: 0.77 K/UL — LOW (ref 1–3.3)
LYMPHOCYTES NFR BLD AUTO: 18.7 % — SIGNIFICANT CHANGE UP (ref 13–44)
MAGNESIUM SERPL-MCNC: 1.6 MG/DL — SIGNIFICANT CHANGE UP (ref 1.6–2.6)
MCHC RBC-ENTMCNC: 27.5 PG — SIGNIFICANT CHANGE UP (ref 27–34)
MCHC RBC-ENTMCNC: 32.7 G/DL — SIGNIFICANT CHANGE UP (ref 32–36)
MCV RBC AUTO: 84 FL — SIGNIFICANT CHANGE UP (ref 80–100)
MICROALBUMIN 24H UR DL<=1MG/L-MCNC: <1.2 MG/DL
MICROALBUMIN/CREAT 24H UR-RTO: NORMAL MG/G
MONOCYTES # BLD AUTO: 0.4 K/UL — SIGNIFICANT CHANGE UP (ref 0–0.9)
MONOCYTES NFR BLD AUTO: 9.7 % — SIGNIFICANT CHANGE UP (ref 2–14)
MRSA PCR RESULT.: SIGNIFICANT CHANGE UP
NEUTROPHILS # BLD AUTO: 2.89 K/UL — SIGNIFICANT CHANGE UP (ref 1.8–7.4)
NEUTROPHILS NFR BLD AUTO: 70.4 % — SIGNIFICANT CHANGE UP (ref 43–77)
NONHDLC SERPL-MCNC: 68 MG/DL
NRBC # BLD AUTO: 0 K/UL — SIGNIFICANT CHANGE UP (ref 0–0)
NRBC # FLD: 0 K/UL — SIGNIFICANT CHANGE UP (ref 0–0)
NRBC BLD AUTO-RTO: 0 /100 WBCS — SIGNIFICANT CHANGE UP (ref 0–0)
PCO2 BLDV: 38 MMHG — LOW (ref 42–55)
PH BLDV: 7.37 — SIGNIFICANT CHANGE UP (ref 7.32–7.43)
PHOSPHATE SERPL-MCNC: 1.9 MG/DL — LOW (ref 2.5–4.5)
PLATELET # BLD AUTO: 150 K/UL — SIGNIFICANT CHANGE UP (ref 150–400)
PMV BLD: 10.3 FL — SIGNIFICANT CHANGE UP (ref 7–13)
PO2 BLDV: 69 MMHG — HIGH (ref 25–45)
POTASSIUM SERPL-MCNC: 3.1 MMOL/L — LOW (ref 3.5–5.3)
POTASSIUM SERPL-SCNC: 3.1 MMOL/L — LOW (ref 3.5–5.3)
POTASSIUM SERPL-SCNC: 5 MMOL/L
PROT SERPL-MCNC: 4.4 G/DL — LOW (ref 6–8.3)
PROT SERPL-MCNC: 5 G/DL
PROTHROM AB SERPL-ACNC: 10.7 SEC — SIGNIFICANT CHANGE UP (ref 9.9–13.4)
RBC # BLD: 4.26 M/UL — SIGNIFICANT CHANGE UP (ref 4.2–5.8)
RBC # FLD: 13.9 % — SIGNIFICANT CHANGE UP (ref 10.3–14.5)
S AUREUS DNA NOSE QL NAA+PROBE: DETECTED
SAO2 % BLDV: 96.7 % — HIGH (ref 67–88)
SODIUM SERPL-SCNC: 135 MMOL/L — SIGNIFICANT CHANGE UP (ref 135–145)
SODIUM SERPL-SCNC: 141 MMOL/L
TRIGL SERPL-MCNC: 62 MG/DL
WBC # BLD: 4.11 K/UL — SIGNIFICANT CHANGE UP (ref 3.8–10.5)
WBC # FLD AUTO: 4.11 K/UL — SIGNIFICANT CHANGE UP (ref 3.8–10.5)

## 2025-07-08 PROCEDURE — 93308 TTE F-UP OR LMTD: CPT | Mod: 26,GC

## 2025-07-08 PROCEDURE — 99233 SBSQ HOSP IP/OBS HIGH 50: CPT | Mod: GC,25

## 2025-07-08 PROCEDURE — 76604 US EXAM CHEST: CPT | Mod: 26,GC

## 2025-07-08 RX ORDER — POTASSIUM PHOSPHATE, MONOBASIC POTASSIUM PHOSPHATE, DIBASIC INJECTION, 236; 224 MG/ML; MG/ML
15 SOLUTION, CONCENTRATE INTRAVENOUS ONCE
Refills: 0 | Status: DISCONTINUED | OUTPATIENT
Start: 2025-07-08 | End: 2025-07-08

## 2025-07-08 RX ORDER — MAGNESIUM SULFATE 500 MG/ML
2 SYRINGE (ML) INJECTION ONCE
Refills: 0 | Status: COMPLETED | OUTPATIENT
Start: 2025-07-08 | End: 2025-07-08

## 2025-07-08 RX ORDER — SODIUM PHOSPHATE,DIBASIC DIHYD
30 POWDER (GRAM) MISCELLANEOUS ONCE
Refills: 0 | Status: COMPLETED | OUTPATIENT
Start: 2025-07-08 | End: 2025-07-08

## 2025-07-08 RX ADMIN — Medication 500 MILLIGRAM(S): at 06:01

## 2025-07-08 RX ADMIN — HEPARIN SODIUM 5000 UNIT(S): 1000 INJECTION INTRAVENOUS; SUBCUTANEOUS at 16:56

## 2025-07-08 RX ADMIN — Medication 500 MILLIGRAM(S): at 16:55

## 2025-07-08 RX ADMIN — Medication 500 MILLIGRAM(S): at 06:02

## 2025-07-08 RX ADMIN — Medication 1 APPLICATION(S): at 05:44

## 2025-07-08 RX ADMIN — Medication 40 MILLIGRAM(S): at 06:02

## 2025-07-08 RX ADMIN — Medication 40 MILLIEQUIVALENT(S): at 13:04

## 2025-07-08 RX ADMIN — INSULIN LISPRO 1: 100 INJECTION, SOLUTION INTRAVENOUS; SUBCUTANEOUS at 13:03

## 2025-07-08 RX ADMIN — HEPARIN SODIUM 5000 UNIT(S): 1000 INJECTION INTRAVENOUS; SUBCUTANEOUS at 06:01

## 2025-07-08 RX ADMIN — Medication 500 MILLIGRAM(S): at 16:56

## 2025-07-08 RX ADMIN — Medication 25 GRAM(S): at 06:02

## 2025-07-08 RX ADMIN — Medication 85 MILLIMOLE(S): at 07:54

## 2025-07-08 RX ADMIN — TAMSULOSIN HYDROCHLORIDE 0.4 MILLIGRAM(S): 0.4 CAPSULE ORAL at 22:21

## 2025-07-08 RX ADMIN — Medication 81 MILLIGRAM(S): at 12:29

## 2025-07-08 RX ADMIN — ATORVASTATIN CALCIUM 10 MILLIGRAM(S): 80 TABLET, FILM COATED ORAL at 22:22

## 2025-07-08 RX ADMIN — Medication 40 MILLIEQUIVALENT(S): at 06:02

## 2025-07-08 NOTE — PROGRESS NOTE ADULT - ATTENDING COMMENTS
Agree with above. Seen and examined with team on rounds. Agree with history and physical exam as stated above. Critically ill requiring frequent bedside visits for titration of meds and drips. Time spent is exclusive of procedures or teaching. Admitted with shock state with severe diarrhea. Stool culture shows salmonella and E coli in stool with colitis on CT scan. Improved with volume and antibiotics. Now off vasopressors. OOB to chair. Supportive care.
Agree with above. Seen and examined with team on rounds. Critically ill requiring frequent bedside visits. Time spent is exclusive of procedures and teaching. Admitted in shock state with severe diarrhea over several days. Given several liters fluid bolus. Stool cultures positive for salmonella and e coli. On appropriate antibiotic regimen. On vasopressors for support. Supportive care. Family at the bedside and updated.

## 2025-07-08 NOTE — PROGRESS NOTE ADULT - SUBJECTIVE AND OBJECTIVE BOX
****[facility] PGY-2*****    CHIEF COMPLAINT: ecoli/salmonella gastroenteritis    Overnight Events: K and Phosph were repleted  Interval Events: Patient reported doing well. He had a soft BM in the morning.     They denied fevers/chills, shortness of breath/chest pain, abdomin pain, any issues with urination.      OBJECTIVE:  ICU Vital Signs Last 24 Hrs  T(C): 36.9 (08 Jul 2025 08:00), Max: 37.7 (07 Jul 2025 20:00)  T(F): 98.4 (08 Jul 2025 08:00), Max: 99.9 (07 Jul 2025 20:00)  HR: 74 (08 Jul 2025 08:00) (66 - 89)  BP: 105/59 (08 Jul 2025 08:00) (95/46 - 147/111)  BP(mean): 74 (08 Jul 2025 08:00) (61 - 124)  ABP: --  ABP(mean): --  RR: 14 (08 Jul 2025 08:00) (14 - 25)  SpO2: 97% (08 Jul 2025 08:00) (95% - 100%)    O2 Parameters below as of 08 Jul 2025 08:00  Patient On (Oxygen Delivery Method): room air              07-07 @ 07:01  -  07-08 @ 07:00  --------------------------------------------------------  IN: 1989.8 mL / OUT: 1250 mL / NET: 739.8 mL    07-08 @ 07:01  -  07-08 @ 09:42  --------------------------------------------------------  IN: 0 mL / OUT: 500 mL / NET: -500 mL      CAPILLARY BLOOD GLUCOSE      POCT Blood Glucose.: 96 mg/dL (08 Jul 2025 08:46)      PHYSICAL EXAM  GENERAL: Nontoxic-appearing, resting comfortably, NAD  HEAD: Normocephalic, atraumatic  EYES: Clear sclera, appropriate dilation   NECK: Supple, no JVD, no lymphadenopathy   HEART: Regular R/R, no pathologic heart sounds   LUNGS: Clear to auscultation bilaterally, no rales, wheezes, or rhonchi  ABDOMEN: Soft, non-distended, non-tender, normoactive bowel sounds  EXTREMITIES: No edema, no cyanosis, pulses intact  NEURO: AO3   PSYCH: Appropriate affect   SKIN: Warm, dry, no rashes or lesions    HOSPITAL MEDICATIONS:  MEDICATIONS  (STANDING):  aspirin enteric coated 81 milliGRAM(s) Oral daily  atorvastatin 10 milliGRAM(s) Oral at bedtime  chlorhexidine 2% Cloths 1 Application(s) Topical <User Schedule>  ciprofloxacin     Tablet 500 milliGRAM(s) Oral every 12 hours  dextrose 5%. 1000 milliLiter(s) (100 mL/Hr) IV Continuous <Continuous>  dextrose 5%. 1000 milliLiter(s) (50 mL/Hr) IV Continuous <Continuous>  dextrose 50% Injectable 25 Gram(s) IV Push once  dextrose 50% Injectable 12.5 Gram(s) IV Push once  dextrose 50% Injectable 25 Gram(s) IV Push once  dextrose Oral Gel 15 Gram(s) Oral once  glucagon  Injectable 1 milliGRAM(s) IntraMuscular once  heparin   Injectable 5000 Unit(s) SubCutaneous every 12 hours  insulin lispro (ADMELOG) corrective regimen sliding scale   SubCutaneous three times a day before meals  metroNIDAZOLE    Tablet 500 milliGRAM(s) Oral every 12 hours  pantoprazole    Tablet 40 milliGRAM(s) Oral before breakfast  potassium chloride    Tablet ER 40 milliEquivalent(s) Oral every 4 hours  tamsulosin 0.4 milliGRAM(s) Oral at bedtime    MEDICATIONS  (PRN):  ondansetron Injectable 4 milliGRAM(s) IV Push every 8 hours PRN Nausea      LABS:                        11.7   4.11  )-----------( 150      ( 08 Jul 2025 03:58 )             35.8     Hgb Trend: 11.7<--, 14.1<--, 14.0<--  07-08    135  |  105  |  9   ----------------------------<  96  3.1[L]   |  19[L]  |  0.83    Ca    7.5[L]      08 Jul 2025 03:58  Phos  1.9     07-08  Mg     1.60     07-08    TPro  4.4[L]  /  Alb  2.5[L]  /  TBili  <0.2  /  DBili  x   /  AST  61[H]  /  ALT  38  /  AlkPhos  41  07-08    Creatinine Trend: 0.83<--, 0.83<--, 1.26<--, 1.18<--, 1.48<--  PT/INR - ( 08 Jul 2025 03:58 )   PT: 10.7 sec;   INR: 0.92 ratio         PTT - ( 08 Jul 2025 03:58 )  PTT:31.0 sec  Urinalysis Basic - ( 08 Jul 2025 03:58 )    Color: x / Appearance: x / SG: x / pH: x  Gluc: 96 mg/dL / Ketone: x  / Bili: x / Urobili: x   Blood: x / Protein: x / Nitrite: x   Leuk Esterase: x / RBC: x / WBC x   Sq Epi: x / Non Sq Epi: x / Bacteria: x        Venous Blood Gas:  07-08 @ 03:58  7.37/38/69/22/96.7  VBG Lactate: --  Venous Blood Gas:  07-07 @ 08:20  7.37/36/51/21/85.4  VBG Lactate: --  Venous Blood Gas:  07-07 @ 00:21  7.15/48/28/17/43.8  VBG Lactate: 1.3  Venous Blood Gas:  07-06 @ 18:54  7.26/33/75/15/97.6  VBG Lactate: 0.8  Venous Blood Gas:  07-06 @ 16:41  7.23/42/28/18/47.8  VBG Lactate: 1.9      MICROBIOLOGY:     Urinalysis with Rflx Culture (collected 06 Jul 2025 20:24)    Culture - Blood (collected 06 Jul 2025 16:41)  Source: Blood Blood-Peripheral  Preliminary Report (07 Jul 2025 19:01):    No growth at 24 hours    Culture - Blood (collected 06 Jul 2025 16:32)  Source: Blood Blood-Peripheral  Preliminary Report (07 Jul 2025 19:01):    No growth at 24 hours        RADIOLOGY:  [ ] Reviewed by me

## 2025-07-08 NOTE — PROGRESS NOTE ADULT - ASSESSMENT
SABRINA DOUGLAS is a 75y male with PMH HTN, HLD, BPH, T2DM, tongue cancer s/p radiation in the hospital for transferred to the MICU for septic shock requiring pressors. Etiology likely enterocolitis.    PLAN  =====Neurologic=====  #Baseline mental status, no active issues  - A&Ox4 at baseline    =====Pulmonary=====  #No active issues  Patient breathing comfortably on room air    =====Cardiovascular=====  #Septic Shock  - Patient w/ soft pressures initially requiring Levo pressor support, now off Levo and maintaining MAP > 65  - Patient s/p 4L on admission  - Downtrending lactate 1.9->0.8    Plan:  > DC levo, MAP goal >65  > C/w abx as per below    #HTN  #HLD  - Home Regimen: Not currently on antihypertensive medications. On Simvastatin 20mg     Plan:  > Continue with Atorvastatin 10mg QHS  > Continue Aspirin 81mg qday    =====Gastroenterology=====  #Diarrhea  #Enterocolitis  - Patient with profuse diarrhea with episodes every 30-60 minutes    Plan:  > Abx as below  > Zofran 4mg PRN    #Hepatic Lesion  CTAP 7/6: "Questionable 1.5 cm ovoid enhancing structure in hepatic segment 7 in the setting of artifact, not definitively seen on prior study. Nonemergent liver ultrasound is recommended for further evaluation."    Plan:  > Continue to monitor outpatient or inpatient if clinically indicated    #GERD  Home regimen: Omeprazole 40mg daily    Plan:  > Continue pantoprazole 40mg daily    #Diet  #Weight loss  > Regular diet (CC), can change to soft and bite-sized if patient unable to tolerate (due to dentures) Added glucerna BID     =====Renal/=====  #Acute Kidney Injury  #Hyponatremia   - Patient with initial Cr 1.48, BUN 33 with initial improvement s/p fluids to Cr 1.18, BUN 28; labs on 7/7 at 08:20 demonstrating Cr 0.83 and BUN 15  - Hyponatremia likely in setting of hypovolemia iso diffuse diarrhea with reduced PO intake  - Urine studies positive for elevated protein to 30 and elevated protein:Cr ratio of 0.6    Plan:  > Continue to trend  > IVF as above    #NAGMA  - Likely in setting of diarrhea  - Received sodium bicarb gtt 150mEq at 100 mL/hr    Plan:  > im improved    #BPH  Home regimen: Tamsulosin 0.4mg qhs    Plan:  > Continue tamsulosin 0.4mg qhs    #Electrolytes  - Maintain K>4, Phos>3, Mag>2, iCal>1    =====Endocrine=====  #T2DM  - Home Regimen: Metformin 1000mg BID, Trajenta 5mg daily  - A1c 6.8% (3/2025)    Plan:  > CC diet  > F/u A1c  > SAUL    =====Infectious Disease=====  #Septic Shock  #Enterocolitis  - Patient hypotensive requiring pressors  - Lethargic and febrile at home with profuse diarrhea and abdominal pain  - CT abdomen suggestive of infectious versus inflammatory enterocolitis/diarrheal disease. Underdistended stomach with questionable distal wall thickening (inflammatory gastritis and/or ulcer disease)  - No leukocytosis, but bandemia  - C. diff negative  - GI PCR positive for EPEC and Salmonella    Plan:  > C/w ciprofloxacin 500mg bid (7/7-7/14) and flagyl 500mg bid (7/7-12)   > F/u Blood cultures : NGTD    =====Musculoskeletal=====  #Bone Cysts  CT 7/6: "6 cm in greatest dimension unicameral bone cyst versus aneurysmal bone   cyst, unchanged when remeasured. Nonemergent MRI is again recommended for   further evaluation."    Plan:  - Follow up outpatient MRI    =====Heme/Onc=====    #Anemia  Drop in WBC from 14->11. Likely dilutional as they received 3 L yesterday   - Will monitor for any gross bleeding (BM are fine)   #DVT Ppx  - Heparin 5000units q12    =====Ethics=====  - Pending confirmation of Full Code

## 2025-07-08 NOTE — PROGRESS NOTE ADULT - REASON FOR ADMISSION
Shock, likely septic vs hypovolemic, requiring pressors
Shock, likely septic vs hypovolemic, requiring pressors

## 2025-07-08 NOTE — PROGRESS NOTE ADULT - NUTRITIONAL ASSESSMENT
This patient has been assessed with a concern for Malnutrition and has been determined to have a diagnosis/diagnoses of Severe protein-calorie malnutrition and Underweight (BMI < 19).    This patient is being managed with:   Diet Regular-  Consistent Carbohydrate {No Snacks} (CSTCHO)  Minced and Moist (MINCEDMOIST)  Lacto-Ovo Veg (Accepts Milk Prod. Eggs)  Supplement Feeding Modality:  Oral  Glucerna Shake Cans or Servings Per Day:  1       Frequency:  Two Times a day  Entered: Jul 8 2025  9:26AM

## 2025-07-08 NOTE — CHART NOTE - NSCHARTNOTEFT_GEN_A_CORE
: Johnnie Guajardo    INDICATION:   sepsis    PROCEDURE:  [x] LIMITED ECHO  [X ] LIMITED CHEST  [ ] LIMITED RETROPERITONEAL  [ ] LIMITED ABDOMINAL  [ ] LIMITED DVT  [ ] NEEDLE GUIDANCE VASCULAR  [ ] NEEDLE GUIDANCE THORACENTESIS  [ ] NEEDLE GUIDANCE PARACENTESIS  [ ] NEEDLE GUIDANCE PERICARDIOCENTESIS  [ ] OTHER    FINDINGS:  trace pericardial posterior effusion  normal LVSF and LV:RV  IVC WNL    scattered B lines bilaterally  LLL consolidation     INTERPRETATION:  grossly normal heart  LLL consolidation with B lines      Images stored on [employer]. : Johnnie Guajardo    INDICATION:   sepsis    PROCEDURE:  [x] LIMITED ECHO  [X ] LIMITED CHEST  [ ] LIMITED RETROPERITONEAL  [ ] LIMITED ABDOMINAL  [ ] LIMITED DVT  [ ] NEEDLE GUIDANCE VASCULAR  [ ] NEEDLE GUIDANCE THORACENTESIS  [ ] NEEDLE GUIDANCE PARACENTESIS  [ ] NEEDLE GUIDANCE PERICARDIOCENTESIS  [ ] OTHER    FINDINGS:  trace pericardial posterior effusion  normal LVSF and LV:RV  IVC WNL    scattered B lines bilaterally  LLL consolidation     INTERPRETATION:  grossly normal heart  LLL consolidation with B lines      Images stored on Qpath.    Attending Addendum:  Agree with above. At bedside for procedure.  MARIANNE Luong DO, FCCP : Johnnie Guajardo    INDICATION:   shock    PROCEDURE:  [x] LIMITED ECHO  [X ] LIMITED CHEST  [ ] LIMITED RETROPERITONEAL  [ ] LIMITED ABDOMINAL  [ ] LIMITED DVT  [ ] NEEDLE GUIDANCE VASCULAR  [ ] NEEDLE GUIDANCE THORACENTESIS  [ ] NEEDLE GUIDANCE PARACENTESIS  [ ] NEEDLE GUIDANCE PERICARDIOCENTESIS  [ ] OTHER    FINDINGS:  normal LVSF and LV>RV  IVC WNL    scattered B lines bilaterally  small LLL consolidation     INTERPRETATION:  grossly normal heart fxn  small LLL consolidation with B lines      Images stored on Qpath.    Attending Addendum:  Agree with above. At bedside for procedure.  MARIANNE Luong DO, FCCP

## 2025-07-08 NOTE — ED PROCEDURE NOTE - PROCEDURE ADDITIONAL DETAILS
[facility] (PGY2): Emergency Department Focused Ultrasound performed at patient's bedside for educational purposes. An appropriate follow up study was ordered.

## 2025-07-08 NOTE — ED PROCEDURE NOTE - PROCEDURE DATE TIME, MLM
Using a micropuncture needle with ultrasound ([employer] App) the left femoral artery was succesfully accessed in a retrograde fashion over the guidewire using a INTRODUCER SHTH 7FR 25CM GW HEMOSTASIS [facility] SARAH DIL ENGAGE. Ultrasound found the vessel was patent. 06-Jul-2025 19:15

## 2025-07-09 ENCOUNTER — TRANSCRIPTION ENCOUNTER (OUTPATIENT)
Age: 75
End: 2025-07-09

## 2025-07-09 VITALS
HEART RATE: 83 BPM | RESPIRATION RATE: 17 BRPM | OXYGEN SATURATION: 98 % | DIASTOLIC BLOOD PRESSURE: 62 MMHG | SYSTOLIC BLOOD PRESSURE: 116 MMHG | TEMPERATURE: 99 F

## 2025-07-09 LAB
ALBUMIN SERPL ELPH-MCNC: 2.6 G/DL — LOW (ref 3.3–5)
ALP SERPL-CCNC: 49 U/L — SIGNIFICANT CHANGE UP (ref 40–120)
ALT FLD-CCNC: 43 U/L — HIGH (ref 4–41)
ANION GAP SERPL CALC-SCNC: 9 MMOL/L — SIGNIFICANT CHANGE UP (ref 7–14)
AST SERPL-CCNC: 57 U/L — HIGH (ref 4–40)
BASOPHILS # BLD AUTO: 0.01 K/UL — SIGNIFICANT CHANGE UP (ref 0–0.2)
BASOPHILS NFR BLD AUTO: 0.2 % — SIGNIFICANT CHANGE UP (ref 0–2)
BILIRUB SERPL-MCNC: <0.2 MG/DL — SIGNIFICANT CHANGE UP (ref 0.2–1.2)
BUN SERPL-MCNC: 8 MG/DL — SIGNIFICANT CHANGE UP (ref 7–23)
CALCIUM SERPL-MCNC: 7.7 MG/DL — LOW (ref 8.4–10.5)
CHLORIDE SERPL-SCNC: 105 MMOL/L — SIGNIFICANT CHANGE UP (ref 98–107)
CO2 SERPL-SCNC: 22 MMOL/L — SIGNIFICANT CHANGE UP (ref 22–31)
CREAT SERPL-MCNC: 0.89 MG/DL — SIGNIFICANT CHANGE UP (ref 0.5–1.3)
EGFR: 89 ML/MIN/1.73M2 — SIGNIFICANT CHANGE UP
EGFR: 89 ML/MIN/1.73M2 — SIGNIFICANT CHANGE UP
EOSINOPHIL # BLD AUTO: 0.16 K/UL — SIGNIFICANT CHANGE UP (ref 0–0.5)
EOSINOPHIL NFR BLD AUTO: 3.4 % — SIGNIFICANT CHANGE UP (ref 0–6)
GLUCOSE BLDC GLUCOMTR-MCNC: 108 MG/DL — HIGH (ref 70–99)
GLUCOSE BLDC GLUCOMTR-MCNC: 157 MG/DL — HIGH (ref 70–99)
GLUCOSE BLDC GLUCOMTR-MCNC: 157 MG/DL — HIGH (ref 70–99)
GLUCOSE SERPL-MCNC: 113 MG/DL — HIGH (ref 70–99)
HCT VFR BLD CALC: 36.9 % — LOW (ref 39–50)
HGB BLD-MCNC: 12.6 G/DL — LOW (ref 13–17)
IMM GRANULOCYTES # BLD AUTO: 0.04 K/UL — SIGNIFICANT CHANGE UP (ref 0–0.07)
IMM GRANULOCYTES NFR BLD AUTO: 0.8 % — SIGNIFICANT CHANGE UP (ref 0–0.9)
LYMPHOCYTES # BLD AUTO: 0.92 K/UL — LOW (ref 1–3.3)
LYMPHOCYTES NFR BLD AUTO: 19.4 % — SIGNIFICANT CHANGE UP (ref 13–44)
MAGNESIUM SERPL-MCNC: 1.7 MG/DL — SIGNIFICANT CHANGE UP (ref 1.6–2.6)
MCHC RBC-ENTMCNC: 28.3 PG — SIGNIFICANT CHANGE UP (ref 27–34)
MCHC RBC-ENTMCNC: 34.1 G/DL — SIGNIFICANT CHANGE UP (ref 32–36)
MCV RBC AUTO: 82.9 FL — SIGNIFICANT CHANGE UP (ref 80–100)
MONOCYTES # BLD AUTO: 0.64 K/UL — SIGNIFICANT CHANGE UP (ref 0–0.9)
MONOCYTES NFR BLD AUTO: 13.5 % — SIGNIFICANT CHANGE UP (ref 2–14)
NEUTROPHILS # BLD AUTO: 2.98 K/UL — SIGNIFICANT CHANGE UP (ref 1.8–7.4)
NEUTROPHILS NFR BLD AUTO: 62.7 % — SIGNIFICANT CHANGE UP (ref 43–77)
NRBC # BLD AUTO: 0 K/UL — SIGNIFICANT CHANGE UP (ref 0–0)
NRBC # FLD: 0 K/UL — SIGNIFICANT CHANGE UP (ref 0–0)
NRBC BLD AUTO-RTO: 0 /100 WBCS — SIGNIFICANT CHANGE UP (ref 0–0)
PHOSPHATE SERPL-MCNC: 2.1 MG/DL — LOW (ref 2.5–4.5)
PLATELET # BLD AUTO: 147 K/UL — LOW (ref 150–400)
PMV BLD: 9.7 FL — SIGNIFICANT CHANGE UP (ref 7–13)
POTASSIUM SERPL-MCNC: 4.1 MMOL/L — SIGNIFICANT CHANGE UP (ref 3.5–5.3)
POTASSIUM SERPL-SCNC: 4.1 MMOL/L — SIGNIFICANT CHANGE UP (ref 3.5–5.3)
PROT SERPL-MCNC: 4.7 G/DL — LOW (ref 6–8.3)
RBC # BLD: 4.45 M/UL — SIGNIFICANT CHANGE UP (ref 4.2–5.8)
RBC # FLD: 14 % — SIGNIFICANT CHANGE UP (ref 10.3–14.5)
SODIUM SERPL-SCNC: 136 MMOL/L — SIGNIFICANT CHANGE UP (ref 135–145)
WBC # BLD: 4.75 K/UL — SIGNIFICANT CHANGE UP (ref 3.8–10.5)
WBC # FLD AUTO: 4.75 K/UL — SIGNIFICANT CHANGE UP (ref 3.8–10.5)

## 2025-07-09 PROCEDURE — 99239 HOSP IP/OBS DSCHRG MGMT >30: CPT

## 2025-07-09 RX ORDER — MUPIROCIN CALCIUM 20 MG/G
1 CREAM TOPICAL
Refills: 0 | Status: DISCONTINUED | OUTPATIENT
Start: 2025-07-09 | End: 2025-07-09

## 2025-07-09 RX ORDER — METRONIDAZOLE 250 MG
1 TABLET ORAL
Qty: 4 | Refills: 0
Start: 2025-07-09 | End: 2025-07-10

## 2025-07-09 RX ORDER — CIPROFLOXACIN HCL 250 MG
1 TABLET ORAL
Qty: 4 | Refills: 0
Start: 2025-07-09 | End: 2025-07-10

## 2025-07-09 RX ORDER — METFORMIN HYDROCHLORIDE 850 MG/1
1 TABLET ORAL
Refills: 0 | DISCHARGE

## 2025-07-09 RX ADMIN — Medication 81 MILLIGRAM(S): at 11:50

## 2025-07-09 RX ADMIN — Medication 500 MILLIGRAM(S): at 17:19

## 2025-07-09 RX ADMIN — INSULIN LISPRO 1: 100 INJECTION, SOLUTION INTRAVENOUS; SUBCUTANEOUS at 12:27

## 2025-07-09 RX ADMIN — MUPIROCIN CALCIUM 1 APPLICATION(S): 20 CREAM TOPICAL at 17:18

## 2025-07-09 RX ADMIN — INSULIN LISPRO 1: 100 INJECTION, SOLUTION INTRAVENOUS; SUBCUTANEOUS at 18:24

## 2025-07-09 RX ADMIN — HEPARIN SODIUM 5000 UNIT(S): 1000 INJECTION INTRAVENOUS; SUBCUTANEOUS at 05:14

## 2025-07-09 RX ADMIN — Medication 500 MILLIGRAM(S): at 05:15

## 2025-07-09 RX ADMIN — Medication 500 MILLIGRAM(S): at 17:18

## 2025-07-09 RX ADMIN — Medication 500 MILLIGRAM(S): at 05:14

## 2025-07-09 RX ADMIN — HEPARIN SODIUM 5000 UNIT(S): 1000 INJECTION INTRAVENOUS; SUBCUTANEOUS at 17:18

## 2025-07-09 RX ADMIN — Medication 1 APPLICATION(S): at 05:14

## 2025-07-09 RX ADMIN — Medication 40 MILLIGRAM(S): at 05:14

## 2025-07-09 NOTE — DISCHARGE NOTE PROVIDER - PROVIDER TOKENS
FREE:[LAST:[PCP],PHONE:[(   )    -],FAX:[(   )    -]] FREE:[LAST:[PCP],PHONE:[(   )    -],FAX:[(   )    -]],PROVIDER:[TOKEN:[[id number]]]

## 2025-07-09 NOTE — DISCHARGE NOTE NURSING/CASE MANAGEMENT/SOCIAL WORK - NURSING SECTION COMPLETE
Patient/Caregiver provided printed discharge information.
diabetes mellitus with hyperglycemia, without long-term current use of insulin (HCC)    Essential hypertension    Hyperlipidemia    Suspected sleep apnea    Class 2 severe obesity due to excess calories with serious comorbidity and body mass index (BMI) of 37.0 to 37.9 in adult    Hypothyroid    Callus of foot    Elevated PSA    Osteoarthritis    CAD (coronary artery disease)    Tobacco use    Arthritis of right knee    Coronary artery calcification of native artery    Pulmonary nodule less than 6 mm determined by computed tomography of lung    Screen for colon cancer         Assessment:   1. Arthritis of the right knee      Plan:    1. Proceed with scheduled right knee replacement      All material risks, benefits, and reasonable alternatives including postponing the procedure were discussed. The patient does wish to proceed with the procedure at this time.         Signed By: ZACH Rosa  April 10, 2025

## 2025-07-09 NOTE — DISCHARGE NOTE PROVIDER - HOSPITAL COURSE
"H/P  75-year-old male with PMH of HTN (not on meds), HLD, BPH, T2DM, and history of tongue cancer s/p radiation, presenting with several days of generalized weakness, fever, diarrhea, throat pain, dysphagia, and burning abdominal pain. Chart review revealed multiple episodes of loose, non-bloody diarrhea occurring every 30–60 minutes over the prior day, accompanied by increased fatigue and weakness. No recent travel or sick contacts; wife denies recent antibiotic use. At presentation, patient denied ongoing abdominal pain.     He is typically independent with ADLs but has required increased assistance in recent weeks to months. Wife also reports ~20 lb weight loss, attributed to decreased PO intake due to poorly fitted dentures limiting solid food consumption.     In the ED, he was febrile to 102.5°F, tachycardic to 107, hypotensive (81/47), and saturating well on RA. Labs notable for hyponatremia (Na 129), HEIDI (Cr 1.48; baseline 0.8–1), bandemia (11.1%) without leukocytosis, and VBG of 7.23/42/28/18. Lactate was normal. UA with proteinuria but no evidence of infection. RVP negative. CXR was clear. CT abdomen/pelvis concerning for enterocolitis vs infectious gastroenteritis. He received IV acetaminophen, broad-spectrum antibiotics (Zosyn, vancomycin), and fluids. POCUS IVC was borderline collapsible (1.2–1.3 cm), and patient remained hypotensive after 2.5L IVF. Levofloxacin was initiated, and calcium gluconate given for hypocalcemia (Ca 6.5). MICU consulted for septic shock requiring pressors.     MICU Course:    Patient admitted on vasopressors and started on bicarbonate drip. Blood cultures grew Salmonella, and stool testing positive for EPEC; antibiotics narrowed to ciprofloxacin and metronidazole. He received additional fluid bolus and clinically improved, returning to baseline functional and hemodynamic status. Head removed (placed in ED)"    Hospital course:  Patient was seen and evaluated. Reported feeling well, able to tolerate PO, no nausea, no diarrhea. No palpitations, no dizziness. Reported having solid stool.   Patient passed trial of void.   C/w ciprofloxacin 500mg bid (7/7-7/11) and flagyl 500mg bid  -pt will need to follow up with PCP to further evaluated abnormal imaging findings:    CTAP 7/6/25  IMPRESSION:  Findings suggestive of infectious versus inflammatory   enterocolitis/diarrheal disease, as above.    Underdistended stomach with questionable distal wall thickening, which   would suggest infectious versus inflammatory gastritis and/or ulcer   disease.    Questionable 1.5 cm ovoid enhancing structure in hepatic segment 7 in the   setting of artifact, not definitively seen on prior study. Nonemergent   liver ultrasound is recommended for further evaluation.    A 6 cm in greatest dimension unicameral bone cyst versus aneurysmal bone   cyst, unchanged when remeasured. Nonemergent MRI is again recommended for   further evaluation.

## 2025-07-09 NOTE — SWALLOW BEDSIDE ASSESSMENT ADULT - COMMENTS
Progress Note- MICU 7/9: "SABRINA DOUGLAS is a 75y male with PMH HTN, HLD, BPH, T2DM, tongue cancer s/p radiation in the hospital for transferred to the MICU for septic shock requiring pressors. Etiology likely enterocolitis."    CXR 7/6: "PRELIMINARY IMPRESSION: No focal consolidation. No interval change."    Patient seen Progress Note- MICU 7/9: "SABRINA DOUGLAS is a 75y male with PMH HTN, HLD, BPH, T2DM, tongue cancer s/p radiation in the hospital for transferred to the MICU for septic shock requiring pressors. Etiology likely enterocolitis."    CXR 7/6: "PRELIMINARY IMPRESSION: No focal consolidation. No interval change."    Patient seen awake/alert this AM with patient's wife present at bedside. Patient is Hamzah speaking, Language Line offered however patient's wife provided translation per patient preference. Patient reports odynophagia and difficulty swallowing upon admission however states it is now resolved. Patient's wife states the patient was only tolerating liquids but now is tolerating current diet well. Patient expressing wishes to advance diet.

## 2025-07-09 NOTE — DISCHARGE NOTE NURSING/CASE MANAGEMENT/SOCIAL WORK - PATIENT PORTAL LINK FT
You can access the FollowMyHealth Patient Portal offered by [facility] by registering at the following website: [email or web address]. By joining [facility]’s FollowMyHealth portal, you will also be able to view your health information using other applications (apps) compatible with our system.

## 2025-07-09 NOTE — DISCHARGE NOTE NURSING/CASE MANAGEMENT/SOCIAL WORK - NSDCPEFALRISK_GEN_ALL_CORE
For information on Fall & Injury Prevention, visit: [email or web address] OR  [email or web address] OR  https://www.cdc.gov/steadi/patient.html

## 2025-07-09 NOTE — DISCHARGE NOTE NURSING/CASE MANAGEMENT/SOCIAL WORK - FINANCIAL ASSISTANCE
[facility] provides services at a reduced cost to those who are determined to be eligible through [facility]’s financial assistance program. Information regarding [facility]’s financial assistance program can be found by going to [email or web address] or by calling [phone].

## 2025-07-09 NOTE — DISCHARGE NOTE PROVIDER - NSDCFUADDAPPT_GEN_ALL_CORE_FT
APPTS ARE READY TO BE MADE: [x] YES    Best Family or Patient Contact (if needed):    Additional Information about above appointments (if needed):    1: PCP   2:   3:     Other comments or requests:    APPTS ARE READY TO BE MADE: [x] YES    Best Family or Patient Contact (if needed):    Additional Information about above appointments (if needed):    1: PCP - outpatient liver ultrasounf for hepatic lesion seen on CT   Follow up outpatient MRI for bony cysts  2: follow up with gastroentrologist  3: Dr. Linda Persaud    Other comments or requests:    APPTS ARE READY TO BE MADE: [x] YES    Best Family or Patient Contact (if needed):    Additional Information about above appointments (if needed):    1: PCP - outpatient liver ultrasounf for hepatic lesion seen on CT   Follow up outpatient MRI for bony cysts  2: follow up with gastroentrologist  3: Dr. Linda Persaud    Other comments or requests:     Patient was outreached but did not answer nor could a voicemail be left.   APPTS ARE READY TO BE MADE: [x] YES    Best Family or Patient Contact (if needed):    Additional Information about above appointments (if needed):    1: PCP - outpatient liver ultrasounf for hepatic lesion seen on CT   Follow up outpatient MRI for bony cysts  2: follow up with gastroentrologist  3: Dr. Linda Persaud    Other comments or requests:     Prior to outreaching the patient, it was visible that the patient has secured a follow up appointment which was not scheduled by our team.  07/22 1:00p with Dr. Sandoval; Internal Medicine at [address]    An appointment was scheduled in [facility].  07/16 1:00p with Dr. Ford; Gastro at [address]

## 2025-07-09 NOTE — DISCHARGE NOTE PROVIDER - NSDCFUSCHEDAPPT_GEN_ALL_CORE_FT
[facility]  DENTAL [address]  Scheduled Appointment: 07/21/2025    Sheri Hurd  [facility]  ENDOCRIN [address] [facility]  Scheduled Appointment: 08/01/2025    Isha Medellin  [facility]  OP[address]THALM [address] [facility] [facility] [address]  Scheduled Appointment: 09/17/2025    Alana Elliott  [facility] [address]  Scheduled Appointment: 10/02/2025     Lori Heredia  [facility]  CARENAV Patient Janet  Scheduled Appointment: 07/17/2025    [facility]  DENTAL [address]  Scheduled Appointment: 07/21/2025    [facility]  INTMED [address]  Scheduled Appointment: 07/22/2025    Sheri Hurd  [facility] [address] [facility]  Scheduled Appointment: 08/01/2025    Isha Medellin  [facility]  OPHTHALM [address] [facility]  Scheduled Appointment: 09/17/2025    Alana Elliott  [facility] [address] [facility]  Scheduled Appointment: 10/02/2025

## 2025-07-09 NOTE — SWALLOW BEDSIDE ASSESSMENT ADULT - SWALLOW EVAL: DIAGNOSIS
1- Mild Oral Stage for puree, soft/bite solids, and thin liquids marked by adequate oral containment, adequate bolus manipulation and mildly slow mastication for soft/bite solids with adequate anterior to posterior transfer, trace oral residue noted which clears with liquid wash. 2- Functional pharyngeal stage for puree, soft/bite solids, and thin liquids marked by initiation of the pharyngeal swallow with hyolaryngeal excursion upon palpation without evidence of impaired airway protection.

## 2025-07-09 NOTE — DISCHARGE NOTE PROVIDER - ATTENDING DISCHARGE PHYSICAL EXAMINATION:
Vital Signs Last 24 Hrs  T(C): 37.1 (09 Jul 2025 13:44), Max: 37.5 (09 Jul 2025 00:00)  T(F): 98.7 (09 Jul 2025 13:44), Max: 99.5 (09 Jul 2025 00:00)  HR: 83 (09 Jul 2025 13:44) (71 - 83)  BP: 116/62 (09 Jul 2025 13:44) (101/53 - 116/62)  BP(mean): 77 (09 Jul 2025 00:00) (68 - 77)  RR: 17 (09 Jul 2025 13:44) (14 - 18)  SpO2: 98% (09 Jul 2025 13:44) (97% - 100%)    Parameters below as of 09 Jul 2025 13:44  Patient On (Oxygen Delivery Method): room air    PHYSICAL EXAM  GENERAL: Nontoxic-appearing, resting comfortably, NAD  HEAD: Normocephalic, atraumatic  EYES: Clear sclera, appropriate dilation   NECK: Supple, no JVD, no lymphadenopathy   HEART: Regular R/R, no pathologic heart sounds   LUNGS: Clear to auscultation bilaterally, no rales, wheezes, or rhonchi  ABDOMEN: Soft, non-distended, non-tender, normoactive bowel sounds  EXTREMITIES: No edema, no cyanosis, pulses intact  NEURO: AO3   PSYCH: Appropriate affect   SKIN: Warm, dry, no rashes or lesions

## 2025-07-09 NOTE — DISCHARGE NOTE PROVIDER - DETAILS OF MALNUTRITION DIAGNOSIS/DIAGNOSES
This patient has been assessed with a concern for Malnutrition and was treated during this hospitalization for the following Nutrition diagnosis/diagnoses:     -  07/07/2025: Severe protein-calorie malnutrition   -  07/07/2025: Underweight (BMI < 19)

## 2025-07-09 NOTE — DISCHARGE NOTE NURSING/CASE MANAGEMENT/SOCIAL WORK - NSDCFUADDAPPT_GEN_ALL_CORE_FT
APPTS ARE READY TO BE MADE: [x] YES    Best Family or Patient Contact (if needed):    Additional Information about above appointments (if needed):    1: PCP - outpatient liver ultrasounf for hepatic lesion seen on CT   Follow up outpatient MRI for bony cysts  2: follow up with gastroentrologist  3: Dr. Linda Persaud    Other comments or requests:

## 2025-07-09 NOTE — DISCHARGE NOTE PROVIDER - NSDCMRMEDTOKEN_GEN_ALL_CORE_FT
aspirin 81 mg oral delayed release tablet: 1 tab(s) orally once a day  metFORMIN 1000 mg oral tablet: 1 tab(s) orally 2 times a day  omeprazole 40 mg oral delayed release capsule: 1 cap(s) orally once a day  simvastatin 20 mg oral tablet: 1 tab(s) orally once a day  tamsulosin 0.4 mg oral capsule: 1 cap(s) orally once a day  Tradjenta 5 mg oral tablet: 1 tab(s) orally once a day   aspirin 81 mg oral delayed release tablet: 1 tab(s) orally once a day  ciprofloxacin 500 mg oral tablet: 1 tab(s) orally every 12 hours stop after 7/11/25  metroNIDAZOLE 500 mg oral tablet: 1 tab(s) orally every 12 hours stop after 7/11/25  pantoprazole 40 mg oral delayed release tablet: 1 tab(s) orally once a day (before a meal)  simvastatin 20 mg oral tablet: 1 tab(s) orally once a day  tamsulosin 0.4 mg oral capsule: 1 cap(s) orally once a day

## 2025-07-09 NOTE — DISCHARGE NOTE PROVIDER - NSFOLLOWUPCLINICS_GEN_ALL_ED_FT
[facility]  Gastroenterology  [address] [place] [address] [address]  [place] [address]  Phone: [phone]  Fax:

## 2025-07-09 NOTE — DISCHARGE NOTE PROVIDER - CARE PROVIDER_API CALL
PCP,   Phone: (   )    -  Fax: (   )    -  Follow Up Time:    PCP,   Phone: (   )    -  Fax: (   )    -  Follow Up Time:     Linda Persaud  Internal Medicine  [address] [place] [address]  Phone: [phone]  Fax: [phone]  Follow Up Time:

## 2025-07-09 NOTE — DISCHARGE NOTE PROVIDER - NSDCCPCAREPLAN_GEN_ALL_CORE_FT
PRINCIPAL DISCHARGE DIAGNOSIS  Diagnosis: Septic shock  Assessment and Plan of Treatment: you were diagnosed with bacteria in your blood (Salmonella) and Ecoli gastroenteritis and required blood pressure support. You were treated with antibiotics and will need to continue to take antibiotics after discharge as prescribed.      SECONDARY DISCHARGE DIAGNOSES  Diagnosis: Salmonella bacteremia  Assessment and Plan of Treatment: continue antibiotics as prescribed    Diagnosis: E. coli gastroenteritis  Assessment and Plan of Treatment: continue taking antibiotics as prescribed    Diagnosis: Imaging abnormalities  Assessment and Plan of Treatment: Imaging studies suggested abnormal findings in Liver and bone. Please follow up with your PCP to discuss need for further imaging.  CTAP 7/6/25  IMPRESSION:  Findings suggestive of infectious versus inflammatory   enterocolitis/diarrheal disease, as above.  Underdistended stomach with questionable distal wall thickening, which   would suggest infectious versus inflammatory gastritis and/or ulcer   disease.  Questionable 1.5 cm ovoid enhancing structure in hepatic segment 7 in the   setting of artifact, not definitively seen on prior study. Nonemergent   liver ultrasound is recommended for further evaluation.  A 6 cm in greatest dimension unicameral bone cyst versus aneurysmal bone   cyst, unchanged when remeasured. Nonemergent MRI is again recommended for   further evaluation.       PRINCIPAL DISCHARGE DIAGNOSIS  Diagnosis: Septic shock  Assessment and Plan of Treatment: you were diagnosed with bacteria in your blood (Salmonella) and Ecoli gastroenteritis and required blood pressure support. You were treated with antibiotics and will need to continue to take antibiotics after discharge as prescribed.      SECONDARY DISCHARGE DIAGNOSES  Diagnosis: E. coli gastroenteritis  Assessment and Plan of Treatment: Stool studies revealed infection with E coli and Salmonella.   - continue taking antibiotics as prescribed    Diagnosis: Imaging abnormalities  Assessment and Plan of Treatment: Imaging studies suggested abnormal findings in Liver and bone. Please follow up with your PCP to discuss need for further imaging.  CTAP 7/6/25  IMPRESSION:  Findings suggestive of infectious versus inflammatory   enterocolitis/diarrheal disease, as above.  Underdistended stomach with questionable distal wall thickening, which   would suggest infectious versus inflammatory gastritis and/or ulcer   disease.  Questionable 1.5 cm ovoid enhancing structure in hepatic segment 7 in the   setting of artifact, not definitively seen on prior study. Nonemergent   liver ultrasound is recommended for further evaluation.  A 6 cm in greatest dimension unicameral bone cyst versus aneurysmal bone   cyst, unchanged when remeasured. Nonemergent MRI is again recommended for   further evaluation.      Diagnosis: PUD (peptic ulcer disease)  Assessment and Plan of Treatment: please f/u with GI and continue taking pantoprazole

## 2025-07-10 ENCOUNTER — TRANSCRIPTION ENCOUNTER (OUTPATIENT)
Age: 75
End: 2025-07-10

## 2025-07-11 LAB
CULTURE RESULTS: SIGNIFICANT CHANGE UP
CULTURE RESULTS: SIGNIFICANT CHANGE UP
SPECIMEN SOURCE: SIGNIFICANT CHANGE UP
SPECIMEN SOURCE: SIGNIFICANT CHANGE UP

## 2025-07-12 LAB
-  AMPICILLIN: SIGNIFICANT CHANGE UP
-  TRIMETHOPRIM/SULFAMETHOXAZOLE: SIGNIFICANT CHANGE UP
CULTURE RESULTS: SIGNIFICANT CHANGE UP
METHOD TYPE: SIGNIFICANT CHANGE UP
SPECIMEN SOURCE: SIGNIFICANT CHANGE UP

## 2025-07-13 LAB
-  CIPROFLOXACIN: SIGNIFICANT CHANGE UP
CULTURE RESULTS: ABNORMAL
METHOD TYPE: SIGNIFICANT CHANGE UP
ORGANISM # SPEC MICROSCOPIC CNT: ABNORMAL
SPECIMEN SOURCE: SIGNIFICANT CHANGE UP

## 2025-07-14 ENCOUNTER — APPOINTMENT (OUTPATIENT)
Dept: CARE COORDINATION | Facility: HOME HEALTH | Age: 75
End: 2025-07-14
Payer: MEDICARE

## 2025-07-14 ENCOUNTER — TRANSCRIPTION ENCOUNTER (OUTPATIENT)
Age: 75
End: 2025-07-14

## 2025-07-14 PROBLEM — A02.9 SALMONELLA: Status: ACTIVE | Noted: 2025-07-14

## 2025-07-14 PROCEDURE — 99495 TRANSJ CARE MGMT MOD F2F 14D: CPT | Mod: 2W

## 2025-07-14 RX ORDER — KRILL/OM-3/DHA/EPA/PHOSPHO/AST 1000-230MG
81 CAPSULE ORAL
Refills: 0 | Status: ACTIVE | COMMUNITY
Start: 2025-07-14

## 2025-07-15 ENCOUNTER — TRANSCRIPTION ENCOUNTER (OUTPATIENT)
Age: 75
End: 2025-07-15

## 2025-07-15 ENCOUNTER — APPOINTMENT (OUTPATIENT)
Dept: RHEUMATOLOGY | Facility: CLINIC | Age: 75
End: 2025-07-15
Payer: MEDICARE

## 2025-07-15 VITALS
OXYGEN SATURATION: 98 % | SYSTOLIC BLOOD PRESSURE: 90 MMHG | BODY MASS INDEX: 18.1 KG/M2 | HEART RATE: 82 BPM | WEIGHT: 106 LBS | HEIGHT: 64 IN | DIASTOLIC BLOOD PRESSURE: 56 MMHG

## 2025-07-15 PROCEDURE — G2211 COMPLEX E/M VISIT ADD ON: CPT

## 2025-07-15 PROCEDURE — 99214 OFFICE O/P EST MOD 30 MIN: CPT

## 2025-07-15 RX ORDER — PREDNISONE 5 MG/1
5 TABLET ORAL
Qty: 30 | Refills: 1 | Status: ACTIVE | COMMUNITY
Start: 2025-07-15 | End: 1900-01-01

## 2025-07-16 ENCOUNTER — APPOINTMENT (OUTPATIENT)
Dept: GASTROENTEROLOGY | Facility: CLINIC | Age: 75
End: 2025-07-16
Payer: MEDICARE

## 2025-07-16 VITALS
HEIGHT: 64 IN | DIASTOLIC BLOOD PRESSURE: 53 MMHG | WEIGHT: 106 LBS | HEART RATE: 88 BPM | BODY MASS INDEX: 18.1 KG/M2 | SYSTOLIC BLOOD PRESSURE: 93 MMHG

## 2025-07-16 PROBLEM — A41.51 SEPSIS DUE TO ESCHERICHIA COLI (E. COLI): Status: ACTIVE | Noted: 2025-07-16

## 2025-07-16 PROBLEM — R93.5 ABNORMAL CT OF THE ABDOMEN: Status: ACTIVE | Noted: 2025-07-16

## 2025-07-16 LAB
APPEARANCE: CLEAR
BILIRUBIN URINE: NEGATIVE
BLOOD URINE: NEGATIVE
COLOR: YELLOW
GLUCOSE QUALITATIVE U: NEGATIVE MG/DL
KETONES URINE: NEGATIVE MG/DL
LEUKOCYTE ESTERASE URINE: NEGATIVE
NITRITE URINE: NEGATIVE
PH URINE: 6.5
PROTEIN URINE: NEGATIVE MG/DL
SPECIFIC GRAVITY URINE: 1.01
UROBILINOGEN URINE: 0.2 MG/DL

## 2025-07-16 PROCEDURE — 99214 OFFICE O/P EST MOD 30 MIN: CPT

## 2025-07-16 RX ORDER — MIDODRINE HYDROCHLORIDE 5 MG/1
5 TABLET ORAL 3 TIMES DAILY
Qty: 15 | Refills: 0 | Status: ACTIVE | COMMUNITY
Start: 2025-07-16 | End: 1900-01-01

## 2025-07-16 RX ORDER — LOSARTAN POTASSIUM 100 MG/1
TABLET, FILM COATED ORAL
Refills: 0 | Status: ACTIVE | COMMUNITY

## 2025-07-16 RX ORDER — PANTOPRAZOLE 40 MG/1
40 TABLET, DELAYED RELEASE ORAL DAILY
Qty: 90 | Refills: 3 | Status: ACTIVE | COMMUNITY
Start: 2025-07-16 | End: 1900-01-01

## 2025-07-17 ENCOUNTER — APPOINTMENT (OUTPATIENT)
Dept: UROLOGY | Facility: CLINIC | Age: 75
End: 2025-07-17
Payer: MEDICARE

## 2025-07-17 ENCOUNTER — TRANSCRIPTION ENCOUNTER (OUTPATIENT)
Age: 75
End: 2025-07-17

## 2025-07-17 ENCOUNTER — APPOINTMENT (OUTPATIENT)
Dept: CARE COORDINATION | Facility: HOME HEALTH | Age: 75
End: 2025-07-17

## 2025-07-17 PROCEDURE — 99213 OFFICE O/P EST LOW 20 MIN: CPT

## 2025-07-18 LAB
APPEARANCE: CLEAR
BACTERIA: NEGATIVE /HPF
BILIRUBIN URINE: NEGATIVE
BLOOD URINE: NEGATIVE
CAST: 0 /LPF
COLOR: YELLOW
EPITHELIAL CELLS: 0 /HPF
GLUCOSE QUALITATIVE U: 100 MG/DL
KETONES URINE: NEGATIVE MG/DL
LEUKOCYTE ESTERASE URINE: NEGATIVE
MICROSCOPIC-UA: NORMAL
NITRITE URINE: NEGATIVE
PH URINE: 6.5
PROTEIN URINE: NORMAL MG/DL
RED BLOOD CELLS URINE: 1 /HPF
SPECIFIC GRAVITY URINE: 1.03
UROBILINOGEN URINE: 0.2 MG/DL
WHITE BLOOD CELLS URINE: 0 /HPF

## 2025-07-21 ENCOUNTER — APPOINTMENT (OUTPATIENT)
Age: 75
End: 2025-07-21
Payer: MEDICAID

## 2025-07-21 LAB — BACTERIA UR CULT: NORMAL

## 2025-07-21 PROCEDURE — 99024 POSTOP FOLLOW-UP VISIT: CPT

## 2025-07-22 ENCOUNTER — APPOINTMENT (OUTPATIENT)
Dept: INTERNAL MEDICINE | Facility: CLINIC | Age: 75
End: 2025-07-22
Payer: MEDICARE

## 2025-07-22 VITALS
HEIGHT: 64 IN | TEMPERATURE: 98.2 F | WEIGHT: 106 LBS | SYSTOLIC BLOOD PRESSURE: 115 MMHG | OXYGEN SATURATION: 95 % | BODY MASS INDEX: 18.1 KG/M2 | HEART RATE: 83 BPM | DIASTOLIC BLOOD PRESSURE: 65 MMHG

## 2025-07-22 DIAGNOSIS — A41.9 SEPSIS, UNSPECIFIED ORGANISM: ICD-10-CM

## 2025-07-22 DIAGNOSIS — R65.21 SEPSIS, UNSPECIFIED ORGANISM: ICD-10-CM

## 2025-07-22 DIAGNOSIS — R63.4 ABNORMAL WEIGHT LOSS: ICD-10-CM

## 2025-07-22 PROCEDURE — 99214 OFFICE O/P EST MOD 30 MIN: CPT | Mod: 25

## 2025-07-23 ENCOUNTER — APPOINTMENT (OUTPATIENT)
Dept: CARE COORDINATION | Facility: HOME HEALTH | Age: 75
End: 2025-07-23
Payer: MEDICARE

## 2025-07-23 VITALS
RESPIRATION RATE: 16 BRPM | HEART RATE: 78 BPM | SYSTOLIC BLOOD PRESSURE: 115 MMHG | TEMPERATURE: 97.8 F | OXYGEN SATURATION: 95 % | DIASTOLIC BLOOD PRESSURE: 53 MMHG

## 2025-07-23 DIAGNOSIS — R30.0 DYSURIA: ICD-10-CM

## 2025-07-23 DIAGNOSIS — K52.9 NONINFECTIVE GASTROENTERITIS AND COLITIS, UNSPECIFIED: ICD-10-CM

## 2025-07-23 LAB
ALBUMIN SERPL ELPH-MCNC: 3.5 G/DL
ALP BLD-CCNC: 52 U/L
ALT SERPL-CCNC: 70 U/L
ANION GAP SERPL CALC-SCNC: 12 MMOL/L
APPEARANCE: CLEAR
AST SERPL-CCNC: 35 U/L
BACTERIA: NEGATIVE /HPF
BILIRUB SERPL-MCNC: 0.2 MG/DL
BILIRUBIN URINE: NEGATIVE
BLOOD URINE: NEGATIVE
BUN SERPL-MCNC: 17 MG/DL
CALCIUM SERPL-MCNC: 9 MG/DL
CAST: 0 /LPF
CHLORIDE SERPL-SCNC: 102 MMOL/L
CO2 SERPL-SCNC: 22 MMOL/L
COLOR: NORMAL
CREAT SERPL-MCNC: 0.84 MG/DL
EGFRCR SERPLBLD CKD-EPI 2021: 90 ML/MIN/1.73M2
EPITHELIAL CELLS: 0 /HPF
GLUCOSE QUALITATIVE U: >=1000 MG/DL
GLUCOSE SERPL-MCNC: 389 MG/DL
KETONES URINE: NEGATIVE MG/DL
LEUKOCYTE ESTERASE URINE: NEGATIVE
MICROSCOPIC-UA: NORMAL
NITRITE URINE: POSITIVE
PH URINE: 6.5
POTASSIUM SERPL-SCNC: 5.7 MMOL/L
PROT SERPL-MCNC: 5.1 G/DL
PROTEIN URINE: NEGATIVE MG/DL
RED BLOOD CELLS URINE: 0 /HPF
REVIEW: NORMAL
SODIUM SERPL-SCNC: 136 MMOL/L
SPECIFIC GRAVITY URINE: 1.01
UROBILINOGEN URINE: 0.2 MG/DL
WHITE BLOOD CELLS URINE: 0 /HPF

## 2025-07-23 PROCEDURE — 99495 TRANSJ CARE MGMT MOD F2F 14D: CPT

## 2025-07-24 ENCOUNTER — APPOINTMENT (OUTPATIENT)
Dept: RADIOLOGY | Facility: IMAGING CENTER | Age: 75
End: 2025-07-24
Payer: MEDICAID

## 2025-07-24 ENCOUNTER — OUTPATIENT (OUTPATIENT)
Dept: OUTPATIENT SERVICES | Facility: HOSPITAL | Age: 75
LOS: 1 days | End: 2025-07-24
Payer: MEDICAID

## 2025-07-24 DIAGNOSIS — M25.511 PAIN IN RIGHT SHOULDER: ICD-10-CM

## 2025-07-24 DIAGNOSIS — M75.02 ADHESIVE CAPSULITIS OF LEFT SHOULDER: ICD-10-CM

## 2025-07-24 LAB — BACTERIA UR CULT: NORMAL

## 2025-07-24 PROCEDURE — 73030 X-RAY EXAM OF SHOULDER: CPT | Mod: 26,LT,RT

## 2025-07-24 PROCEDURE — 73030 X-RAY EXAM OF SHOULDER: CPT

## 2025-07-25 ENCOUNTER — OUTPATIENT (OUTPATIENT)
Dept: OUTPATIENT SERVICES | Facility: HOSPITAL | Age: 75
LOS: 1 days | End: 2025-07-25
Payer: MEDICAID

## 2025-07-25 ENCOUNTER — APPOINTMENT (OUTPATIENT)
Dept: ULTRASOUND IMAGING | Facility: CLINIC | Age: 75
End: 2025-07-25
Payer: MEDICAID

## 2025-07-25 DIAGNOSIS — Z98.49 CATARACT EXTRACTION STATUS, UNSPECIFIED EYE: Chronic | ICD-10-CM

## 2025-07-25 DIAGNOSIS — R93.5 ABNORMAL FINDINGS ON DIAGNOSTIC IMAGING OF OTHER ABDOMINAL REGIONS, INCLUDING RETROPERITONEUM: ICD-10-CM

## 2025-07-25 PROCEDURE — 76705 ECHO EXAM OF ABDOMEN: CPT

## 2025-07-25 PROCEDURE — 76705 ECHO EXAM OF ABDOMEN: CPT | Mod: 26

## 2025-07-28 PROBLEM — R30.0 DYSURIA: Status: ACTIVE | Noted: 2025-07-17

## 2025-07-28 PROBLEM — K52.9 GASTROENTERITIS: Status: ACTIVE | Noted: 2025-07-28

## 2025-07-29 ENCOUNTER — TRANSCRIPTION ENCOUNTER (OUTPATIENT)
Age: 75
End: 2025-07-29

## 2025-07-30 ENCOUNTER — TRANSCRIPTION ENCOUNTER (OUTPATIENT)
Age: 75
End: 2025-07-30

## 2025-08-01 ENCOUNTER — APPOINTMENT (OUTPATIENT)
Dept: ENDOCRINOLOGY | Facility: CLINIC | Age: 75
End: 2025-08-01
Payer: MEDICARE

## 2025-08-01 DIAGNOSIS — E11.9 TYPE 2 DIABETES MELLITUS W/OUT COMPLICATIONS: ICD-10-CM

## 2025-08-01 DIAGNOSIS — E78.5 HYPERLIPIDEMIA, UNSPECIFIED: ICD-10-CM

## 2025-08-01 DIAGNOSIS — M80.00XD AGE-RELATED OSTEOPOROSIS WITH CURRENT PATHOLOGICAL FRACTURE, UNSPECIFIED SITE, SUBSEQUENT ENCOUNTER FOR FRACTURE WITH ROUTINE HEALING: ICD-10-CM

## 2025-08-01 DIAGNOSIS — R79.89 OTHER SPECIFIED ABNORMAL FINDINGS OF BLOOD CHEMISTRY: ICD-10-CM

## 2025-08-01 PROCEDURE — G2211 COMPLEX E/M VISIT ADD ON: CPT

## 2025-08-01 PROCEDURE — 99215 OFFICE O/P EST HI 40 MIN: CPT

## 2025-08-04 ENCOUNTER — APPOINTMENT (OUTPATIENT)
Dept: UROLOGY | Facility: CLINIC | Age: 75
End: 2025-08-04
Payer: MEDICARE

## 2025-08-04 VITALS
DIASTOLIC BLOOD PRESSURE: 71 MMHG | TEMPERATURE: 97.5 F | OXYGEN SATURATION: 98 % | HEART RATE: 86 BPM | SYSTOLIC BLOOD PRESSURE: 132 MMHG

## 2025-08-04 DIAGNOSIS — R30.0 DYSURIA: ICD-10-CM

## 2025-08-04 PROCEDURE — 52000 CYSTOURETHROSCOPY: CPT

## 2025-08-05 ENCOUNTER — APPOINTMENT (OUTPATIENT)
Dept: OTOLARYNGOLOGY | Facility: CLINIC | Age: 75
End: 2025-08-05
Payer: MEDICARE

## 2025-08-05 VITALS — HEIGHT: 64 IN | BODY MASS INDEX: 17.58 KG/M2 | WEIGHT: 103 LBS

## 2025-08-05 DIAGNOSIS — K13.70 UNSPECIFIED LESIONS OF ORAL MUCOSA: ICD-10-CM

## 2025-08-05 DIAGNOSIS — R13.10 DYSPHAGIA, UNSPECIFIED: ICD-10-CM

## 2025-08-05 DIAGNOSIS — Z85.819 PERSONAL HISTORY OF MALIGNANT NEOPLASM OF UNSPECIFIED SITE OF LIP, ORAL CAVITY, AND PHARYNX: ICD-10-CM

## 2025-08-05 PROCEDURE — 99214 OFFICE O/P EST MOD 30 MIN: CPT | Mod: 25

## 2025-08-05 PROCEDURE — 31575 DIAGNOSTIC LARYNGOSCOPY: CPT

## 2025-08-06 ENCOUNTER — APPOINTMENT (OUTPATIENT)
Dept: INTERNAL MEDICINE | Facility: CLINIC | Age: 75
End: 2025-08-06
Payer: MEDICARE

## 2025-08-06 VITALS
HEART RATE: 80 BPM | OXYGEN SATURATION: 97 % | BODY MASS INDEX: 17.75 KG/M2 | TEMPERATURE: 97.6 F | SYSTOLIC BLOOD PRESSURE: 119 MMHG | HEIGHT: 64 IN | DIASTOLIC BLOOD PRESSURE: 69 MMHG | WEIGHT: 104 LBS

## 2025-08-06 DIAGNOSIS — N13.8 BENIGN PROSTATIC HYPERPLASIA WITH LOWER URINARY TRACT SYMPMS: ICD-10-CM

## 2025-08-06 DIAGNOSIS — M05.9 RHEUMATOID ARTHRITIS WITH RHEUMATOID FACTOR, UNSPECIFIED: ICD-10-CM

## 2025-08-06 DIAGNOSIS — I10 ESSENTIAL (PRIMARY) HYPERTENSION: ICD-10-CM

## 2025-08-06 DIAGNOSIS — E11.65 TYPE 2 DIABETES MELLITUS WITH HYPERGLYCEMIA: ICD-10-CM

## 2025-08-06 DIAGNOSIS — N40.1 BENIGN PROSTATIC HYPERPLASIA WITH LOWER URINARY TRACT SYMPMS: ICD-10-CM

## 2025-08-06 DIAGNOSIS — Z86.79 PERSONAL HISTORY OF OTHER DISEASES OF THE CIRCULATORY SYSTEM: ICD-10-CM

## 2025-08-06 PROCEDURE — G2211 COMPLEX E/M VISIT ADD ON: CPT

## 2025-08-06 PROCEDURE — 99214 OFFICE O/P EST MOD 30 MIN: CPT

## 2025-08-06 RX ORDER — DICLOFENAC SODIUM 10 MG/G
1 GEL TOPICAL
Qty: 1 | Refills: 0 | Status: ACTIVE | COMMUNITY
Start: 2025-08-06 | End: 1900-01-01

## 2025-08-07 ENCOUNTER — APPOINTMENT (OUTPATIENT)
Dept: ENDOCRINOLOGY | Facility: CLINIC | Age: 75
End: 2025-08-07

## 2025-08-12 ENCOUNTER — RX RENEWAL (OUTPATIENT)
Age: 75
End: 2025-08-12

## 2025-08-13 ENCOUNTER — OUTPATIENT (OUTPATIENT)
Dept: OUTPATIENT SERVICES | Facility: HOSPITAL | Age: 75
LOS: 1 days | End: 2025-08-13
Payer: MEDICAID

## 2025-08-13 ENCOUNTER — APPOINTMENT (OUTPATIENT)
Dept: ULTRASOUND IMAGING | Facility: CLINIC | Age: 75
End: 2025-08-13
Payer: MEDICARE

## 2025-08-13 DIAGNOSIS — M75.02 ADHESIVE CAPSULITIS OF LEFT SHOULDER: ICD-10-CM

## 2025-08-13 DIAGNOSIS — M25.511 PAIN IN RIGHT SHOULDER: ICD-10-CM

## 2025-08-13 DIAGNOSIS — Z98.49 CATARACT EXTRACTION STATUS, UNSPECIFIED EYE: Chronic | ICD-10-CM

## 2025-08-13 PROCEDURE — 76881 US COMPL JOINT R-T W/IMG: CPT | Mod: 26,RT

## 2025-08-13 PROCEDURE — 76881 US COMPL JOINT R-T W/IMG: CPT

## 2025-08-19 ENCOUNTER — TRANSCRIPTION ENCOUNTER (OUTPATIENT)
Age: 75
End: 2025-08-19

## 2025-08-20 ENCOUNTER — TRANSCRIPTION ENCOUNTER (OUTPATIENT)
Age: 75
End: 2025-08-20

## 2025-08-28 ENCOUNTER — TRANSCRIPTION ENCOUNTER (OUTPATIENT)
Age: 75
End: 2025-08-28

## 2025-08-28 ENCOUNTER — RX RENEWAL (OUTPATIENT)
Age: 75
End: 2025-08-28

## 2025-09-09 ENCOUNTER — APPOINTMENT (OUTPATIENT)
Dept: OTOLARYNGOLOGY | Facility: CLINIC | Age: 75
End: 2025-09-09
Payer: MEDICARE

## 2025-09-09 VITALS
SYSTOLIC BLOOD PRESSURE: 84 MMHG | HEART RATE: 87 BPM | WEIGHT: 104 LBS | OXYGEN SATURATION: 97 % | DIASTOLIC BLOOD PRESSURE: 57 MMHG | HEIGHT: 64 IN | BODY MASS INDEX: 17.75 KG/M2

## 2025-09-09 DIAGNOSIS — R13.10 DYSPHAGIA, UNSPECIFIED: ICD-10-CM

## 2025-09-09 DIAGNOSIS — K13.70 UNSPECIFIED LESIONS OF ORAL MUCOSA: ICD-10-CM

## 2025-09-09 DIAGNOSIS — Z85.819 PERSONAL HISTORY OF MALIGNANT NEOPLASM OF UNSPECIFIED SITE OF LIP, ORAL CAVITY, AND PHARYNX: ICD-10-CM

## 2025-09-09 PROCEDURE — 31575 DIAGNOSTIC LARYNGOSCOPY: CPT

## 2025-09-09 PROCEDURE — 99214 OFFICE O/P EST MOD 30 MIN: CPT | Mod: 25

## 2025-09-09 PROCEDURE — 92612 ENDOSCOPY SWALLOW (FEES) VID: CPT | Mod: GN

## 2025-09-11 ENCOUNTER — APPOINTMENT (OUTPATIENT)
Age: 75
End: 2025-09-11
Payer: SELF-PAY

## 2025-09-11 PROCEDURE — D0171NC: CUSTOM | Mod: NC

## 2025-09-11 PROCEDURE — 99024 POSTOP FOLLOW-UP VISIT: CPT

## 2025-09-17 ENCOUNTER — NON-APPOINTMENT (OUTPATIENT)
Age: 75
End: 2025-09-17

## 2025-09-17 ENCOUNTER — APPOINTMENT (OUTPATIENT)
Dept: OPHTHALMOLOGY | Facility: CLINIC | Age: 75
End: 2025-09-17
Payer: MEDICARE

## 2025-09-17 PROCEDURE — 92134 CPTRZ OPH DX IMG PST SGM RTA: CPT

## 2025-09-17 PROCEDURE — 92014 COMPRE OPH EXAM EST PT 1/>: CPT | Mod: 25

## 2025-09-18 ENCOUNTER — APPOINTMENT (OUTPATIENT)
Dept: RHEUMATOLOGY | Facility: CLINIC | Age: 75
End: 2025-09-18